# Patient Record
Sex: FEMALE | Race: BLACK OR AFRICAN AMERICAN | NOT HISPANIC OR LATINO | Employment: FULL TIME | URBAN - METROPOLITAN AREA
[De-identification: names, ages, dates, MRNs, and addresses within clinical notes are randomized per-mention and may not be internally consistent; named-entity substitution may affect disease eponyms.]

---

## 2022-04-04 ENCOUNTER — APPOINTMENT (OUTPATIENT)
Dept: RADIOLOGY | Facility: MEDICAL CENTER | Age: 22
End: 2022-04-04

## 2022-04-04 ENCOUNTER — OCCMED (OUTPATIENT)
Dept: URGENT CARE | Facility: MEDICAL CENTER | Age: 22
End: 2022-04-04
Payer: OTHER MISCELLANEOUS

## 2022-04-04 DIAGNOSIS — S43.52XA ACROMIOCLAVICULAR SPRAIN, LEFT, INITIAL ENCOUNTER: Primary | ICD-10-CM

## 2022-04-04 DIAGNOSIS — S43.52XA ACROMIOCLAVICULAR SPRAIN, LEFT, INITIAL ENCOUNTER: ICD-10-CM

## 2022-04-04 PROCEDURE — G0382 LEV 3 HOSP TYPE B ED VISIT: HCPCS | Performed by: PHYSICIAN ASSISTANT

## 2022-04-04 PROCEDURE — 73030 X-RAY EXAM OF SHOULDER: CPT

## 2022-04-04 PROCEDURE — 99283 EMERGENCY DEPT VISIT LOW MDM: CPT | Performed by: PHYSICIAN ASSISTANT

## 2022-04-10 ENCOUNTER — APPOINTMENT (OUTPATIENT)
Dept: URGENT CARE | Facility: MEDICAL CENTER | Age: 22
End: 2022-04-10
Payer: OTHER MISCELLANEOUS

## 2022-04-10 PROCEDURE — 99213 OFFICE O/P EST LOW 20 MIN: CPT | Performed by: PHYSICIAN ASSISTANT

## 2022-05-18 ENCOUNTER — APPOINTMENT (EMERGENCY)
Dept: RADIOLOGY | Facility: HOSPITAL | Age: 22
End: 2022-05-18

## 2022-05-18 ENCOUNTER — HOSPITAL ENCOUNTER (EMERGENCY)
Facility: HOSPITAL | Age: 22
Discharge: HOME | End: 2022-05-19
Attending: EMERGENCY MEDICINE | Admitting: EMERGENCY MEDICINE
Payer: COMMERCIAL

## 2022-05-18 DIAGNOSIS — M79.674 GREAT TOE PAIN, RIGHT: Primary | ICD-10-CM

## 2022-05-18 PROCEDURE — 73660 X-RAY EXAM OF TOE(S): CPT

## 2022-05-18 PROCEDURE — 99284 EMERGENCY DEPT VISIT MOD MDM: CPT | Performed by: EMERGENCY MEDICINE

## 2022-05-18 PROCEDURE — 99283 EMERGENCY DEPT VISIT LOW MDM: CPT

## 2022-05-19 VITALS
HEART RATE: 78 BPM | TEMPERATURE: 99.1 F | OXYGEN SATURATION: 98 % | RESPIRATION RATE: 18 BRPM | SYSTOLIC BLOOD PRESSURE: 110 MMHG | WEIGHT: 240.3 LBS | DIASTOLIC BLOOD PRESSURE: 64 MMHG

## 2022-05-19 RX ADMIN — Medication 2 G: at 00:45

## 2022-05-19 NOTE — ED PROVIDER NOTES
Pt Name: Hattie Cummings  MRN: [id number]  [place] 2000  Age/Sex: 25 y o  female  Date of evaluation: 5/18/2022  PCP: No primary care provider on file  CHIEF COMPLAINT    Chief Complaint   Patient presents with    Toe Pain     R great toe pain, unable to move it, ongoing for a few days  HPI    25 y o  female presenting with several to right great toe pain  Patient states that she underwent tactical training a few weeks ago and thinks that she may have injured her toe  Since then, she has had pain to the toe, worse with moving the toe, particularly with dorsiflexion  Over the past few days, she has noted worsening pain, particular walking  The pain is now dull, moderate to severe in intensity, worst over the 1st MTP, radiating throughout the foot, better at rest   She denies rash, fever, any other pain or injuries  HPI      Past Medical and Surgical History    History reviewed  No pertinent past medical history  Past Surgical History:   Procedure Laterality Date    NOSE SURGERY         History reviewed  No pertinent family history  Social History     Tobacco Use    Smoking status: Never Smoker    Smokeless tobacco: Never Used   Vaping Use    Vaping Use: Never used   Substance Use Topics    Alcohol use: Yes     Comment: 1 daily    Drug use: Never           Allergies    Allergies   Allergen Reactions    Latex Rash       Home Medications    Prior to Admission medications    Not on File           Review of Systems    Review of Systems   Constitutional: Negative for activity change, chills and fever  HENT: Negative for drooling and facial swelling  Eyes: Negative for pain, discharge and visual disturbance  Respiratory: Negative for apnea, cough, chest tightness, shortness of breath and wheezing  Cardiovascular: Negative for chest pain and leg swelling  Gastrointestinal: Negative for abdominal pain, constipation, diarrhea, nausea and vomiting     Genitourinary: Negative for difficulty urinating, dysuria and urgency  Musculoskeletal: Positive for arthralgias and gait problem  Negative for back pain  Skin: Negative for color change and rash  Neurological: Negative for dizziness, speech difficulty, weakness and headaches  Psychiatric/Behavioral: Negative for agitation, behavioral problems and confusion  All other systems reviewed and negative  Physical Exam      ED Triage Vitals [05/18/22 2302]   Temperature Pulse Respirations Blood Pressure SpO2   99 1 °F (37 3 °C) (!) 108 18 162/93 100 %      Temp Source Heart Rate Source Patient Position - Orthostatic VS BP Location FiO2 (%)   Tympanic Monitor Sitting Left arm --      Pain Score       --               Physical Exam  Vitals and nursing note reviewed  Constitutional:       General: She is not in acute distress  Appearance: She is well-developed  She is not ill-appearing, toxic-appearing or diaphoretic  HENT:      Head: Normocephalic and atraumatic  Right Ear: External ear normal       Left Ear: External ear normal    Eyes:      Conjunctiva/sclera: Conjunctivae normal       Pupils: Pupils are equal, round, and reactive to light  Cardiovascular:      Rate and Rhythm: Normal rate and regular rhythm  Heart sounds: Normal heart sounds  Pulmonary:      Effort: Pulmonary effort is normal  No respiratory distress  Breath sounds: No stridor  Abdominal:      General: There is no distension  Musculoskeletal:         General: Tenderness present  No deformity  Normal range of motion  Cervical back: Normal range of motion and neck supple  Comments: Patient tender to palpation the medial aspect of the 1st MTP of the right foot, small bunion noted there  No skin changes, no swelling, no pain out of proportion  Strength sensation pulse and cap refill intact  Pain reproduced maximally with dorsiflexion of great toe  Skin:     General: Skin is warm and dry        Findings: No erythema or rash    Neurological:      Mental Status: She is alert and oriented to person, place, and time  Psychiatric:         Behavior: Behavior normal          Thought Content: Thought content normal          Judgment: Judgment normal               Diagnostic Results      Labs:    Results Reviewed     None          All labs reviewed and utilized in the medical decision making process    Radiology:    XR toe great min 2 views RIGHT   ED Interpretation   No acute fracture or dislocation  All radiology studies independently viewed by me and interpreted by the radiologist     Procedure    Procedures        ED Course of Care and Re-Assessments      Given Voltaren gel with improvement of pain  Medications - No data to display        FINAL IMPRESSION    Final diagnoses:   Great toe pain, right         DISPOSITION/PLAN    Presentation as above with right great toe pain  Vital signs reassuring, examination likewise reassuring  Very low suspicion for unstable fracture dislocation, septic arthritis, necrotizing soft tissue infection, critical neurovascular disruption, and had a threat to life or limb  Suspect injury of flexor hallucis longus tendon verses possible chronic pressure injury from ill-fitting boots coupled with bunion  Counseled regarding lifestyle modifications and mitigation  Patient counseled regarding differential diagnosis, referred to Podiatry, discharged strict return precautions  Time reflects when diagnosis was documented in both MDM as applicable and the Disposition within this note     Time User Action Codes Description Comment    5/19/2022 12:12 AM Myrna Jaquez Add [W95 100] Great toe pain, right       ED Disposition     ED Disposition   Discharge    Condition   Stable    Date/Time   u May 19, 2022 12:12 AM    Comment   Vivi Drake discharge to home/self care                 Follow-up Information     Follow up With Specialties Details Why Contact Info Additional Information [address] [facility] Emergency Medicine Go to  If symptoms worsen [address] [facility] [address]  [address] [facility], [address], [place],   [address] Call in 1 day To schedule close outpatient follow-up for your foot pain  [address]  [facility] [address]  [phone]               PATIENT REFERRED TO:    [address] [facility]  [address] [facility] [address] [phone]  Go to   If symptoms worsen    [facility]  [address]  [facility] [address]  [phone]    Call in 1 day  To schedule close outpatient follow-up for your foot pain      DISCHARGE MEDICATIONS:    There are no discharge medications for this patient  No discharge procedures on file  Sanjuana Junior MD    Portions of the record may have been created with voice recognition software  Occasional wrong word or "sound alike" substitutions may have occurred due to the inherent limitations of voice recognition software    Please read the chart carefully and recognize, using context, where substitutions have occurred     Sanjuana Junior MD  05/19/22 3658

## 2022-06-13 ENCOUNTER — HOSPITAL ENCOUNTER (EMERGENCY)
Facility: HOSPITAL | Age: 22
Discharge: HOME | End: 2022-06-13
Attending: EMERGENCY MEDICINE
Payer: COMMERCIAL

## 2022-06-13 VITALS
OXYGEN SATURATION: 96 % | WEIGHT: 220 LBS | RESPIRATION RATE: 18 BRPM | TEMPERATURE: 99.5 F | BODY MASS INDEX: 35.36 KG/M2 | HEIGHT: 66 IN | SYSTOLIC BLOOD PRESSURE: 128 MMHG | DIASTOLIC BLOOD PRESSURE: 58 MMHG | HEART RATE: 89 BPM

## 2022-06-13 DIAGNOSIS — R11.0 NAUSEA: Primary | ICD-10-CM

## 2022-06-13 LAB — B-HCG SERPL-ACNC: <2 MIU/ML

## 2022-06-13 PROCEDURE — 99282 EMERGENCY DEPT VISIT SF MDM: CPT | Performed by: EMERGENCY MEDICINE

## 2022-06-13 PROCEDURE — 36415 COLL VENOUS BLD VENIPUNCTURE: CPT | Performed by: EMERGENCY MEDICINE

## 2022-06-13 PROCEDURE — 99283 EMERGENCY DEPT VISIT LOW MDM: CPT

## 2022-06-13 PROCEDURE — 84702 CHORIONIC GONADOTROPIN TEST: CPT | Performed by: EMERGENCY MEDICINE

## 2022-06-13 NOTE — ED PROVIDER NOTES
History  Chief Complaint   Patient presents with    Medical Problem     Pt requesting blood pregnancy test s/p 2 negatives and 2 positives at home     This young lady presents to emergency department for pregnancy test due to the fact that patient has some questionable positive test at home she is currently late for her menses and she has been intermittently nauseous  She went to Urgent Care they gave her some Zofran to treat the nausea  She is  0 para 0  She has had some slight spotting the last few days  She has no underlying past medical history, she is a nonsmoker she drinks alcohol only occasionally  Surgical history only pertinent for nasal surgery  Currently no pain  History provided by:  Patient   used: No    Medical Problem  Associated symptoms: nausea    Associated symptoms: no abdominal pain, no chest pain, no cough, no ear pain, no fever, no rash, no shortness of breath, no sore throat and no vomiting        Cannot display prior to admission medications because the patient has not been admitted in this contact  History reviewed  No pertinent past medical history  Past Surgical History:   Procedure Laterality Date    NOSE SURGERY         History reviewed  No pertinent family history  I have reviewed and agree with the history as documented  E-Cigarette/Vaping    E-Cigarette Use Never User      E-Cigarette/Vaping Substances     Social History     Tobacco Use    Smoking status: Never Smoker    Smokeless tobacco: Never Used   Vaping Use    Vaping Use: Never used   Substance Use Topics    Alcohol use: Yes     Comment: 1 daily    Drug use: Never       Review of Systems   Constitutional: Negative for chills and fever  HENT: Negative for ear pain and sore throat  Eyes: Negative for pain and visual disturbance  Respiratory: Negative for cough and shortness of breath  Cardiovascular: Negative for chest pain and palpitations  Gastrointestinal: Positive for nausea  Negative for abdominal pain and vomiting  Genitourinary: Negative for dysuria and hematuria  Musculoskeletal: Negative for arthralgias and back pain  Skin: Negative for color change and rash  Neurological: Negative for seizures and syncope  All other systems reviewed and are negative  Physical Exam  Physical Exam  Vitals and nursing note reviewed  Constitutional:       General: She is not in acute distress  Appearance: Normal appearance  She is well-developed  HENT:      Head: Normocephalic and atraumatic  Mouth/Throat:      Mouth: Mucous membranes are moist    Eyes:      Conjunctiva/sclera: Conjunctivae normal       Pupils: Pupils are equal, round, and reactive to light  Cardiovascular:      Rate and Rhythm: Normal rate and regular rhythm  Heart sounds: No murmur heard  Pulmonary:      Effort: Pulmonary effort is normal  No respiratory distress  Breath sounds: Normal breath sounds  Abdominal:      Palpations: Abdomen is soft  Tenderness: There is no abdominal tenderness  Musculoskeletal:      Cervical back: Neck supple  Skin:     General: Skin is warm and dry  Capillary Refill: Capillary refill takes less than 2 seconds  Neurological:      General: No focal deficit present  Mental Status: She is alert and oriented to person, place, and time     Psychiatric:         Mood and Affect: Mood normal          Behavior: Behavior normal          Vital Signs  ED Triage Vitals [06/13/22 1811]   Temperature Pulse Respirations Blood Pressure SpO2   99 5 °F (37 5 °C) 89 18 128/58 96 %      Temp Source Heart Rate Source Patient Position - Orthostatic VS BP Location FiO2 (%)   Oral Monitor Sitting Left arm --      Pain Score       --           Vitals:    06/13/22 1811   BP: 128/58   Pulse: 89   Patient Position - Orthostatic VS: Sitting         Visual Acuity      ED Medications  Medications - No data to display    Diagnostic Studies  Results Reviewed     Procedure Component Value Units Date/Time    hCG, quantitative [[id number]]  (Normal) Collected: 06/13/22 1819    Lab Status: Final result Specimen: Blood from Arm, Left Updated: 06/13/22 1847     HCG, Quant <2 mIU/mL     Narrative:       Expected Ranges:     Approximate               Approximate HCG  Gestation age          Concentration ( mIU/mL)  _____________          ______________________   [facility]                      HCG values  0 2-1                       5-50  1-2                           2-3                         100-5000  3-4                         500-39478  4-5                         1000-87253  5-6                         [id number]  6-8                         [id number]  8-12                        [id number]                   No orders to display              Procedures  Procedures         ED Course                                             MDM  Number of Diagnoses or Management Options  Diagnosis management comments: Neg quant hcg      Disposition  Final diagnoses:   Nausea     Time reflects when diagnosis was documented in both MDM as applicable and the Disposition within this note     Time User Action Codes Description Comment    6/13/2022  6:58 PM Carmen Robles Add [R11 0] Nausea       ED Disposition     ED Disposition   Discharge    Condition   Stable    Date/Time   Mon Jun 13, 2022  6:58 PM    Comment   Glenn Mendoza discharge to home/self care  Follow-up Information    None         Patient's Medications    No medications on file       No discharge procedures on file      PDMP Review     None          ED Provider  Electronically Signed by           Amanda Claude, MD  06/13/22 6660

## 2022-06-17 ENCOUNTER — OFFICE VISIT (OUTPATIENT)
Dept: OBGYN CLINIC | Facility: CLINIC | Age: 22
End: 2022-06-17

## 2022-06-17 VITALS
BODY MASS INDEX: 39.54 KG/M2 | SYSTOLIC BLOOD PRESSURE: 114 MMHG | WEIGHT: 245 LBS | HEART RATE: 86 BPM | DIASTOLIC BLOOD PRESSURE: 71 MMHG

## 2022-06-17 DIAGNOSIS — N92.6 IRREGULAR MENSES: Primary | ICD-10-CM

## 2022-06-17 DIAGNOSIS — N89.8 VAGINA ITCHING: ICD-10-CM

## 2022-06-17 DIAGNOSIS — R32 URINARY INCONTINENCE, UNSPECIFIED TYPE: ICD-10-CM

## 2022-06-17 DIAGNOSIS — B37.3 VAGINAL CANDIDIASIS: ICD-10-CM

## 2022-06-17 DIAGNOSIS — R10.2 PELVIC PAIN: ICD-10-CM

## 2022-06-17 LAB
BV WHIFF TEST VAG QL: ABNORMAL
CLUE CELLS SPEC QL WET PREP: ABNORMAL
PH SMN: 4.5 [PH]
SL AMB  POCT GLUCOSE, UA: NORMAL
SL AMB LEUKOCYTE ESTERASE,UA: NORMAL
SL AMB POCT BILIRUBIN,UA: NORMAL
SL AMB POCT BLOOD,UA: NORMAL
SL AMB POCT CLARITY,UA: CLEAR
SL AMB POCT COLOR,UA: YELLOW
SL AMB POCT KETONES,UA: NORMAL
SL AMB POCT NITRITE,UA: NORMAL
SL AMB POCT PH,UA: 5
SL AMB POCT SPECIFIC GRAVITY,UA: 1.03
SL AMB POCT URINE HCG: NEGATIVE
SL AMB POCT URINE PROTEIN: NORMAL
SL AMB POCT UROBILINOGEN: 0.2
SL AMB POCT WET MOUNT: ABNORMAL
T VAGINALIS VAG QL WET PREP: ABNORMAL
YEAST VAG QL WET PREP: ABNORMAL

## 2022-06-17 PROCEDURE — 99203 OFFICE O/P NEW LOW 30 MIN: CPT | Performed by: NURSE PRACTITIONER

## 2022-06-17 PROCEDURE — 87210 SMEAR WET MOUNT SALINE/INK: CPT | Performed by: NURSE PRACTITIONER

## 2022-06-17 PROCEDURE — 81002 URINALYSIS NONAUTO W/O SCOPE: CPT | Performed by: NURSE PRACTITIONER

## 2022-06-17 PROCEDURE — 81025 URINE PREGNANCY TEST: CPT | Performed by: NURSE PRACTITIONER

## 2022-06-17 RX ORDER — FLUCONAZOLE 150 MG/1
150 TABLET ORAL ONCE
Qty: 1 TABLET | Refills: 0 | Status: SHIPPED | OUTPATIENT
Start: 2022-06-17 | End: 2022-06-17

## 2022-06-17 NOTE — PATIENT INSTRUCTIONS
Thank you for your confidence in our team    We appreciate you and welcome your feedback  If you receive a survey from us, please take a few moments to let us know how we are doing     Sincerely,  VALENTIN Reece

## 2022-06-17 NOTE — PROGRESS NOTES
PROBLEM GYNECOLOGICAL VISIT    Kelley Clemente is a 25 y o  female who presents today with complaint of irregular menses  Her general medical history has been reviewed and she reports it as follows:    Past Medical History:   Diagnosis Date    Migraine without aura     OTC med management    PCOS (polycystic ovarian syndrome)      Past Surgical History:   Procedure Laterality Date    TURBINATE RESECTION       OB History        0    Para   0    Term   0       0    AB   0    Living   0       SAB   0    IAB   0    Ectopic   0    Multiple   0    Live Births   0               Social History     Tobacco Use    Smoking status: Never Smoker    Smokeless tobacco: Never Used   Vaping Use    Vaping Use: Some days   Substance Use Topics    Alcohol use: Yes     Alcohol/week: 7 0 standard drinks     Types: 7 Glasses of wine per week     Comment: 1 daily    Drug use: Never     Social History     Substance and Sexual Activity   Sexual Activity Yes    Partners: Male    Birth control/protection: None       No current outpatient medications    History of Present Illness:   Reports long-standing history of irregular menses with subsequent diagnosis of PCOS  She reports that she checked home pregnancy test x2 on 22 and both were positive  She presented to Urgent Care and ER on 22 with c/o nausea and serum HCG was negative  She reports nausea and abdominal bloating and cramping persist   She reports dyspareunia with L sided pelvic pain for past 5 months  She desires to become pregnant, but is worried that she is not ovulating as her past 2 menses have been different than her typical (with brown blood instead of red)  She additionally reports vaginal itching and discharge and urinary leaking for past 2 weeks  Denies vaginal discharge or odor  Review of Systems:  Review of Systems   Constitutional: Negative      Genitourinary: Positive for dyspareunia, menstrual problem, pelvic pain and vaginal pain  Negative for vaginal discharge  Physical Exam:  /71   Pulse 86   Wt 111 kg (245 lb)   LMP 05/09/2022   BMI 39 54 kg/m²   Physical Exam  Constitutional:       General: She is not in acute distress  Genitourinary:      Vulva exam comments: normal       Vaginal discharge and erythema present  Right Adnexa: not tender  Left Adnexa: tender  Cervical discharge present  No cervical lesion  Uterus is not tender  Abdominal:      Palpations: Abdomen is soft  Tenderness: There is abdominal tenderness  Neurological:      Mental Status: She is alert  Skin:     General: Skin is warm and dry  Vitals reviewed  Point of Care Testing:   -Wet mount: no clue cells, no trichomonads, few WBC's, pH=4 5   -KOH mount: + hyphae   -Whiff: negative   -urine pregnancy test: negative   -urinalysis: neg nitrites, neg leuks, neg blood    Assessment:   1  Vaginal candidiasis  2  Urinary incontinence  3  Pelvic pain  Plan:   1  Given Rx Diflucan  2  Imaging ordered: pelvic ultrasound  3  Return to office 1 week after ultrasound to review results  Will address urinary incontinence and irregular menses/desire for fertility with patient at that time

## 2022-06-24 ENCOUNTER — HOSPITAL ENCOUNTER (OUTPATIENT)
Dept: ULTRASOUND IMAGING | Facility: CLINIC | Age: 22
Discharge: HOME | End: 2022-06-24
Payer: COMMERCIAL

## 2022-06-24 DIAGNOSIS — R10.2 PELVIC PAIN: ICD-10-CM

## 2022-06-24 PROCEDURE — 76830 TRANSVAGINAL US NON-OB: CPT

## 2022-06-24 PROCEDURE — 76856 US EXAM PELVIC COMPLETE: CPT

## 2022-07-07 ENCOUNTER — OFFICE VISIT (OUTPATIENT)
Dept: OBGYN CLINIC | Facility: CLINIC | Age: 22
End: 2022-07-07

## 2022-07-07 VITALS
HEIGHT: 66 IN | DIASTOLIC BLOOD PRESSURE: 75 MMHG | WEIGHT: 247.6 LBS | SYSTOLIC BLOOD PRESSURE: 118 MMHG | BODY MASS INDEX: 39.79 KG/M2 | HEART RATE: 79 BPM

## 2022-07-07 DIAGNOSIS — Z71.2 ENCOUNTER TO DISCUSS TEST RESULTS: Primary | ICD-10-CM

## 2022-07-07 PROCEDURE — 99212 OFFICE O/P EST SF 10 MIN: CPT | Performed by: NURSE PRACTITIONER

## 2022-07-07 NOTE — PROGRESS NOTES
Miya Traore presents today to review results of pelvic ultrasound performed 6/24/22  Explained that ultrasound is completely normal   Patient reports that she did have menses since seeing me last with LMP 6/23/22  She reports that since then she has not been having any further nausea, abdominal bloating or cramping  She also reports that urinary leaking and vaginal discharge/itching has resolved  She is still very concerned about her irregular menses and PCOS as she has been trying to conceive for the past 6 months without success  She is worried that she is not ovulating  I will have patient return to have discussion with senior resident OBGYN next available

## 2022-07-07 NOTE — PATIENT INSTRUCTIONS
Thank you for your confidence in our team    We appreciate you and welcome your feedback  If you receive a survey from us, please take a few moments to let us know how we are doing     Sincerely,  VALENTIN Mills

## 2022-07-19 ENCOUNTER — TELEPHONE (OUTPATIENT)
Dept: OBGYN CLINIC | Facility: CLINIC | Age: 22
End: 2022-07-19

## 2023-01-23 ENCOUNTER — OFFICE VISIT (OUTPATIENT)
Dept: FAMILY MEDICINE CLINIC | Facility: CLINIC | Age: 23
End: 2023-01-23

## 2023-01-23 ENCOUNTER — APPOINTMENT (OUTPATIENT)
Dept: LAB | Facility: HOSPITAL | Age: 23
End: 2023-01-23

## 2023-01-23 VITALS
TEMPERATURE: 98.6 F | HEIGHT: 66 IN | HEART RATE: 98 BPM | DIASTOLIC BLOOD PRESSURE: 84 MMHG | BODY MASS INDEX: 42.91 KG/M2 | WEIGHT: 267 LBS | OXYGEN SATURATION: 95 % | SYSTOLIC BLOOD PRESSURE: 120 MMHG

## 2023-01-23 DIAGNOSIS — R21 RASH: ICD-10-CM

## 2023-01-23 DIAGNOSIS — M79.18 MYALGIA, MULTIPLE SITES: ICD-10-CM

## 2023-01-23 DIAGNOSIS — N92.6 MISSED PERIOD: ICD-10-CM

## 2023-01-23 DIAGNOSIS — F51.01 PRIMARY INSOMNIA: ICD-10-CM

## 2023-01-23 DIAGNOSIS — M54.41 CHRONIC MIDLINE LOW BACK PAIN WITH BILATERAL SCIATICA: ICD-10-CM

## 2023-01-23 DIAGNOSIS — E55.9 VITAMIN D DEFICIENCY: ICD-10-CM

## 2023-01-23 DIAGNOSIS — G89.29 CHRONIC MIDLINE LOW BACK PAIN WITH BILATERAL SCIATICA: ICD-10-CM

## 2023-01-23 DIAGNOSIS — M25.559 HIP PAIN: ICD-10-CM

## 2023-01-23 DIAGNOSIS — E66.01 CLASS 3 SEVERE OBESITY WITHOUT SERIOUS COMORBIDITY WITH BODY MASS INDEX (BMI) OF 40.0 TO 44.9 IN ADULT, UNSPECIFIED OBESITY TYPE (HCC): ICD-10-CM

## 2023-01-23 DIAGNOSIS — T73.3XXD FATIGUE DUE TO EXCESSIVE EXERTION, SUBSEQUENT ENCOUNTER: ICD-10-CM

## 2023-01-23 DIAGNOSIS — E28.2 PCOS (POLYCYSTIC OVARIAN SYNDROME): ICD-10-CM

## 2023-01-23 DIAGNOSIS — M54.42 CHRONIC MIDLINE LOW BACK PAIN WITH BILATERAL SCIATICA: ICD-10-CM

## 2023-01-23 DIAGNOSIS — R63.5 WEIGHT GAIN: ICD-10-CM

## 2023-01-23 DIAGNOSIS — E28.2 PCOS (POLYCYSTIC OVARIAN SYNDROME): Primary | ICD-10-CM

## 2023-01-23 LAB
25(OH)D3 SERPL-MCNC: 15.6 NG/ML (ref 30–100)
ALBUMIN SERPL BCP-MCNC: 4 G/DL (ref 3.5–5)
ALP SERPL-CCNC: 70 U/L (ref 46–116)
ALT SERPL W P-5'-P-CCNC: 32 U/L (ref 12–78)
ANION GAP SERPL CALCULATED.3IONS-SCNC: 9 MMOL/L (ref 4–13)
AST SERPL W P-5'-P-CCNC: 18 U/L (ref 5–45)
BASOPHILS # BLD AUTO: 0.02 THOUSANDS/ÂΜL (ref 0–0.1)
BASOPHILS NFR BLD AUTO: 0 % (ref 0–1)
BILIRUB SERPL-MCNC: 0.36 MG/DL (ref 0.2–1)
BILIRUB UR QL STRIP: NEGATIVE
BUN SERPL-MCNC: 10 MG/DL (ref 5–25)
CALCIUM SERPL-MCNC: 9.1 MG/DL (ref 8.3–10.1)
CHLORIDE SERPL-SCNC: 103 MMOL/L (ref 96–108)
CHOLEST SERPL-MCNC: 167 MG/DL
CLARITY UR: CLEAR
CO2 SERPL-SCNC: 28 MMOL/L (ref 21–32)
COLOR UR: NORMAL
CREAT SERPL-MCNC: 0.73 MG/DL (ref 0.6–1.3)
EOSINOPHIL # BLD AUTO: 0.11 THOUSAND/ÂΜL (ref 0–0.61)
EOSINOPHIL NFR BLD AUTO: 2 % (ref 0–6)
ERYTHROCYTE [DISTWIDTH] IN BLOOD BY AUTOMATED COUNT: 12.4 % (ref 11.6–15.1)
EST. AVERAGE GLUCOSE BLD GHB EST-MCNC: 88 MG/DL
FERRITIN SERPL-MCNC: 58 NG/ML (ref 8–388)
FSH SERPL-ACNC: 6 MIU/ML
GFR SERPL CREATININE-BSD FRML MDRD: 118 ML/MIN/1.73SQ M
GLUCOSE P FAST SERPL-MCNC: 87 MG/DL (ref 65–99)
GLUCOSE UR STRIP-MCNC: NEGATIVE MG/DL
HBA1C MFR BLD: 4.7 %
HCT VFR BLD AUTO: 40.4 % (ref 34.8–46.1)
HDLC SERPL-MCNC: 54 MG/DL
HGB BLD-MCNC: 12.6 G/DL (ref 11.5–15.4)
HGB UR QL STRIP.AUTO: NEGATIVE
IMM GRANULOCYTES # BLD AUTO: 0.02 THOUSAND/UL (ref 0–0.2)
IMM GRANULOCYTES NFR BLD AUTO: 0 % (ref 0–2)
INSULIN SERPL-ACNC: 20.4 MU/L (ref 3–25)
IRON SATN MFR SERPL: 25 % (ref 15–50)
IRON SERPL-MCNC: 85 UG/DL (ref 50–170)
KETONES UR STRIP-MCNC: NEGATIVE MG/DL
LDLC SERPL CALC-MCNC: 93 MG/DL (ref 0–100)
LEUKOCYTE ESTERASE UR QL STRIP: NEGATIVE
LH SERPL-ACNC: 9.4 MIU/ML
LYMPHOCYTES # BLD AUTO: 2.07 THOUSANDS/ÂΜL (ref 0.6–4.47)
LYMPHOCYTES NFR BLD AUTO: 39 % (ref 14–44)
MCH RBC QN AUTO: 27.5 PG (ref 26.8–34.3)
MCHC RBC AUTO-ENTMCNC: 31.2 G/DL (ref 31.4–37.4)
MCV RBC AUTO: 88 FL (ref 82–98)
MONOCYTES # BLD AUTO: 0.39 THOUSAND/ÂΜL (ref 0.17–1.22)
MONOCYTES NFR BLD AUTO: 7 % (ref 4–12)
NEUTROPHILS # BLD AUTO: 2.68 THOUSANDS/ÂΜL (ref 1.85–7.62)
NEUTS SEG NFR BLD AUTO: 52 % (ref 43–75)
NITRITE UR QL STRIP: NEGATIVE
NONHDLC SERPL-MCNC: 113 MG/DL
NRBC BLD AUTO-RTO: 0 /100 WBCS
PH UR STRIP.AUTO: 7 [PH]
PLATELET # BLD AUTO: 287 THOUSANDS/UL (ref 149–390)
PMV BLD AUTO: 9.3 FL (ref 8.9–12.7)
POTASSIUM SERPL-SCNC: 4 MMOL/L (ref 3.5–5.3)
PROLACTIN SERPL-MCNC: 7.9 NG/ML
PROT SERPL-MCNC: 7.7 G/DL (ref 6.4–8.4)
PROT UR STRIP-MCNC: NEGATIVE MG/DL
RBC # BLD AUTO: 4.58 MILLION/UL (ref 3.81–5.12)
SL AMB  POCT GLUCOSE, UA: NORMAL
SL AMB LEUKOCYTE ESTERASE,UA: NORMAL
SL AMB POCT BILIRUBIN,UA: NORMAL
SL AMB POCT BLOOD,UA: NEGATIVE
SL AMB POCT CLARITY,UA: CLEAR
SL AMB POCT COLOR,UA: YELLOW
SL AMB POCT KETONES,UA: NORMAL
SL AMB POCT NITRITE,UA: NORMAL
SL AMB POCT PH,UA: 7.5
SL AMB POCT SPECIFIC GRAVITY,UA: 1.01
SL AMB POCT URINE HCG: NEGATIVE
SL AMB POCT URINE PROTEIN: 15
SL AMB POCT UROBILINOGEN: 0.2
SODIUM SERPL-SCNC: 140 MMOL/L (ref 135–147)
SP GR UR STRIP.AUTO: 1.02 (ref 1–1.03)
TESTOST SERPL-MCNC: 49 NG/DL
TIBC SERPL-MCNC: 346 UG/DL (ref 250–450)
TRIGL SERPL-MCNC: 98 MG/DL
TSH SERPL DL<=0.05 MIU/L-ACNC: 2.15 UIU/ML (ref 0.45–4.5)
UROBILINOGEN UR STRIP-ACNC: <2 MG/DL
WBC # BLD AUTO: 5.29 THOUSAND/UL (ref 4.31–10.16)

## 2023-01-23 RX ORDER — MELATONIN
1000 DAILY
COMMUNITY
End: 2023-01-24

## 2023-01-23 RX ORDER — FERROUS SULFATE 325(65) MG
325 TABLET ORAL
COMMUNITY

## 2023-01-23 NOTE — PROGRESS NOTES
BMI Counseling: Body mass index is 43 09 kg/m²  The BMI is above normal  Nutrition recommendations include decreasing portion sizes, encouraging healthy choices of fruits and vegetables, decreasing fast food intake, consuming healthier snacks, limiting drinks that contain sugar, moderation in carbohydrate intake, increasing intake of lean protein, reducing intake of saturated and trans fat and reducing intake of cholesterol  Exercise recommendations include vigorous physical activity 75 minutes/week, exercising 3-5 times per week and strength training exercises  Patient referred to PCP  Rationale for BMI follow-up plan is due to patient being overweight or obese  Depression Screening and Follow-up Plan: Patient was screened for depression during today's encounter  They screened negative with a PHQ-2 score of 0  Assessment/Plan:    Patient is a 81-year-old female presents in office for establishing care  Underlying medical history of obesity current BMI is 43 09  PCOS used to be on birth control pill in the past she has stopped taking them and has gained some weight since all prior to that  She has missed her period now for 2 months in a row was checked for pregnancy in the office and it was negative we will do some hormonal work-up  Excessive fatigue and problems sleeping she has multiple joint issues primarily bilateral hips and lower back  She has had recurrent rashes on her face currently none and vitamin D deficiency in the past   Also history of iron anemia and vitamin D deficiency discussed that currently she is taking her supplements just for the last couple weeks  She is recently  she is a  and [facility] she is interested on getting some work-up done and possibly discussing some weight loss modalities     At this point she is to avoid carbs hydrate well increase protein intake increase activity  Encouraged that she has a daily exercise routine at least 20 minutes a day  Discussed adding my fitness pal on her phone and working on keeping track of caloric intake and fats and sodium  Once I get the results of her blood work I will call her and she is to see me back in 2 to 4 weeks to get evaluated and discuss further weight management modalities considering all labs are normal and there is no underlying clinical issues  We have discussed PCOS treatments in the past she has not tried metformin states her mom is a nurse and does not agree with the metformin so she has not been taking it  She has had issues in the armpits and skin boils and infections currently she does not have any but that is has been an issue in the past    Noted for some mild thickening of her skin around her neck and the darkening of the complexion  We will check for cholesterol panel and hyperlipidemia  Diabetes  I have ordered an EKG and blood work to be done and a sleep study       Questions answered and I will see her back in 4 weeks     Problem List Items Addressed This Visit    None  Visit Diagnoses     PCOS (polycystic ovarian syndrome)    -  Primary    Relevant Orders    Testosterone    FSH and LH    Prolactin    Comprehensive metabolic panel (Completed)    TSH, 3rd generation with Free T4 reflex (Completed)    UA (URINE) with reflex to Scope (Completed)    CBC and differential (Completed)    Iron Panel (Includes Ferritin, Iron Sat%, Iron, and TIBC)    Insulin, fasting    HEMOGLOBIN A1C W/ EAG ESTIMATION    Lipid panel (Completed)    ECG 12 lead    Home Study    Missed period        Relevant Orders    POCT urine HCG (Completed)    ECG 12 lead    Primary insomnia        Relevant Orders    Testosterone    FSH and LH    Prolactin    Comprehensive metabolic panel (Completed)    TSH, 3rd generation with Free T4 reflex (Completed)    UA (URINE) with reflex to Scope (Completed)    CBC and differential (Completed)    ECG 12 lead    Home Study    Weight gain        Fatigue due to excessive exertion, subsequent encounter        Relevant Orders    ECG 12 lead    Home Study    Hip pain        b/l lower back     Chronic midline low back pain with bilateral sciatica        Relevant Orders    POCT urine dip (Completed)    Myalgia, multiple sites        Relevant Orders    Testosterone    FSH and LH    Prolactin    Comprehensive metabolic panel (Completed)    TSH, 3rd generation with Free T4 reflex (Completed)    UA (URINE) with reflex to Scope (Completed)    CBC and differential (Completed)    ELINOR SCR, IFA W/REFL TITER/PATTERN/LUPUS PNL 4    RF Screen w/ Reflex to Titer    Lyme Antibody Profile with reflex to WB    Rash        Relevant Orders    ELINOR SCR, IFA W/REFL TITER/PATTERN/LUPUS PNL 4    RF Screen w/ Reflex to Titer    Lyme Antibody Profile with reflex to WB    Vitamin D deficiency        Relevant Orders    Vitamin D 25 hydroxy    Class 3 severe obesity without serious comorbidity with body mass index (BMI) of 40 0 to 44 9 in adult, unspecified obesity type (HCC)                Subjective:      Patient ID: Joseph Prado is a 25 y o  female  Patient is a 71-year-old female presents in office for establishing care  Underlying medical history of obesity current BMI is 43 09  PCOS used to be on birth control pill in the past she has stopped taking them and has gained some weight since all prior to that  She has missed her period now for 2 months in a row was checked for pregnancy in the office and it was negative we will do some hormonal work-up  Excessive fatigue and problems sleeping she has multiple joint issues primarily bilateral hips and lower back  She has had recurrent rashes on her face currently none and vitamin D deficiency in the past   Also history of iron anemia and vitamin D deficiency discussed that currently she is taking her supplements just for the last couple weeks     She is recently  she is a  and [employer] [facility] she is interested on getting some work-up done and possibly discussing some weight loss modalities  At this point she is to avoid carbs hydrate well increase protein intake increase activity  Encouraged that she has a daily exercise routine at least 20 minutes a day  Discussed adding my fitness pal on her phone and working on keeping track of caloric intake and fats and sodium  Once I get the results of her blood work I will call her and she is to see me back in 2 to 4 weeks to get evaluated and discuss further weight management modalities considering all labs are normal and there is no underlying clinical issues  We have discussed PCOS treatments in the past she has not tried metformin states her mom is a nurse and does not agree with the metformin so she has not been taking it  She has had issues in the armpits and skin boils and infections currently she does not have any but that is has been an issue in the past    Noted for some mild thickening of her skin around her neck and the darkening of the complexion  We will check for cholesterol panel and hyperlipidemia  Diabetes  I have ordered an EKG and blood work to be done and a sleep study  Questions answered and I will see her back in 4 weeks        The following portions of the patient's history were reviewed and updated as appropriate:   Past Medical History:  She has a past medical history of Migraine without aura and PCOS (polycystic ovarian syndrome)  ,  _______________________________________________________________________  Medical Problems:  does not have a problem list on file ,  _______________________________________________________________________  Past Surgical History:   has a past surgical history that includes Turbinate resection  ,  _______________________________________________________________________  Family History:  family history includes Hypertension in her father ,  _______________________________________________________________________  Social History:   reports that she has never smoked  She has never used smokeless tobacco  She reports current alcohol use of about 7 0 standard drinks per week  She reports current drug use  Drug: Marijuana  ,  _______________________________________________________________________  Allergies:  is allergic to latex     _______________________________________________________________________  Current Outpatient Medications   Medication Sig Dispense Refill   • cholecalciferol (VITAMIN D3) 1,000 units tablet Take 1,000 Units by mouth daily Unsure of exact dose     • ferrous sulfate 325 (65 Fe) mg tablet Take 325 mg by mouth daily with breakfast     • Lactobacillus (PROBIOTIC ACIDOPHILUS PO) Take by mouth       No current facility-administered medications for this visit      _______________________________________________________________________  Review of Systems   Constitutional: Positive for fatigue and unexpected weight change (weight gain )  Negative for fever  HENT: Negative for congestion, nosebleeds, sore throat and voice change  Eyes: Negative  Respiratory: Negative for cough and shortness of breath  Cardiovascular: Negative for chest pain, palpitations and leg swelling  Gastrointestinal: Negative for abdominal distention, abdominal pain, nausea and vomiting  Missed periods    Endocrine:        PCOS and weight gain    Genitourinary: Negative for difficulty urinating  Musculoskeletal: Positive for arthralgias and myalgias  Hip pains and low back pain    Skin: Positive for rash (recurrent rashes and skin infections under axilla )  Allergic/Immunologic: Negative for environmental allergies  Neurological: Negative for headaches  Hematological: Negative for adenopathy  Psychiatric/Behavioral: Negative for sleep disturbance and suicidal ideas  The patient is not nervous/anxious            Objective:  Vitals:    01/23/23 1345   BP: 120/84   BP Location: Left arm   Patient Position: Sitting Cuff Size: Large   Pulse: 98   Temp: 98 6 °F (37 °C)   SpO2: 95%   Weight: 121 kg (267 lb)   Height: 5' 6" (1 676 m)     Body mass index is 43 09 kg/m²  Physical Exam  Vitals and nursing note reviewed  Constitutional:       Appearance: She is obese  Comments: BMI 43 09   HENT:      Head: Atraumatic  Nose: No congestion or rhinorrhea  Mouth/Throat:      Pharynx: No posterior oropharyngeal erythema  Eyes:      Extraocular Movements: Extraocular movements intact  Cardiovascular:      Rate and Rhythm: Normal rate and regular rhythm  Pulses: Normal pulses  Heart sounds: Normal heart sounds  Pulmonary:      Effort: Pulmonary effort is normal       Breath sounds: Normal breath sounds  Abdominal:      Palpations: Abdomen is soft  Musculoskeletal:         General: Normal range of motion  Cervical back: Normal range of motion  Skin:     General: Skin is dry  Neurological:      General: No focal deficit present  Mental Status: She is alert and oriented to person, place, and time  Psychiatric:         Mood and Affect: Mood normal          Behavior: Behavior normal          BMI Counseling: Body mass index is 43 09 kg/m²  The BMI is above normal  Nutrition recommendations include reducing portion sizes, decreasing overall calorie intake, 3-5 servings of fruits/vegetables daily, reducing fast food intake, consuming healthier snacks, decreasing soda and/or juice intake, moderation in carbohydrate intake, increasing intake of lean protein, reducing intake of saturated fat and trans fat and reducing intake of cholesterol  Exercise recommendations include moderate aerobic physical activity for 150 minutes/week

## 2023-01-24 LAB
B BURGDOR IGG+IGM SER-ACNC: <0.2 AI
RHEUMATOID FACT SER QL LA: NEGATIVE

## 2023-01-24 RX ORDER — IRON HEME POLYPEPTIDE/FOLIC AC 12-1MG
5000 TABLET ORAL DAILY
Qty: 90 CAPSULE | Refills: 1 | Status: SHIPPED | OUTPATIENT
Start: 2023-01-24 | End: 2023-04-24

## 2023-01-24 NOTE — PATIENT INSTRUCTIONS
Obesity   AMBULATORY CARE:   Obesity  means your body mass index (BMI) is greater than 30  Your healthcare provider will use your height and weight to measure your BMI  The risks of obesity include  many health problems, including injuries or physical disability  · Diabetes (high blood sugar level)    · High blood pressure or high cholesterol    · Heart disease    · Stroke    · Gallbladder or liver disease    · Cancer of the colon, breast, prostate, liver, or kidney    · Sleep apnea    · Arthritis or gout    Screening  is done to check for health conditions before you have signs or symptoms  If you are 28to 79years old, your blood sugar level may be checked every 3 years for signs of prediabetes or diabetes  Your healthcare provider will check your blood pressure at each visit  High blood pressure can lead to a stroke or other problems  Your provider may check for signs of heart disease, cancer, or other health problems  Seek care immediately if:   · You have a severe headache, confusion, or difficulty speaking  · You have weakness on one side of your body  · You have chest pain, sweating, or shortness of breath  Call your doctor if:   · You have symptoms of gallbladder or liver disease, such as pain in your upper abdomen  · You have knee or hip pain and discomfort while walking  · You have symptoms of diabetes, such as intense hunger and thirst, and frequent urination  · You have symptoms of sleep apnea, such as snoring or daytime sleepiness  · You have questions or concerns about your condition or care  Treatment for obesity  focuses on helping you lose weight to improve your health  Even a small decrease in BMI can reduce the risk for many health problems  Your healthcare provider will help you set a weight-loss goal   · Lifestyle changes  are the first step in treating obesity  These include making healthy food choices and getting regular physical activity   Your healthcare provider may suggest a weight-loss program that involves coaching, education, and therapy  · Medicine  may help you lose weight when it is used with a healthy foods and physical activity  · Surgery  can help you lose weight if you are very obese and have other health problems  There are several types of weight-loss surgery  Ask your healthcare provider for more information  Tips for safe weight loss:   · Set small, realistic goals  An example of a small goal is to walk for 20 minutes 5 days a week  Anther goal is to lose 5% of your body weight  · Tell friends, family members, and coworkers about your goals  and ask for their support  Ask a friend to lose weight with you, or join a weight-loss support group  · Identify foods or triggers that may cause you to overeat , and find ways to avoid them  Remove tempting high-calorie foods from your home and workplace  Place a bowl of fresh fruit on your kitchen counter  If stress causes you to eat, then find other ways to cope with stress  A counselor or therapist may be able to help you  · Keep a diary to track what you eat and drink  Also write down how many minutes of physical activity you do each day  Weigh yourself once a week and record it in your diary  Eating changes: You will need to eat 500 to 1,000 fewer calories each day than you currently eat to lose 1 to 2 pounds a week  The following changes will help you cut calories:  · Eat smaller portions  Use small plates, no larger than 9 inches in diameter  Fill your plate half full of fruits and vegetables  Measure your food using measuring cups until you know what a serving size looks like  · Eat 3 meals and 1 or 2 snacks each day  Plan your meals in advance  Wilmer Fleischer and eat at home most of the time  Eat slowly  Do not skip meals  Skipping meals can lead to overeating later in the day  This can make it harder for you to lose weight   Talk with a dietitian to help you make a meal plan and schedule that is right for you  · Eat fruits and vegetables at every meal   They are low in calories and high in fiber, which makes you feel full  Do not add butter, margarine, or cream sauce to vegetables  Use herbs to season steamed vegetables  · Eat less fat and fewer fried foods  Eat more baked or grilled chicken and fish  These protein sources are lower in calories and fat than red meat  Limit fast food  Dress your salads with olive oil and vinegar instead of bottled dressing  · Limit the amount of sugar you eat  Do not drink sugary beverages  Limit alcohol  Activity changes:  Physical activity is good for your body in many ways  It helps you burn calories and build strong muscles  It decreases stress and depression, and improves your mood  It can also help you sleep better  Talk to your healthcare provider before you begin an exercise program   · Exercise for at least 30 minutes 5 days a week  Start slowly  Set aside time each day for physical activity that you enjoy and that is convenient for you  It is best to do both weight training and an activity that increases your heart rate, such as walking, bicycling, or swimming  · Find ways to be more active  Do yard work and housecleaning  Walk up the stairs instead of using elevators  Spend your leisure time going to events that require walking, such as outdoor festivals or fairs  This extra physical activity can help you lose weight and keep it off  Follow up with your doctor as directed: You may need to meet with a dietitian  Write down your questions so you remember to ask them during your visits  © Copyright [email or web address] 2022 Information is for End User's use only and may not be sold, redistributed or otherwise used for commercial purposes  All illustrations and images included in CareNotes® are the copyrighted property of [employer]  or Yocasta Ibarra   The above information is an  only   It is not intended as medical advice for individual conditions or treatments  Talk to your doctor, nurse or pharmacist before following any medical regimen to see if it is safe and effective for you  Weight Management   AMBULATORY CARE:   Why it is important to manage your weight:  Being overweight increases your risk of health conditions such as heart disease, high blood pressure, type 2 diabetes, and certain types of cancer  It can also increase your risk for osteoarthritis, sleep apnea, and other respiratory problems  Aim for a slow, steady weight loss  Even a small amount of weight loss can lower your risk of health problems  Risks of being overweight:  Extra weight can cause many health problems, including the following:  · Diabetes (high blood sugar level)    · High blood pressure or high cholesterol    · Heart disease    · Stroke    · Gallbladder or liver disease    · Cancer of the colon, breast, prostate, liver, or kidney    · Sleep apnea    · Arthritis or gout    Screening  is done to check for health conditions before you have signs or symptoms  If you are 28to 79years old, your blood sugar level may be checked every 3 years for signs of prediabetes or diabetes  Your healthcare provider will check your blood pressure at each visit  High blood pressure can lead to a stroke or other problems  Your provider may check for signs of heart disease, cancer, or other health problems  How to lose weight safely:  A safe and healthy way to lose weight is to eat fewer calories and get regular exercise  · You can lose up about 1 pound a week by decreasing the number of calories you eat by 500 calories each day  You can decrease calories by eating smaller portion sizes or by cutting out high-calorie foods  Read labels to find out how many calories are in the foods you eat  · You can also burn calories with exercise such as walking, swimming, or biking   You will be more likely to keep weight off if you make these changes part of your lifestyle  Exercise at least 30 minutes per day on most days of the week  You can also fit in more physical activity by taking the stairs instead of the elevator or parking farther away from stores  Ask your healthcare provider about the best exercise plan for you  Healthy meal plan for weight management:  A healthy meal plan includes a variety of foods, contains fewer calories, and helps you stay healthy  A healthy meal plan includes the following:     · Eat whole-grain foods more often  A healthy meal plan should contain fiber  Fiber is the part of grains, fruits, and vegetables that is not broken down by your body  Whole-grain foods are healthy and provide extra fiber in your diet  Some examples of whole-grain foods are whole-wheat breads and pastas, oatmeal, brown rice, and bulgur  · Eat a variety of vegetables every day  Include dark, leafy greens such as spinach, kale, willie greens, and mustard greens  Eat yellow and orange vegetables such as carrots, sweet potatoes, and winter squash  · Eat a variety of fruits every day  Choose fresh or canned fruit (canned in its own juice or light syrup) instead of juice  Fruit juice has very little or no fiber  · Eat low-fat dairy foods  Drink fat-free (skim) milk or 1% milk  Eat fat-free yogurt and low-fat cottage cheese  Try low-fat cheeses such as mozzarella and other reduced-fat cheeses  · Choose meat and other protein foods that are low in fat  Choose beans or other legumes such as split peas or lentils  Choose fish, skinless poultry (chicken or turkey), or lean cuts of red meat (beef or pork)  Before you cook meat or poultry, cut off any visible fat  · Use less fat and oil  Try baking foods instead of frying them  Add less fat, such as margarine, sour cream, regular salad dressing and mayonnaise to foods  Eat fewer high-fat foods   Some examples of high-fat foods include french fries, doughnuts, ice cream, and cakes     · Eat fewer sweets  Limit foods and drinks that are high in sugar  This includes candy, cookies, regular soda, and sweetened drinks  Ways to decrease calories:   · Eat smaller portions  ? Use a small plate with smaller servings  ? Do not eat second helpings  ? When you eat at a restaurant, ask for a box and place half of your meal in the box before you eat  ? Share an entrée with someone else  · Replace high-calorie snacks with healthy, low-calorie snacks  ? Choose fresh fruit, vegetables, fat-free rice cakes, or air-popped popcorn instead of potato chips, nuts, or chocolate  ? Choose water or calorie-free drinks instead of soda or sweetened drinks  · Do not shop for groceries when you are hungry  You may be more likely to make unhealthy food choices  Take a grocery list of healthy foods and shop after you have eaten  · Eat regular meals  Do not skip meals  Skipping meals can lead to overeating later in the day  This can make it harder for you to lose weight  Eat a healthy snack in place of a meal if you do not have time to eat a regular meal  Talk with a dietitian to help you create a meal plan and schedule that is right for you  Other things to consider as you try to lose weight:   · Be aware of situations that may give you the urge to overeat, such as eating while watching television  Find ways to avoid these situations  For example, read a book, go for a walk, or do crafts  · Meet with a weight loss support group or friends who are also trying to lose weight  This may help you stay motivated to continue working on your weight loss goals  © Copyright [employer] 2022 Information is for End User's use only and may not be sold, redistributed or otherwise used for commercial purposes  All illustrations and images included in CareNotes® are the copyrighted property of [employer]  or Yocasta Bass  The above information is an  only   It is not intended as medical advice for individual conditions or treatments  Talk to your doctor, nurse or pharmacist before following any medical regimen to see if it is safe and effective for you  Low Fat Diet   AMBULATORY CARE:   A low-fat diet  is an eating plan that is low in total fat, unhealthy fat, and cholesterol  You may need to follow a low-fat diet if you have trouble digesting or absorbing fat  You may also need to follow this diet if you have high cholesterol  You can also lower your cholesterol by increasing the amount of fiber in your diet  Soluble fiber is a type of fiber that helps to decrease cholesterol levels  Different types of fat in food:   · Limit unhealthy fats  A diet that is high in cholesterol, saturated fat, and trans fat may cause unhealthy cholesterol levels  Unhealthy cholesterol levels increase your risk of heart disease  ? Cholesterol:  Limit intake of cholesterol to less than 200 mg per day  Cholesterol is found in meat, eggs, and dairy  ? Saturated fat:  Limit saturated fat to less than 7% of your total daily calories  Ask your dietitian how many calories you need each day  Saturated fat is found in butter, cheese, ice cream, whole milk, and palm oil  Saturated fat is also found in meat, such as beef, pork, chicken skin, and processed meats  Processed meats include sausage, hot dogs, and bologna  ? Trans fat:  Avoid trans fat as much as possible  Trans fat is used in fried and baked foods  Foods that say trans fat free on the label may still have up to 0 5 grams of trans fat per serving  · Include healthy fats  Replace foods that are high in saturated and trans fat with foods high in healthy fats  This may help to decrease high cholesterol levels  ? Monounsaturated fats: These are found in avocados, nuts, and vegetable oils, such as olive, canola, and sunflower oil  ? Polyunsaturated fats: These can be found in vegetable oils, such as soybean or corn oil  Omega-3 fats can help to decrease the risk of heart disease  Omega-3 fats are found in fish, such as salmon, herring, trout, and tuna  Omega-3 fats can also be found in plant foods, such as walnuts, flaxseed, soybeans, and canola oil  Foods to limit or avoid:   · Grains:      ? Snacks that are made with partially hydrogenated oils, such as chips, regular crackers, and butter-flavored popcorn    ? High-fat baked goods, such as biscuits, croissants, doughnuts, pies, cookies, and pastries    · Dairy:      ? Whole milk, 2% milk, and yogurt and ice cream made with whole milk    ? Half and half creamer, heavy cream, and whipping cream    ? Cheese, cream cheese, and sour cream    · Meats and proteins:      ? High-fat cuts of meat (T-bone steak, regular hamburger, and ribs)    ? Fried meat, poultry (turkey and chicken), and fish    ? Poultry (chicken and turkey) with skin    ? Cold cuts (salami or bologna), hot dogs, luevano, and sausage    ? Whole eggs and egg yolks    · Vegetables and fruits with added fat:      ? Fried vegetables or vegetables in butter or high-fat sauces, such as cream or cheese sauces    ? Fried fruit or fruit served with butter or cream    · Fats:      ? Butter, stick margarine, and shortening    ? Coconut, palm oil, and palm kernel oil    Foods to include:   · Grains:      ? Whole-grain breads, cereals, pasta, and brown rice    ? Low-fat crackers and pretzels    · Vegetables and fruits:      ? Fresh, frozen, or canned vegetables (no salt or low-sodium)    ? Fresh, frozen, dried, or canned fruit (canned in light syrup or fruit juice)    ? Avocado    · Low-fat dairy products:      ? Nonfat (skim) or 1% milk    ? Nonfat or low-fat cheese, yogurt, and cottage cheese    · Meats and proteins:      ? Chicken or turkey with no skin    ? Baked or broiled fish    ? Lean beef and pork (loin, round, extra lean hamburger)    ? Beans and peas, unsalted nuts, soy products    ?  Egg whites and substitutes    ? Seeds and nuts    · Fats:      ? Unsaturated oil, such as canola, olive, peanut, soybean, or sunflower oil    ? Soft or liquid margarine and vegetable oil spread    ? Low-fat salad dressing    Other ways to decrease fat:   · Read food labels before you buy foods  Choose foods that have less than 30% of calories from fat  Choose low-fat or fat-free dairy products  Remember that fat free does not mean calorie free  These foods still contain calories, and too many calories can lead to weight gain  · Trim fat from meat and avoid fried food  Trim all visible fat from meat before you cook it  Remove the skin from poultry  Do not szymanski meat, fish, or poultry  Bake, roast, boil, or broil these foods instead  Avoid fried foods  Eat a baked potato instead of [place] fries  Steam vegetables instead of sautéing them in butter  · Add less fat to foods  Use imitation luevano bits on salads and baked potatoes instead of regular luevano bits  Use fat-free or low-fat salad dressings instead of regular dressings  Use low-fat or nonfat butter-flavored topping instead of regular butter or margarine on popcorn and other foods  Ways to decrease fat in recipes:  Replace high-fat ingredients with low-fat or nonfat ones  This may cause baked goods to be drier than usual  You may need to use nonfat cooking spray on pans to prevent food from sticking  You also may need to change the amount of other ingredients, such as water, in the recipe  Try the following:  · Use low-fat or light margarine instead of regular margarine or shortening  · Use lean ground turkey breast or chicken, or lean ground beef (less than 5% fat) instead of hamburger  · Add 1 teaspoon of canola oil to 8 ounces of skim milk instead of using cream or half and half  · Use grated zucchini, carrots, or apples in breads instead of coconut      · Use blenderized, low-fat cottage cheese, plain tofu, or low-fat ricotta cheese instead of cream cheese  · Use 1 egg white and 1 teaspoon of canola oil, or use ¼ cup (2 ounces) of fat-free egg substitute instead of a whole egg  · Replace half of the oil that is called for in a recipe with applesauce when you bake  Use 3 tablespoons of cocoa powder and 1 tablespoon of canola oil instead of a square of baking chocolate  How to increase fiber:  Eat enough high-fiber foods to get 20 to 30 grams of fiber every day  Slowly increase your fiber intake to avoid stomach cramps, gas, and other problems  · Eat 3 ounces of whole-grain foods each day  An ounce is about 1 slice of bread  Eat whole-grain breads, such as whole-wheat bread  Whole wheat, whole-wheat flour, or other whole grains should be listed as the first ingredient on the food label  Replace white flour with whole-grain flour or use half of each in recipes  Whole-grain flour is heavier than white flour, so you may have to add more yeast or baking powder  · Eat a high-fiber cereal for breakfast   Oatmeal is a good source of soluble fiber  Look for cereals that have bran or fiber in the name  Choose whole-grain products, such as brown rice, barley, and whole-wheat pasta  · Eat more beans, peas, and lentils  For example, add beans to soups or salads  Eat at least 5 cups of fruits and vegetables each day  Eat fruits and vegetables with the peel because the peel is high in fiber  © Copyright [employer] 2022 Information is for End User's use only and may not be sold, redistributed or otherwise used for commercial purposes  All illustrations and images included in CareNotes® are the copyrighted property of [employer]  or [address] [place], [place] [id number]   The above information is an  only  It is not intended as medical advice for individual conditions or treatments  Talk to your doctor, nurse or pharmacist before following any medical regimen to see if it is safe and effective for you      Heart Healthy Diet   AMBULATORY CARE:   A heart healthy diet  is an eating plan low in unhealthy fats and sodium (salt)  The plan is high in healthy fats and fiber  A heart healthy diet helps improve your cholesterol levels and lowers your risk for heart disease and stroke  A dietitian will teach you how to read and understand food labels  Heart healthy diet guidelines to follow:   · Choose foods that contain healthy fats  ? Unsaturated fats  include monounsaturated and polyunsaturated fats  Unsaturated fat is found in foods such as soybean, canola, olive, corn, and safflower oils  It is also found in soft tub margarine that is made with liquid vegetable oil  ? Omega-3 fat  is found in certain fish, such as salmon, tuna, and trout, and in walnuts and flaxseed  Eat fish high in omega-3 fats at least 2 times a week  · Get 20 to 30 grams of fiber each day  Fruits, vegetables, whole-grain foods, and legumes (cooked beans) are good sources of fiber  · Limit or do not have unhealthy fats  ? Cholesterol  is found in animal foods, such as eggs and lobster, and in dairy products made from whole milk  Limit cholesterol to less than 200 mg each day  ? Saturated fat  is found in meats, such as luevano and hamburger  It is also found in chicken or turkey skin, whole milk, and butter  Limit saturated fat to less than 7% of your total daily calories  ? Trans fat  is found in packaged foods, such as potato chips and cookies  It is also in hard margarine, some fried foods, and shortening  Do not eat foods that contain trans fats  · Limit sodium as directed  You may be told to limit sodium to 2,000 to 2,300 mg each day  Choose low-sodium or no-salt-added foods  Add little or no salt to food you prepare  Use herbs and spices in place of salt  Include the following in your heart healthy plan:  Ask your dietitian or healthcare provider how many servings to have from each of the following food groups:  · Grains:      ?  Whole-wheat breads, cereals, and pastas, and brown rice    ? Low-fat, low-sodium crackers and chips    · Vegetables:      ? Broccoli, green beans, green peas, and spinach    ? Collards, kale, and lima beans    ? Carrots, sweet potatoes, tomatoes, and peppers    ? Canned vegetables with no salt added    · Fruits:      ? Bananas, peaches, pears, and pineapple    ? Grapes, raisins, and dates    ? Oranges, tangerines, grapefruit, orange juice, and grapefruit juice    ? Apricots, mangoes, melons, and papaya    ? Raspberries and strawberries    ? Canned fruit with no added sugar    · Low-fat dairy:      ? Nonfat (skim) milk, 1% milk, and low-fat almond, cashew, or soy milks fortified with calcium    ? Low-fat cheese, regular or frozen yogurt, and cottage cheese    · Meats and proteins:      ? Lean cuts of beef and pork (loin, leg, round), skinless chicken and turkey    ? Legumes, soy products, egg whites, or nuts    Limit or do not include the following in your heart healthy plan:   · Unhealthy fats and oils:      ? Whole or 2% milk, cream cheese, sour cream, or cheese    ? High-fat cuts of beef (T-bone steaks, ribs), chicken or turkey with skin, and organ meats such as liver    ? Butter, stick margarine, shortening, and cooking oils such as coconut or palm oil    · Foods and liquids high in sodium:      ? Packaged foods, such as frozen dinners, cookies, macaroni and cheese, and cereals with more than 300 mg of sodium per serving    ? Vegetables with added sodium, such as instant potatoes, vegetables with added sauces, or regular canned vegetables    ? Cured or smoked meats, such as hot dogs, luevano, and sausage    ? High-sodium ketchup, barbecue sauce, salad dressing, pickles, olives, soy sauce, or miso    · Foods and liquids high in sugar:      ? Candy, cake, cookies, pies, or doughnuts    ? Soft drinks (soda), sports drinks, or sweetened tea    ?  Canned or dry mixes for cakes, soups, sauces, or gravies    Other healthy heart guidelines: · Do not smoke  Nicotine and other chemicals in cigarettes and cigars can cause lung and heart damage  Ask your healthcare provider for information if you currently smoke and need help to quit  E-cigarettes or smokeless tobacco still contain nicotine  Talk to your healthcare provider before you use these products  · Limit or do not drink alcohol as directed  Alcohol can damage your heart and raise your blood pressure  Your healthcare provider may give you specific daily and weekly limits  The general recommended limit is 1 drink a day for women 21 or older and for men 72 or older  Do not have more than 3 drinks in a day or 7 in a week  The recommended limit is 2 drinks a day for men 24to 59years of age  Do not have more than 4 drinks in a day or 14 in a week  A drink of alcohol is 12 ounces of beer, 5 ounces of wine, or 1½ ounces of liquor  · Exercise regularly  Exercise can help you maintain a healthy weight and improve your blood pressure and cholesterol levels  Regular exercise can also decrease your risk for heart problems  Ask your healthcare provider about the best exercise plan for you  Do not start an exercise program without asking your healthcare provider  Follow up with your doctor or cardiologist as directed:  Write down your questions so you remember to ask them during your visits  © [employer] 2022 Information is for End User's use only and may not be sold, redistributed or otherwise used for commercial purposes  All illustrations and images included in CareNotes® are the copyrighted property of A D A M , Inc  or [facility]   The above information is an  only  It is not intended as medical advice for individual conditions or treatments  Talk to your doctor, nurse or pharmacist before following any medical regimen to see if it is safe and effective for you  Calorie Counting Diet   WHAT YOU NEED TO KNOW:   What is a calorie counting diet?   It is a meal plan based on counting calories each day to reach a healthy body weight  You will need to eat fewer calories if you are trying to lose weight  Weight loss may decrease your risk for certain health problems or improve your health if you have health problems  Some of these health problems include heart disease, high blood pressure, and diabetes  What foods should I avoid? Your dietitian will tell you if you need to avoid certain foods based on your body weight and health condition  You may need to avoid high-fat foods if you are at risk for or have heart disease  You may need to eat fewer foods from the breads and starches food group if you have diabetes  How many calories are in foods? The following is a list of foods and drinks with the approximate number of calories in each  Check the food label to find the exact number of calories  A dietitian can tell you how many calories you should have from each food group each day  · Carbohydrate:      ? ½ of a 3-inch bagel, 1 slice of bread, or ½ of a hamburger bun or hot dog bun (80)    ? 1 (8-inch) flour tortilla or ½ cup of cooked rice (100)    ? 1 (6-inch) corn tortilla (80)    ? 1 (6-inch) pancake or 1 cup of bran flakes cereal (110)    ? ½ cup of cooked cereal (80)    ? ½ cup of cooked pasta (85)    ? 1 ounce of pretzels (100)    ? 3 cups of air-popped popcorn without butter or oil (80)    · Dairy:      ? 1 cup of skim or 1% milk (90)    ? 1 cup of 2% milk (120)    ? 1 cup of whole milk (160)    ? 1 cup of 2% chocolate milk (220)    ? 1 ounce of low-fat cheese with 3 grams of fat per ounce (70)    ? 1 ounce of cheddar cheese (114)    ? ½ cup of 1% fat cottage cheese (80)    ? 1 cup of plain or sugar-free, fat-free yogurt (90)    · Protein foods:      ? 3 ounces of fish (not breaded or fried) (95)    ? 3 ounces of breaded, fried fish (195)    ? ¾ cup of tuna canned in water (105)    ? 3 ounces of chicken breast without skin (105)    ?  1 fried chicken breast with skin (350)    ? ¼ cup of fat free egg substitute (40)    ? 1 large egg (75)    ? 3 ounces of lean beef or pork (165)    ? 3 ounces of fried pork chop or ham (185)    ? ½ cup of cooked dried beans, such as kidney, euceda, lentils, or navy (115)    ? 3 ounces of bologna or lunch meat (225)    ? 2 links of breakfast sausage (140)    · Vegetables:      ? ½ cup of sliced mushrooms (10)    ? 1 cup of salad greens, such as lettuce, spinach, or radha (15)    ? ½ cup of steamed asparagus (20)    ? ½ cup of cooked summer squash, zucchini squash, or green or wax beans (25)    ? 1 cup of broccoli or cauliflower florets, or 1 medium tomato (25)    ? 1 large raw carrot or ½ cup of cooked carrots (40)    ? ? of a medium cucumber or 1 stalk of celery (5)    ? 1 small baked potato (160)    ? 1 cup of breaded, fried vegetables (230)    · Fruit:      ? 1 (6-inch) banana (55)     ? ½ of a 4-inch grapefruit (55)    ? 15 grapes (60)    ? 1 medium orange or apple (70)    ? 1 large peach (65)    ? 1 cup of fresh pineapple chunks (75)    ? 1 cup of melon cubes (50)    ? 1¼ cups of whole strawberries (45)    ? ½ cup of fruit canned in juice (55)    ? ½ cup of fruit canned in heavy syrup (110)    ? ? cup of raisins (130)    ? ½ cup of unsweetened fruit juice (60)    ? ½ cup of grape, cranberry, or prune juice (90)    · Fat:      ? 10 peanuts or 2 teaspoons of peanut butter (55)    ? 2 tablespoons of avocado or 1 tablespoon of regular salad dressing (45)    ? 2 slices of luevano (90)    ? 1 teaspoon of oil, such as safflower, canola, corn, or olive oil (45)    ? 2 teaspoons of low-fat margarine, or 1 tablespoon of low-fat mayonnaise (50)    ? 1 teaspoon of regular margarine (40)    ? 1 tablespoon of regular mayonnaise (135)    ? 1 tablespoon of cream cheese or 2 tablespoons of low-fat cream cheese (45)    ?  2 tablespoons of vegetable shortening (215)    · Dessert and sweets:      ? 8 animal crackers or 5 vanilla wafers (80)    ? 1 frozen fruit juice bar (80)    ? ½ cup of ice milk or low-fat frozen yogurt (90)    ? ½ cup of sherbet or sorbet (125)    ? ½ cup of sugar-free pudding or custard (60)    ? ½ cup of ice cream (140)    ? ½ cup of pudding or custard (175)    ? 1 (2-inch) square chocolate brownie (185)    · Combination foods:      ? Bean burrito made with an 8-inch tortilla, without cheese (275)    ? Chicken breast sandwich with lettuce and tomato (325)    ? 1 cup of chicken noodle soup (60)    ? 1 beef taco (175)    ? Regular hamburger with lettuce and tomato (310)    ? Regular cheeseburger with lettuce and tomato (410)     ? ¼ of a 12-inch cheese pizza (280)    ? Fried fish sandwich with lettuce and tomato (425)    ? Hot dog and bun (275)    ? 1½ cups of macaroni and cheese (310)    ? Taco salad with a fried tortilla shell (870)    · Low-calorie foods:      ? 1 tablespoon of ketchup or 1 tablespoon of fat free sour cream (15)    ? 1 teaspoon of mustard (5)    ? ¼ cup of salsa (20)    ? 1 large dill pickle (15)    ? 1 tablespoon of fat free salad dressing (10)    ? 2 teaspoons of low-sugar, light jam or jelly, or 1 tablespoon of sugar-free syrup (15)    ? 1 sugar-free popsicle (15)    ? 1 cup of club soda, seltzer water, or diet soda (0)    CARE AGREEMENT:   You have the right to help plan your care  Discuss treatment options with your healthcare provider to decide what care you want to receive  You always have the right to refuse treatment  The above information is an  only  It is not intended as medical advice for individual conditions or treatments  Talk to your doctor, nurse or pharmacist before following any medical regimen to see if it is safe and effective for you  © Copyright [employer] 2022 Information is for End User's use only and may not be sold, redistributed or otherwise used for commercial purposes   All illustrations and images included in CareNotes® are the copyrighted property of [employer]  or [address] [facility]

## 2023-01-25 LAB
ATRIAL RATE: 76 BPM
P AXIS: 59 DEGREES
PR INTERVAL: 172 MS
QRS AXIS: 17 DEGREES
QRSD INTERVAL: 76 MS
QT INTERVAL: 380 MS
QTC INTERVAL: 427 MS
T WAVE AXIS: 20 DEGREES
VENTRICULAR RATE: 76 BPM

## 2023-01-26 LAB — MISCELLANEOUS LAB TEST RESULT: NORMAL

## 2023-02-07 ENCOUNTER — RA CDI HCC (OUTPATIENT)
Dept: OTHER | Facility: HOSPITAL | Age: 23
End: 2023-02-07

## 2023-02-07 NOTE — PROGRESS NOTES
[facility] 76  coding opportunities       Chart Reviewed number of suggestions sent to Provider: 1     Patients Insurance     Commercial Insurance: [employer]       [id number]

## 2023-02-20 ENCOUNTER — OFFICE VISIT (OUTPATIENT)
Dept: FAMILY MEDICINE CLINIC | Facility: CLINIC | Age: 23
End: 2023-02-20

## 2023-02-20 VITALS
HEART RATE: 75 BPM | BODY MASS INDEX: 40.66 KG/M2 | TEMPERATURE: 99.1 F | WEIGHT: 253 LBS | HEIGHT: 66 IN | SYSTOLIC BLOOD PRESSURE: 116 MMHG | DIASTOLIC BLOOD PRESSURE: 84 MMHG | OXYGEN SATURATION: 97 %

## 2023-02-20 DIAGNOSIS — E28.2 PCOS (POLYCYSTIC OVARIAN SYNDROME): ICD-10-CM

## 2023-02-20 DIAGNOSIS — Z00.00 ANNUAL PHYSICAL EXAM: Primary | ICD-10-CM

## 2023-02-20 DIAGNOSIS — D50.9 IRON DEFICIENCY ANEMIA, UNSPECIFIED IRON DEFICIENCY ANEMIA TYPE: ICD-10-CM

## 2023-02-20 NOTE — PROGRESS NOTES
BMI Counseling: Body mass index is 40 84 kg/m²  The BMI is above normal  Nutrition recommendations include decreasing portion sizes, encouraging healthy choices of fruits and vegetables, decreasing fast food intake, consuming healthier snacks, limiting drinks that contain sugar, moderation in carbohydrate intake, increasing intake of lean protein, reducing intake of saturated and trans fat and reducing intake of cholesterol  Exercise recommendations include vigorous physical activity 75 minutes/week, exercising 3-5 times per week and strength training exercises  No pharmacotherapy was ordered  Patient referred to PCP  Rationale for BMI follow-up plan is due to patient being overweight or obese  Assessment/Plan:    Presents in office for annual physical and lab review   She has lost over 12 lbs since last time seen   She gave up carbs and not eating at work   [employer] better   Looks better   Reviewed labs   Reviewed vitamin D supplementation - rest of the labs look fine   Low fat diet   Increase activity   Follow up in 4 months      Problem List Items Addressed This Visit    None  Visit Diagnoses     Annual physical exam    -  Primary    Relevant Orders    CBC and differential    TSH, 3rd generation with Free T4 reflex    Vitamin D 25 hydroxy    Comprehensive metabolic panel    UA (URINE) with reflex to Scope    PCOS (polycystic ovarian syndrome)        Relevant Orders    CBC and differential    TSH, 3rd generation with Free T4 reflex    Vitamin D 25 hydroxy    Comprehensive metabolic panel    UA (URINE) with reflex to Scope    Iron deficiency anemia, unspecified iron deficiency anemia type        Relevant Orders    CBC and differential    TSH, 3rd generation with Free T4 reflex    Vitamin D 25 hydroxy    Comprehensive metabolic panel    UA (URINE) with reflex to Scope    Iron Panel (Includes Ferritin, Iron Sat%, Iron, and TIBC)          No results found  Subjective:      Patient ID: Winston Stone is a 25 y o  female  Presents in office for annual physical and lab review   She has lost over 12 lbs since last time seen   She gave up carbs and not eating at work   [employer] better   Looks better   Reviewed labs       The following portions of the patient's history were reviewed and updated as appropriate:   Past Medical History:  She has a past medical history of Migraine without aura and PCOS (polycystic ovarian syndrome)  ,  _______________________________________________________________________  Medical Problems:  does not have a problem list on file ,  _______________________________________________________________________  Past Surgical History:   has a past surgical history that includes Turbinate resection  ,  _______________________________________________________________________  Family History:  family history includes Hypertension in her father ,  _______________________________________________________________________  Social History:   reports that she has never smoked  She has never used smokeless tobacco  She reports current alcohol use of about 7 0 standard drinks per week  She reports current drug use  Drug: Marijuana  ,  _______________________________________________________________________  Allergies:  is allergic to latex     _______________________________________________________________________  Current Outpatient Medications   Medication Sig Dispense Refill   • Cholecalciferol (Dialyvite Vitamin D 5000) 125 MCG (5000 UT) capsule Take 1 capsule (5,000 Units total) by mouth daily 90 capsule 1   • ferrous sulfate 325 (65 Fe) mg tablet Take 325 mg by mouth daily with breakfast     • Lactobacillus (PROBIOTIC ACIDOPHILUS PO) Take by mouth       No current facility-administered medications for this visit      _______________________________________________________________________  Review of Systems   Constitutional: Positive for fatigue and unexpected weight change (weight gain )  Negative for fever     HENT: Negative for congestion, nosebleeds, sore throat and voice change  Eyes: Negative  Respiratory: Negative for cough and shortness of breath  Cardiovascular: Negative for chest pain, palpitations and leg swelling  Gastrointestinal: Negative for abdominal distention, abdominal pain, nausea and vomiting  Missed periods    Endocrine:        PCOS and weight gain    Genitourinary: Negative for difficulty urinating  Musculoskeletal: Positive for arthralgias and myalgias  Hip pains and low back pain   Improved a bit    Skin: Positive for rash (recurrent rashes and skin infections under axilla )  Allergic/Immunologic: Negative for environmental allergies  Neurological: Negative for headaches  Hematological: Negative for adenopathy  Psychiatric/Behavioral: Negative for sleep disturbance and suicidal ideas  The patient is not nervous/anxious  Objective:  Vitals:    02/20/23 1439   BP: 116/84   BP Location: Left arm   Patient Position: Sitting   Cuff Size: Large   Pulse: 75   Temp: 99 1 °F (37 3 °C)   SpO2: 97%   Weight: 115 kg (253 lb)   Height: 5' 6" (1 676 m)     Body mass index is 40 84 kg/m²  Physical Exam  Vitals and nursing note reviewed  Constitutional:       Appearance: She is obese  Comments: BMI 40 84    HENT:      Head: Atraumatic  Nose: No congestion or rhinorrhea  Mouth/Throat:      Pharynx: No posterior oropharyngeal erythema  Eyes:      Extraocular Movements: Extraocular movements intact  Cardiovascular:      Rate and Rhythm: Normal rate and regular rhythm  Pulses: Normal pulses  Heart sounds: Normal heart sounds  Pulmonary:      Effort: Pulmonary effort is normal       Breath sounds: Normal breath sounds  Abdominal:      Palpations: Abdomen is soft  Musculoskeletal:         General: Normal range of motion  Cervical back: Normal range of motion  Skin:     General: Skin is dry     Neurological:      General: No focal deficit present  Mental Status: She is alert and oriented to person, place, and time  Psychiatric:         Mood and Affect: Mood normal          Behavior: Behavior normal          US pelvis complete w transvaginal  Status: Final result     PACS Images     Show images for US pelvis complete w transvaginal  Study Result    Narrative & Impression   PELVIC ULTRASOUND, COMPLETE     INDICATION:  The patient is 25years old  R10 2: Pelvic and perineal pain      COMPARISON: None     TECHNIQUE:   Transabdominal pelvic ultrasound was performed in sagittal and transverse planes with a curvilinear transducer  Additional transvaginal imaging was performed to better evaluate the endometrium and ovaries  Imaging included volumetric   sweeps as well as traditional still imaging technique      FINDINGS:     UTERUS:  The uterus is anteverted in position, measuring 6 2 x 4 4 x 5 0 cm  The uterus has a normal contour and echotexture  The cervix appears within normal limits      ENDOMETRIUM:    The endometrial echo complex has an AP caliber of 9 0 mm  Its appearance is within normal limits for age and cycle and shows no filling defects        OVARIES/ADNEXA:  Right ovary:  5 3 x 3 2 x 2 4 cm  21 7 mL  No suspicious right ovarian abnormality  Doppler flow within normal limits      Left ovary:  4 3 x 2 9 x 1 7 cm  11 2 mL  No suspicious left ovarian abnormality  Doppler flow within normal limits      No suspicious adnexal mass or loculated collections    There is no free fluid      IMPRESSION:     Normal                     Workstation performed: [id number]        Imaging    US pelvis complete w transvaginal (Order: [id number]) - 6/24/2022    Result History    US pelvis complete w transvaginal (Order #[id number]) on 6/24/2022 - Order Result History Report

## 2023-03-09 ENCOUNTER — HOSPITAL ENCOUNTER (EMERGENCY)
Facility: HOSPITAL | Age: 23
Discharge: HOME | End: 2023-03-09
Attending: EMERGENCY MEDICINE

## 2023-03-09 ENCOUNTER — APPOINTMENT (EMERGENCY)
Dept: RADIOLOGY | Facility: HOSPITAL | Age: 23
End: 2023-03-09

## 2023-03-09 VITALS
RESPIRATION RATE: 17 BRPM | OXYGEN SATURATION: 97 % | TEMPERATURE: 98.4 F | SYSTOLIC BLOOD PRESSURE: 123 MMHG | DIASTOLIC BLOOD PRESSURE: 56 MMHG | HEART RATE: 85 BPM

## 2023-03-09 DIAGNOSIS — J40 BRONCHITIS: Primary | ICD-10-CM

## 2023-03-09 LAB
2HR DELTA HS TROPONIN: >0 NG/L
ALBUMIN SERPL BCP-MCNC: 4.4 G/DL (ref 3.5–5)
ALP SERPL-CCNC: 61 U/L (ref 34–104)
ALT SERPL W P-5'-P-CCNC: 18 U/L (ref 7–52)
ANION GAP SERPL CALCULATED.3IONS-SCNC: 6 MMOL/L (ref 4–13)
AST SERPL W P-5'-P-CCNC: 12 U/L (ref 13–39)
ATRIAL RATE: 82 BPM
ATRIAL RATE: 83 BPM
BASOPHILS # BLD AUTO: 0.03 THOUSANDS/ÂΜL (ref 0–0.1)
BASOPHILS NFR BLD AUTO: 0 % (ref 0–1)
BILIRUB SERPL-MCNC: 0.39 MG/DL (ref 0.2–1)
BUN SERPL-MCNC: 7 MG/DL (ref 5–25)
CALCIUM SERPL-MCNC: 9.9 MG/DL (ref 8.4–10.2)
CARDIAC TROPONIN I PNL SERPL HS: 2 NG/L
CARDIAC TROPONIN I PNL SERPL HS: <2 NG/L
CHLORIDE SERPL-SCNC: 106 MMOL/L (ref 96–108)
CO2 SERPL-SCNC: 26 MMOL/L (ref 21–32)
CREAT SERPL-MCNC: 0.72 MG/DL (ref 0.6–1.3)
D DIMER PPP FEU-MCNC: 0.35 UG/ML FEU
EOSINOPHIL # BLD AUTO: 0.11 THOUSAND/ÂΜL (ref 0–0.61)
EOSINOPHIL NFR BLD AUTO: 1 % (ref 0–6)
ERYTHROCYTE [DISTWIDTH] IN BLOOD BY AUTOMATED COUNT: 12.3 % (ref 11.6–15.1)
FLUAV RNA RESP QL NAA+PROBE: NEGATIVE
FLUBV RNA RESP QL NAA+PROBE: NEGATIVE
GFR SERPL CREATININE-BSD FRML MDRD: 119 ML/MIN/1.73SQ M
GLUCOSE SERPL-MCNC: 105 MG/DL (ref 65–140)
HCG SERPL QL: NEGATIVE
HCT VFR BLD AUTO: 40.8 % (ref 34.8–46.1)
HGB BLD-MCNC: 13 G/DL (ref 11.5–15.4)
IMM GRANULOCYTES # BLD AUTO: 0.03 THOUSAND/UL (ref 0–0.2)
IMM GRANULOCYTES NFR BLD AUTO: 0 % (ref 0–2)
LYMPHOCYTES # BLD AUTO: 2.06 THOUSANDS/ÂΜL (ref 0.6–4.47)
LYMPHOCYTES NFR BLD AUTO: 19 % (ref 14–44)
MCH RBC QN AUTO: 28.1 PG (ref 26.8–34.3)
MCHC RBC AUTO-ENTMCNC: 31.9 G/DL (ref 31.4–37.4)
MCV RBC AUTO: 88 FL (ref 82–98)
MONOCYTES # BLD AUTO: 0.84 THOUSAND/ÂΜL (ref 0.17–1.22)
MONOCYTES NFR BLD AUTO: 8 % (ref 4–12)
NEUTROPHILS # BLD AUTO: 7.53 THOUSANDS/ÂΜL (ref 1.85–7.62)
NEUTS SEG NFR BLD AUTO: 72 % (ref 43–75)
NRBC BLD AUTO-RTO: 0 /100 WBCS
P AXIS: 77 DEGREES
P AXIS: 78 DEGREES
PLATELET # BLD AUTO: 278 THOUSANDS/UL (ref 149–390)
PMV BLD AUTO: 9.3 FL (ref 8.9–12.7)
POTASSIUM SERPL-SCNC: 4.1 MMOL/L (ref 3.5–5.3)
PR INTERVAL: 164 MS
PR INTERVAL: 172 MS
PROT SERPL-MCNC: 7.5 G/DL (ref 6.4–8.4)
QRS AXIS: 73 DEGREES
QRS AXIS: 74 DEGREES
QRSD INTERVAL: 72 MS
QRSD INTERVAL: 74 MS
QT INTERVAL: 348 MS
QT INTERVAL: 358 MS
QTC INTERVAL: 406 MS
QTC INTERVAL: 420 MS
RBC # BLD AUTO: 4.62 MILLION/UL (ref 3.81–5.12)
RSV RNA RESP QL NAA+PROBE: NEGATIVE
S PYO DNA THROAT QL NAA+PROBE: NOT DETECTED
SARS-COV-2 RNA RESP QL NAA+PROBE: NEGATIVE
SODIUM SERPL-SCNC: 138 MMOL/L (ref 135–147)
T WAVE AXIS: 65 DEGREES
T WAVE AXIS: 69 DEGREES
VENTRICULAR RATE: 82 BPM
VENTRICULAR RATE: 83 BPM
WBC # BLD AUTO: 10.6 THOUSAND/UL (ref 4.31–10.16)

## 2023-03-09 RX ORDER — SODIUM CHLORIDE 9 MG/ML
3 INJECTION INTRAVENOUS
Status: DISCONTINUED | OUTPATIENT
Start: 2023-03-09 | End: 2023-03-09 | Stop reason: HOSPADM

## 2023-03-09 NOTE — Clinical Note
Jordan Lowe was seen and treated in our emergency department on 3/9/2023  Diagnosis:     Lukasz Lee  may return to work on return date  She may return on this date: 03/14/2023         If you have any questions or concerns, please don't hesitate to call        Odalis Mendieta RN    ______________________________           _______________          _______________  Hospital Representative                              Date                                Time

## 2023-03-10 LAB
GAMMA INTERFERON BACKGROUND BLD IA-ACNC: 0.04 IU/ML
M TB IFN-G BLD-IMP: NEGATIVE
M TB IFN-G CD4+ BCKGRND COR BLD-ACNC: 0 IU/ML
M TB IFN-G CD4+ BCKGRND COR BLD-ACNC: 0 IU/ML
MITOGEN IGNF BCKGRD COR BLD-ACNC: 8.69 IU/ML

## 2023-03-28 NOTE — ED PROVIDER NOTES
History  Chief Complaint   Patient presents with   • Cough     Pt reports a cough and generalized weakness over the last 2 days  Pt states she has been coughing up thick bloody sputum in the mornings      52-year-old female presents emergency room with cough and generalized weakness x2 days  Patient states that she has had a productive cough with thick bloody sputum that has been worse in the mornings  Denies any fever/chills, chest pain, abdominal pain, nausea/vomiting, diarrhea/constipation, or urinary symptoms  Does states that she works in a [employer] and has been exposed to TB and is concerned that she may have this  She denies any night sweats  Has not tried anything for the symptoms  No other complaints  History provided by:  Patient  Cough  Cough characteristics:  Productive  Sputum characteristics:  Bloody  Severity:  Moderate  Onset quality:  Sudden  Timing:  Intermittent  Progression:  Worsening  Chronicity:  New  Relieved by:  Nothing  Worsened by:  Nothing  Ineffective treatments:  None tried  Associated symptoms: no chest pain, no chills, no ear pain, no fever, no headaches, no rash, no shortness of breath, no sore throat and no wheezing        Prior to Admission Medications   Prescriptions Last Dose Informant Patient Reported? Taking?    Cholecalciferol (Dialyvite Vitamin D 5000) 125 MCG (5000 UT) capsule   No No   Sig: Take 1 capsule (5,000 Units total) by mouth daily   Lactobacillus (PROBIOTIC ACIDOPHILUS PO)   Yes No   Sig: Take by mouth   ferrous sulfate 325 (65 Fe) mg tablet   Yes No   Sig: Take 325 mg by mouth daily with breakfast      Facility-Administered Medications: None       Past Medical History:   Diagnosis Date   • Migraine without aura     OTC med management   • PCOS (polycystic ovarian syndrome)        Past Surgical History:   Procedure Laterality Date   • TURBINATE RESECTION         Family History   Problem Relation Age of Onset   • Hypertension Father    • Colon cancer Neg Hx • Ovarian cancer Neg Hx    • Breast cancer Neg Hx    • Cancer Neg Hx      I have reviewed and agree with the history as documented  E-Cigarette/Vaping   • E-Cigarette Use Current Some Day User    • Comments very seldom, 3 times a year      E-Cigarette/Vaping Substances     Social History     Tobacco Use   • Smoking status: Never   • Smokeless tobacco: Never   Vaping Use   • Vaping Use: Some days   Substance Use Topics   • Alcohol use: Yes     Alcohol/week: 7 0 standard drinks     Types: 7 Glasses of wine per week     Comment: 1 daily   • Drug use: Yes     Types: Marijuana       Review of Systems   Constitutional: Negative for chills and fever  HENT: Negative for congestion, ear pain and sore throat  Eyes: Negative for pain and visual disturbance  Respiratory: Positive for cough  Negative for shortness of breath and wheezing  Cardiovascular: Negative for chest pain and leg swelling  Gastrointestinal: Negative for abdominal pain, diarrhea, nausea and vomiting  Genitourinary: Negative for dysuria, frequency, hematuria and urgency  Musculoskeletal: Negative for neck pain and neck stiffness  Skin: Negative for rash and wound  Neurological: Negative for weakness, numbness and headaches  Psychiatric/Behavioral: Negative for agitation and confusion  All other systems reviewed and are negative  Physical Exam  Physical Exam  Vitals and nursing note reviewed  Constitutional:       Appearance: She is well-developed  HENT:      Head: Normocephalic and atraumatic  Eyes:      Pupils: Pupils are equal, round, and reactive to light  Cardiovascular:      Rate and Rhythm: Normal rate and regular rhythm  Pulmonary:      Effort: Pulmonary effort is normal       Breath sounds: Normal breath sounds  Abdominal:      General: Bowel sounds are normal       Palpations: Abdomen is soft  Musculoskeletal:         General: Normal range of motion        Cervical back: Normal range of motion and neck supple  Skin:     General: Skin is warm and dry  Neurological:      General: No focal deficit present  Mental Status: She is alert and oriented to person, place, and time        Comments: No focal deficits         Vital Signs  ED Triage Vitals [03/09/23 1147]   Temperature Pulse Respirations Blood Pressure SpO2   98 4 °F (36 9 °C) 100 18 159/90 100 %      Temp Source Heart Rate Source Patient Position - Orthostatic VS BP Location FiO2 (%)   Oral Monitor Sitting Left arm --      Pain Score       --           Vitals:    03/09/23 1147 03/09/23 1515 03/09/23 1700   BP: 159/90 129/67 123/56   Pulse: 100 92 85   Patient Position - Orthostatic VS: Sitting Sitting Sitting         Visual Acuity      ED Medications  Medications - No data to display    Diagnostic Studies  Results Reviewed     Procedure Component Value Units Date/Time    Quantiferon TB Gold Plus [[id number]] Collected: 03/09/23 1448    Lab Status: Final result Specimen: Blood from Arm, Left Updated: 03/10/23 1506     QFT Nil 0 04 IU/ml      QFT TB1-NIL 0 00 IU/ml      QFT TB2-NIL 0 00 IU/ml      QFT Mitogen-NIL 8 69 IU/ml      QFT Final Interpretation Negative    Strep A PCR [[id number]]  (Normal) Collected: 03/09/23 1659    Lab Status: Final result Specimen: Throat Updated: 03/09/23 1822     STREP A PCR Not Detected    HS Troponin I 2hr [[id number]]  (Normal) Collected: 03/09/23 1659    Lab Status: Final result Specimen: Blood from Arm, Right Updated: 03/09/23 1748     hs TnI 2hr 2 ng/L      Delta 2hr hsTnI >0 ng/L     FLU/RSV/COVID - if FLU/RSV clinically relevant [[id number]]  (Normal) Collected: 03/09/23 1448    Lab Status: Final result Specimen: Nares from Nose Updated: 03/09/23 1543     SARS-CoV-2 Negative     INFLUENZA A PCR Negative     INFLUENZA B PCR Negative     RSV PCR Negative    Narrative:      FOR PEDIATRIC PATIENTS - copy/paste COVID Guidelines URL to browser: [email or web address]    SARS-CoV-2 assay is a Nucleic Acid Amplification assay intended for the  qualitative detection of nucleic acid from SARS-CoV-2 in nasopharyngeal  swabs  Results are for the presumptive identification of SARS-CoV-2 RNA  Positive results are indicative of infection with SARS-CoV-2, the virus  causing COVID-19, but do not rule out bacterial infection or co-infection  with other viruses  Laboratories within the [place] and its  territories are required to report all positive results to the appropriate  public health authorities  Negative results do not preclude SARS-CoV-2  infection and should not be used as the sole basis for treatment or other  patient management decisions  Negative results must be combined with  clinical observations, patient history, and epidemiological information  This test has not been FDA cleared or approved  This test has been authorized by FDA under an Emergency Use Authorization  (EUA)  This test is only authorized for the duration of time the  declaration that circumstances exist justifying the authorization of the  emergency use of an in vitro diagnostic tests for detection of SARS-CoV-2  virus and/or diagnosis of COVID-19 infection under section 564(b)(1) of  the Act, 21 U  S C  424PDW-0(H)(0), unless the authorization is terminated  or revoked sooner  The test has been validated but independent review by FDA  and CLIA is pending  Test performed using [employer] GeneXpert: This RT-PCR assay targets N2,  a region unique to SARS-CoV-2  A conserved region in the E-gene was chosen  for pan-Sarbecovirus detection which includes SARS-CoV-2  According to CMS-2020-01-R, this platform meets the definition of high-throughput technology      hCG, qualitative pregnancy [[id number]]  (Normal) Collected: 03/09/23 1448    Lab Status: Final result Specimen: Blood from Arm, Left Updated: 03/09/23 1524     Preg, Serum Negative    HS Troponin 0hr (reflex protocol) [[id number]]  (Normal) Collected: 03/09/23 1448    Lab Status: Final result Specimen: Blood from Arm, Left Updated: 03/09/23 1522     hs TnI 0hr <2 ng/L     Comprehensive metabolic panel [[id number]]  (Abnormal) Collected: 03/09/23 1448    Lab Status: Final result Specimen: Blood from Arm, Left Updated: 03/09/23 1514     Sodium 138 mmol/L      Potassium 4 1 mmol/L      Chloride 106 mmol/L      CO2 26 mmol/L      ANION GAP 6 mmol/L      BUN 7 mg/dL      Creatinine 0 72 mg/dL      Glucose 105 mg/dL      Calcium 9 9 mg/dL      AST 12 U/L      ALT 18 U/L      Alkaline Phosphatase 61 U/L      Total Protein 7 5 g/dL      Albumin 4 4 g/dL      Total Bilirubin 0 39 mg/dL      eGFR 119 ml/min/1 73sq m     Narrative:      [place] guidelines for Chronic Kidney Disease (CKD):   •  Stage 1 with normal or high GFR (GFR > 90 mL/min/1 73 square meters)  •  Stage 2 Mild CKD (GFR = 60-89 mL/min/1 73 square meters)  •  Stage 3A Moderate CKD (GFR = 45-59 mL/min/1 73 square meters)  •  Stage 3B Moderate CKD (GFR = 30-44 mL/min/1 73 square meters)  •  Stage 4 Severe CKD (GFR = 15-29 mL/min/1 73 square meters)  •  Stage 5 End Stage CKD (GFR <15 mL/min/1 73 square meters)  Note: GFR calculation is accurate only with a steady state creatinine    D-dimer, quantitative [[id number]]  (Normal) Collected: 03/09/23 1448    Lab Status: Final result Specimen: Blood from Arm, Left Updated: 03/09/23 1508     D-Dimer, Quant 0 35 ug/ml FEU     CBC and differential [[id number]]  (Abnormal) Collected: 03/09/23 1448    Lab Status: Final result Specimen: Blood from Arm, Left Updated: 03/09/23 1456     WBC 10 60 Thousand/uL      RBC 4 62 Million/uL      Hemoglobin 13 0 g/dL      Hematocrit 40 8 %      MCV 88 fL      MCH 28 1 pg      MCHC 31 9 g/dL      RDW 12 3 %      MPV 9 3 fL      Platelets 973 Thousands/uL      nRBC 0 /100 WBCs      Neutrophils Relative 72 %      Immat GRANS % 0 % Lymphocytes Relative 19 %      Monocytes Relative 8 %      Eosinophils Relative 1 %      Basophils Relative 0 %      Neutrophils Absolute 7 53 Thousands/µL      Immature Grans Absolute 0 03 Thousand/uL      Lymphocytes Absolute 2 06 Thousands/µL      Monocytes Absolute 0 84 Thousand/µL      Eosinophils Absolute 0 11 Thousand/µL      Basophils Absolute 0 03 Thousands/µL                  X-ray chest 2 views   Final Result by Cinthya Mckeon MD (03/10 0500)      No acute cardiopulmonary disease  Workstation performed: [id number]                    Procedures  Procedures         ED Course  ED Course as of 03/28/23 1335   Thu Mar 09, 2023   1517 D-Dimer, Quant: 0 35                                             Medical Decision Making  Patient with URI symptoms-we will get cardiac work-up including D-dimer, Trope/EKG, chest x-ray, and reassess for dispo  Patient reevaluated and feels improved  Patient updated on results of tests  Discharge instructions given including follow-up, and return precautions  Patient demonstrates verbal understanding and agrees with plan  Bronchitis: acute illness or injury  Amount and/or Complexity of Data Reviewed  Labs: ordered  Decision-making details documented in ED Course  Radiology: ordered  Disposition  Final diagnoses:   Bronchitis     Time reflects when diagnosis was documented in both MDM as applicable and the Disposition within this note     Time User Action Codes Description Comment    3/9/2023  4:17 PM Kimi NICOLE Add [J40] Bronchitis       ED Disposition     ED Disposition   Discharge    Condition   Stable    Date/Time   Thu Mar 9, 2023  4:17 PM    Comment   Jad Homme discharge to home/self care                 Follow-up Information     Follow up With Specialties Details Why Contact Info Additional Information    VALENTIN Rose Nurse Practitioner, Family Medicine Call in 1 day for follow up within 2-3 days [address] [facility] [id number]  [address] [facility] Emergency Department Emergency Medicine Go to  immediately for any new or worsening symptoms Maryam Renteria [address] [facility] [address] [facility] Emergency Department, [address] [place] [address], [facility]          Discharge Medication List as of 3/9/2023  4:52 PM      CONTINUE these medications which have NOT CHANGED    Details   Cholecalciferol (Dialyvite Vitamin D 5000) 125 MCG (5000 UT) capsule Take 1 capsule (5,000 Units total) by mouth daily, Starting Tue 1/24/2023, Until Mon 4/24/2023, Normal      ferrous sulfate 325 (65 Fe) mg tablet Take 325 mg by mouth daily with breakfast, Historical Med      Lactobacillus (PROBIOTIC ACIDOPHILUS PO) Take by mouth, Historical Med             No discharge procedures on file      PDMP Review     None          ED Provider  Electronically Signed by           Junior Ivy DO  03/28/23 0001

## 2023-06-08 ENCOUNTER — RA CDI HCC (OUTPATIENT)
Dept: OTHER | Facility: HOSPITAL | Age: 23
End: 2023-06-08

## 2023-06-08 NOTE — PROGRESS NOTES
[facility] [address]  coding opportunities          Chart Reviewed number of suggestions sent to Provider: 1     Patients Insurance        Commercial Insurance: [facility]       E66 01: Morbid (severe) obesity due to excess calories [facility]     Per CMS/ICD 10 coding guidelines, to be used when BMI >=40

## 2023-08-09 ENCOUNTER — HOSPITAL ENCOUNTER (EMERGENCY)
Facility: HOSPITAL | Age: 23
Discharge: HOME | End: 2023-08-09
Attending: EMERGENCY MEDICINE
Payer: COMMERCIAL

## 2023-08-09 ENCOUNTER — APPOINTMENT (EMERGENCY)
Dept: CT IMAGING | Facility: HOSPITAL | Age: 23
End: 2023-08-09
Payer: COMMERCIAL

## 2023-08-09 VITALS
DIASTOLIC BLOOD PRESSURE: 65 MMHG | SYSTOLIC BLOOD PRESSURE: 107 MMHG | RESPIRATION RATE: 18 BRPM | OXYGEN SATURATION: 96 % | TEMPERATURE: 98.2 F | HEART RATE: 70 BPM

## 2023-08-09 DIAGNOSIS — R10.30 LOWER ABDOMINAL PAIN: Primary | ICD-10-CM

## 2023-08-09 LAB
ALBUMIN SERPL BCP-MCNC: 4.6 G/DL (ref 3.5–5)
ALP SERPL-CCNC: 52 U/L (ref 34–104)
ALT SERPL W P-5'-P-CCNC: 14 U/L (ref 7–52)
ANION GAP SERPL CALCULATED.3IONS-SCNC: 6 MMOL/L
AST SERPL W P-5'-P-CCNC: 11 U/L (ref 13–39)
BASOPHILS # BLD AUTO: 0.01 THOUSANDS/ÂΜL (ref 0–0.1)
BASOPHILS NFR BLD AUTO: 0 % (ref 0–1)
BILIRUB SERPL-MCNC: 0.39 MG/DL (ref 0.2–1)
BILIRUB UR QL STRIP: NEGATIVE
BUN SERPL-MCNC: 9 MG/DL (ref 5–25)
CALCIUM SERPL-MCNC: 9.5 MG/DL (ref 8.4–10.2)
CHLORIDE SERPL-SCNC: 108 MMOL/L (ref 96–108)
CLARITY UR: CLEAR
CO2 SERPL-SCNC: 23 MMOL/L (ref 21–32)
COLOR UR: NORMAL
CREAT SERPL-MCNC: 0.73 MG/DL (ref 0.6–1.3)
EOSINOPHIL # BLD AUTO: 0.13 THOUSAND/ÂΜL (ref 0–0.61)
EOSINOPHIL NFR BLD AUTO: 2 % (ref 0–6)
ERYTHROCYTE [DISTWIDTH] IN BLOOD BY AUTOMATED COUNT: 12.4 % (ref 11.6–15.1)
EXT PREGNANCY TEST URINE: NEGATIVE
EXT. CONTROL: NORMAL
GFR SERPL CREATININE-BSD FRML MDRD: 117 ML/MIN/1.73SQ M
GLUCOSE SERPL-MCNC: 95 MG/DL (ref 65–140)
GLUCOSE UR STRIP-MCNC: NEGATIVE MG/DL
HCT VFR BLD AUTO: 39 % (ref 34.8–46.1)
HGB BLD-MCNC: 12.5 G/DL (ref 11.5–15.4)
HGB UR QL STRIP.AUTO: NEGATIVE
IMM GRANULOCYTES # BLD AUTO: 0.01 THOUSAND/UL (ref 0–0.2)
IMM GRANULOCYTES NFR BLD AUTO: 0 % (ref 0–2)
KETONES UR STRIP-MCNC: NEGATIVE MG/DL
LEUKOCYTE ESTERASE UR QL STRIP: NEGATIVE
LIPASE SERPL-CCNC: 19 U/L (ref 11–82)
LYMPHOCYTES # BLD AUTO: 2.11 THOUSANDS/ÂΜL (ref 0.6–4.47)
LYMPHOCYTES NFR BLD AUTO: 36 % (ref 14–44)
MCH RBC QN AUTO: 27.9 PG (ref 26.8–34.3)
MCHC RBC AUTO-ENTMCNC: 32.1 G/DL (ref 31.4–37.4)
MCV RBC AUTO: 87 FL (ref 82–98)
MONOCYTES # BLD AUTO: 0.48 THOUSAND/ÂΜL (ref 0.17–1.22)
MONOCYTES NFR BLD AUTO: 8 % (ref 4–12)
NEUTROPHILS # BLD AUTO: 3.15 THOUSANDS/ÂΜL (ref 1.85–7.62)
NEUTS SEG NFR BLD AUTO: 54 % (ref 43–75)
NITRITE UR QL STRIP: NEGATIVE
NRBC BLD AUTO-RTO: 0 /100 WBCS
PH UR STRIP.AUTO: 6.5 [PH]
PLATELET # BLD AUTO: 283 THOUSANDS/UL (ref 149–390)
PMV BLD AUTO: 9.1 FL (ref 8.9–12.7)
POTASSIUM SERPL-SCNC: 3.7 MMOL/L (ref 3.5–5.3)
PROT SERPL-MCNC: 7.6 G/DL (ref 6.4–8.4)
PROT UR STRIP-MCNC: NEGATIVE MG/DL
RBC # BLD AUTO: 4.48 MILLION/UL (ref 3.81–5.12)
SODIUM SERPL-SCNC: 137 MMOL/L (ref 135–147)
SP GR UR STRIP.AUTO: 1.03 (ref 1–1.03)
UROBILINOGEN UR STRIP-ACNC: <2 MG/DL
WBC # BLD AUTO: 5.89 THOUSAND/UL (ref 4.31–10.16)

## 2023-08-09 PROCEDURE — 74177 CT ABD & PELVIS W/CONTRAST: CPT

## 2023-08-09 PROCEDURE — 99284 EMERGENCY DEPT VISIT MOD MDM: CPT

## 2023-08-09 PROCEDURE — 81003 URINALYSIS AUTO W/O SCOPE: CPT | Performed by: PHYSICIAN ASSISTANT

## 2023-08-09 PROCEDURE — 87591 N.GONORRHOEAE DNA AMP PROB: CPT | Performed by: PHYSICIAN ASSISTANT

## 2023-08-09 PROCEDURE — 85025 COMPLETE CBC W/AUTO DIFF WBC: CPT | Performed by: PHYSICIAN ASSISTANT

## 2023-08-09 PROCEDURE — 36415 COLL VENOUS BLD VENIPUNCTURE: CPT | Performed by: PHYSICIAN ASSISTANT

## 2023-08-09 PROCEDURE — 81025 URINE PREGNANCY TEST: CPT | Performed by: PHYSICIAN ASSISTANT

## 2023-08-09 PROCEDURE — 83690 ASSAY OF LIPASE: CPT | Performed by: PHYSICIAN ASSISTANT

## 2023-08-09 PROCEDURE — 80053 COMPREHEN METABOLIC PANEL: CPT | Performed by: PHYSICIAN ASSISTANT

## 2023-08-09 PROCEDURE — 99285 EMERGENCY DEPT VISIT HI MDM: CPT | Performed by: PHYSICIAN ASSISTANT

## 2023-08-09 PROCEDURE — G1004 CDSM NDSC: HCPCS

## 2023-08-09 PROCEDURE — NC001 PR NO CHARGE: Performed by: PHYSICIAN ASSISTANT

## 2023-08-09 PROCEDURE — 87491 CHLMYD TRACH DNA AMP PROBE: CPT | Performed by: PHYSICIAN ASSISTANT

## 2023-08-09 RX ORDER — DICYCLOMINE HCL 20 MG
20 TABLET ORAL 2 TIMES DAILY PRN
Qty: 20 TABLET | Refills: 0 | Status: SHIPPED | OUTPATIENT
Start: 2023-08-09

## 2023-08-09 RX ADMIN — IOHEXOL 100 ML: 350 INJECTION, SOLUTION INTRAVENOUS at 15:34

## 2023-08-09 NOTE — DISCHARGE INSTRUCTIONS
Can try heating pad/moist heat compresses to the lower abdomen. May take Bentyl as needed for alleviation of cramping abdominal pain in addition to Tylenol/NSAIDs for pain control. Recommend outpatient GI and OB/GYN follow-up for further evaluation and management if symptoms persist.  Please return to the ED with any new or worsening symptoms as discussed.

## 2023-08-09 NOTE — Clinical Note
Jennifer Ballesteros was seen and treated in our emergency department on 8/9/2023. Diagnosis:     Edgar Gould  . She may return on this date: 08/11/2023         If you have any questions or concerns, please don't hesitate to call.       Lyly Peña RN    ______________________________           _______________          _______________  Hospital Representative                              Date                                Time

## 2023-08-09 NOTE — Clinical Note
Yuri Jung was seen and treated in our emergency department on 8/9/2023. Diagnosis:     Amilcar Morel  . She may return on this date: 08/11/2023         If you have any questions or concerns, please don't hesitate to call.       Hanna Miramontes RN    ______________________________           _______________          _______________  Hospital Representative                              Date                                Time

## 2023-08-10 ENCOUNTER — APPOINTMENT (EMERGENCY)
Dept: ULTRASOUND IMAGING | Facility: HOSPITAL | Age: 23
End: 2023-08-10
Payer: COMMERCIAL

## 2023-08-10 ENCOUNTER — HOSPITAL ENCOUNTER (EMERGENCY)
Facility: HOSPITAL | Age: 23
Discharge: HOME | End: 2023-08-10
Attending: EMERGENCY MEDICINE
Payer: COMMERCIAL

## 2023-08-10 VITALS
TEMPERATURE: 97.8 F | DIASTOLIC BLOOD PRESSURE: 53 MMHG | SYSTOLIC BLOOD PRESSURE: 109 MMHG | OXYGEN SATURATION: 100 % | RESPIRATION RATE: 16 BRPM | HEART RATE: 78 BPM

## 2023-08-10 DIAGNOSIS — R10.9 ABDOMINAL PAIN: Primary | ICD-10-CM

## 2023-08-10 LAB
ALBUMIN SERPL BCP-MCNC: 4.2 G/DL (ref 3.5–5)
ALP SERPL-CCNC: 46 U/L (ref 34–104)
ALT SERPL W P-5'-P-CCNC: 13 U/L (ref 7–52)
ANION GAP SERPL CALCULATED.3IONS-SCNC: 5 MMOL/L
AST SERPL W P-5'-P-CCNC: 13 U/L (ref 13–39)
BASOPHILS # BLD AUTO: 0.03 THOUSANDS/ÂΜL (ref 0–0.1)
BASOPHILS NFR BLD AUTO: 0 % (ref 0–1)
BILIRUB SERPL-MCNC: 0.36 MG/DL (ref 0.2–1)
BUN SERPL-MCNC: 8 MG/DL (ref 5–25)
C TRACH DNA SPEC QL NAA+PROBE: NEGATIVE
CALCIUM SERPL-MCNC: 8.8 MG/DL (ref 8.4–10.2)
CHLORIDE SERPL-SCNC: 107 MMOL/L (ref 96–108)
CO2 SERPL-SCNC: 24 MMOL/L (ref 21–32)
CREAT SERPL-MCNC: 0.73 MG/DL (ref 0.6–1.3)
EOSINOPHIL # BLD AUTO: 0.18 THOUSAND/ÂΜL (ref 0–0.61)
EOSINOPHIL NFR BLD AUTO: 3 % (ref 0–6)
ERYTHROCYTE [DISTWIDTH] IN BLOOD BY AUTOMATED COUNT: 12.5 % (ref 11.6–15.1)
GFR SERPL CREATININE-BSD FRML MDRD: 117 ML/MIN/1.73SQ M
GLUCOSE SERPL-MCNC: 102 MG/DL (ref 65–140)
HCG SERPL QL: NEGATIVE
HCT VFR BLD AUTO: 38.2 % (ref 34.8–46.1)
HGB BLD-MCNC: 12.5 G/DL (ref 11.5–15.4)
IMM GRANULOCYTES # BLD AUTO: 0.01 THOUSAND/UL (ref 0–0.2)
IMM GRANULOCYTES NFR BLD AUTO: 0 % (ref 0–2)
LIPASE SERPL-CCNC: 17 U/L (ref 11–82)
LYMPHOCYTES # BLD AUTO: 2.86 THOUSANDS/ÂΜL (ref 0.6–4.47)
LYMPHOCYTES NFR BLD AUTO: 41 % (ref 14–44)
MCH RBC QN AUTO: 28.3 PG (ref 26.8–34.3)
MCHC RBC AUTO-ENTMCNC: 32.7 G/DL (ref 31.4–37.4)
MCV RBC AUTO: 87 FL (ref 82–98)
MONOCYTES # BLD AUTO: 0.58 THOUSAND/ÂΜL (ref 0.17–1.22)
MONOCYTES NFR BLD AUTO: 8 % (ref 4–12)
N GONORRHOEA DNA SPEC QL NAA+PROBE: NEGATIVE
NEUTROPHILS # BLD AUTO: 3.34 THOUSANDS/ÂΜL (ref 1.85–7.62)
NEUTS SEG NFR BLD AUTO: 48 % (ref 43–75)
NRBC BLD AUTO-RTO: 0 /100 WBCS
PLATELET # BLD AUTO: 287 THOUSANDS/UL (ref 149–390)
PMV BLD AUTO: 9.7 FL (ref 8.9–12.7)
POTASSIUM SERPL-SCNC: 3.9 MMOL/L (ref 3.5–5.3)
PROT SERPL-MCNC: 6.9 G/DL (ref 6.4–8.4)
RBC # BLD AUTO: 4.41 MILLION/UL (ref 3.81–5.12)
SODIUM SERPL-SCNC: 136 MMOL/L (ref 135–147)
WBC # BLD AUTO: 7 THOUSAND/UL (ref 4.31–10.16)

## 2023-08-10 PROCEDURE — 80053 COMPREHEN METABOLIC PANEL: CPT | Performed by: EMERGENCY MEDICINE

## 2023-08-10 PROCEDURE — 83690 ASSAY OF LIPASE: CPT | Performed by: EMERGENCY MEDICINE

## 2023-08-10 PROCEDURE — 36415 COLL VENOUS BLD VENIPUNCTURE: CPT | Performed by: EMERGENCY MEDICINE

## 2023-08-10 PROCEDURE — 76856 US EXAM PELVIC COMPLETE: CPT

## 2023-08-10 PROCEDURE — 84703 CHORIONIC GONADOTROPIN ASSAY: CPT | Performed by: EMERGENCY MEDICINE

## 2023-08-10 PROCEDURE — 85025 COMPLETE CBC W/AUTO DIFF WBC: CPT | Performed by: EMERGENCY MEDICINE

## 2023-08-10 PROCEDURE — 76830 TRANSVAGINAL US NON-OB: CPT

## 2023-08-10 RX ORDER — ACETAMINOPHEN 325 MG/1
975 TABLET ORAL ONCE
Status: COMPLETED | OUTPATIENT
Start: 2023-08-10 | End: 2023-08-10

## 2023-08-10 RX ORDER — KETOROLAC TROMETHAMINE 30 MG/ML
15 INJECTION, SOLUTION INTRAMUSCULAR; INTRAVENOUS ONCE
Status: COMPLETED | OUTPATIENT
Start: 2023-08-10 | End: 2023-08-10

## 2023-08-10 RX ADMIN — ACETAMINOPHEN 975 MG: 325 TABLET, FILM COATED ORAL at 20:42

## 2023-08-10 RX ADMIN — KETOROLAC TROMETHAMINE 15 MG: 30 INJECTION, SOLUTION INTRAMUSCULAR; INTRAVENOUS at 20:42

## 2023-08-11 NOTE — ED PROVIDER NOTES
History  Chief Complaint   Patient presents with   • Abdominal Pain     Patient c/o lower abdominal pain more on the left side x several days, was previously seen here x 2 days ago for same symptoms. Per patient, "the pain isn't getting any better. " Patient had CT scan 2 days ago, requesting ultrasound. Patient is a 81-year-old female past medical history PCOS, migraine presenting with pelvic pain. Patient states she has had bilateral lower quadrant abdominal cramping for the last week and a half sometimes worse on the right side nonradiating. Denies any nausea/vomiting/diarrhea but does note some constipation last bowel movement was today. Was seen 2 days ago and had normal CT he was given Bentyl which is not helping. Is not taking any other medications for pain. Notes irregular periods and states that her last 1 was 6/14. States the pain is stabbing and worse with standing and walking. Has never had before. Denies any urinary symptoms, vaginal discharge, vaginal bleeding, nausea/vomiting/diarrhea, chest pain, shortness of breath, dizziness, fevers. Prior to Admission Medications   Prescriptions Last Dose Informant Patient Reported? Taking?    Cholecalciferol (Dialyvite Vitamin D 5000) 125 MCG (5000 UT) capsule   No No   Sig: Take 1 capsule (5,000 Units total) by mouth daily   Lactobacillus (PROBIOTIC ACIDOPHILUS PO)   Yes No   Sig: Take by mouth   dicyclomine (BENTYL) 20 mg tablet   No No   Sig: Take 1 tablet (20 mg total) by mouth 2 (two) times a day as needed (abdominal pain/cramping)   ferrous sulfate 325 (65 Fe) mg tablet   Yes No   Sig: Take 325 mg by mouth daily with breakfast      Facility-Administered Medications: None       Past Medical History:   Diagnosis Date   • Migraine without aura     OTC med management   • PCOS (polycystic ovarian syndrome)        Past Surgical History:   Procedure Laterality Date   • TURBINATE RESECTION         Family History   Problem Relation Age of Onset   • Hypertension Father    • Colon cancer Neg Hx    • Ovarian cancer Neg Hx    • Breast cancer Neg Hx    • Cancer Neg Hx      I have reviewed and agree with the history as documented. E-Cigarette/Vaping   • E-Cigarette Use Current Some Day User    • Comments very seldom, 3 times a year      E-Cigarette/Vaping Substances     Social History     Tobacco Use   • Smoking status: Never   • Smokeless tobacco: Never   Vaping Use   • Vaping Use: Some days   Substance Use Topics   • Alcohol use: Yes     Alcohol/week: 7.0 standard drinks of alcohol     Types: 7 Glasses of wine per week     Comment: 1 daily   • Drug use: Not Currently     Types: Marijuana       Review of Systems   All other systems reviewed and are negative. Physical Exam  Physical Exam  Vitals reviewed. Constitutional:       General: She is not in acute distress. Appearance: Normal appearance. She is not ill-appearing. HENT:      Mouth/Throat:      Mouth: Mucous membranes are moist.   Eyes:      Conjunctiva/sclera: Conjunctivae normal.      Comments: Normal conjunctiva   Cardiovascular:      Rate and Rhythm: Normal rate and regular rhythm. Heart sounds: Normal heart sounds. Pulmonary:      Effort: Pulmonary effort is normal.      Breath sounds: Normal breath sounds. Abdominal:      General: Abdomen is flat. Palpations: Abdomen is soft. Tenderness: There is abdominal tenderness in the right lower quadrant. There is no right CVA tenderness or left CVA tenderness. Comments: Right lower quadrant tenderness without guarding   Musculoskeletal:         General: No swelling. Normal range of motion. Cervical back: Neck supple. Skin:     General: Skin is warm and dry. Neurological:      General: No focal deficit present. Mental Status: She is alert.    Psychiatric:         Mood and Affect: Mood normal.         Vital Signs  ED Triage Vitals   Temperature Pulse Respirations Blood Pressure SpO2   08/10/23 2014 08/10/23 2014 08/10/23 2014 08/10/23 2014 08/10/23 2014   97.8 °F (36.6 °C) 89 16 151/100 100 %      Temp src Heart Rate Source Patient Position - Orthostatic VS BP Location FiO2 (%)   -- -- -- -- --             Pain Score       08/10/23 2042       3           Vitals:    08/10/23 2014   BP: 151/100   Pulse: 89         Visual Acuity      ED Medications  Medications   ketorolac (TORADOL) injection 15 mg (15 mg Intravenous Given 8/10/23 2042)   acetaminophen (TYLENOL) tablet 975 mg (975 mg Oral Given 8/10/23 2042)       Diagnostic Studies  Results Reviewed     Procedure Component Value Units Date/Time    Comprehensive metabolic panel [[id number]] Collected: 08/10/23 2138    Lab Status: Final result Specimen: Blood from Arm, Right Updated: 08/10/23 2206     Sodium 136 mmol/L      Potassium 3.9 mmol/L      Chloride 107 mmol/L      CO2 24 mmol/L      ANION GAP 5 mmol/L      BUN 8 mg/dL      Creatinine 0.73 mg/dL      Glucose 102 mg/dL      Calcium 8.8 mg/dL      AST 13 U/L      ALT 13 U/L      Alkaline Phosphatase 46 U/L      Total Protein 6.9 g/dL      Albumin 4.2 g/dL      Total Bilirubin 0.36 mg/dL      eGFR 117 ml/min/1.73sq m     Narrative:      [place] guidelines for Chronic Kidney Disease (CKD):   •  Stage 1 with normal or high GFR (GFR > 90 mL/min/1.73 square meters)  •  Stage 2 Mild CKD (GFR = 60-89 mL/min/1.73 square meters)  •  Stage 3A Moderate CKD (GFR = 45-59 mL/min/1.73 square meters)  •  Stage 3B Moderate CKD (GFR = 30-44 mL/min/1.73 square meters)  •  Stage 4 Severe CKD (GFR = 15-29 mL/min/1.73 square meters)  •  Stage 5 End Stage CKD (GFR <15 mL/min/1.73 square meters)  Note: GFR calculation is accurate only with a steady state creatinine    Lipase [[id number]]  (Normal) Collected: 08/10/23 2138    Lab Status: Final result Specimen: Blood from Arm, Right Updated: 08/10/23 2206     Lipase 17 u/L     hCG, qualitative pregnancy [[id number]]  (Normal) Collected: 08/10/23 2041    Lab Status: Final result Specimen: Blood from Arm, Right Updated: 08/10/23 2117     Preg, Serum Negative    CBC and differential [[id number]] Collected: 08/10/23 2041    Lab Status: Final result Specimen: Blood from Arm, Right Updated: 08/10/23 2055     WBC 7.00 Thousand/uL      RBC 4.41 Million/uL      Hemoglobin 12.5 g/dL      Hematocrit 38.2 %      MCV 87 fL      MCH 28.3 pg      MCHC 32.7 g/dL      RDW 12.5 %      MPV 9.7 fL      Platelets 417 Thousands/uL      nRBC 0 /100 WBCs      Neutrophils Relative 48 %      Immat GRANS % 0 %      Lymphocytes Relative 41 %      Monocytes Relative 8 %      Eosinophils Relative 3 %      Basophils Relative 0 %      Neutrophils Absolute 3.34 Thousands/µL      Immature Grans Absolute 0.01 Thousand/uL      Lymphocytes Absolute 2.86 Thousands/µL      Monocytes Absolute 0.58 Thousand/µL      Eosinophils Absolute 0.18 Thousand/µL      Basophils Absolute 0.03 Thousands/µL     UA w Reflex to Microscopic w Reflex to Culture [[id number]]     Lab Status: No result Specimen: Urine                  US pelvis complete w transvaginal   Final Result by Elena Stout MD (08/10 2241)      Unremarkable pelvic ultrasound. Workstation performed: [id number]                    Procedures  Procedures         ED Course  ED Course as of 08/10/23 2248   Thu Aug 10, 2023   2243 Patient notes improvement of pain, patient has no elevated white count, afebrile with no vomiting have low suspicion for appendicitis given normal CT yesterday, normal ultrasound today and have discussed outpatient follow-up with OB/GYN and return precautions which she states she understands. SBIRT 20yo+    Flowsheet Row Most Recent Value   Initial Alcohol Screen: US AUDIT-C     1. How often do you have a drink containing alcohol? 0 Filed at: 08/10/2023 2016   2. How many drinks containing alcohol do you have on a typical day you are drinking? 0 Filed at: 08/10/2023 2016   3a. Male UNDER 65:  How often do you have five or more drinks on one occasion? 0 Filed at: 08/10/2023 2016   3b. FEMALE Any Age, or MALE 65+: How often do you have 4 or more drinks on one occassion? 0 Filed at: 08/10/2023 2016   Audit-C Score 0 Filed at: 08/10/2023 2016   MERISSA: How many times in the past year have you. .. Used an illegal drug or used a prescription medication for non-medical reasons? Never Filed at: 08/10/2023 2016                    Medical Decision Making  Patient is a 21-year-old female past medical history of PCOS, migraine presenting with right lower quadrant abdominal pain. Patient is well-appearing at bedside with stable vitals and in no acute distress. She has right lower quadrant abdominal pain without guarding, no CVA tenderness and no other significant physical exam findings. Patient had CT abdomen pelvis yesterday which did not show any acute pathology, intrarenal stone without obstruction and no other findings. Will obtain pelvic ultrasound today to assess for torsion given patient's history of PCOS, obtain labs and urinalysis to assess for electrolyte abnormalities, anemia, signs of UTI or stone, give pain control and reassess. If normal ultrasound will consider repeat CT and discussed with patient. Amount and/or Complexity of Data Reviewed  Labs: ordered. Radiology: ordered. Risk  OTC drugs. Prescription drug management. Disposition  Final diagnoses:   Abdominal pain     Time reflects when diagnosis was documented in both MDM as applicable and the Disposition within this note     Time User Action Codes Description Comment    8/10/2023 10:47 PM Tho Lerner Add [R10.9] Abdominal pain       ED Disposition     ED Disposition   Discharge    Condition   Stable    Date/Time   Thu Aug 10, 2023 10:47 PM    Comment   Chari Dumont discharge to home/self care.                Follow-up Information     Follow up With Specialties Details Why Contact Info    Your OBGYN  Schedule an appointment as soon as possible for a visit             Patient's Medications   Discharge Prescriptions    No medications on file       No discharge procedures on file.     PDMP Review     None          ED Provider  Electronically Signed by           Bernis Mortimer, DO  08/10/23 1311

## 2023-08-21 ENCOUNTER — TELEPHONE (OUTPATIENT)
Dept: OBGYN CLINIC | Facility: CLINIC | Age: 23
End: 2023-08-21

## 2023-08-22 ENCOUNTER — TELEPHONE (OUTPATIENT)
Dept: OBGYN CLINIC | Facility: CLINIC | Age: 23
End: 2023-08-22

## 2023-10-16 ENCOUNTER — HOSPITAL ENCOUNTER (EMERGENCY)
Facility: HOSPITAL | Age: 23
Discharge: HOME | End: 2023-10-16
Attending: EMERGENCY MEDICINE | Admitting: EMERGENCY MEDICINE
Payer: COMMERCIAL

## 2023-10-16 VITALS
TEMPERATURE: 97.9 F | DIASTOLIC BLOOD PRESSURE: 90 MMHG | SYSTOLIC BLOOD PRESSURE: 135 MMHG | HEART RATE: 93 BPM | RESPIRATION RATE: 18 BRPM | OXYGEN SATURATION: 99 %

## 2023-10-16 DIAGNOSIS — Z32.00 ENCOUNTER FOR PREGNANCY TEST, RESULT UNKNOWN: Primary | ICD-10-CM

## 2023-10-16 LAB
ABO GROUP BLD: NORMAL
B-HCG SERPL-ACNC: 20 MIU/ML (ref 0–5)
RH BLD: POSITIVE

## 2023-10-16 PROCEDURE — 86900 BLOOD TYPING SEROLOGIC ABO: CPT | Performed by: EMERGENCY MEDICINE

## 2023-10-16 PROCEDURE — 36415 COLL VENOUS BLD VENIPUNCTURE: CPT | Performed by: EMERGENCY MEDICINE

## 2023-10-16 PROCEDURE — 86901 BLOOD TYPING SEROLOGIC RH(D): CPT | Performed by: EMERGENCY MEDICINE

## 2023-10-16 PROCEDURE — 84702 CHORIONIC GONADOTROPIN TEST: CPT | Performed by: EMERGENCY MEDICINE

## 2023-10-16 NOTE — DISCHARGE INSTRUCTIONS
A  personal message from Dr. Baltazar Kuo,  Thank you so much for allowing me to care for you today. I pride myself in the care and attention I give all my patients. I hope you were a witness to this tonight. If for any reason your condition does not improve or worsens, or you have a question that was not answered during your visit you can feel free to text me on my personal phone #  # [phone]. I will answer to your message and continue your care past your emergency room visit. Please understand that although you are being discharged because your condition has been deemed stable and able to be managed on an outpatient setting. However your condition may worsen as part of the natural progression of the illness/condition, if this occurs please come back to the emergency department for a repeat evaluation.

## 2023-10-16 NOTE — ED PROVIDER NOTES
History  Chief Complaint   Patient presents with    Pregnancy Test     Would like Hcg blood test, has done three tests and all had thin faint positive     Bettina Villa is a 26y.o.  year old female  Without significant past medical history,   Surgical Hx: none  Denies the use of cigarettes, alcohol, or drugs  That presents to the Emergency Department today with a chief complaint of patient thinks she might be pregnant. She has had 3 pregnancy test at home that have a very faint line so she thinks she might be. This will be her first pregnancy. She has no cramping no bleeding no spotting. She does not know her blood type at this time. History obtained directly from the patient / family        History provided by:  Patient   used: No    Pregnancy Test  Associated symptoms: no abdominal pain, no chest pain, no cough, no ear pain, no fever, no rash, no shortness of breath, no sore throat and no vomiting        Prior to Admission Medications   Prescriptions Last Dose Informant Patient Reported? Taking?    Cholecalciferol (Dialyvite Vitamin D 5000) 125 MCG (5000 UT) capsule   No No   Sig: Take 1 capsule (5,000 Units total) by mouth daily   Lactobacillus (PROBIOTIC ACIDOPHILUS PO)   Yes No   Sig: Take by mouth   dicyclomine (BENTYL) 20 mg tablet   No No   Sig: Take 1 tablet (20 mg total) by mouth 2 (two) times a day as needed (abdominal pain/cramping)   ferrous sulfate 325 (65 Fe) mg tablet   Yes No   Sig: Take 325 mg by mouth daily with breakfast      Facility-Administered Medications: None       Past Medical History:   Diagnosis Date    Migraine without aura     OTC med management    PCOS (polycystic ovarian syndrome)        Past Surgical History:   Procedure Laterality Date    TURBINATE RESECTION         Family History   Problem Relation Age of Onset    Hypertension Father     Colon cancer Neg Hx     Ovarian cancer Neg Hx     Breast cancer Neg Hx     Cancer Neg Hx      I have reviewed and agree with the history as documented. E-Cigarette/Vaping    E-Cigarette Use Current Some Day User     Comments very seldom, 3 times a year      E-Cigarette/Vaping Substances     Social History     Tobacco Use    Smoking status: Never    Smokeless tobacco: Never   Vaping Use    Vaping Use: Some days   Substance Use Topics    Alcohol use: Yes     Alcohol/week: 7.0 standard drinks of alcohol     Types: 7 Glasses of wine per week     Comment: 1 daily    Drug use: Not Currently     Types: Marijuana       Review of Systems   Constitutional:  Negative for chills and fever. HENT:  Negative for ear pain and sore throat. Eyes:  Negative for pain and visual disturbance. Respiratory:  Negative for cough and shortness of breath. Cardiovascular:  Negative for chest pain and palpitations. Gastrointestinal:  Negative for abdominal pain and vomiting. Genitourinary:  Negative for dysuria and hematuria. Musculoskeletal:  Negative for arthralgias and back pain. Skin:  Negative for color change and rash. Neurological:  Negative for seizures and syncope. All other systems reviewed and are negative. Physical Exam  Physical Exam  Vitals and nursing note reviewed. Constitutional:       General: She is not in acute distress. Appearance: Normal appearance. She is well-developed. She is obese. HENT:      Head: Normocephalic and atraumatic. Eyes:      Conjunctiva/sclera: Conjunctivae normal.      Pupils: Pupils are equal, round, and reactive to light. Cardiovascular:      Rate and Rhythm: Normal rate. Heart sounds: No murmur heard. Pulmonary:      Effort: Pulmonary effort is normal. No respiratory distress. Abdominal:      General: Abdomen is flat. Palpations: Abdomen is soft. Tenderness: There is no abdominal tenderness. Musculoskeletal:         General: No swelling. Cervical back: Neck supple. Skin:     General: Skin is warm and dry.       Capillary Refill: Capillary refill takes less than 2 seconds. Neurological:      General: No focal deficit present. Mental Status: She is alert and oriented to person, place, and time. Psychiatric:         Mood and Affect: Mood normal.         Behavior: Behavior normal.         Vital Signs  ED Triage Vitals [10/16/23 1722]   Temperature Pulse Respirations Blood Pressure SpO2   97.9 °F (36.6 °C) 93 18 135/90 99 %      Temp Source Heart Rate Source Patient Position - Orthostatic VS BP Location FiO2 (%)   Temporal Monitor Sitting Left arm --      Pain Score       --           Vitals:    10/16/23 1722   BP: 135/90   Pulse: 93   Patient Position - Orthostatic VS: Sitting         Visual Acuity      ED Medications  Medications - No data to display    Diagnostic Studies  Results Reviewed       Procedure Component Value Units Date/Time    hCG, quantitative [[id number]]  (Abnormal) Collected: 10/16/23 1743    Lab Status: Final result Specimen: Blood from Arm, Right Updated: 10/16/23 1821     HCG, Quant 20 mIU/mL     Narrative:       Expected Ranges:    HCG results between 5 and 25 mIU/mL may be indicative of early pregnancy but should be interpreted in light of the total clinical presentation. HCG can rise to detectable levels in david and post menopausal women (0-11.6 mIU/mL).      Approximate               Approximate HCG  Gestation age          Concentration ( mIU/mL)  _____________          ______________________   Dixie Arnett                      HCG values  0.2-1                       5-50  1-2                           2-3                         100-5000  3-4                         500-04371  4-5                         1000-27570  5-6                         [id number]  6-8                         [id number]  8-12                        [id number]                     No orders to display              Procedures  Procedures         ED Course                               SBIRT 20yo+      Flowsheet Row Most Recent Value   Initial Alcohol Screen: US AUDIT-C     1. How often do you have a drink containing alcohol? 0 Filed at: 10/16/2023 1723   2. How many drinks containing alcohol do you have on a typical day you are drinking? 0 Filed at: 10/16/2023 1723   3a. Male UNDER 65: How often do you have five or more drinks on one occasion? 0 Filed at: 10/16/2023 1723   3b. FEMALE Any Age, or MALE 65+: How often do you have 4 or more drinks on one occassion? 0 Filed at: 10/16/2023 1723   Audit-C Score 0 Filed at: 10/16/2023 1723   MERISSA: How many times in the past year have you. .. Used an illegal drug or used a prescription medication for non-medical reasons? Never Filed at: 10/16/2023 1723                      Medical Decision Making  Patient's quantitative pregnancy test was 20. I asked the patient to come back in 2 days for repeat quantitative pregnancy test.    Amount and/or Complexity of Data Reviewed  Labs: ordered. Risk  OTC drugs. Disposition  Final diagnoses:   Encounter for pregnancy test, result unknown     Time reflects when diagnosis was documented in both MDM as applicable and the Disposition within this note       Time User Action Codes Description Comment    10/16/2023  5:31 PM Augusto Reinoso Add [Z32.00] Encounter for pregnancy test, result unknown           ED Disposition       ED Disposition   Discharge    Condition   Stable    Date/Time   Mon Oct 16, 2023 [address] discharge to home/self care.                    Follow-up Information       Follow up With Specialties Details Why Contact Info    VALENTIN Chery Nurse Practitioner, Family Medicine In 1 week If symptoms worsen [address]  [phone]              Discharge Medication List as of 10/16/2023  5:31 PM        CONTINUE these medications which have NOT CHANGED    Details   Cholecalciferol (Dialyvite Vitamin D 5000) 125 MCG (5000 UT) capsule Take 1 capsule (5,000 Units total) by mouth daily, Starting Tue 1/24/2023, Until Mon 4/24/2023, Normal      dicyclomine (BENTYL) 20 mg tablet Take 1 tablet (20 mg total) by mouth 2 (two) times a day as needed (abdominal pain/cramping), Starting Wed 8/9/2023, Normal      ferrous sulfate 325 (65 Fe) mg tablet Take 325 mg by mouth daily with breakfast, Historical Med      Lactobacillus (PROBIOTIC ACIDOPHILUS PO) Take by mouth, Historical Med             No discharge procedures on file.     PDMP Review       None            ED Provider  Electronically Signed by             Bella Chicas MD  10/17/23 1037

## 2023-10-17 ENCOUNTER — HOSPITAL ENCOUNTER (EMERGENCY)
Facility: HOSPITAL | Age: 23
Discharge: HOME | End: 2023-10-17
Attending: EMERGENCY MEDICINE | Admitting: EMERGENCY MEDICINE
Payer: COMMERCIAL

## 2023-10-17 VITALS
HEART RATE: 94 BPM | RESPIRATION RATE: 18 BRPM | TEMPERATURE: 98.4 F | OXYGEN SATURATION: 98 % | SYSTOLIC BLOOD PRESSURE: 123 MMHG | DIASTOLIC BLOOD PRESSURE: 64 MMHG

## 2023-10-17 DIAGNOSIS — Z34.90 EARLY STAGE OF PREGNANCY: Primary | ICD-10-CM

## 2023-10-17 LAB — B-HCG SERPL-ACNC: 37 MIU/ML (ref 0–5)

## 2023-10-17 PROCEDURE — 84702 CHORIONIC GONADOTROPIN TEST: CPT | Performed by: EMERGENCY MEDICINE

## 2023-10-17 PROCEDURE — 36415 COLL VENOUS BLD VENIPUNCTURE: CPT | Performed by: EMERGENCY MEDICINE

## 2023-10-17 NOTE — ED PROVIDER NOTES
History  Chief Complaint   Patient presents with    Pregnancy Problem     Patient states she has multiple pregnancy tests since yesterday. Seen at [facility] yesterday and told hcg was low. And to come back for a recheck in a few days. Patient is anxious and wants hcg level drawn now. LMP 9/19     Alyssa Li is a 26year old female with a history of PCOS presenting for repeat quantitative hCG testing. Patient has had multiple positive pregnancy tests positive since yesterday, she was seen at the [facility] emergency department yesterday, quantitative hCG was drawn and was greater than 20, Rh factor was positive. She was instructed to get repeat testing in 48 hours, patient was too anxious for which she came to the emergency department today for repeat hCG quantitative testing. She endorses mild abdominal cramping, no vaginal bleeding or spotting. No fevers. Last menstrual period was approximately 4 weeks ago. Denies any prior pregnancies. History provided by:  Patient and medical records   used: No    Pregnancy Problem  Associated symptoms: no abdominal pain, no back pain, no dysuria and no fever        Prior to Admission Medications   Prescriptions Last Dose Informant Patient Reported? Taking?    Cholecalciferol (Dialyvite Vitamin D 5000) 125 MCG (5000 UT) capsule   No No   Sig: Take 1 capsule (5,000 Units total) by mouth daily   Lactobacillus (PROBIOTIC ACIDOPHILUS PO)   Yes No   Sig: Take by mouth   dicyclomine (BENTYL) 20 mg tablet   No No   Sig: Take 1 tablet (20 mg total) by mouth 2 (two) times a day as needed (abdominal pain/cramping)   ferrous sulfate 325 (65 Fe) mg tablet   Yes No   Sig: Take 325 mg by mouth daily with breakfast      Facility-Administered Medications: None       Past Medical History:   Diagnosis Date    Migraine without aura     OTC med management    PCOS (polycystic ovarian syndrome)        Past Surgical History:   Procedure Laterality Date    TURBINATE RESECTION Family History   Problem Relation Age of Onset    Hypertension Father     Colon cancer Neg Hx     Ovarian cancer Neg Hx     Breast cancer Neg Hx     Cancer Neg Hx      I have reviewed and agree with the history as documented. E-Cigarette/Vaping    E-Cigarette Use Current Some Day User     Comments very seldom, 3 times a year      E-Cigarette/Vaping Substances     Social History     Tobacco Use    Smoking status: Never    Smokeless tobacco: Never   Vaping Use    Vaping Use: Some days   Substance Use Topics    Alcohol use: Yes     Alcohol/week: 7.0 standard drinks of alcohol     Types: 7 Glasses of wine per week     Comment: 1 daily    Drug use: Not Currently     Types: Marijuana        Review of Systems   Constitutional:  Negative for chills and fever. HENT:  Negative for ear pain and sore throat. Eyes:  Negative for pain and visual disturbance. Respiratory:  Negative for cough and shortness of breath. Cardiovascular:  Negative for chest pain and palpitations. Gastrointestinal:  Negative for abdominal pain and vomiting. Genitourinary:  Negative for dysuria and hematuria. Musculoskeletal:  Negative for arthralgias and back pain. Skin:  Negative for color change and rash. Neurological:  Negative for seizures and syncope. All other systems reviewed and are negative. Physical Exam  ED Triage Vitals [10/17/23 1111]   Temperature Pulse Respirations Blood Pressure SpO2   98.4 °F (36.9 °C) 94 18 123/64 98 %      Temp Source Heart Rate Source Patient Position - Orthostatic VS BP Location FiO2 (%)   Oral Monitor Sitting Right arm --      Pain Score       --             Orthostatic Vital Signs  Vitals:    10/17/23 1111   BP: 123/64   Pulse: 94   Patient Position - Orthostatic VS: Sitting       Physical Exam  Vitals and nursing note reviewed. Constitutional:       Appearance: Normal appearance. HENT:      Head: Normocephalic and atraumatic.       Mouth/Throat:      Mouth: Mucous membranes are moist.      Pharynx: Oropharynx is clear. Eyes:      General: No scleral icterus. Conjunctiva/sclera: Conjunctivae normal.   Cardiovascular:      Rate and Rhythm: Normal rate and regular rhythm. Heart sounds: Normal heart sounds. Pulmonary:      Effort: Pulmonary effort is normal. No respiratory distress. Breath sounds: Normal breath sounds. Abdominal:      General: Abdomen is flat. There is no distension. Tenderness: There is no abdominal tenderness. Musculoskeletal:         General: No tenderness or signs of injury. Cervical back: Neck supple. No rigidity. Skin:     General: Skin is warm. Coloration: Skin is not jaundiced. Findings: No erythema or rash. Neurological:      General: No focal deficit present. Mental Status: She is alert. Mental status is at baseline. Psychiatric:         Mood and Affect: Mood normal.         Behavior: Behavior normal.         ED Medications  Medications - No data to display    Diagnostic Studies  Results Reviewed       Procedure Component Value Units Date/Time    hCG, quantitative, pregnancy [[id number]]  (Abnormal) Collected: 10/17/23 1129    Lab Status: Final result Specimen: Blood from Arm, Left Updated: 10/17/23 1202     HCG, Quant 37 mIU/mL     Narrative:       Expected Ranges:    HCG results between 5 and 25 mIU/mL may be indicative of early pregnancy but should be interpreted in light of the total clinical presentation. HCG can rise to detectable levels in david and post menopausal women (0-11.6 mIU/mL).      Approximate               Approximate HCG  Gestation age          Concentration ( mIU/mL)  _____________          ______________________   Jeronimo Naval                      HCG values  0.2-1                       5-50  1-2                           2-3                         100-5000  3-4                         500-28545  4-5                         1000-31755  5-6                         [id number]  6-8 [id number]  8-12                        [id number]                     No orders to display         Procedures  Procedures      ED Course                                       Medical Decision Making  Ayde Means is a 26year old female with a history of PCOS presenting for repeat quantitative hCG testing. Patient had several positive pregnancy test yesterday and was seen at the emergency department on room for quantitative hCG which was greater than 20, she was Rh+ at that visit. She was instructed to get repeat hCG in 48 hours, however she was very anxious and wanted to get blood work repeated sooner. She otherwise has no complaints today, no significant abdominal tenderness, mild cramping noted, no vaginal bleeding. hCG was collected here in the emergency department today after which she requested be discharged. Instructed that the hCG should be collected in 48 hours, may not be very helpful in distinguishing between true pregnancy today which she was understanding of, advised that she follow-up with her OB/GYN for further evaluation and work-up of this possible early pregnancy. She was given strict return precautions and agreeable to plan. Amount and/or Complexity of Data Reviewed  Labs: ordered. Disposition  Final diagnoses:   Early stage of pregnancy     Time reflects when diagnosis was documented in both MDM as applicable and the Disposition within this note       Time User Action Codes Description Comment    10/17/2023 11:42 AM Willie Michele Add [Z34.90] Early stage of pregnancy           ED Disposition       ED Disposition   Discharge    Condition   Stable    Date/Time   Tue Oct 17, 2023 11:42 AM    Comment   Demetrius Mullen discharge to home/self care.                    Follow-up Information       Follow up With Specialties Details Why [address] [facility], [address] [facility] Obstetrics and Gynecology, Nurse Practitioner, Obstetrics, Gynecology Schedule an appointment as soon as possible for a visit   [place] [id number]  [phone]              Discharge Medication List as of 10/17/2023 11:43 AM        CONTINUE these medications which have NOT CHANGED    Details   Cholecalciferol (Dialyvite Vitamin D 5000) 125 MCG (5000 UT) capsule Take 1 capsule (5,000 Units total) by mouth daily, Starting Tue 1/24/2023, Until Mon 4/24/2023, Normal      dicyclomine (BENTYL) 20 mg tablet Take 1 tablet (20 mg total) by mouth 2 (two) times a day as needed (abdominal pain/cramping), Starting Wed 8/9/2023, Normal      ferrous sulfate 325 (65 Fe) mg tablet Take 325 mg by mouth daily with breakfast, Historical Med      Lactobacillus (PROBIOTIC ACIDOPHILUS PO) Take by mouth, Historical Med           No discharge procedures on file. PDMP Review       None             ED Provider  Attending physically available and evaluated Jenn Venegas. I managed the patient along with the ED Attending.     Electronically Signed by           Chaz Han DO  10/17/23 7944

## 2023-10-17 NOTE — ED ATTENDING ATTESTATION
10/17/2023  I, Arrie Najjar, MD, saw and evaluated the patient. I have discussed the patient with the resident/non-physician practitioner and agree with the resident's/non-physician practitioner's findings, Plan of Care, and MDM as documented in the resident's/non-physician practitioner's note, except where noted. All available labs and Radiology studies were reviewed. I was present for key portions of any procedure(s) performed by the resident/non-physician practitioner and I was immediately available to provide assistance. At this point I agree with the current assessment done in the Emergency Department. I have conducted an independent evaluation of this patient a history and physical is as follows:    25-year-old G3PO female with a history of PCOS presenting for evaluation of positive pregnancy test.  Patient states she has irregular menstrual cycles, and per her OB documentation from this month, patient's last menstrual cycle was likely at the end of August.  Patient states she takes ovulation test and had regular sexual intercourse throughout the week of ovulation. Patient states yesterday she took several pregnancy test which were positive. Presented to the emergency department at [facility] and had an hCG performed which was 20. Blood type is Rh+. Patient was told to have repeat blood work in 2 days. However, patient states she is "too anxious" and wanted it repeated today. Notes fatigue, breast tenderness, and intermittent nausea. Denies vaginal bleeding or discharge. Notes slight intermittent abdominal cramping. hCG was drawn upon triage; patient is now requesting discharge. Please see resident documentation for history review of systems.     Exam: Vital signs and nursing notes reviewed  General: Awake, alert, anxious  HEENT: Normocephalic, atraumatic, mucous membranes moist  Neck: Supple  Lungs: Symmetric expansion, nonlabored respirations  Abdomen: nondistended  Extremities: No deformity    ED course/medical decision makin-year-old female presenting for evaluation of positive pregnancy test.  Quantitative hCG was 20 yesterday; repeat was sent today, but the patient informed that this test likely will not be very informative. Ideally, would like to obtain hCGs at least 48 hours apart to ensure appropriate doubling. Discussed possibility of early pregnancy versus chemical pregnancy. Patient denies acute complaints and does not require imaging today. Strongly encouraged to follow-up with OB/GYN for further testing and management. Advised to act as if she is pregnant and to avoid tobacco and alcohol use. Patient is in agreement with this plan.     Diagnosis: Positive pregnancy test  Disposition: Discharge

## 2023-10-18 ENCOUNTER — OFFICE VISIT (OUTPATIENT)
Dept: FAMILY MEDICINE CLINIC | Facility: CLINIC | Age: 23
End: 2023-10-18
Payer: COMMERCIAL

## 2023-10-18 VITALS
HEIGHT: 66 IN | SYSTOLIC BLOOD PRESSURE: 108 MMHG | OXYGEN SATURATION: 97 % | HEART RATE: 93 BPM | DIASTOLIC BLOOD PRESSURE: 70 MMHG | BODY MASS INDEX: 39.53 KG/M2 | WEIGHT: 246 LBS | TEMPERATURE: 99.4 F

## 2023-10-18 DIAGNOSIS — Z3A.01 LESS THAN 8 WEEKS GESTATION OF PREGNANCY: ICD-10-CM

## 2023-10-18 DIAGNOSIS — Z32.01 POSITIVE PREGNANCY TEST: Primary | ICD-10-CM

## 2023-10-18 PROCEDURE — 99213 OFFICE O/P EST LOW 20 MIN: CPT | Performed by: NURSE PRACTITIONER

## 2023-10-18 NOTE — PROGRESS NOTES
Assessment/Plan:    Pt is a 26 yr old female   Presents in office post ER follow up   Was seen in ER as she had multiple home pregnancies that were positive and she went to be check in the ER. She did have a positive pregnancy test but still very early in pregnancy. She is feeling ok no spotting or bleeding   She is having a hard time believing as she has history of PCOS. She is already on prenatal vitamins discussed healthy diet adequate hydration ad reportable s/s    Follow up with OB and some follow up labs ordered she can repeat in 2 weeks        Problem List Items Addressed This Visit    None  Visit Diagnoses       Positive pregnancy test    -  Primary    ER follow up    Relevant Orders    Ambulatory Referral to Obstetrics / Gynecology    RPR-Syphilis Screening (Total Syphilis IGG/IGM)    Hepatitis B surface antigen    hCG Ql w/reflex to hCG Qn    CBC and differential    TSH, 3rd generation with Free T4 reflex    UA w Reflex to Microscopic w Reflex to Culture -Lab Collect    Comprehensive metabolic panel    Hepatitis C antibody    HIV 1/2 AB/AG w Reflex [facility] for 2 yr old and above    Less than 8 weeks gestation of pregnancy                  Subjective:      Patient ID: Luisito Benavidez is a 26 y.o. female. Pt is a 26 yr old female   Presents in office post ER follow up   Was seen in ER as she had multiple home pregnancies that were positive and she went to be check in the ER. She did have a positive pregnancy test but still very early in pregnancy. She is feeling ok no spotting or bleeding   She is having a hard time believing as she has history of PCOS.    She is already on prenatal vitamins discussed healthy diet adequate hydration ad reportable s/s    Follow up with OB and some follow up labs ordered she can repeat in 2 weeks         The following portions of the patient's history were reviewed and updated as appropriate:   Past Medical History:  She has a past medical history of Migraine without aura and PCOS (polycystic ovarian syndrome). ,  _______________________________________________________________________  Medical Problems:  does not have a problem list on file.,  _______________________________________________________________________  Past Surgical History:   has a past surgical history that includes Turbinate resection. ,  _______________________________________________________________________  Family History:  family history includes Hypertension in her father.,  _______________________________________________________________________  Social History:   reports that she has never smoked. She has never used smokeless tobacco. She reports current alcohol use of about 7.0 standard drinks of alcohol per week. She reports that she does not currently use drugs after having used the following drugs: Marijuana. ,  _______________________________________________________________________  Allergies:  is allergic to latex. .  _______________________________________________________________________  Current Outpatient Medications   Medication Sig Dispense Refill    Prenatal Vit-Iron Carbonyl-FA (prenatal multivitamin) TABS Take 1 tablet by mouth daily       No current facility-administered medications for this visit.     _______________________________________________________________________  Review of Systems   Constitutional:  Positive for fatigue and unexpected weight change (weight gain ). Negative for fever. HENT:  Negative for congestion, nosebleeds, sore throat and voice change. Eyes: Negative. Respiratory:  Negative for cough and shortness of breath. Cardiovascular:  Negative for chest pain, palpitations and leg swelling. Gastrointestinal:  Negative for abdominal distention, abdominal pain, nausea and vomiting. Missed periods    Endocrine:        PCOS and weight gain    Genitourinary:  Negative for difficulty urinating. Musculoskeletal:  Positive for arthralgias and myalgias. Hip pains and low back pain   Improved a bit    Skin:  Positive for rash (recurrent rashes and skin infections under axilla ). Allergic/Immunologic: Negative for environmental allergies. Neurological:  Negative for headaches. Hematological:  Negative for adenopathy. Psychiatric/Behavioral:  Negative for sleep disturbance and suicidal ideas. The patient is not nervous/anxious. Objective:  Vitals:    10/18/23 0743   BP: 108/70   BP Location: Right arm   Patient Position: Sitting   Cuff Size: Large   Pulse: 93   Temp: 99.4 °F (37.4 °C)   SpO2: 97%   Weight: 112 kg (246 lb)   Height: 5' 6" (1.676 m)     Body mass index is 39.71 kg/m². Physical Exam  Vitals and nursing note reviewed. Constitutional:       Appearance: Normal appearance. HENT:      Head: Atraumatic. Cardiovascular:      Rate and Rhythm: Normal rate and regular rhythm. Pulses: Normal pulses. Heart sounds: Normal heart sounds. Pulmonary:      Effort: Pulmonary effort is normal.      Breath sounds: Normal breath sounds. Abdominal:      Palpations: Abdomen is soft. Musculoskeletal:      Right lower leg: No edema. Left lower leg: No edema. Skin:     General: Skin is warm. Capillary Refill: Capillary refill takes less than 2 seconds. Neurological:      Mental Status: She is alert and oriented to person, place, and time.    Psychiatric:         Mood and Affect: Mood normal.         Behavior: Behavior normal.       LAB WORK REVIEWED FROM  THE ED

## 2023-10-19 ENCOUNTER — HOSPITAL ENCOUNTER (EMERGENCY)
Facility: HOSPITAL | Age: 23
Discharge: HOME | End: 2023-10-19
Attending: EMERGENCY MEDICINE
Payer: COMMERCIAL

## 2023-10-19 VITALS
OXYGEN SATURATION: 99 % | RESPIRATION RATE: 20 BRPM | HEART RATE: 101 BPM | TEMPERATURE: 97.9 F | DIASTOLIC BLOOD PRESSURE: 79 MMHG | SYSTOLIC BLOOD PRESSURE: 135 MMHG

## 2023-10-19 DIAGNOSIS — Z34.90 EARLY STAGE OF PREGNANCY: Primary | ICD-10-CM

## 2023-10-19 LAB — B-HCG SERPL-ACNC: 77 MIU/ML (ref 0–5)

## 2023-10-19 PROCEDURE — 99283 EMERGENCY DEPT VISIT LOW MDM: CPT

## 2023-10-19 PROCEDURE — 84702 CHORIONIC GONADOTROPIN TEST: CPT | Performed by: PHYSICIAN ASSISTANT

## 2023-10-19 PROCEDURE — 99283 EMERGENCY DEPT VISIT LOW MDM: CPT | Performed by: PHYSICIAN ASSISTANT

## 2023-10-19 PROCEDURE — 36415 COLL VENOUS BLD VENIPUNCTURE: CPT | Performed by: PHYSICIAN ASSISTANT

## 2023-10-19 NOTE — ED PROVIDER NOTES
History  Chief Complaint   Patient presents with    Pregnancy Problem     , presents for reevaluation of hcg levels. Reports ongoing cramps, denies vaginal bleeding. 26year old female with a history of PCOS presents to ED with chief complaint of requesting repeat quantitative hCG testing. Patient has had multiple positive pregnancy tests positive this week, she was seen at the [facility] emergency department on 10/16/23, quantitative hCG was drawn and was greater than 20, Rh factor was positive. Was seen at [facility] emergency department on 10/17/23, quantitative HCG was drawn and was 37. She returns today with significant anxiety about her pregnancy and requesting repeat hCG quantitative testing. She reports  mild abdominal cramping, no vaginal bleeding or spotting. No fevers. Last menstrual period was approximately 4 weeks ago. Denies any prior pregnancies. History provided by:  Patient   used: No    Pregnancy Problem  Associated symptoms: no abdominal pain, no back pain, no dysuria and no fever        Prior to Admission Medications   Prescriptions Last Dose Informant Patient Reported? Taking? Prenatal Vit-Iron Carbonyl-FA (prenatal multivitamin) TABS   Yes No   Sig: Take 1 tablet by mouth daily      Facility-Administered Medications: None       Past Medical History:   Diagnosis Date    Migraine without aura     OTC med management    PCOS (polycystic ovarian syndrome)        Past Surgical History:   Procedure Laterality Date    TURBINATE RESECTION         Family History   Problem Relation Age of Onset    Hypertension Father     Colon cancer Neg Hx     Ovarian cancer Neg Hx     Breast cancer Neg Hx     Cancer Neg Hx      I have reviewed and agree with the history as documented.     E-Cigarette/Vaping    E-Cigarette Use Current Some Day User     Comments very seldom, 3 times a year      E-Cigarette/Vaping Substances     Social History     Tobacco Use    Smoking status: Never Smokeless tobacco: Never   Vaping Use    Vaping Use: Some days   Substance Use Topics    Alcohol use: Yes     Alcohol/week: 7.0 standard drinks of alcohol     Types: 7 Glasses of wine per week     Comment: 1 daily    Drug use: Not Currently     Types: Marijuana       Review of Systems   Constitutional:  Negative for chills and fever. HENT:  Negative for ear pain and sore throat. Eyes:  Negative for pain and visual disturbance. Respiratory:  Negative for cough and shortness of breath. Cardiovascular:  Negative for chest pain and palpitations. Gastrointestinal:  Negative for abdominal pain and vomiting. Genitourinary:  Negative for dysuria and hematuria. Musculoskeletal:  Negative for arthralgias and back pain. Skin:  Negative for color change and rash. Neurological:  Negative for seizures and syncope. All other systems reviewed and are negative. Physical Exam  Physical Exam  Vitals and nursing note reviewed. Constitutional:       Appearance: Normal appearance. HENT:      Head: Normocephalic and atraumatic. Mouth/Throat:      Mouth: Mucous membranes are moist.      Pharynx: Oropharynx is clear. Eyes:      General: No scleral icterus. Conjunctiva/sclera: Conjunctivae normal.   Cardiovascular:      Rate and Rhythm: Normal rate and regular rhythm. Heart sounds: Normal heart sounds. Pulmonary:      Effort: Pulmonary effort is normal. No respiratory distress. Breath sounds: Normal breath sounds. Abdominal:      General: Abdomen is flat. There is no distension. Tenderness: There is no abdominal tenderness. Musculoskeletal:         General: No tenderness or signs of injury. Cervical back: Neck supple. No rigidity. Skin:     General: Skin is warm. Coloration: Skin is not jaundiced. Findings: No erythema or rash. Neurological:      General: No focal deficit present. Mental Status: She is alert. Mental status is at baseline.    Psychiatric: Mood and Affect: Mood normal.         Behavior: Behavior normal.         Vital Signs  ED Triage Vitals [10/19/23 0701]   Temperature Pulse Respirations Blood Pressure SpO2   97.9 °F (36.6 °C) 101 20 135/79 99 %      Temp Source Heart Rate Source Patient Position - Orthostatic VS BP Location FiO2 (%)   Temporal Monitor Sitting Left arm --      Pain Score       --           Vitals:    10/19/23 0701   BP: 135/79   Pulse: 101   Patient Position - Orthostatic VS: Sitting         Visual Acuity      ED Medications  Medications - No data to display    Diagnostic Studies  Results Reviewed       Procedure Component Value Units Date/Time    Quantitative hCG [[id number]]  (Abnormal) Collected: 10/19/23 0752    Lab Status: Final result Specimen: Blood from Arm, Right Updated: 10/19/23 0824     HCG, Quant 77 mIU/mL     Narrative:       Expected Ranges:    HCG results between 5 and 25 mIU/mL may be indicative of early pregnancy but should be interpreted in light of the total clinical presentation. HCG can rise to detectable levels in david and post menopausal women (0-11.6 mIU/mL). Approximate               Approximate HCG  Gestation age          Concentration ( mIU/mL)  _____________          ______________________   Leonides Lechuga                      HCG values  0.2-1                       5-50  1-2                           2-3                         100-5000  3-4                         500-29951  4-5                         1000-83874  5-6                         [id number]  6-8                         [id number]  8-12                        [id number]                     No orders to display              Procedures  Procedures         ED Course                                             Medical Decision Making  MDM    hCG was collected here in the emergency department. patient requested be discharged and will follow up on [facility].   Discussed results today may not be helpful in distinguishing between true pregnancy today vs miscarriage. Discussed results today can not assure final outcome of this pregnancy,   which she was understanding of. Instructed follow-up with her OB/GYN for further evaluation and work-up of this possible early pregnancy. No abdominal pain, syncope or hypotension. Doubt ectopic pregnancy. She was given strict return precautions and agreeable to plan. I discussed diagnosis and treatment plan with patient at bedside. Extended discussion with patient regarding the diagnosis, pathophysiology, expectant coarse and treatment plan. Instructed to follow up with pcp and recommended specialist in 3-5 days. Reviewed reasons to return to ed. Patient verbalized understanding of diagnosis and agreement with discharge plan of care as well as understanding of reasons to return to ed. Amount and/or Complexity of Data Reviewed  Labs: ordered. Disposition  Final diagnoses:   Early stage of pregnancy     Time reflects when diagnosis was documented in both MDM as applicable and the Disposition within this note       Time User Action Codes Description Comment    10/19/2023  7:31 AM Marvin Loaiza Add [Z34.90] Early stage of pregnancy           ED Disposition       ED Disposition   Discharge    Condition   Stable    Date/Time   Thu Oct 19, 2023  7:31 AM    Comment   Luisito Been discharge to home/self care.                    Follow-up Information       Follow up With Specialties Details Why Contact Info Additional Information    VALENTIN Colon Nurse Practitioner, Family Medicine Call in 1 week for further evaluation of symptoms [address] [facility] Emergency Department Emergency Medicine Go to  If symptoms worsen [address] [facility] Emergency Department, [address] [facility] [place], [id number]    Zaid Jenkins MD Obstetrics and Gynecology, Obstetrics, Gynecology Call in 2 weeks for further evaluation of symptoms [address] [facility] [address] [facility]  [phone]               Discharge Medication List as of 10/19/2023  7:31 AM        CONTINUE these medications which have NOT CHANGED    Details   Prenatal Vit-Iron Carbonyl-FA (prenatal multivitamin) TABS Take 1 tablet by mouth daily, Historical Med             No discharge procedures on file.     PDMP Review       None            ED Provider  Electronically Signed by             Devora Pandya PA-C  10/19/23 1004

## 2023-10-25 ENCOUNTER — HOSPITAL ENCOUNTER (EMERGENCY)
Facility: HOSPITAL | Age: 23
Discharge: HOME | End: 2023-10-25
Attending: EMERGENCY MEDICINE
Payer: COMMERCIAL

## 2023-10-25 ENCOUNTER — APPOINTMENT (EMERGENCY)
Dept: ULTRASOUND IMAGING | Facility: HOSPITAL | Age: 23
End: 2023-10-25
Payer: COMMERCIAL

## 2023-10-25 ENCOUNTER — APPOINTMENT (OUTPATIENT)
Dept: ULTRASOUND IMAGING | Facility: HOSPITAL | Age: 23
End: 2023-10-25
Payer: COMMERCIAL

## 2023-10-25 VITALS
HEART RATE: 92 BPM | RESPIRATION RATE: 17 BRPM | DIASTOLIC BLOOD PRESSURE: 74 MMHG | SYSTOLIC BLOOD PRESSURE: 133 MMHG | OXYGEN SATURATION: 98 % | TEMPERATURE: 97.6 F

## 2023-10-25 DIAGNOSIS — Z34.90 INTRAUTERINE PREGNANCY: Primary | ICD-10-CM

## 2023-10-25 DIAGNOSIS — R10.2 PELVIC PAIN: ICD-10-CM

## 2023-10-25 LAB
ALBUMIN SERPL BCP-MCNC: 4.5 G/DL (ref 3.5–5)
ALP SERPL-CCNC: 53 U/L (ref 34–104)
ALT SERPL W P-5'-P-CCNC: 20 U/L (ref 7–52)
ANION GAP SERPL CALCULATED.3IONS-SCNC: 8 MMOL/L
AST SERPL W P-5'-P-CCNC: 15 U/L (ref 13–39)
B-HCG SERPL-ACNC: 1643 MIU/ML (ref 0–5)
BASOPHILS # BLD AUTO: 0.02 THOUSANDS/ÂΜL (ref 0–0.1)
BASOPHILS NFR BLD AUTO: 0 % (ref 0–1)
BILIRUB SERPL-MCNC: 0.24 MG/DL (ref 0.2–1)
BILIRUB UR QL STRIP: NEGATIVE
BUN SERPL-MCNC: 6 MG/DL (ref 5–25)
CALCIUM SERPL-MCNC: 9.4 MG/DL (ref 8.4–10.2)
CHLORIDE SERPL-SCNC: 106 MMOL/L (ref 96–108)
CLARITY UR: CLEAR
CO2 SERPL-SCNC: 22 MMOL/L (ref 21–32)
COLOR UR: NORMAL
CREAT SERPL-MCNC: 0.67 MG/DL (ref 0.6–1.3)
EOSINOPHIL # BLD AUTO: 0.11 THOUSAND/ÂΜL (ref 0–0.61)
EOSINOPHIL NFR BLD AUTO: 1 % (ref 0–6)
ERYTHROCYTE [DISTWIDTH] IN BLOOD BY AUTOMATED COUNT: 12.5 % (ref 11.6–15.1)
GFR SERPL CREATININE-BSD FRML MDRD: 125 ML/MIN/1.73SQ M
GLUCOSE SERPL-MCNC: 102 MG/DL (ref 65–140)
GLUCOSE UR STRIP-MCNC: NEGATIVE MG/DL
HCT VFR BLD AUTO: 36.2 % (ref 34.8–46.1)
HGB BLD-MCNC: 11.6 G/DL (ref 11.5–15.4)
HGB UR QL STRIP.AUTO: NEGATIVE
IMM GRANULOCYTES # BLD AUTO: 0.04 THOUSAND/UL (ref 0–0.2)
IMM GRANULOCYTES NFR BLD AUTO: 1 % (ref 0–2)
KETONES UR STRIP-MCNC: NEGATIVE MG/DL
LEUKOCYTE ESTERASE UR QL STRIP: NEGATIVE
LIPASE SERPL-CCNC: 18 U/L (ref 11–82)
LYMPHOCYTES # BLD AUTO: 2.05 THOUSANDS/ÂΜL (ref 0.6–4.47)
LYMPHOCYTES NFR BLD AUTO: 27 % (ref 14–44)
MCH RBC QN AUTO: 28.1 PG (ref 26.8–34.3)
MCHC RBC AUTO-ENTMCNC: 32 G/DL (ref 31.4–37.4)
MCV RBC AUTO: 88 FL (ref 82–98)
MONOCYTES # BLD AUTO: 0.7 THOUSAND/ÂΜL (ref 0.17–1.22)
MONOCYTES NFR BLD AUTO: 9 % (ref 4–12)
NEUTROPHILS # BLD AUTO: 4.83 THOUSANDS/ÂΜL (ref 1.85–7.62)
NEUTS SEG NFR BLD AUTO: 62 % (ref 43–75)
NITRITE UR QL STRIP: NEGATIVE
NRBC BLD AUTO-RTO: 0 /100 WBCS
PH UR STRIP.AUTO: 7 [PH]
PLATELET # BLD AUTO: 300 THOUSANDS/UL (ref 149–390)
PMV BLD AUTO: 8.8 FL (ref 8.9–12.7)
POTASSIUM SERPL-SCNC: 3.8 MMOL/L (ref 3.5–5.3)
PROT SERPL-MCNC: 7.6 G/DL (ref 6.4–8.4)
PROT UR STRIP-MCNC: NEGATIVE MG/DL
RBC # BLD AUTO: 4.13 MILLION/UL (ref 3.81–5.12)
SODIUM SERPL-SCNC: 136 MMOL/L (ref 135–147)
SP GR UR STRIP.AUTO: 1.02 (ref 1–1.03)
UROBILINOGEN UR STRIP-ACNC: <2 MG/DL
WBC # BLD AUTO: 7.75 THOUSAND/UL (ref 4.31–10.16)

## 2023-10-25 PROCEDURE — 81003 URINALYSIS AUTO W/O SCOPE: CPT | Performed by: EMERGENCY MEDICINE

## 2023-10-25 PROCEDURE — 99284 EMERGENCY DEPT VISIT MOD MDM: CPT

## 2023-10-25 PROCEDURE — 84702 CHORIONIC GONADOTROPIN TEST: CPT | Performed by: EMERGENCY MEDICINE

## 2023-10-25 PROCEDURE — 87086 URINE CULTURE/COLONY COUNT: CPT | Performed by: EMERGENCY MEDICINE

## 2023-10-25 PROCEDURE — 76815 OB US LIMITED FETUS(S): CPT

## 2023-10-25 PROCEDURE — 36415 COLL VENOUS BLD VENIPUNCTURE: CPT | Performed by: EMERGENCY MEDICINE

## 2023-10-25 PROCEDURE — 85025 COMPLETE CBC W/AUTO DIFF WBC: CPT | Performed by: EMERGENCY MEDICINE

## 2023-10-25 PROCEDURE — 83690 ASSAY OF LIPASE: CPT | Performed by: EMERGENCY MEDICINE

## 2023-10-25 PROCEDURE — 99285 EMERGENCY DEPT VISIT HI MDM: CPT | Performed by: EMERGENCY MEDICINE

## 2023-10-25 PROCEDURE — 80053 COMPREHEN METABOLIC PANEL: CPT | Performed by: EMERGENCY MEDICINE

## 2023-10-25 NOTE — ED PROVIDER NOTES
History  Chief Complaint   Patient presents with    Abdominal Pain Pregnant     Pt reports 5 weeks pregnant, R sided abdominal pain for two days, nausea     Clarence Traore is 26 y.o. female . Patient presents with:  Abdominal Pain Pregnant: Pt reports 5 weeks pregnant, R sided abdominal pain for two days, nausea   Early pregnancy of 5 weeks. Alaina Mayer today is 1.6k  Here with RLQ pain, sharp, moderate, constant. Pt seen by me earlier in the pregnancy . History provided by:  Patient   used: No        Prior to Admission Medications   Prescriptions Last Dose Informant Patient Reported? Taking? Prenatal Vit-Iron Carbonyl-FA (prenatal multivitamin) TABS   Yes No   Sig: Take 1 tablet by mouth daily      Facility-Administered Medications: None       Past Medical History:   Diagnosis Date    Migraine without aura     OTC med management    PCOS (polycystic ovarian syndrome)        Past Surgical History:   Procedure Laterality Date    TURBINATE RESECTION         Family History   Problem Relation Age of Onset    Hypertension Father     Colon cancer Neg Hx     Ovarian cancer Neg Hx     Breast cancer Neg Hx     Cancer Neg Hx      I have reviewed and agree with the history as documented. E-Cigarette/Vaping    E-Cigarette Use Current Some Day User     Comments very seldom, 3 times a year      E-Cigarette/Vaping Substances     Social History     Tobacco Use    Smoking status: Never    Smokeless tobacco: Never   Vaping Use    Vaping Use: Some days   Substance Use Topics    Alcohol use: Yes     Alcohol/week: 7.0 standard drinks of alcohol     Types: 7 Glasses of wine per week     Comment: 1 daily    Drug use: Not Currently     Types: Marijuana       Review of Systems   Constitutional:  Negative for chills and fever. HENT:  Negative for ear pain and sore throat. Eyes:  Negative for pain and visual disturbance. Respiratory:  Negative for cough and shortness of breath.     Cardiovascular:  Negative for chest pain and palpitations. Gastrointestinal:  Negative for abdominal pain and vomiting. Genitourinary:  Negative for dysuria and hematuria. Musculoskeletal:  Negative for arthralgias and back pain. Skin:  Negative for color change and rash. Neurological:  Negative for seizures and syncope. All other systems reviewed and are negative. Physical Exam  Physical Exam  Vitals and nursing note reviewed. Constitutional:       General: She is not in acute distress. Appearance: Normal appearance. She is well-developed. She is obese. HENT:      Head: Normocephalic and atraumatic. Right Ear: External ear normal.      Left Ear: External ear normal.      Nose: Nose normal.      Mouth/Throat:      Mouth: Mucous membranes are moist.   Eyes:      Conjunctiva/sclera: Conjunctivae normal.      Pupils: Pupils are equal, round, and reactive to light. Cardiovascular:      Rate and Rhythm: Normal rate. Pulses: Normal pulses. Heart sounds: No murmur heard. Pulmonary:      Effort: Pulmonary effort is normal. No respiratory distress. Breath sounds: Normal breath sounds. Abdominal:      General: Abdomen is flat. Bowel sounds are normal.      Palpations: Abdomen is soft. Tenderness: There is abdominal tenderness (rlq, w/o rebound). There is no guarding or rebound. Hernia: No hernia is present. Musculoskeletal:         General: No swelling. Cervical back: Neck supple. Skin:     General: Skin is warm and dry. Capillary Refill: Capillary refill takes less than 2 seconds. Neurological:      General: No focal deficit present. Mental Status: She is alert and oriented to person, place, and time. Psychiatric:         Mood and Affect: Mood normal.         Thought Content:  Thought content normal.         Judgment: Judgment normal.         Vital Signs  ED Triage Vitals [10/25/23 1527]   Temperature Pulse Respirations Blood Pressure SpO2   97.6 °F (36.4 °C) (!) 122 18 168/87 99 %      Temp Source Heart Rate Source Patient Position - Orthostatic VS BP Location FiO2 (%)   Temporal Monitor Sitting Left arm --      Pain Score       --           Vitals:    10/25/23 1527 10/25/23 1740   BP: 168/87 133/74   Pulse: (!) 122 92   Patient Position - Orthostatic VS: Sitting          Visual Acuity      ED Medications  Medications - No data to display    Diagnostic Studies  Results Reviewed       Procedure Component Value Units Date/Time    hCG, quantitative, pregnancy [[id number]]  (Abnormal) Collected: 10/25/23 1534    Lab Status: Final result Specimen: Blood from Arm, Right Updated: 10/25/23 1641     HCG, Quant 1,643 mIU/mL     Narrative:       Expected Ranges:    HCG results between 5 and 25 mIU/mL may be indicative of early pregnancy but should be interpreted in light of the total clinical presentation. HCG can rise to detectable levels in david and post menopausal women (0-11.6 mIU/mL).      Approximate               Approximate HCG  Gestation age          Concentration ( mIU/mL)  _____________          ______________________   Juice Ocampo                      HCG values  0.2-1                       5-50  1-2                           2-3                         100-5000  3-4                         500-33011  4-5                         1000-71697  5-6                         [id number]  6-8                         07919-138762  8-12                        77395-480317      Comprehensive metabolic panel [[id number]] Collected: 10/25/23 1534    Lab Status: Final result Specimen: Blood from Hand, Right Updated: 10/25/23 1603     Sodium 136 mmol/L      Potassium 3.8 mmol/L      Chloride 106 mmol/L      CO2 22 mmol/L      ANION GAP 8 mmol/L      BUN 6 mg/dL      Creatinine 0.67 mg/dL      Glucose 102 mg/dL      Calcium 9.4 mg/dL      AST 15 U/L      ALT 20 U/L      Alkaline Phosphatase 53 U/L      Total Protein 7.6 g/dL      Albumin 4.5 g/dL      Total Bilirubin 0.24 mg/dL      eGFR 125 ml/min/1.73sq m     Narrative:      [facility] guidelines for Chronic Kidney Disease (CKD):     Stage 1 with normal or high GFR (GFR > 90 mL/min/1.73 square meters)    Stage 2 Mild CKD (GFR = 60-89 mL/min/1.73 square meters)    Stage 3A Moderate CKD (GFR = 45-59 mL/min/1.73 square meters)    Stage 3B Moderate CKD (GFR = 30-44 mL/min/1.73 square meters)    Stage 4 Severe CKD (GFR = 15-29 mL/min/1.73 square meters)    Stage 5 End Stage CKD (GFR <15 mL/min/1.73 square meters)  Note: GFR calculation is accurate only with a steady state creatinine    Lipase [[id number]]  (Normal) Collected: 10/25/23 1534    Lab Status: Final result Specimen: Blood from Hand, Right Updated: 10/25/23 1603     Lipase 18 u/L     UA w Reflex to Microscopic w Reflex to Culture [[id number]] Collected: 10/25/23 1536    Lab Status: Final result Specimen: Urine, Clean Catch Updated: 10/25/23 1545     Color, UA Light Yellow     Clarity, UA Clear     Specific Gravity, UA 1.024     pH, UA 7.0     Leukocytes, UA Negative     Nitrite, UA Negative     Protein, UA Negative mg/dl      Glucose, UA Negative mg/dl      Ketones, UA Negative mg/dl      Urobilinogen, UA <2.0 mg/dl      Bilirubin, UA Negative     Occult Blood, UA Negative     URINE COMMENT --    Urine culture [[id number]] Collected: 10/25/23 1536    Lab Status:  In process Specimen: Urine, Clean Catch Updated: 10/25/23 1545    CBC and differential [[id number]]  (Abnormal) Collected: 10/25/23 1534    Lab Status: Final result Specimen: Blood from Hand, Right Updated: 10/25/23 1542     WBC 7.75 Thousand/uL      RBC 4.13 Million/uL      Hemoglobin 11.6 g/dL      Hematocrit 36.2 %      MCV 88 fL      MCH 28.1 pg      MCHC 32.0 g/dL      RDW 12.5 %      MPV 8.8 fL      Platelets 725 Thousands/uL      nRBC 0 /100 WBCs      Neutrophils Relative 62 %      Immat GRANS % 1 %      Lymphocytes Relative 27 %      Monocytes Relative 9 %      Eosinophils Relative 1 %      Basophils Relative 0 %      Neutrophils Absolute 4.83 Thousands/µL      Immature Grans Absolute 0.04 Thousand/uL      Lymphocytes Absolute 2.05 Thousands/µL      Monocytes Absolute 0.70 Thousand/µL      Eosinophils Absolute 0.11 Thousand/µL      Basophils Absolute 0.02 Thousands/µL                    US OB pregnancy limited with transvaginal    (Results Pending)              Procedures  Procedures         ED Course  ED Course as of 10/25/23 2049   Wed Oct 25, 2023   1717 HCG QUANTITATIVE(!): 1,643   1717 Color, UA: Light Yellow   1717 Leukocytes, UA: Negative   1717 Nitrite, UA: Negative   1717 WBC: 7.75   1717 Hemoglobin: 11.6   1717 HCT: 36.2                               SBIRT 22yo+      Flowsheet Row Most Recent Value   Initial Alcohol Screen: US AUDIT-C     1. How often do you have a drink containing alcohol? 0 Filed at: 10/25/2023 1528   2. How many drinks containing alcohol do you have on a typical day you are drinking? 0 Filed at: 10/25/2023 1528   3a. Male UNDER 65: How often do you have five or more drinks on one occasion? 0 Filed at: 10/25/2023 1528   3b. FEMALE Any Age, or MALE 65+: How often do you have 4 or more drinks on one occassion? 0 Filed at: 10/25/2023 1528   Audit-C Score 0 Filed at: 10/25/2023 1528   MERISSA: How many times in the past year have you. .. Used an illegal drug or used a prescription medication for non-medical reasons? Never Filed at: 10/25/2023 1528                      Medical Decision Making  Patient presents emergency department with suprapubic abdominal pain. Differential diagnosis includes but not limited to: Cystitis, UTI, kidney stone/renal colic, colitis, appendicitis, Pregnancy (ectopic), ovarian cyst, ovarian cyst rupture, ovarian torsion. Adhesions. US + IUP no fetal heart beat yet. Normal ovaries     Amount and/or Complexity of Data Reviewed  Labs: ordered. Decision-making details documented in ED Course. Radiology: ordered. Risk  OTC drugs.              Disposition  Final diagnoses:   Intrauterine pregnancy   Pelvic pain     Time reflects when diagnosis was documented in both MDM as applicable and the Disposition within this note       Time User Action Codes Description Comment    10/25/2023  8:49 PM Emily Fernando, [address] [N20.24] Intrauterine pregnancy     10/25/2023  8:49 PM Augusto Wolff Add [R10.2] Pelvic pain           ED Disposition       ED Disposition   Discharge    Condition   Stable    Date/Time   Wed Oct 25, 2023 2049    Comment   Leana Echevarria discharge to home/self care. Follow-up Information    None         Patient's Medications   Discharge Prescriptions    No medications on file       No discharge procedures on file.     PDMP Review       None            ED Provider  Electronically Signed by             Ming Bello MD  10/25/23 2049

## 2023-10-26 LAB — BACTERIA UR CULT: NORMAL

## 2023-10-26 NOTE — DISCHARGE INSTRUCTIONS
A  personal message from Dr. Katarina Motley,  Thank you so much for allowing me to care for you today. I pride myself in the care and attention I give all my patients. I hope you were a witness to this tonight. If for any reason your condition does not improve or worsens, or you have a question that was not answered during your visit you can feel free to text me on my personal phone #  # [phone]. I will answer to your message and continue your care past your emergency room visit. Please understand that although you are being discharged because your condition has been deemed stable and able to be managed on an outpatient setting. However your condition may worsen as part of the natural progression of the illness/condition, if this occurs please come back to the emergency department for a repeat evaluation.

## 2023-10-31 ENCOUNTER — LAB (OUTPATIENT)
Dept: LAB | Facility: HOSPITAL | Age: 23
End: 2023-10-31
Payer: COMMERCIAL

## 2023-10-31 ENCOUNTER — HOSPITAL ENCOUNTER (EMERGENCY)
Facility: HOSPITAL | Age: 23
Discharge: HOME | End: 2023-10-31
Attending: EMERGENCY MEDICINE | Admitting: EMERGENCY MEDICINE
Payer: COMMERCIAL

## 2023-10-31 VITALS
RESPIRATION RATE: 18 BRPM | SYSTOLIC BLOOD PRESSURE: 140 MMHG | DIASTOLIC BLOOD PRESSURE: 70 MMHG | OXYGEN SATURATION: 100 % | TEMPERATURE: 97.9 F | HEART RATE: 94 BPM

## 2023-10-31 DIAGNOSIS — Z01.89 ENCOUNTER FOR LABORATORY TEST: Primary | ICD-10-CM

## 2023-10-31 DIAGNOSIS — Z32.01 POSITIVE PREGNANCY TEST: Primary | ICD-10-CM

## 2023-10-31 LAB
ALBUMIN SERPL BCP-MCNC: 4.6 G/DL (ref 3.5–5)
ALP SERPL-CCNC: 54 U/L (ref 34–104)
ALT SERPL W P-5'-P-CCNC: 16 U/L (ref 7–52)
ANION GAP SERPL CALCULATED.3IONS-SCNC: 7 MMOL/L
AST SERPL W P-5'-P-CCNC: 12 U/L (ref 13–39)
B-HCG SERPL-ACNC: ABNORMAL MIU/ML (ref 0–5)
BASOPHILS # BLD AUTO: 0.02 THOUSANDS/ÂΜL (ref 0–0.1)
BASOPHILS NFR BLD AUTO: 0 % (ref 0–1)
BILIRUB SERPL-MCNC: 0.25 MG/DL (ref 0.2–1)
BILIRUB UR QL STRIP: NEGATIVE
BUN SERPL-MCNC: 8 MG/DL (ref 5–25)
CALCIUM SERPL-MCNC: 9.3 MG/DL (ref 8.4–10.2)
CHLORIDE SERPL-SCNC: 104 MMOL/L (ref 96–108)
CLARITY UR: CLEAR
CO2 SERPL-SCNC: 24 MMOL/L (ref 21–32)
COLOR UR: NORMAL
CREAT SERPL-MCNC: 0.68 MG/DL (ref 0.6–1.3)
EOSINOPHIL # BLD AUTO: 0.12 THOUSAND/ÂΜL (ref 0–0.61)
EOSINOPHIL NFR BLD AUTO: 1 % (ref 0–6)
ERYTHROCYTE [DISTWIDTH] IN BLOOD BY AUTOMATED COUNT: 12.8 % (ref 11.6–15.1)
GFR SERPL CREATININE-BSD FRML MDRD: 124 ML/MIN/1.73SQ M
GLUCOSE P FAST SERPL-MCNC: 90 MG/DL (ref 65–99)
GLUCOSE UR STRIP-MCNC: NEGATIVE MG/DL
HBV SURFACE AG SER QL: NORMAL
HCG SERPL QL: POSITIVE
HCT VFR BLD AUTO: 35.7 % (ref 34.8–46.1)
HCV AB SER QL: NORMAL
HGB BLD-MCNC: 11.3 G/DL (ref 11.5–15.4)
HGB UR QL STRIP.AUTO: NEGATIVE
HIV 1+2 AB+HIV1 P24 AG SERPL QL IA: NORMAL
HIV 2 AB SERPL QL IA: NORMAL
HIV1 AB SERPL QL IA: NORMAL
HIV1 P24 AG SERPL QL IA: NORMAL
IMM GRANULOCYTES # BLD AUTO: 0.05 THOUSAND/UL (ref 0–0.2)
IMM GRANULOCYTES NFR BLD AUTO: 1 % (ref 0–2)
KETONES UR STRIP-MCNC: NEGATIVE MG/DL
LEUKOCYTE ESTERASE UR QL STRIP: NEGATIVE
LYMPHOCYTES # BLD AUTO: 2.35 THOUSANDS/ÂΜL (ref 0.6–4.47)
LYMPHOCYTES NFR BLD AUTO: 27 % (ref 14–44)
MCH RBC QN AUTO: 27.9 PG (ref 26.8–34.3)
MCHC RBC AUTO-ENTMCNC: 31.7 G/DL (ref 31.4–37.4)
MCV RBC AUTO: 88 FL (ref 82–98)
MONOCYTES # BLD AUTO: 0.7 THOUSAND/ÂΜL (ref 0.17–1.22)
MONOCYTES NFR BLD AUTO: 8 % (ref 4–12)
NEUTROPHILS # BLD AUTO: 5.41 THOUSANDS/ÂΜL (ref 1.85–7.62)
NEUTS SEG NFR BLD AUTO: 63 % (ref 43–75)
NITRITE UR QL STRIP: NEGATIVE
NRBC BLD AUTO-RTO: 0 /100 WBCS
PH UR STRIP.AUTO: 6.5 [PH]
PLATELET # BLD AUTO: 295 THOUSANDS/UL (ref 149–390)
PMV BLD AUTO: 8.9 FL (ref 8.9–12.7)
POTASSIUM SERPL-SCNC: 3.7 MMOL/L (ref 3.5–5.3)
PROT SERPL-MCNC: 7.7 G/DL (ref 6.4–8.4)
PROT UR STRIP-MCNC: NEGATIVE MG/DL
RBC # BLD AUTO: 4.05 MILLION/UL (ref 3.81–5.12)
SODIUM SERPL-SCNC: 135 MMOL/L (ref 135–147)
SP GR UR STRIP.AUTO: 1.03 (ref 1–1.03)
TREPONEMA PALLIDUM IGG+IGM AB [PRESENCE] IN SERUM OR PLASMA BY IMMUNOASSAY: NORMAL
TSH SERPL DL<=0.05 MIU/L-ACNC: 4.48 UIU/ML (ref 0.45–4.5)
UROBILINOGEN UR STRIP-ACNC: <2 MG/DL
WBC # BLD AUTO: 8.65 THOUSAND/UL (ref 4.31–10.16)

## 2023-10-31 PROCEDURE — 80053 COMPREHEN METABOLIC PANEL: CPT

## 2023-10-31 PROCEDURE — 84443 ASSAY THYROID STIM HORMONE: CPT

## 2023-10-31 PROCEDURE — 81003 URINALYSIS AUTO W/O SCOPE: CPT

## 2023-10-31 PROCEDURE — 87086 URINE CULTURE/COLONY COUNT: CPT

## 2023-10-31 PROCEDURE — 86780 TREPONEMA PALLIDUM: CPT

## 2023-10-31 PROCEDURE — 36415 COLL VENOUS BLD VENIPUNCTURE: CPT

## 2023-10-31 PROCEDURE — 87340 HEPATITIS B SURFACE AG IA: CPT

## 2023-10-31 PROCEDURE — 84703 CHORIONIC GONADOTROPIN ASSAY: CPT

## 2023-10-31 PROCEDURE — 86803 HEPATITIS C AB TEST: CPT

## 2023-10-31 PROCEDURE — 84702 CHORIONIC GONADOTROPIN TEST: CPT | Performed by: EMERGENCY MEDICINE

## 2023-10-31 PROCEDURE — 87389 HIV-1 AG W/HIV-1&-2 AB AG IA: CPT

## 2023-10-31 PROCEDURE — 85025 COMPLETE CBC W/AUTO DIFF WBC: CPT

## 2023-10-31 PROCEDURE — 99281 EMR DPT VST MAYX REQ PHY/QHP: CPT

## 2023-10-31 PROCEDURE — 99283 EMERGENCY DEPT VISIT LOW MDM: CPT | Performed by: EMERGENCY MEDICINE

## 2023-10-31 NOTE — DISCHARGE INSTRUCTIONS
Please follow up PCP and OB. Recommend tylenol 650 mg every 6 hours as needed for pain. Please return for severe chest pain, significant shortness of breath, severely worsening symptoms, or any other concerning signs or symptoms. Please refer to the following documents for additional instructions and return precautions.

## 2023-10-31 NOTE — ED PROVIDER NOTES
History  Chief Complaint   Patient presents with    Labs Only     Pt here for quantitative hcg, had out patient labs and they jair a qualitative instead. Pt having brown discharge, 6 weeks pregnant      81-year-old female with no significant reported past history presenting for blood testing. Patient reports early first trimester pregnancy. Reports persistent lower abdominal cramping for the past several days which has remained unchanged. Was seen and evaluated in the ED a few days ago. Normal ultrasound and quant. Was suppose to get follow-up quant today but had a qualitative run instead. Denies any bleeding. Denies any chest pain shortness of breath. Denies any other complaints. Chart reviewed. Past Medical History:  No date: Migraine without aura      Comment:  OTC med management  No date: PCOS (polycystic ovarian syndrome)  Family History: non-contributory  Social History          Prior to Admission Medications   Prescriptions Last Dose Informant Patient Reported? Taking? Prenatal Vit-Iron Carbonyl-FA (prenatal multivitamin) TABS   Yes No   Sig: Take 1 tablet by mouth daily      Facility-Administered Medications: None       Past Medical History:   Diagnosis Date    Migraine without aura     OTC med management    PCOS (polycystic ovarian syndrome)        Past Surgical History:   Procedure Laterality Date    TURBINATE RESECTION         Family History   Problem Relation Age of Onset    Hypertension Father     Colon cancer Neg Hx     Ovarian cancer Neg Hx     Breast cancer Neg Hx     Cancer Neg Hx      I have reviewed and agree with the history as documented. E-Cigarette/Vaping    E-Cigarette Use Current Some Day User     Comments very seldom, 3 times a year      E-Cigarette/Vaping Substances     Social History     Tobacco Use    Smoking status: Never    Smokeless tobacco: Never   Vaping Use    Vaping Use: Some days   Substance Use Topics    Alcohol use:  Yes     Alcohol/week: 7.0 standard drinks of alcohol     Types: 7 Glasses of wine per week     Comment: 1 daily    Drug use: Not Currently     Types: Marijuana       Review of Systems   Constitutional:  Negative for appetite change, chills, diaphoresis, fever and unexpected weight change. HENT:  Negative for congestion and rhinorrhea. Eyes:  Negative for photophobia and visual disturbance. Respiratory:  Negative for cough, chest tightness and shortness of breath. Cardiovascular:  Negative for chest pain, palpitations and leg swelling. Gastrointestinal:  Positive for abdominal pain. Negative for abdominal distention, blood in stool, constipation, diarrhea, nausea and vomiting. Genitourinary:  Negative for dysuria and hematuria. Musculoskeletal:  Negative for back pain, joint swelling, neck pain and neck stiffness. Skin:  Negative for color change, pallor, rash and wound. Neurological:  Negative for dizziness, syncope, weakness, light-headedness and headaches. Psychiatric/Behavioral:  Negative for agitation. All other systems reviewed and are negative. Physical Exam  Physical Exam  Vitals and nursing note reviewed. Constitutional:       General: She is not in acute distress. Appearance: Normal appearance. She is well-developed. She is not ill-appearing, toxic-appearing or diaphoretic. HENT:      Head: Normocephalic and atraumatic. Nose: Nose normal. No congestion or rhinorrhea. Mouth/Throat:      Mouth: Mucous membranes are moist.      Pharynx: Oropharynx is clear. No oropharyngeal exudate or posterior oropharyngeal erythema. Eyes:      General: No scleral icterus. Right eye: No discharge. Left eye: No discharge. Extraocular Movements: Extraocular movements intact. Conjunctiva/sclera: Conjunctivae normal.      Pupils: Pupils are equal, round, and reactive to light. Neck:      Vascular: No JVD. Trachea: No tracheal deviation. Comments: Supple. Normal range of motion. Cardiovascular:      Rate and Rhythm: Normal rate and regular rhythm. Heart sounds: Normal heart sounds. No murmur heard. No friction rub. No gallop. Comments: Normal rate and regular rhythm  Pulmonary:      Effort: Pulmonary effort is normal. No respiratory distress. Breath sounds: Normal breath sounds. No stridor. No wheezing or rales. Comments: Clear to auscultation bilaterally  Chest:      Chest wall: No tenderness. Abdominal:      General: Bowel sounds are normal. There is no distension. Palpations: Abdomen is soft. Tenderness: There is no abdominal tenderness. There is no right CVA tenderness, left CVA tenderness, guarding or rebound. Comments: Soft, nontender, nondistended. Normal bowel sounds throughout   Musculoskeletal:         General: No swelling, tenderness, deformity or signs of injury. Normal range of motion. Cervical back: Normal range of motion and neck supple. No rigidity. No muscular tenderness. Right lower leg: No edema. Left lower leg: No edema. Lymphadenopathy:      Cervical: No cervical adenopathy. Skin:     General: Skin is warm and dry. Coloration: Skin is not pale. Findings: No erythema or rash. Neurological:      General: No focal deficit present. Mental Status: She is alert. Mental status is at baseline. Sensory: No sensory deficit. Motor: No weakness or abnormal muscle tone. Coordination: Coordination normal.      Gait: Gait normal.      Comments: Alert. Strength and sensation grossly intact. Ambulatory without difficulty at baseline. Psychiatric:         Behavior: Behavior normal.         Thought Content:  Thought content normal.         Vital Signs  ED Triage Vitals [10/31/23 1745]   Temperature Pulse Respirations Blood Pressure SpO2   97.9 °F (36.6 °C) 94 18 140/70 100 %      Temp Source Heart Rate Source Patient Position - Orthostatic VS BP Location FiO2 (%)   Temporal Monitor Sitting Left arm --      Pain Score       --           Vitals:    10/31/23 1745   BP: 140/70   Pulse: 94   Patient Position - Orthostatic VS: Sitting         Visual Acuity      ED Medications  Medications - No data to display    Diagnostic Studies  Results Reviewed       Procedure Component Value Units Date/Time    hCG, quantitative [[id number]] Collected: 10/31/23 1755    Lab Status: In process Specimen: Blood from Arm, Right Updated: 10/31/23 1800                   No orders to display              Procedures  Procedures         ED Course                                             Medical Decision Making  67-year-old female with no significant reported past history presenting for blood testing. Patient already has an OB and is taking prenatal vitamin. Unchanged symptoms. Here today for quantitative testing. Ordered. Discussed results and recommendations. Advised follow up PCP and OB. Medication recommendations. Given instructions and return precautions. Patient/family at bedside acknowledged understanding of all written and verbal instructions and return precautions. Discharged. Amount and/or Complexity of Data Reviewed  Labs: ordered. Disposition  Final diagnoses:   Encounter for laboratory test     Time reflects when diagnosis was documented in both MDM as applicable and the Disposition within this note       Time User Action Codes Description Comment    10/31/2023  5:59 PM Hesham Carrillo Add [Z01.89] Encounter for laboratory test           ED Disposition       ED Disposition   Discharge    Condition   Stable    Date/Time   Tue Oct 31, 2023  5:59 PM    Comment   Beckie Bermudez discharge to home/self care.                    Follow-up Information       Follow up With Specialties Details Why Contact Info Additional Information    VALENTIN Mondragon Nurse Practitioner, Family Medicine Schedule an appointment as soon as possible for a visit in 1 week  Veda [address] [facility]  Obstetrics and Gynecology Schedule an appointment as soon as possible for a visit in 1 week  [address] [facility], [address] [place] [address], [address] [place] [address]            Patient's Medications   Discharge Prescriptions    No medications on file       No discharge procedures on file.     PDMP Review       None            ED Provider  Electronically Signed by             Mariela Gamble MD  10/31/23 8451

## 2023-11-02 LAB — BACTERIA UR CULT: NORMAL

## 2023-11-14 ENCOUNTER — OFFICE VISIT (OUTPATIENT)
Dept: OBGYN CLINIC | Facility: CLINIC | Age: 23
End: 2023-11-14
Payer: COMMERCIAL

## 2023-11-14 VITALS
DIASTOLIC BLOOD PRESSURE: 62 MMHG | HEIGHT: 66 IN | SYSTOLIC BLOOD PRESSURE: 122 MMHG | WEIGHT: 256.5 LBS | BODY MASS INDEX: 41.22 KG/M2

## 2023-11-14 DIAGNOSIS — E03.8 SUBCLINICAL HYPOTHYROIDISM: ICD-10-CM

## 2023-11-14 DIAGNOSIS — N91.2 AMENORRHEA: Primary | ICD-10-CM

## 2023-11-14 PROCEDURE — 99204 OFFICE O/P NEW MOD 45 MIN: CPT | Performed by: PHYSICIAN ASSISTANT

## 2023-11-14 PROCEDURE — 76817 TRANSVAGINAL US OBSTETRIC: CPT | Performed by: PHYSICIAN ASSISTANT

## 2023-11-14 RX ORDER — LEVOTHYROXINE SODIUM 0.03 MG/1
25 TABLET ORAL DAILY
Qty: 30 TABLET | Refills: 0 | Status: SHIPPED | OUTPATIENT
Start: 2023-11-14

## 2023-11-14 NOTE — PROGRESS NOTES
Assessment/Plan:  - Viable IUP @ 8w 1d EGA  - MARIANO 24  - Continue PNV  - TSH 4.4, will start synthroid, target <2.5  - Patient to call for concerns  - RTO 3 weeks for OB intake    Encounter Diagnosis     ICD-10-CM    1. Amenorrhea  N91.2 Ambulatory Referral to Maternal Fetal Medicine      2. Subclinical hypothyroidism  E03.8 levothyroxine (Euthyrox) 25 mcg tablet                Subjective:       Patient ID: Rush Garcia [id number]        Rush Garcia is a 26 y.o. Buzz Zhao presenting to the office for pregnancy confirmation. Patient's last menstrual period was 2023. , placing her at [address] today with MARIANO of 24. She is feeling ok today. She had some spotting which has now resolved. OB History    Para Term  AB Living   1 0 0 0 0 0   SAB IAB Ectopic Multiple Live Births   0 0 0 0 0      # Outcome Date GA Lbr Brian/2nd Weight Sex Delivery Anes PTL Lv   1 Current               Obstetric Comments   Believes she suffered a miscarriage in . Positive pregnancy tests, and then had heavy bleeding shortly ever. Never confirmed with OBGYN          The following portions of the patient's history were reviewed and updated as appropriate: allergies, current medications, past family history, past medical history, past social history, past surgical history, and problem list.    Allergies:  Latex    Medications:    Current Outpatient Medications:     levothyroxine (Euthyrox) 25 mcg tablet, Take 1 tablet (25 mcg total) by mouth daily, Disp: 30 tablet, Rfl: 0    Prenatal Vit-Iron Carbonyl-FA (prenatal multivitamin) TABS, Take 1 tablet by mouth daily, Disp: , Rfl:       Review of Systems:   Review of Systems   Constitutional:  Negative for chills, fever and unexpected weight change. Respiratory:  Negative for shortness of breath. Cardiovascular:  Negative for chest pain. Gastrointestinal:  Negative for abdominal pain, diarrhea, nausea and vomiting. Skin:  Negative for rash. Objective:       Visit Vitals  /62 (BP Location: Left arm, Patient Position: Sitting, Cuff Size: Standard)   Ht 5' 6" (1.676 m)   Wt 116 kg (256 lb 8 oz)   LMP 09/18/2023   BMI 41.40 kg/m²   OB Status Pregnant   Smoking Status Never   BSA 2.22 m²        GEN: The patient was alert and oriented x3, pleasant well-appearing female in no acute distress. CV: Regular rate  PULM: nonlabored respirations  MSK: Normal gait  : WNL  Skin: warm, dry  Neuro: no focal deficits  Psych: normal affect and judgement, cooperative    Ultrasound:     Viability US     Gestational sac: present               Location: intrauterine  Yolk sac: present  Fetal pole: present               CRL: 1.64 cm = 8w0d  Cardiac activity: present               Rate: 176 bpm     Ovaries: normal appearing bilaterally  Cul de sac: absence of free fluid  Uterus: normal in appearance           Ultrasound Probe Disinfection    A transvaginal ultrasound was performed.    Prior to use, disinfection was performed with High Level Disinfection Process (Trophon)  Probe serial number [id number]: [id number] was used    Chong Dean PA-C  11/14/23  3:19 PM

## 2023-11-15 ENCOUNTER — TELEPHONE (OUTPATIENT)
Facility: HOSPITAL | Age: 23
End: 2023-11-15

## 2023-11-15 NOTE — TELEPHONE ENCOUNTER
Patient left voicemail at 11:09am stating she had a referral from her OB to schedule an appointment and requested a call back to schedule. Returned patients call to schedule appointment. Left voicemail requesting she give our office a call back at [phone].

## 2023-11-29 ENCOUNTER — INITIAL PRENATAL (OUTPATIENT)
Dept: OBGYN CLINIC | Facility: CLINIC | Age: 23
End: 2023-11-29

## 2023-11-29 VITALS
WEIGHT: 255.4 LBS | HEART RATE: 91 BPM | OXYGEN SATURATION: 98 % | RESPIRATION RATE: 16 BRPM | DIASTOLIC BLOOD PRESSURE: 74 MMHG | SYSTOLIC BLOOD PRESSURE: 112 MMHG | HEIGHT: 66 IN | BODY MASS INDEX: 41.05 KG/M2

## 2023-11-29 DIAGNOSIS — Z34.00 SUPERVISION OF NORMAL FIRST PREGNANCY, ANTEPARTUM: ICD-10-CM

## 2023-11-29 DIAGNOSIS — Z3A.10 10 WEEKS GESTATION OF PREGNANCY: Primary | ICD-10-CM

## 2023-11-29 PROCEDURE — OBC

## 2023-11-29 NOTE — PROGRESS NOTES
Wendy Goldstein presents for OB intake interview. She is a pleasant 23yr old -, however, she had a +UPT (faintly)in the past with (-) resulted quant. Wendy Goldstein has a history of PCOS and an elevated TSH. Thyrid levels were added to her OB Panel. This pregnancy has been complicated with early bleeding, confirmation ultrasounds at the office and the the ER were both reassuring. Patient offers no concerns during today's visit. Patient oriented to practice, different practice providers, different practice locations. Reviewed expected prenatal visit schedule. PLAN-  -Prenatal labs ordered  -Referral given for MFM  -Reviewed Genetic Testing options  -Patient to contact us with any concerns  -RTO for OB visit with pap and cultures if indicated      OB History          1    Para   0    Term   0       0    AB   0    Living   0         SAB   0    IAB   0    Ectopic   0    Multiple   0    Live Births   0           Obstetric Comments   Believes she suffered a miscarriage in . Positive pregnancy tests, and then had heavy bleeding shortly ever. Never confirmed with OBGYN   Menarche 13               Hx of  delivery prior to 36 weeks 6 days: NO     Last Menstrual Period: Patient's last menstrual period was 2023. Confirmation ultrasound on 2023: 8w1d       Estimated Date of Delivery: 2024                Signs/Symptoms of Pregnancy                            Breast tenderness:  YES              Fatigue:   YES              Cramping:   YES (scant)              Nausea:  NO  Vomiting:   NO     Pregravid BMI: Body mass index is 39.08  Discussed appropriate weight gain in pregnancy based on pre-gravid BMI.      Diabetes              Pregestational DM:  NO              hx of GDM: NO              BMI >35: YES              first degree relative with type 2 diabetes: NO              hx of PCOS: YES              current metformin use: NO               prior hx of LGA/macrosomia: NO Hypertension              Hx of chronic HTN: NO              hx of gestational HTN: NO              hx of preeclampsia, eclampsia, or HELLP syndrome: NO               Family h/o preeclampsia: NO              Age 28 or older: NO              Multifetal gestation: NO  Type 1 or Type 2 DM: NO  Renal Disease: NO  Autoimmune disease (systemic lupus erythematosus, antiphospholipid antibody syndrome): NO  Nulliparity: YES  Obesity (BMI over 30): YES  More than 10 year pregnancy interval: NO  Previous IUGR, low birthweight or small for gestational age: NO        Infection Screening              Does the pt have a hx of MRSA?  NO              Recent travel outside of [place]? NO, plans to travel during pregnancy       Immunizations:              Influenza vaccine given today: NO (declined a this time)              Discussed Tdap vaccine administration at 27-28 weeks   COVID vaccine discussed   RSV vaccine discussed     Interview education              [facility] Pregnancy Essentials Book reviewed and discussed                          Handouts given                          Nutrition During Pregnancy  Prenatal Genetic Screening Tests                          Immunization & Pregnancy                          Medications & Pregnancy                          Warning Signs During Pregnancy                          Baby and Me support center                          [facility] [address] in Pregnancy    Dental visit with last 6 months - YES (with orthodontist)  PHQ-2/9 score: 0         MyChart activated (not 1518 years of age)?: yes       [facility]: N/A  ONAF form submitted: N/A

## 2023-11-30 ENCOUNTER — LAB (OUTPATIENT)
Dept: LAB | Facility: CLINIC | Age: 23
End: 2023-11-30
Payer: COMMERCIAL

## 2023-11-30 DIAGNOSIS — E28.2 PCOS (POLYCYSTIC OVARIAN SYNDROME): ICD-10-CM

## 2023-11-30 DIAGNOSIS — Z34.00 SUPERVISION OF NORMAL FIRST PREGNANCY, ANTEPARTUM: ICD-10-CM

## 2023-11-30 DIAGNOSIS — Z13.71 SCREENING FOR GENETIC DISEASE CARRIER STATUS: Primary | ICD-10-CM

## 2023-11-30 DIAGNOSIS — D50.9 IRON DEFICIENCY ANEMIA, UNSPECIFIED IRON DEFICIENCY ANEMIA TYPE: ICD-10-CM

## 2023-11-30 DIAGNOSIS — Z00.00 ANNUAL PHYSICAL EXAM: ICD-10-CM

## 2023-11-30 DIAGNOSIS — Z3A.10 10 WEEKS GESTATION OF PREGNANCY: ICD-10-CM

## 2023-11-30 LAB
25(OH)D3 SERPL-MCNC: 23.7 NG/ML (ref 30–100)
ABO GROUP BLD: NORMAL
BASOPHILS # BLD AUTO: 0.02 THOUSANDS/ÂΜL (ref 0–0.1)
BASOPHILS NFR BLD AUTO: 0 % (ref 0–1)
BLD GP AB SCN SERPL QL: NEGATIVE
EOSINOPHIL # BLD AUTO: 0.14 THOUSAND/ÂΜL (ref 0–0.61)
EOSINOPHIL NFR BLD AUTO: 2 % (ref 0–6)
ERYTHROCYTE [DISTWIDTH] IN BLOOD BY AUTOMATED COUNT: 12.8 % (ref 11.6–15.1)
FERRITIN SERPL-MCNC: 73 NG/ML (ref 11–307)
HCT VFR BLD AUTO: 35.4 % (ref 34.8–46.1)
HGB BLD-MCNC: 11 G/DL (ref 11.5–15.4)
IMM GRANULOCYTES # BLD AUTO: 0.03 THOUSAND/UL (ref 0–0.2)
IMM GRANULOCYTES NFR BLD AUTO: 0 % (ref 0–2)
IRON SATN MFR SERPL: 15 % (ref 15–50)
IRON SERPL-MCNC: 51 UG/DL (ref 50–212)
LYMPHOCYTES # BLD AUTO: 2.25 THOUSANDS/ÂΜL (ref 0.6–4.47)
LYMPHOCYTES NFR BLD AUTO: 29 % (ref 14–44)
MCH RBC QN AUTO: 27.8 PG (ref 26.8–34.3)
MCHC RBC AUTO-ENTMCNC: 31.1 G/DL (ref 31.4–37.4)
MCV RBC AUTO: 90 FL (ref 82–98)
MONOCYTES # BLD AUTO: 0.64 THOUSAND/ÂΜL (ref 0.17–1.22)
MONOCYTES NFR BLD AUTO: 8 % (ref 4–12)
NEUTROPHILS # BLD AUTO: 4.58 THOUSANDS/ÂΜL (ref 1.85–7.62)
NEUTS SEG NFR BLD AUTO: 61 % (ref 43–75)
NRBC BLD AUTO-RTO: 0 /100 WBCS
PLATELET # BLD AUTO: 290 THOUSANDS/UL (ref 149–390)
PMV BLD AUTO: 9 FL (ref 8.9–12.7)
RBC # BLD AUTO: 3.95 MILLION/UL (ref 3.81–5.12)
RH BLD: POSITIVE
RUBV IGG SERPL IA-ACNC: 35.8 IU/ML
SPECIMEN EXPIRATION DATE: NORMAL
TIBC SERPL-MCNC: 337 UG/DL (ref 250–450)
TSH SERPL DL<=0.05 MIU/L-ACNC: 1.42 UIU/ML (ref 0.45–4.5)
UIBC SERPL-MCNC: 286 UG/DL (ref 155–355)
WBC # BLD AUTO: 7.66 THOUSAND/UL (ref 4.31–10.16)

## 2023-11-30 PROCEDURE — 86901 BLOOD TYPING SEROLOGIC RH(D): CPT

## 2023-11-30 PROCEDURE — 81220 CFTR GENE COM VARIANTS: CPT

## 2023-11-30 PROCEDURE — 86803 HEPATITIS C AB TEST: CPT

## 2023-11-30 PROCEDURE — 83550 IRON BINDING TEST: CPT

## 2023-11-30 PROCEDURE — 84443 ASSAY THYROID STIM HORMONE: CPT

## 2023-11-30 PROCEDURE — 86850 RBC ANTIBODY SCREEN: CPT

## 2023-11-30 PROCEDURE — 87086 URINE CULTURE/COLONY COUNT: CPT

## 2023-11-30 PROCEDURE — 86787 VARICELLA-ZOSTER ANTIBODY: CPT

## 2023-11-30 PROCEDURE — 82306 VITAMIN D 25 HYDROXY: CPT

## 2023-11-30 PROCEDURE — 36415 COLL VENOUS BLD VENIPUNCTURE: CPT

## 2023-11-30 PROCEDURE — 87340 HEPATITIS B SURFACE AG IA: CPT

## 2023-11-30 PROCEDURE — 86780 TREPONEMA PALLIDUM: CPT

## 2023-11-30 PROCEDURE — 87389 HIV-1 AG W/HIV-1&-2 AB AG IA: CPT

## 2023-11-30 PROCEDURE — 85025 COMPLETE CBC W/AUTO DIFF WBC: CPT

## 2023-11-30 PROCEDURE — 81329 SMN1 GENE DOS/DELETION ALYS: CPT

## 2023-11-30 PROCEDURE — 86762 RUBELLA ANTIBODY: CPT

## 2023-11-30 PROCEDURE — 83540 ASSAY OF IRON: CPT

## 2023-11-30 PROCEDURE — 82728 ASSAY OF FERRITIN: CPT

## 2023-11-30 PROCEDURE — 86900 BLOOD TYPING SEROLOGIC ABO: CPT

## 2023-12-01 LAB
HBV SURFACE AG SER QL: NORMAL
HCV AB SER QL: NORMAL
HIV 1+2 AB+HIV1 P24 AG SERPL QL IA: NORMAL
HIV 2 AB SERPL QL IA: NORMAL
HIV1 AB SERPL QL IA: NORMAL
HIV1 P24 AG SERPL QL IA: NORMAL
TREPONEMA PALLIDUM IGG+IGM AB [PRESENCE] IN SERUM OR PLASMA BY IMMUNOASSAY: NORMAL
VZV IGG SER QL IA: ABNORMAL

## 2023-12-02 LAB — BACTERIA UR CULT: NORMAL

## 2023-12-04 DIAGNOSIS — R39.9 UTI SYMPTOMS: Primary | ICD-10-CM

## 2023-12-05 ENCOUNTER — ROUTINE PRENATAL (OUTPATIENT)
Dept: OBGYN CLINIC | Facility: CLINIC | Age: 23
End: 2023-12-05
Payer: COMMERCIAL

## 2023-12-05 VITALS — DIASTOLIC BLOOD PRESSURE: 64 MMHG | WEIGHT: 254 LBS | SYSTOLIC BLOOD PRESSURE: 114 MMHG | BODY MASS INDEX: 41 KG/M2

## 2023-12-05 DIAGNOSIS — Z3A.11 11 WEEKS GESTATION OF PREGNANCY: ICD-10-CM

## 2023-12-05 DIAGNOSIS — O99.211 OBESITY AFFECTING PREGNANCY IN FIRST TRIMESTER, UNSPECIFIED OBESITY TYPE: Primary | ICD-10-CM

## 2023-12-05 DIAGNOSIS — N76.0 ACUTE VAGINITIS: ICD-10-CM

## 2023-12-05 DIAGNOSIS — R35.0 URINARY FREQUENCY: ICD-10-CM

## 2023-12-05 LAB
SL AMB  POCT GLUCOSE, UA: NEGATIVE
SL AMB LEUKOCYTE ESTERASE,UA: NEGATIVE
SL AMB POCT BILIRUBIN,UA: NEGATIVE
SL AMB POCT BLOOD,UA: NEGATIVE
SL AMB POCT CLARITY,UA: CLEAR
SL AMB POCT COLOR,UA: YELLOW
SL AMB POCT KETONES,UA: NEGATIVE
SL AMB POCT NITRITE,UA: NEGATIVE
SL AMB POCT PH,UA: 6
SL AMB POCT SPECIFIC GRAVITY,UA: 1.02
SL AMB POCT URINE PROTEIN: NEGATIVE
SL AMB POCT UROBILINOGEN: NEGATIVE

## 2023-12-05 PROCEDURE — 81003 URINALYSIS AUTO W/O SCOPE: CPT | Performed by: PHYSICIAN ASSISTANT

## 2023-12-05 PROCEDURE — 87086 URINE CULTURE/COLONY COUNT: CPT | Performed by: PHYSICIAN ASSISTANT

## 2023-12-05 PROCEDURE — PNV: Performed by: PHYSICIAN ASSISTANT

## 2023-12-05 NOTE — PROGRESS NOTES
Patient is a 25 YO  female presenting to the office at 11.1 weeks for routine OB care. BP: 114/64  TWlb  Fetal Movement: yes good movement   LOF: no  VB: no  CTX: no  Patient report urinary odor and frequent urination  Reports yellow discharge x 1 day with an odor. Uses dial soap.  Recommend dove unscented  Sureswab plus collected  UA and culture ordered  Reviewed precautions  Call for concerns  RTO 4 weeks

## 2023-12-05 NOTE — PROGRESS NOTES
Petr Vega is  51cga0f, here for vaginal discharge. , with an odor. Also having a "slight ache in pelvic area."  Pt also states that every time she goes to the bathroom her underwear is wet. Pt states is also having increased frequency in urination. Pt is also having left sciatica pain and numbness.   CTX:  no  LOF: no  VB:  no    FM: not yet

## 2023-12-06 LAB
BV BACTERIA RRNA VAG QL NAA+PROBE: NEGATIVE
C GLABRATA RNA VAG QL NAA+PROBE: NOT DETECTED
C TRACH RRNA SPEC QL NAA+PROBE: NOT DETECTED
CANDIDA RRNA VAG QL PROBE: NOT DETECTED
CITATION REF LAB TEST: NORMAL
CLINICAL INFO: NORMAL
ETHNIC BACKGROUND STATED: NORMAL
GENE DIS ANL CARRIER INTERP-IMP: NORMAL
GENE MUT TESTED BLD/T: NORMAL
LAB DIRECTOR NAME PROVIDER: NORMAL
N GONORRHOEA RRNA SPEC QL NAA+PROBE: NOT DETECTED
REASON FOR REFERRAL (NARRATIVE): NORMAL
RECOMMENDATION PATIENT DOC-IMP: NORMAL
REF LAB TEST METHOD: NORMAL
SERVICE CMNT-IMP: NORMAL
SMN1 GENE MUT ANL BLD/T: NORMAL
SPECIMEN SOURCE: NORMAL
T VAGINALIS RRNA SPEC QL NAA+PROBE: NOT DETECTED

## 2023-12-07 LAB — BACTERIA UR CULT: NORMAL

## 2023-12-14 LAB
CF COMMENT: NORMAL
CFTR MUT ANL BLD/T: NORMAL

## 2023-12-15 ENCOUNTER — ROUTINE PRENATAL (OUTPATIENT)
Facility: HOSPITAL | Age: 23
End: 2023-12-15
Payer: COMMERCIAL

## 2023-12-15 VITALS
WEIGHT: 259.8 LBS | HEIGHT: 66 IN | BODY MASS INDEX: 41.75 KG/M2 | HEART RATE: 107 BPM | DIASTOLIC BLOOD PRESSURE: 76 MMHG | SYSTOLIC BLOOD PRESSURE: 134 MMHG

## 2023-12-15 DIAGNOSIS — O99.211 SEVERE OBESITY DUE TO EXCESS CALORIES AFFECTING PREGNANCY IN FIRST TRIMESTER (HCC): Primary | ICD-10-CM

## 2023-12-15 DIAGNOSIS — E66.01 SEVERE OBESITY DUE TO EXCESS CALORIES AFFECTING PREGNANCY IN FIRST TRIMESTER (HCC): Primary | ICD-10-CM

## 2023-12-15 DIAGNOSIS — Z3A.12 12 WEEKS GESTATION OF PREGNANCY: ICD-10-CM

## 2023-12-15 DIAGNOSIS — Z36.82 ENCOUNTER FOR NUCHAL TRANSLUCENCY TESTING: ICD-10-CM

## 2023-12-15 DIAGNOSIS — N91.2 AMENORRHEA: ICD-10-CM

## 2023-12-15 PROCEDURE — 76813 OB US NUCHAL MEAS 1 GEST: CPT | Performed by: OBSTETRICS & GYNECOLOGY

## 2023-12-15 PROCEDURE — 36415 COLL VENOUS BLD VENIPUNCTURE: CPT | Performed by: OBSTETRICS & GYNECOLOGY

## 2023-12-15 PROCEDURE — 76801 OB US < 14 WKS SINGLE FETUS: CPT | Performed by: OBSTETRICS & GYNECOLOGY

## 2023-12-15 PROCEDURE — 99243 OFF/OP CNSLTJ NEW/EST LOW 30: CPT | Performed by: OBSTETRICS & GYNECOLOGY

## 2023-12-15 RX ORDER — ASPIRIN 81 MG/1
162 TABLET, CHEWABLE ORAL DAILY
Qty: 180 TABLET | Refills: 1 | Status: SHIPPED | OUTPATIENT
Start: 2023-12-15

## 2023-12-15 NOTE — LETTER
December 15, 2023     Patsy Finley PA-C  [address]  [address]     Patient: Jelani Silveira   YOB: 2000   Date of Visit: 12/15/2023       Dear Dr. [facility]: Thank you for referring Jelani Silveira to me for evaluation. Below are my notes for this consultation. If you have questions, please do not hesitate to call me. I look forward to following your patient along with you. Sincerely,        Pascale Sawyer MD        CC: No Recipients    Pascale Sawyer MD  12/15/2023  2:30 PM  Sign when Signing Visit  Todd Golmdan presents today for a genetic screening ultrasound. This is her first pregnancy. She has a history of PCOS and mild hypothyroidism for which she is on 25 mcg of levothyroxine with a normal TSH level. She has a history of a turbinate reduction surgery. She has no other significant contributory substance use or family history. A review of systems is otherwise negative. We discussed the options for genetic screening, including but not limited to first trimester screening, second trimester screening, combined first and second trimester screening, noninvasive prenatal screening (NIPS) for patients at high risk and diagnostic screening through the use of CVS and amniocentesis. We discussed the risks and benefits of each approach including the sensitivities and false positive rates as well as the difference between a screening test and a diagnostic test. At the conclusion of our discussion the patient elected noninvasive prenatal testing utilizing the [facility] Non-invasive prenatal screening (NIPS) test. The patient had this blood work drawn in the office and the results should be available approximately 7-10 days after her blood draw. Her results will be reported from [facility]. The implications of obesity and pregnancy are significant.   The level of obesity is directly related to the risk of adverse pregnancy outcomes including but not limited to, risk of diabetes, hypertensive disorders of pregnancy, macrosomia, intrauterine growth restriction, labor and shoulder dystocias,  section, and increased risk of stillbirth. Recommend discussing the current weight gain recommendations for women with obesity and discussing good dietary practices as well as the safety of exercise in pregnancy. I recommend the patient gain no more than 11-20 pounds throughout her entire pregnancy, increase her exercise and follow healthy dietary habits. Consider referral to a dietitian should the patient have difficulty following the aforementioned recommendations. Recommend third trimester growth ultrasounds to screen for fetal growth problems as well as ensuring the patient is appropriately screened for pregestational and gestational diabetes. Additional  surveillance is recommended starting at 36 weeks in those women with a BMI of greater than 40 given the increased risk for stillbirth. Given the patient's history of nulliparity, BMI, and ethnicity, I recommend initiating low dose aspirin therapy. A recent meta-analysis yielded risk reductions of 24% for preeclampsia, 20% for intrauterine growth restriction, and 14% for  birth, with an absolute risk reduction of 2-5% for preeclampsia, one to 5% for intrauterine growth restriction, and 2-4% for  birth. In this study, there was no identified risk of harm to the mother or fetus but long-term evidence was somewhat limited. Given the overall safety profile and risk-benefit analysis, I recommend 162 mg of aspirin be taken Daily and discontinued at around 36 weeks gestation or 2-3 weeks prior to planned delivery. I reviewed these recommendations with the patient and answered all of her questions to apparent satisfaction. We discussed follow-up in detail and I recommend an anatomy ultrasound be scheduled for 20 weeks gestation.     Thank you very much for allowing us to participate in the care of this very nice patient. Should you have any questions, please do not hesitate to contact me. Portions of the record may have been created with voice recognition software. Occasional wrong word or "sound a like" substitutions may have occurred due to the inherent limitations of voice recognition software. Read the chart carefully and recognize, using context, where substitutions have occurred. Mallika Morales  12/15/2023  2:17 PM  Sign when Signing Visit  Patient chose to have [facility] Non-invasive Prenatal Screen with fetal sex. Patient choose billed through insurance. Patient assistance program (PAP) application provided to patient no. PAP sent with specimen No.     Patient given brochure and is aware [facility] will contact their insurance and coordinate coverage. Patient made aware she will receive a text message and e-mail from [employer]. Patient informed text/phone call message will come from area code  "415". Provided [employer] # [phone] and web site at [email or web address].   "[facility] your test online" card with barcode and test tube ID provided to patient. Reviewed [employer]'s web site states 5-7 business days for results via their portal.   [employer] message will be sent to patient when [facility] receives results /provider reviews. 2 vials of blood drawn from  right arm by Yanira Al RN. Patient tolerated blood draw without difficulty. Specimens labeled with patient identifiers (name, date of birth, specimen collection date), order and specimen were verified with patient, packed and sent via [facility]. FED EX  tracking #  [id number]  Copy of lab order scanned to Epic media. Maternal Fetal [facility] will have results in approximately 7-10 business days and will call patient or notify via [address] [place] [address]. Patient aware viewing lab result online will reveal fetal sex if ordered.     Patient verbalized understanding of all instructions and no questions at this time.

## 2023-12-15 NOTE — PROGRESS NOTES
Ultrasound Probe Disinfection    A transvaginal ultrasound was performed. Prior to use, disinfection was performed with High Level Disinfection Process ([employer]pkon). Probe serial number [id number]: [id number] was used.       Guanaco Pandya  12/15/23  1:53 PM

## 2023-12-15 NOTE — PROGRESS NOTES
Patient chose to have [facility] Non-invasive Prenatal Screen with fetal sex. Patient choose billed through insurance. Patient assistance program (PAP) application provided to patient no. PAP sent with specimen No.     Patient given brochure and is aware [facility] will contact their insurance and coordinate coverage. Patient made aware she will receive a text message and e-mail from [employer]. Patient informed text/phone call message will come from area code  "415". Provided [employer] # [phone] and web site at [email or web address].   "Jackman your test online" card with barcode and test tube ID provided to patient. Reviewed [employer]'s web site states 5-7 business days for results via their portal.   [employer] message will be sent to patient when [facility] receives results /provider reviews. 2 vials of blood drawn from  right arm by Yanira Al RN. Patient tolerated blood draw without difficulty. Specimens labeled with patient identifiers (name, date of birth, specimen collection date), order and specimen were verified with patient, packed and sent via [facility]. FED EX  tracking #  [id number]  Copy of lab order scanned to Epic media. Maternal Fetal Medicine will have results in approximately 7-10 business days and will call patient or notify via [address] [place] [address]. Patient aware viewing lab result online will reveal fetal sex if ordered. Patient verbalized understanding of all instructions and no questions at this time.

## 2023-12-21 ENCOUNTER — ROUTINE PRENATAL (OUTPATIENT)
Dept: OBGYN CLINIC | Facility: CLINIC | Age: 23
End: 2023-12-21

## 2023-12-21 VITALS — SYSTOLIC BLOOD PRESSURE: 114 MMHG | WEIGHT: 255 LBS | DIASTOLIC BLOOD PRESSURE: 70 MMHG | BODY MASS INDEX: 41.16 KG/M2

## 2023-12-21 DIAGNOSIS — Z34.01 PRENATAL CARE, FIRST PREGNANCY IN FIRST TRIMESTER: Primary | ICD-10-CM

## 2023-12-21 DIAGNOSIS — Z36.9 ANTENATAL SCREENING ENCOUNTER: ICD-10-CM

## 2023-12-21 PROCEDURE — 87591 N.GONORRHOEAE DNA AMP PROB: CPT | Performed by: PHYSICIAN ASSISTANT

## 2023-12-21 PROCEDURE — G0124 SCREEN C/V THIN LAYER BY MD: HCPCS | Performed by: PATHOLOGY

## 2023-12-21 PROCEDURE — PNV: Performed by: PHYSICIAN ASSISTANT

## 2023-12-21 PROCEDURE — G0145 SCR C/V CYTO,THINLAYER,RESCR: HCPCS | Performed by: PATHOLOGY

## 2023-12-21 PROCEDURE — 87491 CHLMYD TRACH DNA AMP PROBE: CPT | Performed by: PHYSICIAN ASSISTANT

## 2023-12-21 NOTE — PROGRESS NOTES
Latisha Kelly is 66zpg6o, here for her first ob appt, pap is needed and ordered, along with gc/chl. Overall feeling well.    CTX/cramping: light cramping  LOF:  no  VB/spotting: no    FM:  possible flutter.

## 2023-12-26 LAB
C TRACH DNA SPEC QL NAA+PROBE: NEGATIVE
N GONORRHOEA DNA SPEC QL NAA+PROBE: NEGATIVE

## 2024-01-01 ENCOUNTER — NURSE TRIAGE (OUTPATIENT)
Dept: OTHER | Facility: OTHER | Age: 24
End: 2024-01-01

## 2024-01-01 ENCOUNTER — HOSPITAL ENCOUNTER (EMERGENCY)
Facility: HOSPITAL | Age: 24
Discharge: HOME | End: 2024-01-01
Attending: EMERGENCY MEDICINE
Payer: COMMERCIAL

## 2024-01-01 VITALS
BODY MASS INDEX: 40.98 KG/M2 | HEART RATE: 93 BPM | WEIGHT: 255 LBS | TEMPERATURE: 97.5 F | RESPIRATION RATE: 22 BRPM | HEIGHT: 66 IN | SYSTOLIC BLOOD PRESSURE: 124 MMHG | DIASTOLIC BLOOD PRESSURE: 67 MMHG | OXYGEN SATURATION: 98 %

## 2024-01-01 DIAGNOSIS — O16.2 ELEVATED BLOOD PRESSURE AFFECTING PREGNANCY IN SECOND TRIMESTER, ANTEPARTUM: Primary | ICD-10-CM

## 2024-01-01 DIAGNOSIS — R51.9 HEADACHE: ICD-10-CM

## 2024-01-01 LAB
ALBUMIN SERPL BCP-MCNC: 4 G/DL (ref 3.5–5)
ALP SERPL-CCNC: 44 U/L (ref 34–104)
ALT SERPL W P-5'-P-CCNC: 36 U/L (ref 7–52)
ANION GAP SERPL CALCULATED.3IONS-SCNC: 10 MMOL/L
AST SERPL W P-5'-P-CCNC: 16 U/L (ref 13–39)
BASOPHILS # BLD AUTO: 0.02 THOUSANDS/ÂΜL (ref 0–0.1)
BASOPHILS NFR BLD AUTO: 0 % (ref 0–1)
BILIRUB SERPL-MCNC: 0.29 MG/DL (ref 0.2–1)
BILIRUB UR QL STRIP: NEGATIVE
BNP SERPL-MCNC: 18 PG/ML (ref 0–100)
BUN SERPL-MCNC: 5 MG/DL (ref 5–25)
CALCIUM SERPL-MCNC: 9.5 MG/DL (ref 8.4–10.2)
CARDIAC TROPONIN I PNL SERPL HS: 3 NG/L
CHLORIDE SERPL-SCNC: 105 MMOL/L (ref 96–108)
CLARITY UR: CLEAR
CO2 SERPL-SCNC: 21 MMOL/L (ref 21–32)
COLOR UR: YELLOW
CREAT SERPL-MCNC: 0.54 MG/DL (ref 0.6–1.3)
EOSINOPHIL # BLD AUTO: 0.23 THOUSAND/ÂΜL (ref 0–0.61)
EOSINOPHIL NFR BLD AUTO: 3 % (ref 0–6)
ERYTHROCYTE [DISTWIDTH] IN BLOOD BY AUTOMATED COUNT: 12.3 % (ref 11.6–15.1)
GFR SERPL CREATININE-BSD FRML MDRD: 134 ML/MIN/1.73SQ M
GLUCOSE SERPL-MCNC: 123 MG/DL (ref 65–140)
GLUCOSE UR STRIP-MCNC: NEGATIVE MG/DL
HCT VFR BLD AUTO: 35 % (ref 34.8–46.1)
HGB BLD-MCNC: 11.2 G/DL (ref 11.5–15.4)
HGB UR QL STRIP.AUTO: NEGATIVE
IMM GRANULOCYTES # BLD AUTO: 0.08 THOUSAND/UL (ref 0–0.2)
IMM GRANULOCYTES NFR BLD AUTO: 1 % (ref 0–2)
KETONES UR STRIP-MCNC: NEGATIVE MG/DL
LDH SERPL-CCNC: 124 U/L (ref 140–271)
LEUKOCYTE ESTERASE UR QL STRIP: NEGATIVE
LYMPHOCYTES # BLD AUTO: 2 THOUSANDS/ÂΜL (ref 0.6–4.47)
LYMPHOCYTES NFR BLD AUTO: 24 % (ref 14–44)
MAGNESIUM SERPL-MCNC: 1.7 MG/DL (ref 1.9–2.7)
MCH RBC QN AUTO: 28 PG (ref 26.8–34.3)
MCHC RBC AUTO-ENTMCNC: 32 G/DL (ref 31.4–37.4)
MCV RBC AUTO: 88 FL (ref 82–98)
MONOCYTES # BLD AUTO: 0.46 THOUSAND/ÂΜL (ref 0.17–1.22)
MONOCYTES NFR BLD AUTO: 6 % (ref 4–12)
NEUTROPHILS # BLD AUTO: 5.63 THOUSANDS/ÂΜL (ref 1.85–7.62)
NEUTS SEG NFR BLD AUTO: 66 % (ref 43–75)
NITRITE UR QL STRIP: NEGATIVE
NRBC BLD AUTO-RTO: 0 /100 WBCS
PH UR STRIP.AUTO: 6 [PH]
PLATELET # BLD AUTO: 310 THOUSANDS/UL (ref 149–390)
PMV BLD AUTO: 9 FL (ref 8.9–12.7)
POTASSIUM SERPL-SCNC: 3.4 MMOL/L (ref 3.5–5.3)
PROT SERPL-MCNC: 6.9 G/DL (ref 6.4–8.4)
PROT UR STRIP-MCNC: NEGATIVE MG/DL
RBC # BLD AUTO: 4 MILLION/UL (ref 3.81–5.12)
SODIUM SERPL-SCNC: 136 MMOL/L (ref 135–147)
SP GR UR STRIP.AUTO: 1.02 (ref 1–1.03)
URATE SERPL-MCNC: 4.8 MG/DL (ref 2–7.5)
UROBILINOGEN UR STRIP-ACNC: <2 MG/DL
WBC # BLD AUTO: 8.42 THOUSAND/UL (ref 4.31–10.16)

## 2024-01-01 PROCEDURE — 99283 EMERGENCY DEPT VISIT LOW MDM: CPT

## 2024-01-01 PROCEDURE — 84484 ASSAY OF TROPONIN QUANT: CPT | Performed by: EMERGENCY MEDICINE

## 2024-01-01 PROCEDURE — 96360 HYDRATION IV INFUSION INIT: CPT

## 2024-01-01 PROCEDURE — 83880 ASSAY OF NATRIURETIC PEPTIDE: CPT | Performed by: EMERGENCY MEDICINE

## 2024-01-01 PROCEDURE — 84550 ASSAY OF BLOOD/URIC ACID: CPT | Performed by: EMERGENCY MEDICINE

## 2024-01-01 PROCEDURE — 99285 EMERGENCY DEPT VISIT HI MDM: CPT | Performed by: EMERGENCY MEDICINE

## 2024-01-01 PROCEDURE — 81003 URINALYSIS AUTO W/O SCOPE: CPT | Performed by: EMERGENCY MEDICINE

## 2024-01-01 PROCEDURE — 83615 LACTATE (LD) (LDH) ENZYME: CPT | Performed by: EMERGENCY MEDICINE

## 2024-01-01 PROCEDURE — 96361 HYDRATE IV INFUSION ADD-ON: CPT

## 2024-01-01 PROCEDURE — 93005 ELECTROCARDIOGRAM TRACING: CPT

## 2024-01-01 PROCEDURE — 80053 COMPREHEN METABOLIC PANEL: CPT | Performed by: EMERGENCY MEDICINE

## 2024-01-01 PROCEDURE — 87086 URINE CULTURE/COLONY COUNT: CPT | Performed by: EMERGENCY MEDICINE

## 2024-01-01 PROCEDURE — 83735 ASSAY OF MAGNESIUM: CPT | Performed by: EMERGENCY MEDICINE

## 2024-01-01 PROCEDURE — 85025 COMPLETE CBC W/AUTO DIFF WBC: CPT | Performed by: EMERGENCY MEDICINE

## 2024-01-01 PROCEDURE — 36415 COLL VENOUS BLD VENIPUNCTURE: CPT | Performed by: EMERGENCY MEDICINE

## 2024-01-01 RX ORDER — LANOLIN ALCOHOL/MO/W.PET/CERES
400 CREAM (GRAM) TOPICAL 2 TIMES DAILY
Status: DISCONTINUED | OUTPATIENT
Start: 2024-01-01 | End: 2024-01-01 | Stop reason: HOSPADM

## 2024-01-01 RX ORDER — METOCLOPRAMIDE HYDROCHLORIDE 5 MG/ML
10 INJECTION INTRAMUSCULAR; INTRAVENOUS ONCE
Status: DISCONTINUED | OUTPATIENT
Start: 2024-01-01 | End: 2024-01-01

## 2024-01-01 RX ORDER — POTASSIUM CHLORIDE 20MEQ/15ML
40 LIQUID (ML) ORAL ONCE
Status: COMPLETED | OUTPATIENT
Start: 2024-01-01 | End: 2024-01-01

## 2024-01-01 RX ORDER — ACETAMINOPHEN 325 MG/1
975 TABLET ORAL ONCE
Status: COMPLETED | OUTPATIENT
Start: 2024-01-01 | End: 2024-01-01

## 2024-01-01 RX ORDER — MAGNESIUM SULFATE HEPTAHYDRATE 40 MG/ML
2 INJECTION, SOLUTION INTRAVENOUS ONCE
Status: DISCONTINUED | OUTPATIENT
Start: 2024-01-01 | End: 2024-01-01

## 2024-01-01 RX ADMIN — Medication 400 MG: at 17:10

## 2024-01-01 RX ADMIN — POTASSIUM CHLORIDE 40 MEQ: 1.5 SOLUTION ORAL at 16:38

## 2024-01-01 RX ADMIN — ACETAMINOPHEN 975 MG: 325 TABLET, FILM COATED ORAL at 16:38

## 2024-01-01 RX ADMIN — SODIUM CHLORIDE 1000 ML: 0.9 INJECTION, SOLUTION INTRAVENOUS at 16:38

## 2024-01-01 NOTE — ED PROVIDER NOTES
History  Chief Complaint   Patient presents with    Headache     Pt c/o worsening headache that started on Friday, pt states her bp at home was 177/115, pt is 15 weeks pregnant      Patient presents to the ER with uncontrolled hypertension and early second trimester pregnancy.  Patient has a medical history which includes migraines, anemia, PCOS.  She is G1, P0 with an LMP of September 18, 2023.  She reports no history of hypertension prior to pregnancy.  Patient has had a headache almost every day since she became pregnant which usually last a few hours and resolves.  However she has had headache almost continuously for the last 3 days although it resolved without intervention this morning and she is currently pain-free.  There has been no associated lateralizing or focal motor weakness or sensory change.  No speech or vision changes.  No edema.  She has had an ultrasound which she tells me confirmed a graza IUP.  Since Friday she has had persistently elevated blood pressure for the first time in her life.  She has also had mild shortness of breath when lying flat.  There is been no chest pain.  No fever no chills no dysuria hematuria frequency no change in bowel habit.        Prior to Admission Medications   Prescriptions Last Dose Informant Patient Reported? Taking?   Prenatal Vit-Iron Carbonyl-FA (prenatal multivitamin) TABS  Self Yes No   Sig: Take 1 tablet by mouth daily   aspirin 81 mg chewable tablet  Self No No   Sig: Chew 2 tablets (162 mg total) daily   levothyroxine (Euthyrox) 25 mcg tablet  Self No No   Sig: Take 1 tablet (25 mcg total) by mouth daily      Facility-Administered Medications: None       Past Medical History:   Diagnosis Date    Anemia     Migraine without aura     OTC med management    PCOS (polycystic ovarian syndrome)        Past Surgical History:   Procedure Laterality Date    TURBINATE RESECTION         Family History   Problem Relation Age of Onset    No Known Problems Mother      Hypertension Father     No Known Problems Sister     No Known Problems Brother     No Known Problems Brother     No Known Problems Brother     Colon cancer Neg Hx     Ovarian cancer Neg Hx     Breast cancer Neg Hx     Cancer Neg Hx      I have reviewed and agree with the history as documented.    E-Cigarette/Vaping    E-Cigarette Use Former User     Quit Date 1/1/23     Comments very seldom, 3 times a year-quit pror to recent pregnancy      E-Cigarette/Vaping Substances    Nicotine Yes     THC No     CBD No     Flavoring Yes     Other No     Unknown No      Social History     Tobacco Use    Smoking status: Never    Smokeless tobacco: Never   Vaping Use    Vaping status: Former    Quit date: 1/1/2023    Substances: Nicotine, Flavoring   Substance Use Topics    Alcohol use: Not Currently     Alcohol/week: 7.0 standard drinks of alcohol     Types: 7 Glasses of wine per week     Comment: 1 daily-prior to pregnancy    Drug use: Not Currently     Types: Marijuana     Comment: last used THC over 2 years ago (as of 11/29/23)       Review of Systems   Constitutional:  Negative for fever.   HENT:  Negative for rhinorrhea.    Eyes:  Negative for visual disturbance.   Respiratory:  Positive for shortness of breath.    Cardiovascular:  Negative for chest pain.   Gastrointestinal:  Negative for abdominal pain, diarrhea and vomiting.   Endocrine: Negative for polydipsia.   Genitourinary:  Negative for dysuria, frequency and hematuria.   Musculoskeletal:  Negative for neck stiffness.   Skin:  Negative for rash.   Allergic/Immunologic: Negative for immunocompromised state.   Neurological:  Positive for headaches. Negative for speech difficulty, weakness and numbness.   Psychiatric/Behavioral:  Negative for suicidal ideas.        Physical Exam  Physical Exam  Constitutional:       General: She is not in acute distress.  HENT:      Head: Normocephalic and atraumatic.      Right Ear: External ear normal.      Left Ear: External ear  normal.      Nose: Nose normal.      Mouth/Throat:      Pharynx: Oropharynx is clear.   Eyes:      Conjunctiva/sclera: Conjunctivae normal.   Cardiovascular:      Rate and Rhythm: Normal rate and regular rhythm.      Heart sounds: Normal heart sounds. No murmur heard.  Pulmonary:      Effort: Pulmonary effort is normal.      Breath sounds: Normal breath sounds.   Abdominal:      General: Bowel sounds are normal.      Palpations: Abdomen is soft. There is no mass.      Tenderness: There is no abdominal tenderness. There is no guarding.   Musculoskeletal:         General: No swelling or tenderness.      Cervical back: Normal range of motion and neck supple.      Right lower leg: No edema.      Left lower leg: No edema.   Skin:     General: Skin is warm and dry.      Capillary Refill: Capillary refill takes less than 2 seconds.   Neurological:      General: No focal deficit present.      Mental Status: She is alert and oriented to person, place, and time.      Cranial Nerves: No cranial nerve deficit.      Sensory: No sensory deficit.      Motor: No weakness.      Coordination: Coordination normal.      Deep Tendon Reflexes: Reflexes normal.   Psychiatric:         Mood and Affect: Mood normal.         Vital Signs  ED Triage Vitals [01/01/24 1412]   Temperature Pulse Respirations Blood Pressure SpO2   97.5 °F (36.4 °C) 105 20 167/80 98 %      Temp Source Heart Rate Source Patient Position - Orthostatic VS BP Location FiO2 (%)   Oral Monitor Sitting Left arm --      Pain Score       --           Vitals:    01/01/24 1412   BP: 167/80   Pulse: 105   Patient Position - Orthostatic VS: Sitting         Visual Acuity      ED Medications  Medications - No data to display    Diagnostic Studies  Results Reviewed       Procedure Component Value Units Date/Time    HS Troponin 0hr (reflex protocol) [[id number]]     Lab Status: No result Specimen: Blood     B-Type Natriuretic Peptide(BNP) [[id number]]     Lab Status: No result  Specimen: Blood     CBC and differential [[id number]]     Lab Status: No result Specimen: Blood     Comprehensive metabolic panel [[id number]]     Lab Status: No result Specimen: Blood     UA w Reflex to Microscopic w Reflex to Culture [[id number]]     Lab Status: No result Specimen: Urine     Magnesium [[id number]]     Lab Status: No result Specimen: Blood     Uric acid [[id number]]     Lab Status: No result Specimen: Blood     LD,Blood [[id number]]     Lab Status: No result Specimen: Blood                    No orders to display              Procedures  Procedures         ED Course  ED Course as of 01/01/24 2014 Mon Jan 01, 2024   1645 EKG performed at 1526 shows sinus rhythm with a rate of 95 Axis within normal limits no acute ischemic changes                               SBIRT 22yo+      Flowsheet Row Most Recent Value   Initial Alcohol Screen: US AUDIT-C     1. How often do you have a drink containing alcohol? 0 Filed at: 01/01/2024 1413   2. How many drinks containing alcohol do you have on a typical day you are drinking?  0 Filed at: 01/01/2024 1413   3b. FEMALE Any Age, or MALE 65+: How often do you have 4 or more drinks on one occassion? 0 Filed at: 01/01/2024 1413   Audit-C Score 0 Filed at: 01/01/2024 1413   MERISSA: How many times in the past year have you...    Used an illegal drug or used a prescription medication for non-medical reasons? Never Filed at: 01/01/2024 1413                      Medical Decision Making  Patient in early second trimester pregnancy with uncontrolled hypertension of new onset.  Patient does not have symptoms or clinical findings to suggest acute endorgan damage.  It would be very early to have preeclampsia and she does not have additional clinical findings to suggest preeclampsia either.  I was initially planning to give her some magnesium not for preeclampsia but for her headache as well as IV fluids however given that her headache is resolved I have canceled those orders.  Plan to  check labs and an EKG and then discussed with her OB service regarding management of her blood pressure.    D/w OB - HTN in pregnancy, dc and they will see her this week, symptoms improved in ED    Amount and/or Complexity of Data Reviewed  Labs: ordered.    Risk  OTC drugs.  Prescription drug management.             Disposition  Final diagnoses:   None     ED Disposition       None          Follow-up Information    None         Patient's Medications   Discharge Prescriptions    No medications on file       No discharge procedures on file.    PDMP Review       None            ED Provider  Electronically Signed by             Alesia Ulloa MD  01/01/24 2014

## 2024-01-01 NOTE — TELEPHONE ENCOUNTER
"Regarding: Bad headache  ----- Message from Danilo Burgos sent at 1/1/2024 11:49 AM EST -----  'Pt also stated the BP was  177/118\"    ----- Message from Danilo Burgos sent at 1/1/2024 11:48 AM EST -----  \" Pt called in with a bad headache , pt stated that this week was the worse, pt is 15 weeks pregnant and already sent a message through My Chart as well \" Please advise    "

## 2024-01-01 NOTE — TELEPHONE ENCOUNTER
"Reason for Disposition  • Patient sounds very sick or weak to the triager    Answer Assessment - Initial Assessment Questions  1. LOCATION: \"Where does it hurt?\"       Whole head, yesterday left side  2. ONSET: \"When did the headache start?\" (Minutes, hours or days)       Have been having them frequent but this one started Friday   3. PATTERN: \"Does the pain come and go, or has it been constant since it started?\"      Intense over this weekend   4. SEVERITY: \"How bad is the pain?\" and \"What does it keep you from doing?\"     - MILD - doesn't interfere with normal activities     - MODERATE - interferes with normal activities or awakens from sleep     - SEVERE - excruciating pain, unable to do any normal activities         severe  5. RECURRENT SYMPTOM: \"Have you ever had headaches before?\" If Yes, ask: \"When was the last time?\" and \"What happened that time?\"       Yes past several weeks but this time worse since Friday   6. CAUSE: \"What do you think is causing the headache?\"      Unsure   7. MIGRAINE: \"Have you been diagnosed with migraine headaches?\" If Yes, ask: \"Is this headache similar?\"       In the past   9. OTHER SYMPTOMS: \"Do you have any other symptoms?\" (e.g., fever, stiff neck, blurred vision; swelling of hands, face, or feet)      Blood pressure elevated; short of breath \"I feel like I cannot breathe\"  10. PREGNANCY: \"How many weeks pregnant are you?\"        15 weeks   11. MARIANO: \"What date are you expecting to deliver?\"        6/24/24    Checks blood pressure and Saturday was 177/118 -she states that is when she felt the worst.   Today she did not check blood pressure    Protocols used: Pregnancy - Headache-ADULT-AH    "

## 2024-01-02 ENCOUNTER — CLINICAL SUPPORT (OUTPATIENT)
Dept: OBGYN CLINIC | Facility: CLINIC | Age: 24
End: 2024-01-02

## 2024-01-02 ENCOUNTER — TELEPHONE (OUTPATIENT)
Dept: OBGYN CLINIC | Facility: CLINIC | Age: 24
End: 2024-01-02

## 2024-01-02 VITALS — SYSTOLIC BLOOD PRESSURE: 142 MMHG | DIASTOLIC BLOOD PRESSURE: 88 MMHG

## 2024-01-02 DIAGNOSIS — Z01.30 BLOOD PRESSURE CHECK: Primary | ICD-10-CM

## 2024-01-02 LAB
ATRIAL RATE: 95 BPM
BACTERIA UR CULT: NORMAL
LAB AP GYN PRIMARY INTERPRETATION: ABNORMAL
LAB AP LMP: ABNORMAL
Lab: ABNORMAL
P AXIS: 65 DEGREES
PATH INTERP SPEC-IMP: ABNORMAL
PR INTERVAL: 164 MS
QRS AXIS: 52 DEGREES
QRSD INTERVAL: 74 MS
QT INTERVAL: 336 MS
QTC INTERVAL: 422 MS
T WAVE AXIS: 48 DEGREES
VENTRICULAR RATE: 95 BPM

## 2024-01-02 NOTE — TELEPHONE ENCOUNTER
Patient called regarding the results of her PAP test. Her PAP is ASCUS. She would like to know what you recommend for follow up? At her age is it just follow PAP in one year?

## 2024-01-02 NOTE — PROGRESS NOTES
Patient here for BP check, she is 15w1d  Over the weekend she started with severe headaches where she couldn't talk because it was so bad. She took her BP at home and it was 177/118 and 156/110. She called into health call and was seen in the ED yesterday. Her BP in the ED was 167/80. She states she was given fluids, tylenol, potassium and magnesium. She had improvement but did get a headache last night that woke her from her sleep. She denies any visual changes. BP today was 142/88. Discussed with Dr. Hook. Patient is to take two extra strength tylenol at one time (1,000 mg) and take some caffeine with it. She can do this three times daily as needed. She is to start prevention medication, Magnesium 500 mg daily and Riboflavin 400 mg daily. She is to follow up in one week for a repeat BP check. If she has severe headaches again she is to report to the ED.     Of note she denies any PMH of migraines or blood pressure problems in the past. All her BP's prior to this weekend were normal 112/74, 114/70, 114/64

## 2024-01-03 NOTE — TELEPHONE ENCOUNTER
Called patient to review recommendation for repeat pap in one year. Called patient, her mailbox is full, unable to leave a VM. Will call patient again tomorrow if she does not call back

## 2024-01-04 ENCOUNTER — TELEPHONE (OUTPATIENT)
Dept: FAMILY MEDICINE CLINIC | Facility: CLINIC | Age: 24
End: 2024-01-04

## 2024-01-04 NOTE — TELEPHONE ENCOUNTER
"FW: Emergency Discharge  Received: 2 days ago  Caitlin Sturges  VALENTIN Parrish  Note from OB visit today BP check  \"Patient here for BP check, she is 15w1d  Over the weekend she started with severe headaches where she couldn't talk because it was so bad. She took her BP at home and it was 177/118 and 156/110. She called into health call and was seen in the ED yesterday. Her BP in the ED was 167/80. She states she was given fluids, tylenol, potassium and magnesium. She had improvement but did get a headache last night that woke her from her sleep. She denies any visual changes. BP today was 142/88. Discussed with Dr. Hook. Patient is to take two extra strength tylenol at one time (1,000 mg) and take some caffeine with it. She can do this three times daily as needed. She is to start prevention medication, Magnesium 500 mg daily and Riboflavin 400 mg daily. She is to follow up in one week for a repeat BP check. If she has severe headaches again she is to report to the ED.     Of note she denies any PMH of migraines or blood pressure problems in the past. All her BP's prior to this weekend were normal 112/74, 114/70, 114/64\"    Looks like OBGYN is managing this and seeing her             Discharge Notification     Patient: Latisha Kelly  : 2000 (23 yrs)  No data recorded  PCP: VALENTIN Parrish  Attending: No att. providers found  Formerly [facility], [facility], Unit: [facility] ED  Admission Date: 2024  Patient Class: Emergency  ER Presenting complaint:  Headache/high blood pres*  Admitting Diagnosis: Headache [R51.9]           "

## 2024-01-05 NOTE — PROGRESS NOTES
23 y.o.  female at 15w4d (Estimated Date of Delivery: 24) for PNV.    Pre-Vel Vitals      Flowsheet Row Most Recent Value   Prenatal Assessment    Movement Present   Prenatal Vitals    Blood Pressure 114/70   Weight - Scale 116 kg (255 lb)   Urine Albumin/Glucose    Dilation/Effacement/Station    Vaginal Drainage    Edema           TW.897 kg (13 lb)    Leakage of fluid: no  Vaginal bleeding: no  Contractions/Cramping: no  Fetal movement: no  Pt feeling well  Low risk NIPS  Rx afp  Pap done today  Declines flu shot    RTO in 4 weeks.

## 2024-01-09 ENCOUNTER — APPOINTMENT (OUTPATIENT)
Dept: LAB | Facility: AMBULARY SURGERY CENTER | Age: 24
End: 2024-01-09
Payer: COMMERCIAL

## 2024-01-09 ENCOUNTER — ROUTINE PRENATAL (OUTPATIENT)
Dept: OBGYN CLINIC | Facility: CLINIC | Age: 24
End: 2024-01-09

## 2024-01-09 VITALS
HEART RATE: 102 BPM | OXYGEN SATURATION: 98 % | SYSTOLIC BLOOD PRESSURE: 144 MMHG | RESPIRATION RATE: 20 BRPM | DIASTOLIC BLOOD PRESSURE: 90 MMHG

## 2024-01-09 DIAGNOSIS — Z3A.16 16 WEEKS GESTATION OF PREGNANCY: ICD-10-CM

## 2024-01-09 DIAGNOSIS — Z36.9 ANTENATAL SCREENING ENCOUNTER: ICD-10-CM

## 2024-01-09 DIAGNOSIS — O10.919 CHRONIC HYPERTENSION AFFECTING PREGNANCY: Primary | ICD-10-CM

## 2024-01-09 DIAGNOSIS — Z34.01 PRENATAL CARE, FIRST PREGNANCY IN FIRST TRIMESTER: ICD-10-CM

## 2024-01-09 DIAGNOSIS — R39.9 UTI SYMPTOMS: ICD-10-CM

## 2024-01-09 DIAGNOSIS — O10.919 CHRONIC HYPERTENSION AFFECTING PREGNANCY: ICD-10-CM

## 2024-01-09 LAB
ALBUMIN SERPL BCP-MCNC: 4.1 G/DL (ref 3.5–5)
ALP SERPL-CCNC: 45 U/L (ref 34–104)
ALT SERPL W P-5'-P-CCNC: 38 U/L (ref 7–52)
ANION GAP SERPL CALCULATED.3IONS-SCNC: 9 MMOL/L
AST SERPL W P-5'-P-CCNC: 23 U/L (ref 13–39)
BASOPHILS # BLD AUTO: 0.03 THOUSANDS/ÂΜL (ref 0–0.1)
BASOPHILS NFR BLD AUTO: 0 % (ref 0–1)
BILIRUB SERPL-MCNC: 0.22 MG/DL (ref 0.2–1)
BUN SERPL-MCNC: 7 MG/DL (ref 5–25)
CALCIUM SERPL-MCNC: 9.8 MG/DL (ref 8.4–10.2)
CHLORIDE SERPL-SCNC: 103 MMOL/L (ref 96–108)
CO2 SERPL-SCNC: 23 MMOL/L (ref 21–32)
CREAT SERPL-MCNC: 0.46 MG/DL (ref 0.6–1.3)
CREAT UR-MCNC: 97.9 MG/DL
EOSINOPHIL # BLD AUTO: 0.17 THOUSAND/ÂΜL (ref 0–0.61)
EOSINOPHIL NFR BLD AUTO: 2 % (ref 0–6)
ERYTHROCYTE [DISTWIDTH] IN BLOOD BY AUTOMATED COUNT: 12.4 % (ref 11.6–15.1)
GFR SERPL CREATININE-BSD FRML MDRD: 141 ML/MIN/1.73SQ M
GLUCOSE SERPL-MCNC: 78 MG/DL (ref 65–140)
HCT VFR BLD AUTO: 36.5 % (ref 34.8–46.1)
HGB BLD-MCNC: 11.3 G/DL (ref 11.5–15.4)
IMM GRANULOCYTES # BLD AUTO: 0.1 THOUSAND/UL (ref 0–0.2)
IMM GRANULOCYTES NFR BLD AUTO: 1 % (ref 0–2)
LYMPHOCYTES # BLD AUTO: 2.08 THOUSANDS/ÂΜL (ref 0.6–4.47)
LYMPHOCYTES NFR BLD AUTO: 23 % (ref 14–44)
MCH RBC QN AUTO: 28.2 PG (ref 26.8–34.3)
MCHC RBC AUTO-ENTMCNC: 31 G/DL (ref 31.4–37.4)
MCV RBC AUTO: 91 FL (ref 82–98)
MONOCYTES # BLD AUTO: 0.7 THOUSAND/ÂΜL (ref 0.17–1.22)
MONOCYTES NFR BLD AUTO: 8 % (ref 4–12)
NEUTROPHILS # BLD AUTO: 5.85 THOUSANDS/ÂΜL (ref 1.85–7.62)
NEUTS SEG NFR BLD AUTO: 66 % (ref 43–75)
NRBC BLD AUTO-RTO: 0 /100 WBCS
PLATELET # BLD AUTO: 317 THOUSANDS/UL (ref 149–390)
PMV BLD AUTO: 9.7 FL (ref 8.9–12.7)
POTASSIUM SERPL-SCNC: 3.9 MMOL/L (ref 3.5–5.3)
PROT SERPL-MCNC: 7 G/DL (ref 6.4–8.4)
PROT UR-MCNC: 7 MG/DL
PROT/CREAT UR: 0.07 MG/G{CREAT} (ref 0–0.1)
RBC # BLD AUTO: 4.01 MILLION/UL (ref 3.81–5.12)
SODIUM SERPL-SCNC: 135 MMOL/L (ref 135–147)
TSH SERPL DL<=0.05 MIU/L-ACNC: 2.19 UIU/ML (ref 0.45–4.5)
WBC # BLD AUTO: 8.93 THOUSAND/UL (ref 4.31–10.16)

## 2024-01-09 PROCEDURE — 87086 URINE CULTURE/COLONY COUNT: CPT

## 2024-01-09 PROCEDURE — 36415 COLL VENOUS BLD VENIPUNCTURE: CPT

## 2024-01-09 PROCEDURE — 82570 ASSAY OF URINE CREATININE: CPT

## 2024-01-09 PROCEDURE — 85025 COMPLETE CBC W/AUTO DIFF WBC: CPT

## 2024-01-09 PROCEDURE — PNV

## 2024-01-09 PROCEDURE — 80053 COMPREHEN METABOLIC PANEL: CPT

## 2024-01-09 PROCEDURE — 82105 ALPHA-FETOPROTEIN SERUM: CPT

## 2024-01-09 PROCEDURE — 84156 ASSAY OF PROTEIN URINE: CPT

## 2024-01-09 PROCEDURE — 84443 ASSAY THYROID STIM HORMONE: CPT

## 2024-01-09 RX ORDER — LABETALOL 100 MG/1
100 TABLET, FILM COATED ORAL 2 TIMES DAILY
Qty: 60 TABLET | Refills: 0 | Status: SHIPPED | OUTPATIENT
Start: 2024-01-09 | End: 2024-02-08

## 2024-01-09 NOTE — PROGRESS NOTES
Latisha presents to the office for a blood pressure check. She had a sudden onset of headaches and hypertension on 01/01/2024, which she presented to the ED for. She stated that since last week when she was here for a blood pressure check- she had one headache, which resolved after taking a nap. She reports that she checked her BP one time during the week: 142/96. Blood pressure today was taken manually in her Right arm: 144/90. She reports feeling slightly light headed, but no headaches. Patient stated that she did have a yeast infection that resolved on its own- had pink tinge a few weeks ago on her underwear. Patient aware that she could use Monistat 7 and also eat a daily yogurt if she likes them, if it happens to come back.   Reviewed today's blood pressure result with Mary Lou- patient now with a diagnosis of cHTN. She should have Pre-E labs drawn. Patient will also have added TSH since she is due for a refill of thyroid medication- to make sure she is getting the correct dosage. We will be sending medication to her pharmacy for hypertension, and she should return later this week for another BP check.

## 2024-01-10 LAB — BACTERIA UR CULT: NORMAL

## 2024-01-11 ENCOUNTER — ROUTINE PRENATAL (OUTPATIENT)
Dept: OBGYN CLINIC | Facility: CLINIC | Age: 24
End: 2024-01-11

## 2024-01-11 VITALS
HEART RATE: 95 BPM | OXYGEN SATURATION: 98 % | DIASTOLIC BLOOD PRESSURE: 86 MMHG | SYSTOLIC BLOOD PRESSURE: 128 MMHG | RESPIRATION RATE: 18 BRPM

## 2024-01-11 DIAGNOSIS — Z3A.16 16 WEEKS GESTATION OF PREGNANCY: Primary | ICD-10-CM

## 2024-01-11 DIAGNOSIS — O10.919 CHRONIC HYPERTENSION AFFECTING PREGNANCY: ICD-10-CM

## 2024-01-11 DIAGNOSIS — E03.8 SUBCLINICAL HYPOTHYROIDISM: ICD-10-CM

## 2024-01-11 LAB
2ND TRIMESTER 4 SCREEN SERPL-IMP: NORMAL
AFP ADJ MOM SERPL: 1.32
AFP INTERP AMN-IMP: NORMAL
AFP INTERP SERPL-IMP: NORMAL
AFP INTERP SERPL-IMP: NORMAL
AFP SERPL-MCNC: 36.3 NG/ML
AGE AT DELIVERY: 23.3 YR
GA METHOD: NORMAL
GA: 16.1 WEEKS
IDDM PATIENT QL: NO
MULTIPLE PREGNANCY: NO
NEURAL TUBE DEFECT RISK FETUS: 9058 %

## 2024-01-11 PROCEDURE — PNV

## 2024-01-11 RX ORDER — LEVOTHYROXINE SODIUM 0.03 MG/1
25 TABLET ORAL DAILY
Qty: 90 TABLET | Refills: 0 | Status: SHIPPED | OUTPATIENT
Start: 2024-01-11 | End: 2024-04-10

## 2024-01-11 NOTE — PROGRESS NOTES
Latisha presents to the office for a blood pressure check. She was provided with a prescription of Labetalol 100mg BID on Tuesday, but hasn't started the medication yet because of issues with her pharmacy. She will  today after work. Blood pressure was taken manually in her Right arm: 128/86. She had one headache that resolved on its own after sleeping.  Reviewed with Mary Lou- Patient should still start the medication tonight. She has an appointment next week for her scheduled OB check- we will check BP at that time. Her labs look stable at this time. Patient is aware. She also said that she is going to talk to the provider next week about a possible early Glucola test. Patient is agreeable to contact us with any headaches that will not go away with Tylenol or visual changes. Reviewed next appointment with the patient. Mary Lou also refilled her thyroid medication for her.

## 2024-01-19 ENCOUNTER — ROUTINE PRENATAL (OUTPATIENT)
Dept: OBGYN CLINIC | Facility: CLINIC | Age: 24
End: 2024-01-19

## 2024-01-19 VITALS — DIASTOLIC BLOOD PRESSURE: 70 MMHG | WEIGHT: 259 LBS | SYSTOLIC BLOOD PRESSURE: 120 MMHG | BODY MASS INDEX: 41.8 KG/M2

## 2024-01-19 DIAGNOSIS — Z3A.17 17 WEEKS GESTATION OF PREGNANCY: ICD-10-CM

## 2024-01-19 DIAGNOSIS — E03.8 SUBCLINICAL HYPOTHYROIDISM: ICD-10-CM

## 2024-01-19 DIAGNOSIS — O10.912 CHRONIC HYPERTENSION IN OBSTETRIC CONTEXT IN SECOND TRIMESTER: Primary | ICD-10-CM

## 2024-01-19 DIAGNOSIS — E66.01 MORBID OBESITY WITH BMI OF 40.0-44.9, ADULT (HCC): ICD-10-CM

## 2024-01-19 PROCEDURE — PNV

## 2024-01-19 NOTE — PROGRESS NOTES
Patient is a 24 YO  female presenting to the office at 17w4d for routine OB care.     Patient is feeling well today.     Hx PCOS and hypothyroidism, on 25mcg levothyroxine. Last TSH on  was 2.1    Was getting daily severe headaches, seen in the ER on 24. Recommended to take Tylenol with caffeine at onset of headache and magnesium 500mg QD, robiflavin 400mg QD. Still getting headaches, had a few over the weekend, but not as severe as previous, and no longer lasting full days. Tylenol helps for a little and then headaches come back. No blurry vision or other visual changes.     Came to office for BP check on 24, new dx of cHTN made. Currently taking labetalol 100mg BID. Pressures at home have been stable 120's/70's since beginning labetalol. BP stable today in office.    Patient prefers not to complete early 1-hour glucose test if it is not required. Discussed risk factors for gestational diabetes and screening ~26 weeks.    Fetal heart rate: 150  BP: 120/70  TWlb  Fetal Movement: yes, occasional flutters.   LOF: no  VB: no  CTX: no  Reviewed precautions  Call for concerns  RTO 4 weeks

## 2024-01-19 NOTE — PROGRESS NOTES
Patient states she is doing well, the only question she has is about clarification of having to do an early glucose test. Urine dip neg/neg.

## 2024-02-05 ENCOUNTER — ROUTINE PRENATAL (OUTPATIENT)
Facility: HOSPITAL | Age: 24
End: 2024-02-05
Payer: COMMERCIAL

## 2024-02-05 VITALS
DIASTOLIC BLOOD PRESSURE: 76 MMHG | WEIGHT: 261.8 LBS | BODY MASS INDEX: 42.07 KG/M2 | HEART RATE: 99 BPM | OXYGEN SATURATION: 99 % | SYSTOLIC BLOOD PRESSURE: 120 MMHG | HEIGHT: 66 IN

## 2024-02-05 DIAGNOSIS — O10.919 CHRONIC HYPERTENSION AFFECTING PREGNANCY: ICD-10-CM

## 2024-02-05 DIAGNOSIS — Z3A.20 20 WEEKS GESTATION OF PREGNANCY: ICD-10-CM

## 2024-02-05 DIAGNOSIS — Z36.3 ENCOUNTER FOR ANTENATAL SCREENING FOR MALFORMATION: Primary | ICD-10-CM

## 2024-02-05 DIAGNOSIS — Z36.86 ENCOUNTER FOR ANTENATAL SCREENING FOR CERVICAL LENGTH: ICD-10-CM

## 2024-02-05 DIAGNOSIS — O99.212 OBESITY AFFECTING PREGNANCY IN SECOND TRIMESTER, UNSPECIFIED OBESITY TYPE: ICD-10-CM

## 2024-02-05 PROCEDURE — 99213 OFFICE O/P EST LOW 20 MIN: CPT | Performed by: STUDENT IN AN ORGANIZED HEALTH CARE EDUCATION/TRAINING PROGRAM

## 2024-02-05 PROCEDURE — 76811 OB US DETAILED SNGL FETUS: CPT | Performed by: STUDENT IN AN ORGANIZED HEALTH CARE EDUCATION/TRAINING PROGRAM

## 2024-02-05 PROCEDURE — 76817 TRANSVAGINAL US OBSTETRIC: CPT | Performed by: STUDENT IN AN ORGANIZED HEALTH CARE EDUCATION/TRAINING PROGRAM

## 2024-02-05 RX ORDER — MAGNESIUM 30 MG
TABLET ORAL
COMMUNITY

## 2024-02-05 NOTE — PROGRESS NOTES
"[facility]: Ms. Kelly was seen today for anatomic survey and cervical length screening ultrasound.  See ultrasound report under \"OB Procedures\" tab.      MDM:   I. Diagnoses/Problems addressed:  Stable chronic illness: chronic hypertension  II.  Data: I reviewed 5 lab tests ordered by another provider.  III.  Risk of morbidity: Low    Please don't hesitate to contact our office with any concerns or questions.  -Francesca Atwood MD      "

## 2024-02-05 NOTE — PROGRESS NOTES
Ultrasound Probe Disinfection    A transvaginal ultrasound was performed.   Prior to use, disinfection was performed with High Level Disinfection Process ([employer]ae[employer]).  Probe serial number A3: [id number] was used.      Alexsandra Pardo  02/05/24  12:40 PM

## 2024-02-12 ENCOUNTER — ROUTINE PRENATAL (OUTPATIENT)
Dept: OBGYN CLINIC | Facility: CLINIC | Age: 24
End: 2024-02-12

## 2024-02-12 VITALS — DIASTOLIC BLOOD PRESSURE: 70 MMHG | WEIGHT: 256 LBS | BODY MASS INDEX: 41.32 KG/M2 | SYSTOLIC BLOOD PRESSURE: 130 MMHG

## 2024-02-12 DIAGNOSIS — Z3A.21 21 WEEKS GESTATION OF PREGNANCY: ICD-10-CM

## 2024-02-12 DIAGNOSIS — E66.8 OTHER OBESITY AFFECTING PREGNANCY IN SECOND TRIMESTER: ICD-10-CM

## 2024-02-12 DIAGNOSIS — O99.212 OTHER OBESITY AFFECTING PREGNANCY IN SECOND TRIMESTER: ICD-10-CM

## 2024-02-12 DIAGNOSIS — O10.919 CHRONIC HYPERTENSION AFFECTING PREGNANCY: Primary | ICD-10-CM

## 2024-02-12 PROCEDURE — PNV: Performed by: OBSTETRICS & GYNECOLOGY

## 2024-02-13 NOTE — PROGRESS NOTES
23 y.o.   female at 21 wga for PNV. BP : 130/70. TW  + FM, - LOF, - Ctxn, - bleeding  Pt complains of passing mucus over the last 2 days and back pain.  Some sharp abd/pelvic pain, but not period like cramping    Spec - cervix closed, physiological discharge  SVE cl/th/hi  CL - 3.1cm    Reviewed with pt that current exam is reassuring.  Reviewed PTL precautions and for her to call with concerns  Has appt next week for BP check

## 2024-02-16 ENCOUNTER — ROUTINE PRENATAL (OUTPATIENT)
Dept: OBGYN CLINIC | Facility: CLINIC | Age: 24
End: 2024-02-16

## 2024-02-16 VITALS — SYSTOLIC BLOOD PRESSURE: 124 MMHG | WEIGHT: 263 LBS | DIASTOLIC BLOOD PRESSURE: 76 MMHG | BODY MASS INDEX: 42.45 KG/M2

## 2024-02-16 DIAGNOSIS — Z3A.21 21 WEEKS GESTATION OF PREGNANCY: ICD-10-CM

## 2024-02-16 DIAGNOSIS — O10.919 CHRONIC HYPERTENSION AFFECTING PREGNANCY: ICD-10-CM

## 2024-02-16 DIAGNOSIS — O10.919 CHRONIC HYPERTENSION AFFECTING PREGNANCY: Primary | ICD-10-CM

## 2024-02-16 PROCEDURE — PNV: Performed by: PHYSICIAN ASSISTANT

## 2024-02-16 RX ORDER — LABETALOL 100 MG/1
100 TABLET, FILM COATED ORAL 2 TIMES DAILY
Qty: 60 TABLET | Refills: 0 | Status: SHIPPED | OUTPATIENT
Start: 2024-02-16 | End: 2024-02-16

## 2024-02-16 RX ORDER — LABETALOL 100 MG/1
100 TABLET, FILM COATED ORAL 2 TIMES DAILY
Qty: 180 TABLET | Refills: 0 | Status: SHIPPED | OUTPATIENT
Start: 2024-02-16 | End: 2024-05-16

## 2024-02-16 NOTE — PROGRESS NOTES
Patient is a 24 YO  female presenting to the office at 21.4 weeks for routine OB care.   BP: 124/76  TWlb  Fetal Movement: yes  LOF: no  VB: no  CTX: no  Recommend daily BP checks at home  CHTN, recently placed on 100mg BID labetalol - rec repeat BP check next week  Should not drive more than 2 hours one way for work due to pregnancy  Reviewed precautions  Call for concerns  RTO 1 week for BP check and 4 weeks for routine OB       Oriented - self; Oriented - place; Oriented - time

## 2024-02-16 NOTE — PROGRESS NOTES
Patient is requesting a refill on her BP medications. She also has a few things she would like to discuss with you. Urine dip neg/neg.

## 2024-02-16 NOTE — LETTER
February 16, 2024     Patient: Latisha Kelly  YOB: 2000  Date of Visit: 2/16/2024      To Whom it May Concern:    Latisha Kelly is under my professional care. Latisha was seen in my office on 2/16/2024. Latisha may return to work with limitations . Due to pregnancy, Latisha should spend no more than 2 hours in a vehicle each way during her work day  .    If you have any questions or concerns, please don't hesitate to call.         Sincerely,          Mary Lou Castellanos PA-C        CC: No Recipients

## 2024-02-16 NOTE — LETTER
February 16, 2024     Patient: Latisha Kelly  YOB: 2000  Date of Visit: 2/16/2024      To Whom it May Concern:    Latisha Kelly is under my professional care. Latisha was seen in my office on 2/16/2024. Latisha is currently pregnant with a due date of June 24, 2024.     If you have any questions or concerns, please don't hesitate to call.         Sincerely,          Mary Lou Castellanos PA-C        CC: No Recipients

## 2024-02-23 ENCOUNTER — CLINICAL SUPPORT (OUTPATIENT)
Dept: OBGYN CLINIC | Facility: CLINIC | Age: 24
End: 2024-02-23

## 2024-02-23 VITALS
DIASTOLIC BLOOD PRESSURE: 80 MMHG | BODY MASS INDEX: 42.75 KG/M2 | SYSTOLIC BLOOD PRESSURE: 122 MMHG | HEIGHT: 66 IN | WEIGHT: 266 LBS

## 2024-02-23 NOTE — PROGRESS NOTES
Prenatal 22 weeks, here for a blood pressure check. Pt would like heart tones to be completed, which Yas AVILA completed.     BP:122/80  Heart tones: 150

## 2024-02-26 ENCOUNTER — HOSPITAL ENCOUNTER (INPATIENT)
Facility: HOSPITAL | Age: 24
LOS: 4 days | Discharge: HOME | DRG: 817 | End: 2024-03-01
Attending: OBSTETRICS & GYNECOLOGY | Admitting: OBSTETRICS & GYNECOLOGY
Payer: COMMERCIAL

## 2024-02-26 ENCOUNTER — TELEPHONE (OUTPATIENT)
Dept: OBGYN CLINIC | Facility: CLINIC | Age: 24
End: 2024-02-26

## 2024-02-26 DIAGNOSIS — O34.32 CERVICAL CERCLAGE SUTURE PRESENT IN SECOND TRIMESTER: ICD-10-CM

## 2024-02-26 DIAGNOSIS — O34.32 CERVICAL INSUFFICIENCY DURING PREGNANCY IN SECOND TRIMESTER, ANTEPARTUM: Primary | ICD-10-CM

## 2024-02-26 DIAGNOSIS — O10.919 CHRONIC HYPERTENSION AFFECTING PREGNANCY: ICD-10-CM

## 2024-02-26 PROBLEM — O47.00 PRETERM UTERINE CONTRACTIONS: Status: ACTIVE | Noted: 2024-02-26

## 2024-02-26 PROBLEM — Z3A.23 23 WEEKS GESTATION OF PREGNANCY: Status: ACTIVE | Noted: 2023-12-15

## 2024-02-26 PROBLEM — O47.9 UTERINE CONTRACTIONS DURING PREGNANCY: Status: ACTIVE | Noted: 2024-02-26

## 2024-02-26 PROBLEM — O47.9 UTERINE CONTRACTIONS DURING PREGNANCY: Status: RESOLVED | Noted: 2024-02-26 | Resolved: 2024-02-26

## 2024-02-26 LAB
ABO GROUP BLD: NORMAL
ALBUMIN SERPL BCP-MCNC: 3.9 G/DL (ref 3.5–5)
ALP SERPL-CCNC: 58 U/L (ref 34–104)
ALT SERPL W P-5'-P-CCNC: 22 U/L (ref 7–52)
ANION GAP SERPL CALCULATED.3IONS-SCNC: 10 MMOL/L
AST SERPL W P-5'-P-CCNC: 12 U/L (ref 13–39)
BACTERIA UR QL AUTO: ABNORMAL /HPF
BILIRUB SERPL-MCNC: 0.25 MG/DL (ref 0.2–1)
BILIRUB UR QL STRIP: NEGATIVE
BLD GP AB SCN SERPL QL: NEGATIVE
BUN SERPL-MCNC: 7 MG/DL (ref 5–25)
CALCIUM SERPL-MCNC: 9.6 MG/DL (ref 8.4–10.2)
CHLORIDE SERPL-SCNC: 105 MMOL/L (ref 96–108)
CLARITY UR: CLEAR
CO2 SERPL-SCNC: 22 MMOL/L (ref 21–32)
COLOR UR: YELLOW
CREAT SERPL-MCNC: 0.53 MG/DL (ref 0.6–1.3)
ERYTHROCYTE [DISTWIDTH] IN BLOOD BY AUTOMATED COUNT: 13.1 % (ref 11.6–15.1)
GFR SERPL CREATININE-BSD FRML MDRD: 135 ML/MIN/1.73SQ M
GLUCOSE SERPL-MCNC: 71 MG/DL (ref 65–140)
GLUCOSE UR STRIP-MCNC: NEGATIVE MG/DL
HCT VFR BLD AUTO: 32.9 % (ref 34.8–46.1)
HGB BLD-MCNC: 10.5 G/DL (ref 11.5–15.4)
HGB UR QL STRIP.AUTO: NEGATIVE
KETONES UR STRIP-MCNC: ABNORMAL MG/DL
LEUKOCYTE ESTERASE UR QL STRIP: ABNORMAL
MCH RBC QN AUTO: 28.1 PG (ref 26.8–34.3)
MCHC RBC AUTO-ENTMCNC: 31.9 G/DL (ref 31.4–37.4)
MCV RBC AUTO: 88 FL (ref 82–98)
MUCOUS THREADS UR QL AUTO: ABNORMAL
NITRITE UR QL STRIP: NEGATIVE
NON-SQ EPI CELLS URNS QL MICRO: ABNORMAL /HPF
PH UR STRIP.AUTO: 6.5 [PH] (ref 4.5–8)
PLATELET # BLD AUTO: 290 THOUSANDS/UL (ref 149–390)
PMV BLD AUTO: 9.1 FL (ref 8.9–12.7)
POTASSIUM SERPL-SCNC: 3.7 MMOL/L (ref 3.5–5.3)
PROT SERPL-MCNC: 6.7 G/DL (ref 6.4–8.4)
PROT UR STRIP-MCNC: NEGATIVE MG/DL
RBC # BLD AUTO: 3.74 MILLION/UL (ref 3.81–5.12)
RBC #/AREA URNS AUTO: ABNORMAL /HPF
RH BLD: POSITIVE
SODIUM SERPL-SCNC: 137 MMOL/L (ref 135–147)
SP GR UR STRIP.AUTO: 1.02 (ref 1–1.03)
SPECIMEN EXPIRATION DATE: NORMAL
UROBILINOGEN UR QL STRIP.AUTO: 0.2 E.U./DL
WBC # BLD AUTO: 10.71 THOUSAND/UL (ref 4.31–10.16)
WBC #/AREA URNS AUTO: ABNORMAL /HPF

## 2024-02-26 PROCEDURE — 86901 BLOOD TYPING SEROLOGIC RH(D): CPT

## 2024-02-26 PROCEDURE — 87491 CHLMYD TRACH DNA AMP PROBE: CPT

## 2024-02-26 PROCEDURE — 86780 TREPONEMA PALLIDUM: CPT

## 2024-02-26 PROCEDURE — 81001 URINALYSIS AUTO W/SCOPE: CPT

## 2024-02-26 PROCEDURE — NC001 PR NO CHARGE: Performed by: OBSTETRICS & GYNECOLOGY

## 2024-02-26 PROCEDURE — 87150 DNA/RNA AMPLIFIED PROBE: CPT

## 2024-02-26 PROCEDURE — 83036 HEMOGLOBIN GLYCOSYLATED A1C: CPT

## 2024-02-26 PROCEDURE — 59025 FETAL NON-STRESS TEST: CPT | Performed by: OBSTETRICS & GYNECOLOGY

## 2024-02-26 PROCEDURE — 80053 COMPREHEN METABOLIC PANEL: CPT

## 2024-02-26 PROCEDURE — 76816 OB US FOLLOW-UP PER FETUS: CPT | Performed by: OBSTETRICS & GYNECOLOGY

## 2024-02-26 PROCEDURE — 87591 N.GONORRHOEAE DNA AMP PROB: CPT

## 2024-02-26 PROCEDURE — 4A1HXCZ MONITORING OF PRODUCTS OF CONCEPTION, CARDIAC RATE, EXTERNAL APPROACH: ICD-10-PCS | Performed by: OBSTETRICS & GYNECOLOGY

## 2024-02-26 PROCEDURE — 76817 TRANSVAGINAL US OBSTETRIC: CPT

## 2024-02-26 PROCEDURE — 99254 IP/OBS CNSLTJ NEW/EST MOD 60: CPT | Performed by: OBSTETRICS & GYNECOLOGY

## 2024-02-26 PROCEDURE — 86850 RBC ANTIBODY SCREEN: CPT

## 2024-02-26 PROCEDURE — 99214 OFFICE O/P EST MOD 30 MIN: CPT

## 2024-02-26 PROCEDURE — 86900 BLOOD TYPING SEROLOGIC ABO: CPT

## 2024-02-26 PROCEDURE — 85027 COMPLETE CBC AUTOMATED: CPT

## 2024-02-26 RX ORDER — MAGNESIUM SULFATE HEPTAHYDRATE 40 MG/ML
2 INJECTION, SOLUTION INTRAVENOUS CONTINUOUS
Status: DISCONTINUED | OUTPATIENT
Start: 2024-02-26 | End: 2024-02-27

## 2024-02-26 RX ORDER — SIMETHICONE 80 MG
80 TABLET,CHEWABLE ORAL 4 TIMES DAILY PRN
Status: DISCONTINUED | OUTPATIENT
Start: 2024-02-26 | End: 2024-03-01 | Stop reason: HOSPADM

## 2024-02-26 RX ORDER — MAGNESIUM SULFATE HEPTAHYDRATE 40 MG/ML
2 INJECTION, SOLUTION INTRAVENOUS ONCE
Status: COMPLETED | OUTPATIENT
Start: 2024-02-26 | End: 2024-02-26

## 2024-02-26 RX ORDER — ACETAMINOPHEN 325 MG/1
650 TABLET ORAL EVERY 4 HOURS PRN
Status: DISCONTINUED | OUTPATIENT
Start: 2024-02-26 | End: 2024-02-28

## 2024-02-26 RX ORDER — BETAMETHASONE SODIUM PHOSPHATE AND BETAMETHASONE ACETATE 3; 3 MG/ML; MG/ML
12 INJECTION, SUSPENSION INTRA-ARTICULAR; INTRALESIONAL; INTRAMUSCULAR; SOFT TISSUE EVERY 24 HOURS
Status: COMPLETED | OUTPATIENT
Start: 2024-02-26 | End: 2024-02-27

## 2024-02-26 RX ORDER — INDOMETHACIN 25 MG/1
25 CAPSULE ORAL EVERY 6 HOURS
Status: COMPLETED | OUTPATIENT
Start: 2024-02-27 | End: 2024-02-28

## 2024-02-26 RX ORDER — SODIUM CHLORIDE, SODIUM LACTATE, POTASSIUM CHLORIDE, CALCIUM CHLORIDE 600; 310; 30; 20 MG/100ML; MG/100ML; MG/100ML; MG/100ML
50 INJECTION, SOLUTION INTRAVENOUS CONTINUOUS
Status: DISCONTINUED | OUTPATIENT
Start: 2024-02-26 | End: 2024-02-27

## 2024-02-26 RX ORDER — CALCIUM GLUCONATE 94 MG/ML
1 INJECTION, SOLUTION INTRAVENOUS ONCE AS NEEDED
Status: DISCONTINUED | OUTPATIENT
Start: 2024-02-26 | End: 2024-03-01 | Stop reason: HOSPADM

## 2024-02-26 RX ORDER — INDOMETHACIN 25 MG/1
50 CAPSULE ORAL ONCE
Status: COMPLETED | OUTPATIENT
Start: 2024-02-26 | End: 2024-02-26

## 2024-02-26 RX ORDER — CALCIUM CARBONATE 500 MG/1
1000 TABLET, CHEWABLE ORAL DAILY PRN
Status: DISCONTINUED | OUTPATIENT
Start: 2024-02-26 | End: 2024-03-01 | Stop reason: HOSPADM

## 2024-02-26 RX ORDER — LEVOTHYROXINE SODIUM 0.03 MG/1
25 TABLET ORAL
Status: DISCONTINUED | OUTPATIENT
Start: 2024-02-27 | End: 2024-03-01 | Stop reason: HOSPADM

## 2024-02-26 RX ORDER — ONDANSETRON 2 MG/ML
4 INJECTION INTRAMUSCULAR; INTRAVENOUS EVERY 8 HOURS PRN
Status: DISCONTINUED | OUTPATIENT
Start: 2024-02-26 | End: 2024-03-01 | Stop reason: HOSPADM

## 2024-02-26 RX ORDER — LABETALOL 100 MG/1
100 TABLET, FILM COATED ORAL 2 TIMES DAILY
Status: DISCONTINUED | OUTPATIENT
Start: 2024-02-26 | End: 2024-03-01 | Stop reason: HOSPADM

## 2024-02-26 RX ORDER — MAGNESIUM SULFATE HEPTAHYDRATE 40 MG/ML
4 INJECTION, SOLUTION INTRAVENOUS ONCE
Status: COMPLETED | OUTPATIENT
Start: 2024-02-26 | End: 2024-02-26

## 2024-02-26 RX ORDER — DOCUSATE SODIUM 100 MG/1
100 CAPSULE, LIQUID FILLED ORAL 2 TIMES DAILY
Status: DISCONTINUED | OUTPATIENT
Start: 2024-02-26 | End: 2024-03-01 | Stop reason: HOSPADM

## 2024-02-26 RX ADMIN — MAGNESIUM SULFATE HEPTAHYDRATE 4 G: 40 INJECTION, SOLUTION INTRAVENOUS at 19:47

## 2024-02-26 RX ADMIN — PENICILLIN G POTASSIUM 2.5 MILLION UNITS: 20000000 INJECTION, POWDER, FOR SOLUTION INTRAVENOUS at 23:59

## 2024-02-26 RX ADMIN — SODIUM CHLORIDE, SODIUM LACTATE, POTASSIUM CHLORIDE, AND CALCIUM CHLORIDE 50 ML/HR: .6; .31; .03; .02 INJECTION, SOLUTION INTRAVENOUS at 19:47

## 2024-02-26 RX ADMIN — INDOMETHACIN 50 MG: 25 CAPSULE ORAL at 20:21

## 2024-02-26 RX ADMIN — MAGNESIUM SULFATE HEPTAHYDRATE 2 G/HR: 40 INJECTION, SOLUTION INTRAVENOUS at 20:19

## 2024-02-26 RX ADMIN — MAGNESIUM SULFATE HEPTAHYDRATE 2 G: 40 INJECTION, SOLUTION INTRAVENOUS at 20:03

## 2024-02-26 RX ADMIN — BETAMETHASONE SODIUM PHOSPHATE AND BETAMETHASONE ACETATE 12 MG: 3; 3 INJECTION, SUSPENSION INTRA-ARTICULAR; INTRALESIONAL; INTRAMUSCULAR at 19:54

## 2024-02-26 RX ADMIN — PENICILLIN G POTASSIUM 5 MILLION UNITS: 20000000 INJECTION, POWDER, FOR SOLUTION INTRAVENOUS at 19:47

## 2024-02-26 NOTE — LETTER
[facility]  [address] [place] [address]  Dept: [phone]    February 27, 2024     Patient: Latisha Kelly   YOB: 2000   Date of Visit: 2/26/2024       To Whom it May Concern:    Latisha Kelly is under my professional care. She was seen in the hospital from 2/26/2024 to 02/27/24. Her partner, Jefferson Gutiérrez, was present with her in the hospital. Please excuse his absence from work on 2/26/24 and 2/27/24.     If you have any questions or concerns, please don't hesitate to call.         Sincerely,          Wendy Oquendo MD

## 2024-02-26 NOTE — TELEPHONE ENCOUNTER
----- Message from Latisha Kelly sent at 2/26/2024  3:11 PM EST -----  Regarding: Constant cramping  Contact: [phone]  Hello, I feel like I’m constantly asking questions or having concerns my apologies but today since 7am this morning I’ve been having what feels like cramps or even contractions. At least every 30 months this pain and cramping in my pelvic area then it calms down then starts again. I just ate to see if that would help and it didn’t. I’m really good with my water intake as well. I haven’t felt baby move much today. This morning I think what possibly was a kick but it was so painful  then I haven’t felt movement since. I’ve been trying to get her to move I’m hoping she’s just having a lazy day. But besides that is this constant on and off cramping concerning? I haven’t had cramps since the first trimester. Overall it just feels…uncomfortable. And they have been on and off literally all day like a schedule.

## 2024-02-26 NOTE — LETTER
[facility]  [address] [facility] [address]  Dept: [phone]    March 1, 2024     Patient: Latisha Kelly   YOB: 2000   Date of Visit: 2/26/2024       To Whom it May Concern:    Latisha Kelly is under my professional care. She was seen in the hospital from 2/26/2024 to 03/01/24. She may return to work on 3/11/24 if she is ready, and if not, then 3/18/24  with the following limitations : no driving for the next two weeks until her follow up appointment with M .     If you have any questions or concerns, please don't hesitate to call.         Sincerely,        Jeremias Ramirez MD

## 2024-02-26 NOTE — TELEPHONE ENCOUNTER
Spoke with patient, she is aware to go to Labor and delivery. Dr. Hook and L&D aware patient should be there in an hour.

## 2024-02-27 PROBLEM — O47.00 PRETERM UTERINE CONTRACTIONS: Status: RESOLVED | Noted: 2024-02-26 | Resolved: 2024-02-27

## 2024-02-27 PROBLEM — E03.9 HYPOTHYROIDISM: Status: ACTIVE | Noted: 2024-02-27

## 2024-02-27 PROBLEM — O60.02 PRETERM LABOR IN SECOND TRIMESTER: Status: ACTIVE | Noted: 2024-02-27

## 2024-02-27 LAB
C TRACH DNA SPEC QL NAA+PROBE: NEGATIVE
EST. AVERAGE GLUCOSE BLD GHB EST-MCNC: 74 MG/DL
HBA1C MFR BLD: 4.2 %
N GONORRHOEA DNA SPEC QL NAA+PROBE: NEGATIVE
TREPONEMA PALLIDUM IGG+IGM AB [PRESENCE] IN SERUM OR PLASMA BY IMMUNOASSAY: NORMAL

## 2024-02-27 PROCEDURE — 90715 TDAP VACCINE 7 YRS/> IM: CPT | Performed by: OBSTETRICS & GYNECOLOGY

## 2024-02-27 PROCEDURE — 99223 1ST HOSP IP/OBS HIGH 75: CPT | Performed by: OBSTETRICS & GYNECOLOGY

## 2024-02-27 PROCEDURE — 76816 OB US FOLLOW-UP PER FETUS: CPT | Performed by: OBSTETRICS & GYNECOLOGY

## 2024-02-27 PROCEDURE — 99232 SBSQ HOSP IP/OBS MODERATE 35: CPT | Performed by: OBSTETRICS & GYNECOLOGY

## 2024-02-27 PROCEDURE — NC001 PR NO CHARGE: Performed by: OBSTETRICS & GYNECOLOGY

## 2024-02-27 RX ADMIN — SODIUM CHLORIDE, SODIUM LACTATE, POTASSIUM CHLORIDE, AND CALCIUM CHLORIDE 1000 ML: .6; .31; .03; .02 INJECTION, SOLUTION INTRAVENOUS at 21:48

## 2024-02-27 RX ADMIN — INDOMETHACIN 25 MG: 25 CAPSULE ORAL at 02:18

## 2024-02-27 RX ADMIN — PENICILLIN G POTASSIUM 2.5 MILLION UNITS: 20000000 INJECTION, POWDER, FOR SOLUTION INTRAVENOUS at 03:56

## 2024-02-27 RX ADMIN — BETAMETHASONE SODIUM PHOSPHATE AND BETAMETHASONE ACETATE 12 MG: 3; 3 INJECTION, SUSPENSION INTRA-ARTICULAR; INTRALESIONAL; INTRAMUSCULAR at 19:43

## 2024-02-27 RX ADMIN — LEVOTHYROXINE SODIUM 25 MCG: 25 TABLET ORAL at 06:01

## 2024-02-27 RX ADMIN — SODIUM CHLORIDE, SODIUM LACTATE, POTASSIUM CHLORIDE, AND CALCIUM CHLORIDE 125 ML: .6; .31; .03; .02 INJECTION, SOLUTION INTRAVENOUS at 23:51

## 2024-02-27 RX ADMIN — SODIUM CHLORIDE, SODIUM LACTATE, POTASSIUM CHLORIDE, AND CALCIUM CHLORIDE 1000 ML: .6; .31; .03; .02 INJECTION, SOLUTION INTRAVENOUS at 22:42

## 2024-02-27 RX ADMIN — INDOMETHACIN 25 MG: 25 CAPSULE ORAL at 15:45

## 2024-02-27 RX ADMIN — INDOMETHACIN 25 MG: 25 CAPSULE ORAL at 21:30

## 2024-02-27 RX ADMIN — MAGNESIUM SULFATE HEPTAHYDRATE 2 G/HR: 40 INJECTION, SOLUTION INTRAVENOUS at 06:11

## 2024-02-27 RX ADMIN — DOCUSATE SODIUM 100 MG: 100 CAPSULE, LIQUID FILLED ORAL at 08:46

## 2024-02-27 RX ADMIN — DOCUSATE SODIUM 100 MG: 100 CAPSULE, LIQUID FILLED ORAL at 18:15

## 2024-02-27 RX ADMIN — TETANUS TOXOID, REDUCED DIPHTHERIA TOXOID AND ACELLULAR PERTUSSIS VACCINE, ADSORBED 0.5 ML: 5; 2.5; 8; 8; 2.5 SUSPENSION INTRAMUSCULAR at 13:57

## 2024-02-27 RX ADMIN — INDOMETHACIN 25 MG: 25 CAPSULE ORAL at 08:46

## 2024-02-27 RX ADMIN — LABETALOL HYDROCHLORIDE 100 MG: 100 TABLET, FILM COATED ORAL at 08:46

## 2024-02-27 NOTE — UTILIZATION REVIEW
Initial Clinical Review    IQ not available d/t electronic issues     Admission: Date/Time/Statement:   Admission Orders (From admission, onward)       Ordered        24 1923  Inpatient Admission  Once                          Orders Placed This Encounter   Procedures    Inpatient Admission     Standing Status:   Standing     Number of Occurrences:   1     Order Specific Question:   Level of Care     Answer:   Med Surg [16]     Order Specific Question:   Estimated length of stay     Answer:   More than 2 Midnights     Order Specific Question:   Certification     Answer:   I certify that inpatient services are medically necessary for this patient for a duration of greater than two midnights. See H&P and MD Progress Notes for additional information about the patient's course of treatment.         Chief Complaint   Patient presents with    Abdominal Cramping     And Decreased Fetal Movement        Initial Presentation: 23 y.o. female presented to L&D as inpatient admission G 1 @ 23 weeks  for cervical dilation and shortening, concern for  labor. Patient c/o (+) cramping and contractions as per patient feels like period cramps,denies leaking bleeding (+) fetal movement. Plan consult MFM. Neonatology, IV MGSO4 x 12 hrs for neuro protection, BTM ,indocin for tocolysis, and supportive care   SVE: Cervical Dilation: 4  Cervical Effacement: 100  Fetal Station: -3  Presentation: Vertex  Method: Manual  Bedside Vag US 24  Ultrasound Other  Fetal Presentation: Vertex  Cervical Length: 0.42  Funnel: Yes  Funnel Measurement: 6.58  Debris: Yes  Placenta Previa: No  Vasa Previa: No     MFM consult   US today showed a vertex active fetus that has an EFW of 1lb 3 oz and 546 gms. The placenta is anterior. LVP is 4 cm. There is no sign of a fibroid or abruption. The prior missed anatomy was reviewed and no malformations were noted. The 4 chamber view of the heart is still limited by fetal position. The funneling of  her cervix was noted on abdominal scan and did not funnel past her external os. The fetal vertex is in the BUBBA and is above the region of the cervical os.      Date: 02-27-24   Day 2: s/p 12 hrs MGSO 4,  NST TID and prn, 2 BTM injection today remains on Indocin as tocolytic,  Pregnancy complicated by cHTN labetalol 100 mg BID  Hypothyroidism,levothyroxine 25 mcg and BMI 43.     Admitting Vitals   Temperature Pulse Respirations Blood Pressure SpO2   02/26/24 1747 02/26/24 1800 02/26/24 1747 02/26/24 1800 02/26/24 1950   98.5 °F (36.9 °C) 103 18 119/59 99 %      Temp Source Heart Rate Source Patient Position - Orthostatic VS BP Location FiO2 (%)   02/26/24 1747 02/26/24 1747 -- 02/26/24 1747 --   Oral Monitor  Right arm       Pain Score       02/26/24 2021       No Pain          Wt Readings from Last 1 Encounters:   02/26/24 119 kg (263 lb)     Additional Vital Signs:   Date/Time Temp Pulse Resp BP SpO2 O2 Device   02/27/24 0558 97.8 °F (36.6 °C) 98 20 134/71 97 % None (Room air)   02/27/24 0400 -- -- -- -- -- None (Room air)   02/27/24 0353 97.8 °F (36.6 °C) 93 20 131/67 -- --   02/27/24 0350 -- 65 -- -- 95 %  --   SpO2: adjust pulse ox at 02/27/24 0350   02/27/24 0205 98 °F (36.7 °C) 93 18 111/54 100 % None (Room air)   02/27/24 0000 97.9 °F (36.6 °C) 101 18 135/65 97 % None (Room air)   02/26/24 2105 98.9 °F (37.2 °C) 100 20 121/59 -- --   02/26/24 2100 -- 103 -- -- 98 % None (Room air)   02/26/24 2025 -- 104 -- -- 99 % --   02/26/24 2023 -- -- -- -- -- --   Comment rows:   OBSERV: Dr Hook in room to talk to pt at 02/26/24 2023 02/26/24 2021 -- 103 -- -- 97 % --   02/26/24 2010 -- 105 -- -- 98 % --   Comment rows:   OBSERV: NICU md in room for consult at 02/26/24 2010 02/26/24 2000 -- 108 Abnormal  -- -- 99 % --   02/26/24 1950 -- 109 Abnormal  -- 122/68 99 % --   02/26/24 1947 -- 102 -- -- -- --   02/26/24 1800 -- 103 16 119/59                         Pertinent Labs/Diagnostic Test Results:     Bedside Vag  US 04-26-24  Ultrasound Other  Fetal Presentation: Vertex  Cervical Length: 0.42  Funnel: Yes  Funnel Measurement: 6.58  Debris: Yes  Placenta Previa: No  Vasa Previa: No         Results from last 7 days   Lab Units 02/26/24 1942   WBC Thousand/uL 10.71*   HEMOGLOBIN g/dL 10.5*   HEMATOCRIT % 32.9*   PLATELETS Thousands/uL 290         Results from last 7 days   Lab Units 02/26/24 1942   SODIUM mmol/L 137   POTASSIUM mmol/L 3.7   CHLORIDE mmol/L 105   CO2 mmol/L 22   ANION GAP mmol/L 10   BUN mg/dL 7   CREATININE mg/dL 0.53*   EGFR ml/min/1.73sq m 135   CALCIUM mg/dL 9.6     Results from last 7 days   Lab Units 02/26/24 1942   AST U/L 12*   ALT U/L 22   ALK PHOS U/L 58   TOTAL PROTEIN g/dL 6.7   ALBUMIN g/dL 3.9   TOTAL BILIRUBIN mg/dL 0.25         Results from last 7 days   Lab Units 02/26/24 1942   GLUCOSE RANDOM mg/dL 71         Results from last 7 days   Lab Units 02/26/24 1942   HEMOGLOBIN A1C % 4.2   EAG mg/dl 74     Results from last 7 days   Lab Units 02/26/24  1758   CLARITY UA  Clear   COLOR UA  Yellow   SPEC GRAV UA  1.020   PH UA  6.5   GLUCOSE UA mg/dl Negative   KETONES UA mg/dl Trace*   BLOOD UA  Negative   PROTEIN UA mg/dl Negative   NITRITE UA  Negative   BILIRUBIN UA  Negative   UROBILINOGEN UA E.U./dl 0.2   LEUKOCYTES UA  Small*   WBC UA /hpf 1-2   RBC UA /hpf 1-2   BACTERIA UA /hpf Occasional   EPITHELIAL CELLS WET PREP /hpf Occasional   MUCUS THREADS  Occasional*       Past Medical History:   Diagnosis Date    Anemia     Hypertension     chtn started medication with pregnancy    Hypothyroid     Migraine without aura     OTC med management    PCOS (polycystic ovarian syndrome)     Varicella     vaccine    Visual impairment      Present on Admission:   Chronic hypertension affecting pregnancy   Obesity affecting pregnancy in second trimester   Hypothyroidism      Admitting Diagnosis: Uterine contractions during pregnancy [O47.9]  Decreased fetal movement affecting management of mother,  antepartum [O36.8190]  23 weeks gestation of pregnancy [Z3A.23]  Age/Sex: 23 y.o. female    Admission Orders:  VS, I/O lung assessments and deep tendon reflexes q 2 hrs while on MGSO 4  Continuous external monitoring while on MGSO4           Scheduled Medications:  betamethasone acetate-betamethasone sodium phosphate, 12 mg, Intramuscular, Q24H  docusate sodium, 100 mg, Oral, BID  indomethacin, 25 mg, Oral, Q6H  labetalol, 100 mg, Oral, BID  levothyroxine, 25 mcg, Oral, Early Morning      Continuous IV Infusions:  lactated ringers, 50 mL/hr, Intravenous, Continuous  magnesium sulfate 20 g/500 mL infusion (premix)  Rate: 50 mL/hr Dose: 2 g/h     PRN Meds:  acetaminophen, 650 mg, Oral, Q4H PRN  calcium carbonate, 1,000 mg, Oral, Daily PRN  calcium gluconate, 1 g, Intravenous, Once PRN  ondansetron, 4 mg, Intravenous, Q8H PRN  simethicone, 80 mg, Oral, 4x Daily PRN        IP CONSULT TO PERINATOLOGY  IP CONSULT TO NEONATOLOGY    Network Utilization Review Department  ATTENTION: Please call with any questions or concerns to [phone] and carefully listen to the prompts so that you are directed to the right person. All voicemails are confidential.   For Discharge needs, contact Care Management DC Support Team at [phone] opt. [phone]  Send all requests for admission clinical reviews, approved or denied determinations and any other requests to dedicated fax number below belonging to the campus where the patient is receiving treatment. List of dedicated fax numbers for the Facilities:  FACILITY NAME UR FAX NUMBER   ADMISSION DENIALS (Administrative/Medical Necessity) [phone]   DISCHARGE SUPPORT TEAM (NETWORK) [phone]   PARENT CHILD HEALTH (Maternity/NICU/Pediatrics) [phone]   [facility] [phone]   [facility] [phone]   [facility] [phone]   [facility] [phone]   [facility]  [facility] [phone]   [facility] [phone]   [facility] [phone]   [facility] [phone]   [facility] [phone]   [facility] [phone]   [facility] [phone]   [facility] [phone]

## 2024-02-27 NOTE — PLAN OF CARE
Problem: ANTEPARTUM  Goal: Maintain pregnancy as long as maternal and/or fetal condition is stable  Description: INTERVENTIONS:  - Maternal surveillance  - Fetal surveillance  - Monitor uterine activity  - Medications as ordered  - Bedrest with brp   Outcome: Progressing     Problem: PAIN - ADULT  Goal: Verbalizes/displays adequate comfort level or baseline comfort level  Description: Interventions:  - Encourage patient to monitor pain and request assistance  - Assess pain using appropriate pain scale  - Administer analgesics based on type and severity of pain and evaluate response  - Implement non-pharmacological measures as appropriate and evaluate response  - Consider cultural and social influences on pain and pain management  - Notify physician/advanced practitioner if interventions unsuccessful or patient reports new pain  Outcome: Progressing     Problem: INFECTION - ADULT  Goal: Absence or prevention of progression during hospitalization  Description: INTERVENTIONS:  - Assess and monitor for signs and symptoms of infection  - Monitor lab/diagnostic results  - Monitor all insertion sites, i.e. indwelling lines, tubes, and drains  - Pep appropriate cooling/warming therapies per order  - Administer medications as ordered  - Instruct and encourage patient and family to use good hand hygiene technique  - Identify and instruct in appropriate isolation precautions for identified infection/condition  Outcome: Progressing  Goal: Absence of fever/infection during neutropenic period  Description: INTERVENTIONS:  - Monitor WBC as ordered     Outcome: Progressing     Problem: SAFETY ADULT  Goal: Patient will remain free of falls  Description: INTERVENTIONS:  - Educate patient/family on patient safety including physical limitations  - Instruct patient to call for assistance with activity   - Consult OT/PT to assist with strengthening/mobility   - Keep Call bell within reach  - Keep bed low and locked with side  rails adjusted as appropriate  - Keep care items and personal belongings within reach  - Initiate and maintain comfort rounds  - Make Fall Risk Sign visible to staff    - Apply yellow socks and bracelet for high fall risk patients  - Consider moving patient to room near nurses station  Outcome: Progressing  Goal: Maintain or return to baseline ADL function  Description: INTERVENTIONS:  -  Assess patient's ability to carry out ADLs; assess patient's baseline for ADL function and identify physical deficits which impact ability to perform ADLs (bathing, care of mouth/teeth, toileting, grooming, dressing, etc.)  - Assess/evaluate cause of self-care deficits   - Assess range of motion  - Assess patient's mobility; develop plan if impaired  - Assess patient's need for assistive devices and provide as appropriate  - Encourage maximum independence but intervene and supervise when necessary  - Involve family in performance of ADLs  - Assess for home care needs following discharge   - Consider OT consult to assist with ADL evaluation and planning for discharge  - Provide patient education as appropriate  Outcome: Progressing  Goal: Maintains/Returns to pre admission functional level  Description: INTERVENTIONS:  - Perform AM-PAC 6 Click Basic Mobility/ Daily Activity assessment daily.  - Set and communicate daily mobility goal to care team and patient/family/caregiver.   - Collaborate with rehabilitation services on mobility goals if consulted    - Out of bed for toileting  - Record patient progress and toleration of activity level   Outcome: Progressing     Problem: DISCHARGE PLANNING  Goal: Discharge to home or other facility with appropriate resources  Description: INTERVENTIONS:  - Identify barriers to discharge w/patient and caregiver  - Arrange for needed discharge resources and transportation as appropriate  - Identify discharge learning needs (meds, wound care, etc.)    - Refer to Case Management Department for  coordinating discharge planning if the patient needs post-hospital services based on physician/advanced practitioner order or complex needs related to functional status, cognitive ability, or social support system  Outcome: Progressing

## 2024-02-27 NOTE — PROGRESS NOTES
Patient is feeling comfortable at this time. We reviewed the plan to proceed to a room upstairs on antepartum. Patient is s/p magnesium and has her second dose of steroids due this evening. She has a regular diet ordered. Pt will let us know if at any point she feels more uncomfortable.    Tasha Blair MD  PGY-4 OB/GYN   2/27/2024 8:30 AM

## 2024-02-27 NOTE — PROCEDURES
Latisha Kelly, a  at 23w0d with an MARIANO of 2024, by Last Menstrual Period, was seen at [facility] for the following procedure(s): $Procedure Type: US - Transvaginal]                  Ultrasound Other  Fetal Presentation: Vertex  Cervical Length: 0.42  Funnel: Yes  Funnel Measurement: 6.58  Debris: Yes  Placenta Previa: No  Vasa Previa: No         Ultrasound Probe Disinfection    A transvaginal ultrasound was performed.   Prior to use, disinfection was performed with High Level Disinfection Process (Trophon)    Odette Mercer MD  24  9:50 PM

## 2024-02-27 NOTE — ASSESSMENT & PLAN NOTE
Dated by LMP  Pregnancy complicated by cHTN on meds, Hypothyroidism, and BMI 43.   Genetic screening: NIPS low risk  See plan under  uterine contractions

## 2024-02-27 NOTE — PLAN OF CARE
Problem: ANTEPARTUM  Goal: Maintain pregnancy as long as maternal and/or fetal condition is stable  Description: INTERVENTIONS:  - Maternal surveillance  - Fetal surveillance  - Monitor uterine activity  - Medications as ordered  - Bedrest  Outcome: Progressing     Problem: PAIN - ADULT  Goal: Verbalizes/displays adequate comfort level or baseline comfort level  Description: Interventions:  - Encourage patient to monitor pain and request assistance  - Assess pain using appropriate pain scale  - Administer analgesics based on type and severity of pain and evaluate response  - Implement non-pharmacological measures as appropriate and evaluate response  - Consider cultural and social influences on pain and pain management  - Notify physician/advanced practitioner if interventions unsuccessful or patient reports new pain  Outcome: Progressing     Problem: INFECTION - ADULT  Goal: Absence or prevention of progression during hospitalization  Description: INTERVENTIONS:  - Assess and monitor for signs and symptoms of infection  - Monitor lab/diagnostic results  - Monitor all insertion sites, i.e. indwelling lines, tubes, and drains  - Monitor endotracheal if appropriate and nasal secretions for changes in amount and color  - Gardner appropriate cooling/warming therapies per order  - Administer medications as ordered  - Instruct and encourage patient and family to use good hand hygiene technique  - Identify and instruct in appropriate isolation precautions for identified infection/condition  Outcome: Progressing  Goal: Absence of fever/infection during neutropenic period  Description: INTERVENTIONS:  - Monitor WBC    Outcome: Progressing     Problem: SAFETY ADULT  Goal: Patient will remain free of falls  Description: INTERVENTIONS:  - Educate patient/family on patient safety including physical limitations  - Instruct patient to call for assistance with activity   - Consult OT/PT to assist with strengthening/mobility   -  Keep Call bell within reach  - Keep bed low and locked with side rails adjusted as appropriate  - Keep care items and personal belongings within reach  - Initiate and maintain comfort rounds  Outcome: Progressing  Goal: Maintain or return to baseline ADL function  Description: INTERVENTIONS:  -  Assess patient's ability to carry out ADLs; assess patient's baseline for ADL function and identify physical deficits which impact ability to perform ADLs (bathing, care of mouth/teeth, toileting, grooming, dressing, etc.)  - Assess/evaluate cause of self-care deficits   - Assess range of motion  - Assess patient's mobility; develop plan if impaired  - Assess patient's need for assistive devices and provide as appropriate  - Encourage maximum independence but intervene and supervise when necessary  - Involve family in performance of ADLs  - Assess for home care needs following discharge   - Consider OT consult to assist with ADL evaluation and planning for discharge  - Provide patient education as appropriate  Outcome: Progressing  Goal: Maintains/Returns to pre admission functional level  Description: INTERVENTIONS:  - Perform AM-PAC 6 Click Basic Mobility/ Daily Activity assessment daily.  - Set and communicate daily mobility goal to care team and patient/family/caregiver.   - Collaborate with rehabilitation services on mobility goals if consulted  Outcome: Progressing     Problem: Knowledge Deficit  Goal: Patient/family/caregiver demonstrates understanding of disease process, treatment plan, medications, and discharge instructions  Description: Complete learning assessment and assess knowledge base.  Interventions:  - Provide teaching at level of understanding  - Provide teaching via preferred learning methods  Outcome: Progressing     Problem: DISCHARGE PLANNING  Goal: Discharge to home or other facility with appropriate resources  Description: INTERVENTIONS:  - Identify barriers to discharge w/patient and caregiver  -  Arrange for needed discharge resources and transportation as appropriate  - Identify discharge learning needs (meds, wound care, etc.)  - Arrange for interpretive services to assist at discharge as needed  - Refer to Case Management Department for coordinating discharge planning if the patient needs post-hospital services based on physician/advanced practitioner order or complex needs related to functional status, cognitive ability, or social support system  Outcome: Progressing

## 2024-02-27 NOTE — CONSULTS
History of Present Illness: Patient is a 23 y.o. , with an MARIANO of 2024, at 23 and 0/7 weeks gestation who is being admitted for signs and symptoms of  labor. Pregnancy complicated by short cervix, chronic hypertension, obesity, polycystic ovarian disease, and hypothyroidism. PNL-neg, GBS-unknown, bld type-A+. Current meds: penicillin, Magnesium sulfate, Labetalol, synthroid, and completing first course of Celestone. EFW .    Discussed the following with the mother:    Prognosis: Survival rate ~40%. Potential complications include the followings:    Resp: At ~100% risk for RDS. High risk and will mechanical ventilation including oxygen and surfactant therapy. Risk of CLD is about ~80%. At risk of apnea of prematurity, will need to be treated with Caffeine.     CV: At high risk for significant PDA (80%). May need treatment with Indocin/Tylenol, Ibuprofen and/or surgical ligation.    FEN: will need IV nutrition via central line. Risk of cholestasis is low. May take several weeks until able to tolerate all enteral feed PO by mouth. Risk of NEC is lower if feeding breast milk. Risk of NEC 10-20%.    ID: risk of early sepsis is low unless labor is complicated by chorioamnionitis. Risk of late sepsis is high, 60-70%.    HEM: At risk for jaundice-may need phototherapy. At risk of anemia of prematurity-may require PRBC transfusions. At risk for thrombocytopenia due to maternal preeclampsia.    Neuro: risk of severe IVH is ~30%. Risk of CP 5-10%. At high risk for behavioral problems and learning disabilities ~ 30%. Risk of IQ <70 is ~13%. At risk of IUGR due to maternal preeclampsia.    Optho: Risk of severe ROP ~25%. May need laser surgery.    Overall prognosis:    Survival w/o profound Neurodevelpmental impairment 15-30%   Survival w/o moderate to severe Neurodevelpmental impairment 10-20%  Death 60-75%  Death or profound Neurodevelpmental impairment 70-85%  Death or  moderate to severe Neurodevelpmental  impairment 85-90%    Criteria for discharge: Feeding all PO and good wgt gain at least two days prior to discharge, maintaining normal body temperature in open crib for at least two days prior to discharge, no apnea spells for at least 5 days before discharge, off mechanical ventilation and oxygen therapy, age at least 35wks postconceptual age. Discussed visitation policy with parents.    Continue fetal monitoring, continue Celestone due to its potential benefit, continue current OB care plan, notify NICU team if signs of fetal distress and/or premature delivery.       Time spent to complete this consult ~60 minutes. Majority of time was spent face to face with patient

## 2024-02-27 NOTE — ASSESSMENT & PLAN NOTE
Speculum exam significant for 4cm of cervical membranes at the cervical os, but not prolapsing into the vagina.   Discussed likelihood of  labor given clinical picture and cervical exam of 4/100/-3 with intact membranes and no intact cervix based on bimanual exam.  Patient is appropriately tearful  Transvaginal ultrasound showed significant funneling of 6.5 cm and debris.  Difficult to ascertain true cervical length but likely to be no intact cervix given bimanual exam.  Vaginitis slides were negative and UA was negative for infection.  Urine culture, CBC, CMP, GBS swab, GC/CT were collected and sent.  Recommend neonatology consultation.  Recommend magnesium sulfate for neuroprotection, betamethasone for fetal lung maturity, Indocin for tocolysis, and penicillin due to GBS unknown status.   Patient is agreeable to all recommended interventions.  After further discussion patient wants all things done for this baby.  Remainder of questions to be deferred to neonatology team.

## 2024-02-27 NOTE — CONSULTS
Consultation - Maternal Fetal Medicine   Latisha Kelly 23 y.o. female MRN: [id number]  Unit/Bed#: LD TRIAGE 2- Encounter: [id number]  Admit date: 2024.  Today's date: 24    Assessment/Plan   Ms. Kelly is a 23 y.o. year-old  at 23w0d, Hospital Day: 1, admitted for  labor.  By issue:    Hypothyroidism  Assessment & Plan  Taking home levothyroxine 25mcg qD    Chronic hypertension affecting pregnancy  Assessment & Plan  Blood pressure is well-controlled on home labetalol 100 mg BID  Follow-up PreE labs    23 weeks gestation of pregnancy  Assessment & Plan  Dated by LMP  Pregnancy complicated by cHTN on meds, Hypothyroidism, and BMI 43.   Genetic screening: NIPS low risk  See plan under  uterine contractions    Obesity affecting pregnancy in second trimester  Assessment & Plan  BMI 43  No early Glucola  23 A1c of 4.7  Follow-up A1c    *  uterine contractions  Assessment & Plan  Speculum exam significant for 4cm of cervical membranes at the cervical os, but not prolapsing into the vagina.   Discussed likelihood of  labor given clinical picture and cervical exam of 4/100/-3 with intact membranes and no intact cervix based on bimanual exam.  Patient is appropriately tearful  Transvaginal ultrasound showed significant funneling of 6.5 cm and debris.  Difficult to ascertain true cervical length but likely to be no intact cervix given bimanual exam.  Vaginitis slides were negative and UA was negative for infection.  Urine culture, CBC, CMP, GBS swab, GC/CT were collected and sent.  Recommend neonatology consultation.  Recommend magnesium sulfate for neuroprotection, betamethasone for fetal lung maturity, Indocin for tocolysis, and penicillin due to GBS unknown status.   Patient is agreeable to all recommended interventions.  After further discussion patient wants all things done for this baby.  Remainder of questions to be deferred to neonatology team.           Chief  Complaint   Patient presents with    Abdominal Cramping     And Decreased Fetal Movement        Physician Requesting Consult: Antonia Hook, *  Reason for Consult / Principal Problem:  labor  Subspeciality: Perinatology    Latisha Kelly is a 23 y.o. year-old  with an MARIANO of Estimated Date of Delivery: 24 at 23w0d, Hospital Day: 1, admitted for cervical insufficiency/ labor. She states that she was having [id number] cramping and pelvic pressure for the past day. Latisha denies any vaginal bleeding and leakage of fluid. She does endorses vaginal discomfort which she had a negative vaginitis work up about 1 week ago. Her pregnancy is complicated by cHTN on 100mg BID of labetalol and hypothyroidism.     Other obstetric review of symptoms:  Contractions: [id number].    Leakage of fluid: None.    Bleeding: None.    Fetal movement: present.      Review of Systems   Constitutional:  Negative for chills and fever.   HENT:  Negative for ear pain and sore throat.    Eyes:  Negative for pain and visual disturbance.   Respiratory:  Negative for cough and shortness of breath.    Cardiovascular:  Negative for chest pain and palpitations.   Gastrointestinal:  Negative for abdominal pain, nausea and vomiting.   Genitourinary:  Positive for pelvic pain. Negative for dysuria, hematuria and vaginal bleeding.   Musculoskeletal:  Negative for arthralgias and back pain.   Skin:  Negative for color change and rash.   Neurological:  Negative for seizures and syncope.   All other systems reviewed and are negative.      Other history is as follows:    Historical Information   OB History    Para Term  AB Living   1 0 0 0 0 0   SAB IAB Ectopic Multiple Live Births   0 0 0 0 0      # Outcome Date GA Lbr Brian/2nd Weight Sex Delivery Anes PTL Lv   1 Current               Obstetric Comments   Believes she suffered a miscarriage in . Positive pregnancy tests, and then had heavy bleeding shortly ever.  Never confirmed with OBGYN    Menarche 13     Gynecologic history: Patient's last menstrual period was 09/18/2023.     Past Medical History:   Diagnosis Date    Anemia     Migraine without aura     OTC med management    PCOS (polycystic ovarian syndrome)      Past Surgical History:   Procedure Laterality Date    TURBINATE RESECTION       Social History   Social History     Substance and Sexual Activity   Alcohol Use Not Currently    Alcohol/week: 7.0 standard drinks of alcohol    Types: 7 Glasses of wine per week    Comment: 1 daily-prior to pregnancy     Social History     Substance and Sexual Activity   Drug Use Not Currently    Types: Marijuana    Comment: last used THC over 2 years ago (as of 11/29/23)     Social History     Tobacco Use   Smoking Status Never   Smokeless Tobacco Never     Family History:   Family History   Problem Relation Age of Onset    No Known Problems Mother     Hypertension Father     No Known Problems Sister     No Known Problems Brother     No Known Problems Brother     No Known Problems Brother     Colon cancer Neg Hx     Ovarian cancer Neg Hx     Breast cancer Neg Hx     Cancer Neg Hx        Meds/Allergies   Prior to Admission Medications   Prescriptions Last Dose Informant Patient Reported? Taking?   Prenatal Vit-Iron Carbonyl-FA (prenatal multivitamin) TABS 2/25/2024 Self Yes Yes   Sig: Take 1 tablet by mouth daily   aspirin 81 mg chewable tablet 2/25/2024 Self No Yes   Sig: Chew 2 tablets (162 mg total) daily   labetalol (NORMODYNE) 100 mg tablet 2/26/2024 at 1751  No Yes   Sig: Take 1 tablet (100 mg total) by mouth 2 (two) times a day   levothyroxine (Euthyrox) 25 mcg tablet 2/26/2024 Self No Yes   Sig: Take 1 tablet (25 mcg total) by mouth daily   magnesium 30 MG tablet 2/25/2024 Self Yes Yes   Sig: Take by mouth      Facility-Administered Medications: None     Medication Administration - last 24 hours from 02/25/2024 2033 to 02/26/2024 2033         Date/Time Order Dose Route  Action Action by     02/26/2024 1954 EST betamethasone acetate-betamethasone sodium phosphate (CELESTONE) injection 12 mg 12 mg Intramuscular Given Abbie Chakraborty RN     02/26/2024 2002 EST magnesium sulfate 4 g/100 mL IVPB (premix) 4 g 0 g Intravenous Stopped Trina Celestin RN     02/26/2024 1947 EST magnesium sulfate 4 g/100 mL IVPB (premix) 4 g 4 g Intravenous New Bag Abbie Chakraborty RN     02/26/2024 2018 EST magnesium sulfate 2 g/50 mL IVPB (premix) 2 g 0 g Intravenous Stopped Trina Celetsin RN     02/26/2024 2003 EST magnesium sulfate 2 g/50 mL IVPB (premix) 2 g 2 g Intravenous New Bag Trina Celestin RN     02/26/2024 2019 EST magnesium sulfate 20 g/500 mL infusion (premix) 2 g/hr Intravenous New Bag Trina Celestin RN     02/26/2024 1947 EST lactated ringers infusion 50 mL/hr Intravenous New Bag Abbie Chakraborty RN     02/26/2024 1947 EST penicillin G (PFIZERPEN-G) in 0.9% sodium chloride 250 mL IVPB 5 Million Units 5 Million Units Intravenous New Bag Abbie Chakraborty RN     02/26/2024 2021 EST indomethacin (INDOCIN) capsule 50 mg 50 mg Oral Given Trina Celestin RN          Current Facility-Administered Medications   Medication Dose Route Frequency    acetaminophen (TYLENOL) tablet 650 mg  650 mg Oral Q4H PRN    betamethasone acetate-betamethasone sodium phosphate (CELESTONE) injection 12 mg  12 mg Intramuscular Q24H    calcium carbonate (TUMS) chewable tablet 1,000 mg  1,000 mg Oral Daily PRN    calcium gluconate 10 % injection 1 g  1 g Intravenous Once PRN    docusate sodium (COLACE) capsule 100 mg  100 mg Oral BID    [START ON 2/27/2024] indomethacin (INDOCIN) capsule 25 mg  25 mg Oral Q6H    labetalol (NORMODYNE) tablet 100 mg  100 mg Oral BID    lactated ringers infusion  50 mL/hr Intravenous Continuous    [START ON 2/27/2024] levothyroxine tablet 25 mcg  25 mcg Oral Early Morning    magnesium sulfate 20 g/500 mL infusion (premix)  2 g/hr Intravenous Continuous    ondansetron (ZOFRAN)  injection 4 mg  4 mg Intravenous Q8H PRN    penicillin G (PFIZERPEN-G) in 0.9% sodium chloride 250 mL IVPB 5 Million Units  5 Million Units Intravenous Once    Followed by    penicillin G (PFIZERPEN-G) in 0.9% sodium chloride 100 mL IVPB 2.5 Million Units  2.5 Million Units Intravenous Q4H    simethicone (MYLICON) chewable tablet 80 mg  80 mg Oral 4x Daily PRN     Allergies   Allergen Reactions    Latex Rash       Objective    Patient Vitals for the past 24 hrs:   BP Temp Temp src Pulse Resp SpO2   02/26/24 2025 -- -- -- 104 -- 99 %   02/26/24 2021 -- -- -- 103 -- 97 %   02/26/24 2010 -- -- -- 105 -- 98 %   02/26/24 2000 -- -- -- (!) 108 -- 99 %   02/26/24 1950 122/68 -- -- (!) 109 -- 99 %   02/26/24 1800 119/59 -- -- 103 16 --   02/26/24 1747 -- 98.5 °F (36.9 °C) Oral -- 18 --     Vitals: Blood pressure 122/68, pulse 104, temperature 98.5 °F (36.9 °C), temperature source Oral, resp. rate 16, last menstrual period 09/18/2023, SpO2 99%.There is no height or weight on file to calculate BMI.    Physical Exam  Vitals reviewed.   Constitutional:       Appearance: Normal appearance.   HENT:      Head: Normocephalic and atraumatic.   Eyes:      Extraocular Movements: Extraocular movements intact.   Cardiovascular:      Rate and Rhythm: Normal rate.      Pulses: Normal pulses.   Pulmonary:      Effort: Pulmonary effort is normal. No respiratory distress.   Abdominal:      Palpations: Abdomen is soft.      Tenderness: There is no abdominal tenderness.      Comments: Gravid uterus   Musculoskeletal:         General: Normal range of motion.      Cervical back: Normal range of motion.   Skin:     General: Skin is warm and dry.   Neurological:      Mental Status: She is alert.   Psychiatric:         Mood and Affect: Mood normal.         Behavior: Behavior normal.       Cervical Dilation: 4  Cervical Effacement: 100  Fetal Station: -3  Presentation: Vertex  Method: Manual  OB Examiner: Sylvie    Prenatal Labs: I have  "personally reviewed pertinent reports.  , Blood Type:   Lab Results   Component Value Date    ABO A 2024     , D (Rh type):   Lab Results   Component Value Date    RH Positive 2024     , Antibody Screen: No results found for: \"ANTIBODYSCR\" , HCT/HGB:   Lab Results   Component Value Date    HCT 32.9 (L) 2024    HGB 10.5 (L) 2024      , Platelets: No results found for: \"PLATELETS\"   , 1 hour Glucola: No results found for: \"GLUCOLA\", Rubella: No results found for: \"RUBELLAIGGQT\"     , VDRL/RPR: No results found for: \"RPR\"   , Hep B: No results found for: \"HEPBSAG\"  , HIV: No results found for: \"HIVAGAB\"  , Group B Strep:  No results found for: \"STREPGRPB\"       Results from last 7 days   Lab Units 24   WBC Thousand/uL 10.71*   HEMOGLOBIN g/dL 10.5*   MCV fL 88   PLATELETS Thousands/uL 290         Results from last 7 days   Lab Units 24   POTASSIUM mmol/L 3.7   CHLORIDE mmol/L 105   CO2 mmol/L 22   BUN mg/dL 7   CREATININE mg/dL 0.53*   EGFR ml/min/1.73sq m 135     Results from last 7 days   Lab Units 24   AST U/L 12*   ALT U/L 22   ALK PHOS U/L 58     Results from last 7 days   Lab Units 24   PLATELETS Thousands/uL 290       Fetal data:  Nonstress test: date 24 Continuous  Baseline:  150 bpm  Variability:  minimal  Accelerations: present, 10x10  Decelerations:  occasional variable decelerations  Contractions:  present, difficult to trace  Assessment: reactive  Plan:  continuous while on mag    MFM ultrasound report key findings:      exam performed on 24    Variable presentation  Fetal growth appeared normal  Placenta Location = Anterior  No placenta previa     MEASUREMENTS (* Included In Average GA)     AC             14.27 cm        19 weeks 4 days* (32%)  BPD             4.17 cm        18 weeks 4 days* (6%)  HC             17.29 cm        19 weeks 6 days* (35%)  Femur           3.00 cm        19 weeks 2 days* (18%)     Nuchal " Fold      3.5 mm  NBL              7.7 mm     Cerebellum       1.9 cm        18 weeks 3 days  CisternaMagna    5.1 mm     EFW Hadlock 4    294 grams - 0 lbs 10 oz     THE AVERAGE GESTATIONAL AGE is 19 weeks 2 days +/- 10 days.     AMNIOTIC FLUID        Largest Vertical Pocket = 7.6 cm  Amniotic Fluid: Normal     CERVICAL EVALUATION     The cervix appeared normal (Ultrasound Examination).     SUPINE  Cervical Length: 3.30 cm     OTHER TEST RESULTS  Funneling?: No             Dynamic Changes?: No  Resp. To TFP?: No    Imaging, EKG, Pathology, and Other Studies: I have personally reviewed pertinent reports.            Odette Mercer MD  2/26/2024  8:33 PM

## 2024-02-27 NOTE — H&P
H & P- Obstetrics   Latisha Kelly 23 y.o. female MRN: [id number]  Unit/Bed#: -01 Encounter: [id number]      Assessment/Plan:    Latisha is a 23 y.o.  at 23w0d admitted for cervical dilation and shortening, concern for  labor.  Latisha is feeling cramping and contractions and so we discussed that it is unclear how quickly labor may progress that we are concerned based on her cervical dilation and normal exam several weeks ago.  Will plan for maternal-fetal medicine and neonatology consults, betamethasone, magnesium for fetal neuroprotection, penicillin due to unknown GBS status, infectious labs pending, and will consider tocolysis in the absence of infection.    SVE: Cervical Dilation: 4  Cervical Effacement: 100  Fetal Station: -3  Presentation: Vertex  Method: Manual  OB Examiner: Sylvie    Hypothyroidism  Assessment & Plan  Taking home levothyroxine 25mcg qD    Chronic hypertension affecting pregnancy  Assessment & Plan  Blood pressure is well-controlled on home labetalol 100 mg BID  Follow-up PreE labs    23 weeks gestation of pregnancy  Assessment & Plan  Dated by LMP  Pregnancy complicated by cHTN on meds, Hypothyroidism, and BMI 43.   Genetic screening: NIPS low risk  See plan under  uterine contractions    Obesity affecting pregnancy in second trimester  Assessment & Plan  BMI 43  No early Glucola  23 A1c of 4.7  Follow-up A1c    *  uterine contractions  Assessment & Plan  Speculum exam significant for 4cm of cervical membranes at the cervical os, but not prolapsing into the vagina.   Discussed likelihood of  labor given clinical picture and cervical exam of 4/100/-3 with intact membranes and no intact cervix based on bimanual exam.  Patient is appropriately tearful  Transvaginal ultrasound showed significant funneling of 6.5 cm and debris.  Difficult to ascertain true cervical length but likely to be no intact cervix given bimanual exam.  Vaginitis slides  were negative and UA was negative for infection.  Urine culture, CBC, CMP, GBS swab, GC/CT were collected and sent.  Recommend Perinatology consult  Recommend neonatology consultation.  Recommend magnesium sulfate for neuroprotection, betamethasone for fetal lung maturity, Indocin for tocolysis, and penicillin due to GBS unknown status.     Patient of: [facility]  This patient will be an INPATIENT  and I certify the anticipated length of stay is >2 Midnights  Discussed with Dr. Hook      SUBJECTIVE:    Chief Complaint: Cramping    HPI: Latisha Kelly is a 23 y.o.  with an MARIANO of 2024, by Last Menstrual Period at 23w0d who is being admitted for cervical dilation and shortening, concern for  labor.  She reports that she has had cramping all morning and into this evening and feels that it is coming and going somewhat like period cramps.  She also has been having mucoid discharge, but denies leakage of clear fluid and vaginal bleeding.  She is starting to feel fetal movement.  She reports that over the past several weeks she has had some vaginal irritation with negative workup.  She has a history of chronic hypertension, elevated BMI, hypothyroidism, PCOS and has a family history of cervical insufficiency.  She reports that her mother had a cerclage in her pregnancy.      Pregnancy Plan:  Pregnancy: Huggins  Fetal sex: Female     Delivery Plans  Acceptable blood products: All     Post-Delivery Plans  Feeding intentions: Breast Milk      Patient Active Problem List   Diagnosis    Obesity affecting pregnancy in second trimester    23 weeks gestation of pregnancy    Chronic hypertension affecting pregnancy    Encounter for care and examination of lactating mother     uterine contractions    Hypothyroidism       OB History    Para Term  AB Living   1 0 0 0 0 0   SAB IAB Ectopic Multiple Live Births   0 0 0 0 0      # Outcome Date GA Lbr Brian/2nd Weight Sex Delivery  Anes PTL Lv   1 Current               Obstetric Comments   Believes she suffered a miscarriage in 2022. Positive pregnancy tests, and then had heavy bleeding shortly ever. Never confirmed with OBGYN    Menarche 13       Past Medical History:   Diagnosis Date    Anemia     Hypertension     chtn started medication with pregnancy    Hypothyroid     Migraine without aura     OTC med management    PCOS (polycystic ovarian syndrome)     Varicella     vaccine    Visual impairment        Past Surgical History:   Procedure Laterality Date    TURBINATE RESECTION         Social History     Tobacco Use    Smoking status: Never    Smokeless tobacco: Never   Substance Use Topics    Alcohol use: Not Currently     Alcohol/week: 7.0 standard drinks of alcohol     Types: 7 Glasses of wine per week     Comment: 1 daily-prior to pregnancy       Allergies   Allergen Reactions    Latex Rash       Medications Prior to Admission   Medication    aspirin 81 mg chewable tablet    labetalol (NORMODYNE) 100 mg tablet    levothyroxine (Euthyrox) 25 mcg tablet    magnesium 30 MG tablet    Prenatal Vit-Iron Carbonyl-FA (prenatal multivitamin) TABS           OBJECTIVE:  Vitals:  Temp:  [97.9 °F (36.6 °C)-98.9 °F (37.2 °C)] 97.9 °F (36.6 °C)  HR:  [100-109] 101  Resp:  [16-20] 18  BP: (119-135)/(59-68) 135/65  Body mass index is 42.45 kg/m².     Physical Exam:  General: Anxious appearing, not in distress  Respiratory: Unlabored breathing  Cardiovascular: Regular rate.  Abdomen: Soft, gravid, nontender  Fundal Height: Appropriate for gestational age.  Extremities: Warm and well perfused.  Non tender.  Psychiatric: Behavioral normal for situation    Speculum exam:  Normal external genitalia, cervical mucous present in vaginal vault, visibly dilated cervix with taut-appearing membranes at cervical os    Nitrazine negative, pooling negative, ferning negative  KOH and NS microscopy unremarkable    Cervical length 0.4 cm by US    FHT:  Baseline Rate  "(FHR): 155 bpm  Variability: Moderate (minimal noted at times)  Accelerations: 10 x 10 (<32 weeks)  Decelerations: Variable, Dylan not <80 bpm, For < 60 seconds  FHR Category: Category II    TOCO:   Contraction Frequency (minutes): pt report contraction noted  Contraction Duration (seconds): 30-60  Contraction Intensity:  (rn and dr strickland in room ctx not palpated)      Prenatal Labs:   I have personally reviewed pertinent reports.  Blood Type:   Lab Results   Component Value Date/Time    ABO Grouping A 02/26/2024 07:42 PM   D (Rh type):   Lab Results   Component Value Date/Time    Rh Factor Positive 02/26/2024 07:42 PM   Antibody Screen:   HCT/HGB:   Lab Results   Component Value Date/Time    Hematocrit 32.9 (L) 02/26/2024 07:42 PM    Hemoglobin 10.5 (L) 02/26/2024 07:42 PM   MCV:   Lab Results   Component Value Date/Time    MCV 88 02/26/2024 07:42 PM   Platelets:   Lab Results   Component Value Date/Time    Platelets 290 02/26/2024 07:42 PM   1 hour Glucola: None  Varicella:   Lab Results   Component Value Date/Time    Varicella IgG Equivocal (A) 11/30/2023 05:27 PM   Rubella:   Lab Results   Component Value Date/Time    Rubella IgG Quant 35.8 11/30/2023 05:27 PM   VDRL/RPR:   Urine Culture/Screen:   Lab Results   Component Value Date/Time    Urine Culture 10,000-19,000 cfu/ml 01/09/2024 02:00 PM   Urine Drug Screen: No results found for: \"AMPHETUR\", \"BARBTUR\", \"BDZUR\", \"THCUR\", \"COCAINEUR\", \"METHADONEUR\", \"OPIATEUR\", \"PCPUR\", \"MTHAMUR\", \"ECSTASYUR\", \"TRICYCLICSUR\"  Hep B:   Lab Results   Component Value Date/Time    Hepatitis B Surface Ag Non-reactive 11/30/2023 05:27 PM   Hep C: Non reactive  HIV: Non reactive  Chlamydia: Negative  Gonorrhea:   Lab Results   Component Value Date/Time    N gonorrhoeae, DNA Probe Negative 12/21/2023 03:13 PM   Group B Strep:  unknown      Hedy Keyes MD  2/27/2024  12:42 AM        Portions of the record may have been created with voice recognition software.  Occasional " "wrong word or \"sound a like\" substitutions may have occurred due to the inherent limitations of voice recognition software.  Read the chart carefully and recognize, using context, where substitutions have occurred    "

## 2024-02-28 PROBLEM — O36.8390 VARIABLE FETAL HEART RATE DECELERATIONS, ANTEPARTUM: Status: ACTIVE | Noted: 2024-02-28

## 2024-02-28 LAB — GP B STREP DNA SPEC QL NAA+PROBE: POSITIVE

## 2024-02-28 PROCEDURE — 76815 OB US LIMITED FETUS(S): CPT | Performed by: OBSTETRICS & GYNECOLOGY

## 2024-02-28 PROCEDURE — 99232 SBSQ HOSP IP/OBS MODERATE 35: CPT | Performed by: OBSTETRICS & GYNECOLOGY

## 2024-02-28 PROCEDURE — NC001 PR NO CHARGE: Performed by: OBSTETRICS & GYNECOLOGY

## 2024-02-28 PROCEDURE — 59025 FETAL NON-STRESS TEST: CPT | Performed by: OBSTETRICS & GYNECOLOGY

## 2024-02-28 RX ORDER — SODIUM CHLORIDE, SODIUM LACTATE, POTASSIUM CHLORIDE, CALCIUM CHLORIDE 600; 310; 30; 20 MG/100ML; MG/100ML; MG/100ML; MG/100ML
125 INJECTION, SOLUTION INTRAVENOUS CONTINUOUS
Status: DISCONTINUED | OUTPATIENT
Start: 2024-02-29 | End: 2024-03-01 | Stop reason: HOSPADM

## 2024-02-28 RX ORDER — ACETAMINOPHEN 325 MG/1
975 TABLET ORAL EVERY 6 HOURS PRN
Status: DISCONTINUED | OUTPATIENT
Start: 2024-02-28 | End: 2024-02-29

## 2024-02-28 RX ADMIN — INDOMETHACIN 25 MG: 25 CAPSULE ORAL at 18:46

## 2024-02-28 RX ADMIN — DOCUSATE SODIUM 100 MG: 100 CAPSULE, LIQUID FILLED ORAL at 09:53

## 2024-02-28 RX ADMIN — ACETAMINOPHEN 975 MG: 325 TABLET, FILM COATED ORAL at 02:38

## 2024-02-28 RX ADMIN — SODIUM CHLORIDE, SODIUM LACTATE, POTASSIUM CHLORIDE, AND CALCIUM CHLORIDE 125 ML: .6; .31; .03; .02 INJECTION, SOLUTION INTRAVENOUS at 06:26

## 2024-02-28 RX ADMIN — LABETALOL HYDROCHLORIDE 100 MG: 100 TABLET, FILM COATED ORAL at 09:53

## 2024-02-28 RX ADMIN — LABETALOL HYDROCHLORIDE 100 MG: 100 TABLET, FILM COATED ORAL at 18:47

## 2024-02-28 RX ADMIN — LEVOTHYROXINE SODIUM 25 MCG: 25 TABLET ORAL at 05:34

## 2024-02-28 RX ADMIN — DOCUSATE SODIUM 100 MG: 100 CAPSULE, LIQUID FILLED ORAL at 18:47

## 2024-02-28 RX ADMIN — SODIUM CHLORIDE, SODIUM LACTATE, POTASSIUM CHLORIDE, AND CALCIUM CHLORIDE 999 ML/HR: .6; .31; .03; .02 INJECTION, SOLUTION INTRAVENOUS at 23:39

## 2024-02-28 RX ADMIN — INDOMETHACIN 25 MG: 25 CAPSULE ORAL at 03:31

## 2024-02-28 RX ADMIN — INDOMETHACIN 25 MG: 25 CAPSULE ORAL at 09:54

## 2024-02-28 RX ADMIN — INDOMETHACIN 25 MG: 25 CAPSULE ORAL at 23:40

## 2024-02-28 NOTE — ASSESSMENT & PLAN NOTE
Dated by LMP  Pregnancy complicated by cHTN on meds, Hypothyroidism, and BMI 43.   Genetic screening: NIPS low risk  Tdap given on   See plan under  uterine contractions

## 2024-02-28 NOTE — PROGRESS NOTES
Patient re-evaluated. Speculum exam performed and cervix noted to be visually closed, no bleeding, normal cervical mucous. SVE 2/50/-3 as performed by Dr. Hoffmann. She has not had any contractions in over 36 hours. Cervical length done earlier today 4.87 cm with funnel visualized. Discussed with patient that given regression of cervical dilation and lengthened cervical length, she would be a candidate for exam-indicated cerclage. Reviewed benefits of prolonging pregnancy, and risks of PPROM, pre-term labor and delivery. Alternative would be to not place a cerclage. She would like to proceed with cerclage placement at this time. She was counseled that she can continue normal activity after, but would need to be on pelvic rest. She understands. Scheduled for cerclage placement at 1000 with Dr. Hoffmann.    Dr. Hoffmann present for evaluation and counseling    Jeremias Ramirez MD  PGY-3  2/28/2024  5:40 PM

## 2024-02-28 NOTE — PROGRESS NOTES
Present at the bedside due to pain complaint of worsening abdominal pain. Patient states that she believes her baby is kicking her more. Says this does not feel like the pain she had on presentation. Cervical exam remains unchanged. Will continue with fetal monitoring given variable decelerations. Plan discussed with Dr. Hernandez.     Odette Mercer MD  OBGYN PGY-3  02/28/24 2:38 AM

## 2024-02-28 NOTE — ASSESSMENT & PLAN NOTE
On admission:  - Speculum exam significant for 4cm of cervical membranes at the cervical os, but not prolapsing into the vagina. Discussed likelihood of  labor given clinical picture and cervical exam of 4/100/-3 with intact membranes and no intact cervix based on bimanual exam. Patient is appropriately tearful.  - TVUS: significant funneling of 6.5 cm and debris.  Difficult to ascertain true cervical length but likely to be no intact cervix given bimanual exam.  - Vaginitis slides negative and UA was negative for infection.    Plan:  - UA wnl, GC/CT neg, GBS pos  - s/p NICU consult   - s/p Mag x12 h  - BTM -  - Indocin x48h on admission  - PCN started on admission, stopped after SVE noted to stay unchanged at 4/100/-3  - Re-exam on  showed VE 2/50/-3, and visually closed cervix. CL: 4 cm. Plan was made to place cerclage on , however tracing noted to have fetal tachycardia and periods of minimal variability.  - Amniocentesis done on  with normal glucose and gram stain  - Osman cerclage placed on  without complication  - Indocin x48h and Toradol s/p cerclage

## 2024-02-28 NOTE — PROGRESS NOTES
"Progress Note - OB/GYN  Latisha Kelly 23 y.o. female MRN: [id number]  Unit/Bed#: -01 Encounter: [id number]        Subjective: Raisa reports that she was feeling no further contractions this morning.  Her magnesium had been stopped and she had been up and out of bed to void.  She completed 1 dose of steroids.       Vitals: Blood pressure 103/55, pulse 98, temperature 98.2 °F (36.8 °C), temperature source Oral, resp. rate 19, height 5' 6\" (1.676 m), weight 119 kg (263 lb), last menstrual period 09/18/2023, SpO2 98%.,Body mass index is 42.45 kg/m².      Intake/Output Summary (Last 24 hours) at 2/27/2024 2116  Last data filed at 2/27/2024 0610  Gross per 24 hour   Intake 1181.66 ml   Output 725 ml   Net 456.66 ml       Invasive Devices       Peripheral Intravenous Line  Duration             Peripheral IV 02/26/24 Dorsal (posterior);Right Hand 1 day                    Review of Systems:  Review of Systems she reports no sign of leaking of fluid or vaginal bleeding reports no further contractions this morning      Physical Exam:   Physical Exam  Constitutional:       General: She is not in acute distress.     Appearance: Normal appearance.      Comments: overweight   HENT:      Head: Normocephalic.      Nose: No congestion.   Eyes:      Conjunctiva/sclera: Conjunctivae normal.   Cardiovascular:      Rate and Rhythm: Normal rate and regular rhythm.      Heart sounds: Normal heart sounds. No murmur heard.  Pulmonary:      Effort: Pulmonary effort is normal.      Breath sounds: Normal breath sounds.   Abdominal:      General: There is no distension.      Tenderness: There is no abdominal tenderness.   Skin:     General: Skin is warm and dry.   Neurological:      General: No focal deficit present.      Mental Status: She is alert and oriented to person, place, and time.   Psychiatric:         Mood and Affect: Mood normal.         Behavior: Behavior normal.         Thought Content: Thought content normal.         " Judgment: Judgment normal.         MEDS:   Current Facility-Administered Medications   Medication Dose Route Frequency    acetaminophen (TYLENOL) tablet 650 mg  650 mg Oral Q4H PRN    calcium carbonate (TUMS) chewable tablet 1,000 mg  1,000 mg Oral Daily PRN    calcium gluconate 10 % injection 1 g  1 g Intravenous Once PRN    docusate sodium (COLACE) capsule 100 mg  100 mg Oral BID    indomethacin (INDOCIN) capsule 25 mg  25 mg Oral Q6H    labetalol (NORMODYNE) tablet 100 mg  100 mg Oral BID    lactated ringers infusion  50 mL/hr Intravenous Continuous    levothyroxine tablet 25 mcg  25 mcg Oral Early Morning    ondansetron (ZOFRAN) injection 4 mg  4 mg Intravenous Q8H PRN    simethicone (MYLICON) chewable tablet 80 mg  80 mg Oral 4x Daily PRN       Labs:   Admission on 02/26/2024   Component Date Value    Color, UA 02/26/2024 Yellow     Clarity, UA 02/26/2024 Clear     pH, UA 02/26/2024 6.5     Leukocytes, UA 02/26/2024 Small (A)     Nitrite, UA 02/26/2024 Negative     Protein, UA 02/26/2024 Negative     Glucose, UA 02/26/2024 Negative     Ketones, UA 02/26/2024 Trace (A)     Urobilinogen, UA 02/26/2024 0.2     Bilirubin, UA 02/26/2024 Negative     Occult Blood, UA 02/26/2024 Negative     Specific Gravity, UA 02/26/2024 1.020     RBC, UA 02/26/2024 1-2     WBC, UA 02/26/2024 1-2     Epithelial Cells 02/26/2024 Occasional     Bacteria, UA 02/26/2024 Occasional     MUCUS THREADS 02/26/2024 Occasional (A)     ABO Grouping 02/26/2024 A     Rh Factor 02/26/2024 Positive     Antibody Screen 02/26/2024 Negative     Specimen Expiration Date 02/26/2024 20240229     WBC 02/26/2024 10.71 (H)     RBC 02/26/2024 3.74 (L)     Hemoglobin 02/26/2024 10.5 (L)     Hematocrit 02/26/2024 32.9 (L)     MCV 02/26/2024 88     MCH 02/26/2024 28.1     MCHC 02/26/2024 31.9     RDW 02/26/2024 13.1     Platelets 02/26/2024 290     MPV 02/26/2024 9.1     N gonorrhoeae, DNA Probe 02/26/2024 Negative     Chlamydia trachomatis, D* 02/26/2024  Negative     Syphilis Total Antibody 2024 Non-reactive     Sodium 2024 137     Potassium 2024 3.7     Chloride 2024 105     CO2 2024 22     ANION GAP 2024 10     BUN 2024 7     Creatinine 2024 0.53 (L)     Glucose 2024 71     Calcium 2024 9.6     AST 2024 12 (L)     ALT 2024 22     Alkaline Phosphatase 2024 58     Total Protein 2024 6.7     Albumin 2024 3.9     Total Bilirubin 2024 0.25     eGFR 2024 135     Hemoglobin A1C 2024 4.2     EAG 2024 74        Imaging and other studies:     Patient Active Problem List   Diagnosis    Obesity affecting pregnancy in second trimester    23 weeks gestation of pregnancy    Chronic hypertension affecting pregnancy    Encounter for care and examination of lactating mother    Hypothyroidism     labor in second trimester      Assessment:   labor with advanced dilation diagnosed yesterday.  Her contractions have resolved.   Plan:  Close observation for any signs that her  labor resumes.  Till then recommend completing her second course of steroids this evening.  Magnesium course completed.  Can stop penicillin as her symptoms of contractions have stalled.  Will continue Indocin till 48 hours of steroid benefit.  Compression boots when in bed to decrease risk of blood clots.  She is agreeable to Tdap and RSV however pharmacy found an article that suggest a small percentage 5% of the population who received the RSV vaccine developed symptoms of  labor and it is very uncommon that babies develop an RSV infection in the NICU per Dr Tolbert as it is usually an infection that is developed after the baby is discharged when it comes in contact with family members at home.  Since she is already having symptoms of  labor we will hold on giving her the RSV vaccine.      The majority of time (greater then 50%) was spent on counseling and coordination  of care of this patient and /or family member. Approximate face to face time was 15 minutes.    Ann Hoffmann MD  Time 815 am

## 2024-02-28 NOTE — PROGRESS NOTES
Present at the bedside for 2-minute spontaneous deceleration with a julio to the 70s which resolved with maternal repositioning.  Patient states that she has not been feeling any contractions and has had adequate fetal movement.  Patient took Tylenol earlier for abdominal pain/discomfort which has since resolved.  Will continue to monitor.  FHT is reassuring with moderate variability of a baseline of 150s to 160s.    Odette Mercer MD  OBGYN PGY-3  02/28/24 5:15 AM

## 2024-02-28 NOTE — PLAN OF CARE
Problem: ANTEPARTUM  Goal: Maintain pregnancy as long as maternal and/or fetal condition is stable  Description: INTERVENTIONS:  - Maternal surveillance  - Fetal surveillance  - Monitor uterine activity  - Medications as ordered  - Bedrest  Outcome: Progressing     Problem: PAIN - ADULT  Goal: Verbalizes/displays adequate comfort level or baseline comfort level  Description: Interventions:  - Encourage patient to monitor pain and request assistance  - Assess pain using appropriate pain scale  - Administer analgesics based on type and severity of pain and evaluate response  - Implement non-pharmacological measures as appropriate and evaluate response  - Consider cultural and social influences on pain and pain management  - Notify physician/advanced practitioner if interventions unsuccessful or patient reports new pain  Outcome: Progressing     Problem: INFECTION - ADULT  Goal: Absence or prevention of progression during hospitalization  Description: INTERVENTIONS:  - Assess and monitor for signs and symptoms of infection  - Monitor lab/diagnostic results  - Monitor all insertion sites, i.e. indwelling lines, tubes, and drains  - Monitor endotracheal if appropriate and nasal secretions for changes in amount and color  - [place] appropriate cooling/warming therapies per order  - Administer medications as ordered  - Instruct and encourage patient and family to use good hand hygiene technique  - Identify and instruct in appropriate isolation precautions for identified infection/condition  Outcome: Progressing  Goal: Absence of fever/infection during neutropenic period  Description: INTERVENTIONS:  - Monitor WBC    Outcome: Progressing     Problem: SAFETY ADULT  Goal: Patient will remain free of falls  Description: INTERVENTIONS:  - Educate patient/family on patient safety including physical limitations  - Instruct patient to call for assistance with activity   - Consult OT/PT to assist with strengthening/mobility   -  Keep Call bell within reach  - Keep bed low and locked with side rails adjusted as appropriate  - Keep care items and personal belongings within reach  - Initiate and maintain comfort rounds  Outcome: Progressing  Goal: Maintain or return to baseline ADL function  Description: INTERVENTIONS:  -  Assess patient's ability to carry out ADLs; assess patient's baseline for ADL function and identify physical deficits which impact ability to perform ADLs (bathing, care of mouth/teeth, toileting, grooming, dressing, etc.)  - Assess/evaluate cause of self-care deficits   - Assess range of motion  - Assess patient's mobility; develop plan if impaired  - Assess patient's need for assistive devices and provide as appropriate  - Encourage maximum independence but intervene and supervise when necessary  - Involve family in performance of ADLs  - Assess for home care needs following discharge   - Consider OT consult to assist with ADL evaluation and planning for discharge  - Provide patient education as appropriate  Outcome: Progressing  Goal: Maintains/Returns to pre admission functional level  Description: INTERVENTIONS:  - Perform AM-PAC 6 Click Basic Mobility/ Daily Activity assessment daily.  - Set and communicate daily mobility goal to care team and patient/family/caregiver.   - Collaborate with rehabilitation services on mobility goals if consulted  Outcome: Progressing     Problem: Knowledge Deficit  Goal: Patient/family/caregiver demonstrates understanding of disease process, treatment plan, medications, and discharge instructions  Description: Complete learning assessment and assess knowledge base.  Interventions:  - Provide teaching at level of understanding  - Provide teaching via preferred learning methods  Outcome: Progressing     Problem: DISCHARGE PLANNING  Goal: Discharge to home or other facility with appropriate resources  Description: INTERVENTIONS:  - Identify barriers to discharge w/patient and caregiver  -  Arrange for needed discharge resources and transportation as appropriate  - Identify discharge learning needs (meds, wound care, etc.)  - Arrange for interpretive services to assist at discharge as needed  - Refer to Case Management Department for coordinating discharge planning if the patient needs post-hospital services based on physician/advanced practitioner order or complex needs related to functional status, cognitive ability, or social support system  Outcome: Progressing

## 2024-02-28 NOTE — PROGRESS NOTES
Progress Note - Maternal Fetal Medicine   Latisha Kelly 23 y.o. female MRN: [id number]  Unit/Bed#: -01 Encounter: [id number]  Admit date: 2024.  Today's date: 24    Assessment/Plan     Ms. Kelly is a 23 y.o.  at 23w2d, Hospital Day: 3, admitted with pre-term contractions concerning for pre-term labor.  By issue:     labor in second trimester  Assessment & Plan  On admission:  - Speculum exam significant for 4cm of cervical membranes at the cervical os, but not prolapsing into the vagina. Discussed likelihood of  labor given clinical picture and cervical exam of 4/100/-3 with intact membranes and no intact cervix based on bimanual exam. Patient is appropriately tearful.  - TVUS: significant funneling of 6.5 cm and debris.  Difficult to ascertain true cervical length but likely to be no intact cervix given bimanual exam.  - Vaginitis slides negative and UA was negative for infection.    Plan:  - UA wnl, GC/CT neg, GBS pending  - s/p NICU consult Recommend neonatology consultation.  - s/p Mag x12 h  - BTM -  - Indocin x48h   - PCN started on admission, stopped after SVE noted to stay unchanged at 4/100/-3    23 weeks gestation of pregnancy  Assessment & Plan  Dated by LMP  Pregnancy complicated by cHTN on meds, Hypothyroidism, and BMI 43.   Genetic screening: NIPS low risk  Tdap given on   See plan under  uterine contractions    Chronic hypertension affecting pregnancy  Assessment & Plan  Blood pressure is well-controlled on home labetalol 100 mg BID  Pre-E labs wnl, P/Cr 0.07    Hypothyroidism  Assessment & Plan  Taking home levothyroxine 25mcg qD    Obesity affecting pregnancy in second trimester  Assessment & Plan  BMI 43  No early Glucola  23 A1c of 4.7  Hba1c 4.2 on admission             Subjective    Contractions: no  Loss of fluid: no  Vaginal bleeding: no  Fetal movement: yes    Pain: no  Headaches: no  Visual changes: no  Chest pain:  no  Shortness of breath: no  Nausea: no  Vomiting/Diarrhea: no  Dysuria: no  Leg pain: no          Objective      Patient Vitals for the past 24 hrs:   BP Temp Temp src Pulse Resp SpO2   02/28/24 0338 108/61 98.2 °F (36.8 °C) Oral 92 18 96 %   02/27/24 2349 (!) 120/49 98.6 °F (37 °C) Oral 100 18 96 %   02/27/24 2011 103/55 98.2 °F (36.8 °C) Oral 98 19 98 %   02/27/24 1803 104/51 -- -- 104 19 98 %   02/27/24 1606 107/51 98.4 °F (36.9 °C) Oral (!) 114 20 98 %   02/27/24 1249 120/72 98.6 °F (37 °C) Oral 85 20 98 %       Physical Exam  Constitutional:       Appearance: She is obese.   HENT:      Head: Normocephalic.   Cardiovascular:      Rate and Rhythm: Normal rate.      Pulses: Normal pulses.   Pulmonary:      Effort: Pulmonary effort is normal.   Abdominal:      Palpations: Abdomen is soft.      Tenderness: There is no abdominal tenderness.   Skin:     General: Skin is warm.   Neurological:      Mental Status: She is alert and oriented to person, place, and time.   Psychiatric:         Mood and Affect: Mood normal.         I/O         02/26 0701  02/27 0700 02/27 0701  02/28 0700    P.O. 170     I.V. (mL/kg) 811.7 (6.8)     IV Piggyback 600     Total Intake(mL/kg) 1581.7 (13.3)     Urine (mL/kg/hr) 1225     Total Output 1225     Net +356.7                   Invasive Devices       Peripheral Intravenous Line  Duration             Peripheral IV 02/26/24 Dorsal (posterior);Right Hand 1 day                    Results from last 7 days   Lab Units 02/26/24 1942   WBC Thousand/uL 10.71*   HEMOGLOBIN g/dL 10.5*   MCV fL 88   PLATELETS Thousands/uL 290     Results from last 7 days   Lab Units 02/26/24 1942   POTASSIUM mmol/L 3.7   CHLORIDE mmol/L 105   CO2 mmol/L 22   BUN mg/dL 7   CREATININE mg/dL 0.53*   EGFR ml/min/1.73sq m 135     Results from last 7 days   Lab Units 02/26/24 1942   AST U/L 12*   ALT U/L 22     Results from last 7 days   Lab Units 02/26/24 1942   PLATELETS Thousands/uL 290                            Brief review of pertinent history:  Past Medical History:   Diagnosis Date    Anemia     Hypertension     chtn started medication with pregnancy    Hypothyroid     Migraine without aura     OTC med management    PCOS (polycystic ovarian syndrome)     Varicella     vaccine    Visual impairment      Past Surgical History:   Procedure Laterality Date    TURBINATE RESECTION       OB History    Para Term  AB Living   1 0 0 0 0 0   SAB IAB Ectopic Multiple Live Births   0 0 0 0 0      # Outcome Date GA Lbr Brian/2nd Weight Sex Delivery Anes PTL Lv   1 Current               Obstetric Comments   Believes she suffered a miscarriage in . Positive pregnancy tests, and then had heavy bleeding shortly ever. Never confirmed with OBGYN    Menarche 13       Fetal data:  Nonstress test: date 24 Time: 0553 - 0625  Baseline:  155  Variability: moderate  Accelerations: present, 10x10  Decelerations:  occasional variable decelerations  Contractions: absent  Assessment: reactive  Plan: continue TID and PRN      MEDS:   Medication Administration - last 24 hours from 2024 0646 to 2024 0646         Date/Time Order Dose Route Action Action by     2024 1943 EST betamethasone acetate-betamethasone sodium phosphate (CELESTONE) injection 12 mg 12 mg Intramuscular Given Elizabeth Joel RN     2024 0750 EST magnesium sulfate 20 g/500 mL infusion (premix) 0 g/hr Intravenous Stopped Karolyn Quezada RN     2024 0750 EST lactated ringers infusion 0 mL/hr Intravenous Stopped Karolyn Quezada RN     2024 1806 EST labetalol (NORMODYNE) tablet 100 mg 100 mg Oral Not Given Alessandra Olivas RN     2024 0846 EST labetalol (NORMODYNE) tablet 100 mg 100 mg Oral Given Karolyn Quezada RN     2024 0534 EST levothyroxine tablet 25 mcg 25 mcg Oral Given Elizabeth Joel RN     2024 1815 EST docusate sodium (COLACE) capsule 100 mg 100 mg Oral Given Alessandra Olivas RN     2024 0846 EST  docusate sodium (COLACE) capsule 100 mg 100 mg Oral Given Karolyn Quezada, BARRY     02/28/2024 0331 EST indomethacin (INDOCIN) capsule 25 mg 25 mg Oral Given Elizabeth Joel, BARRY     02/27/2024 2130 EST indomethacin (INDOCIN) capsule 25 mg 25 mg Oral Given Elizabeth Joel, BARRY     02/27/2024 1545 EST indomethacin (INDOCIN) capsule 25 mg 25 mg Oral Given Alessandra lOivas RN     02/27/2024 0846 EST indomethacin (INDOCIN) capsule 25 mg 25 mg Oral Given Karolyn Quezada, BARRY     02/27/2024 1357 EST tetanus-diphtheria-acellular pertussis (BOOSTRIX) IM injection 0.5 mL 0.5 mL Intramuscular Given Karolyn Quezada RN     02/27/2024 1355 EST RSV Pre-Fusion F A&B Vac Rcmb (Abrysvo) vaccine 0.5 mL 0 mL Intramuscular Hold Karolyn Quezada, BARRY     02/27/2024 2241 EST lactated ringers bolus 1,000 mL 0 mL Intravenous Stopped Elizabeth Joel, BARRY     02/27/2024 2148 EST lactated ringers bolus 1,000 mL 1,000 mL Intravenous New Bag Elizabeth Joel, RN     02/27/2024 2350 EST lactated ringers bolus 1,000 mL 0 mL Intravenous Stopped Elizabeth Joel, BARRY     02/27/2024 2242 EST lactated ringers bolus 1,000 mL 1,000 mL Intravenous New Bag Elizabeth Joel, BARRY     02/28/2024 0626 EST lactated ringers bolus 125 mL 125 mL Intravenous New Bag Elizabeth Joel, BARRY     02/28/2024 0625 EST lactated ringers bolus 125 mL 0 mL Intravenous Stopped Elizabeth Joel, BARRY     02/27/2024 2351 EST lactated ringers bolus 125 mL 125 mL Intravenous New Bag Elizabeth Joel, BARRY     02/28/2024 0238 EST acetaminophen (TYLENOL) tablet 975 mg 975 mg Oral Given Elizabeth Joel RN          Current Facility-Administered Medications   Medication Dose Route Frequency    acetaminophen (TYLENOL) tablet 975 mg  975 mg Oral Q6H PRN    calcium carbonate (TUMS) chewable tablet 1,000 mg  1,000 mg Oral Daily PRN    calcium gluconate 10 % injection 1 g  1 g Intravenous Once PRN    docusate sodium (COLACE) capsule 100 mg  100 mg Oral BID    indomethacin (INDOCIN) capsule 25 mg  25 mg Oral  Q6H    labetalol (NORMODYNE) tablet 100 mg  100 mg Oral BID    lactated ringers bolus 125 mL  125 mL Intravenous Continuous    levothyroxine tablet 25 mcg  25 mcg Oral Early Morning    ondansetron (ZOFRAN) injection 4 mg  4 mg Intravenous Q8H PRN    simethicone (MYLICON) chewable tablet 80 mg  80 mg Oral 4x Daily PRN            Jeremias Ramirez MD  OBGYN, PGY-3  2/28/2024  6:46 AM

## 2024-02-28 NOTE — QUICK NOTE
Present at the bedside for variable decelerations. FHT confirmed on TAUS. Will continue to monitor FHT. Denies VB, contractions, LOF and decreased fetal movement    Odette Sandrita Mercer MD  OBGYN PGY-3  02/27/24 10:03 PM

## 2024-02-29 ENCOUNTER — ANESTHESIA EVENT (INPATIENT)
Dept: LABOR AND DELIVERY | Facility: HOSPITAL | Age: 24
DRG: 817 | End: 2024-02-29
Payer: COMMERCIAL

## 2024-02-29 PROBLEM — O26.872 SHORT CERVIX DURING PREGNANCY IN SECOND TRIMESTER: Status: ACTIVE | Noted: 2024-02-29

## 2024-02-29 LAB
ABO GROUP BLD: NORMAL
BASOPHILS # BLD MANUAL: 0 THOUSAND/UL (ref 0–0.1)
BASOPHILS NFR MAR MANUAL: 0 % (ref 0–1)
BLD GP AB SCN SERPL QL: NEGATIVE
EOSINOPHIL # BLD MANUAL: 0 THOUSAND/UL (ref 0–0.4)
EOSINOPHIL NFR BLD MANUAL: 0 % (ref 0–6)
ERYTHROCYTE [DISTWIDTH] IN BLOOD BY AUTOMATED COUNT: 13.3 % (ref 11.6–15.1)
GLUCOSE FLD-MCNC: 35 MG/DL
GRAM STN SPEC: NORMAL
HCT VFR BLD AUTO: 29.7 % (ref 34.8–46.1)
HGB BLD-MCNC: 9.3 G/DL (ref 11.5–15.4)
LYMPHOCYTES # BLD AUTO: 2.59 THOUSAND/UL (ref 0.6–4.47)
LYMPHOCYTES # BLD AUTO: 21 % (ref 14–44)
MCH RBC QN AUTO: 28.4 PG (ref 26.8–34.3)
MCHC RBC AUTO-ENTMCNC: 31.3 G/DL (ref 31.4–37.4)
MCV RBC AUTO: 91 FL (ref 82–98)
MONOCYTES # BLD AUTO: 0.74 THOUSAND/UL (ref 0–1.22)
MONOCYTES NFR BLD: 6 % (ref 4–12)
MYELOCYTES NFR BLD MANUAL: 3 % (ref 0–1)
NEUTROPHILS # BLD MANUAL: 8.65 THOUSAND/UL (ref 1.85–7.62)
NEUTS BAND NFR BLD MANUAL: 4 % (ref 0–8)
NEUTS SEG NFR BLD AUTO: 66 % (ref 43–75)
PLATELET # BLD AUTO: 271 THOUSANDS/UL (ref 149–390)
PLATELET BLD QL SMEAR: ADEQUATE
PMV BLD AUTO: 9 FL (ref 8.9–12.7)
POLYCHROMASIA BLD QL SMEAR: PRESENT
RBC # BLD AUTO: 3.27 MILLION/UL (ref 3.81–5.12)
RBC MORPH BLD: PRESENT
RH BLD: POSITIVE
SPECIMEN EXPIRATION DATE: NORMAL
WBC # BLD AUTO: 12.35 THOUSAND/UL (ref 4.31–10.16)

## 2024-02-29 PROCEDURE — 76946 ECHO GUIDE FOR AMNIOCENTESIS: CPT | Performed by: OBSTETRICS & GYNECOLOGY

## 2024-02-29 PROCEDURE — 89051 BODY FLUID CELL COUNT: CPT

## 2024-02-29 PROCEDURE — 87205 SMEAR GRAM STAIN: CPT

## 2024-02-29 PROCEDURE — 87070 CULTURE OTHR SPECIMN AEROBIC: CPT

## 2024-02-29 PROCEDURE — 0UVC7ZZ RESTRICTION OF CERVIX, VIA NATURAL OR ARTIFICIAL OPENING: ICD-10-PCS | Performed by: OBSTETRICS & GYNECOLOGY

## 2024-02-29 PROCEDURE — 87075 CULTR BACTERIA EXCEPT BLOOD: CPT

## 2024-02-29 PROCEDURE — 85027 COMPLETE CBC AUTOMATED: CPT

## 2024-02-29 PROCEDURE — 82945 GLUCOSE OTHER FLUID: CPT

## 2024-02-29 PROCEDURE — 99232 SBSQ HOSP IP/OBS MODERATE 35: CPT | Performed by: OBSTETRICS & GYNECOLOGY

## 2024-02-29 PROCEDURE — 85007 BL SMEAR W/DIFF WBC COUNT: CPT

## 2024-02-29 PROCEDURE — 86901 BLOOD TYPING SEROLOGIC RH(D): CPT

## 2024-02-29 PROCEDURE — 86850 RBC ANTIBODY SCREEN: CPT

## 2024-02-29 PROCEDURE — 86900 BLOOD TYPING SEROLOGIC ABO: CPT

## 2024-02-29 PROCEDURE — NC001 PR NO CHARGE: Performed by: OBSTETRICS & GYNECOLOGY

## 2024-02-29 PROCEDURE — 10903ZU DRAINAGE OF AMNIOTIC FLUID, DIAGNOSTIC FROM PRODUCTS OF CONCEPTION, PERCUTANEOUS APPROACH: ICD-10-PCS | Performed by: OBSTETRICS & GYNECOLOGY

## 2024-02-29 PROCEDURE — 59320 REVISION OF CERVIX: CPT | Performed by: OBSTETRICS & GYNECOLOGY

## 2024-02-29 PROCEDURE — 59000 AMNIOCENTESIS DIAGNOSTIC: CPT | Performed by: OBSTETRICS & GYNECOLOGY

## 2024-02-29 RX ORDER — FENTANYL CITRATE 50 UG/ML
INJECTION, SOLUTION INTRAMUSCULAR; INTRAVENOUS AS NEEDED
Status: DISCONTINUED | OUTPATIENT
Start: 2024-02-29 | End: 2024-02-29

## 2024-02-29 RX ORDER — KETOROLAC TROMETHAMINE 30 MG/ML
INJECTION, SOLUTION INTRAMUSCULAR; INTRAVENOUS AS NEEDED
Status: DISCONTINUED | OUTPATIENT
Start: 2024-02-29 | End: 2024-02-29

## 2024-02-29 RX ORDER — CEFAZOLIN SODIUM 2 G/50ML
2000 SOLUTION INTRAVENOUS ONCE
Status: DISCONTINUED | OUTPATIENT
Start: 2024-02-29 | End: 2024-03-01 | Stop reason: HOSPADM

## 2024-02-29 RX ORDER — INDOMETHACIN 25 MG/1
25 CAPSULE ORAL ONCE
Status: DISCONTINUED | OUTPATIENT
Start: 2024-02-29 | End: 2024-02-29

## 2024-02-29 RX ORDER — CEFAZOLIN SODIUM 1 G/50ML
1000 SOLUTION INTRAVENOUS ONCE
Status: DISCONTINUED | OUTPATIENT
Start: 2024-02-29 | End: 2024-03-01 | Stop reason: HOSPADM

## 2024-02-29 RX ORDER — ONDANSETRON 2 MG/ML
4 INJECTION INTRAMUSCULAR; INTRAVENOUS ONCE AS NEEDED
Status: CANCELLED | OUTPATIENT
Start: 2024-02-29

## 2024-02-29 RX ORDER — BUPIVACAINE HYDROCHLORIDE 7.5 MG/ML
INJECTION, SOLUTION INTRASPINAL AS NEEDED
Status: DISCONTINUED | OUTPATIENT
Start: 2024-02-29 | End: 2024-02-29

## 2024-02-29 RX ORDER — INDOMETHACIN 25 MG/1
25 CAPSULE ORAL EVERY 6 HOURS
Status: DISCONTINUED | OUTPATIENT
Start: 2024-02-29 | End: 2024-03-01 | Stop reason: HOSPADM

## 2024-02-29 RX ORDER — ACETAMINOPHEN 325 MG/1
975 TABLET ORAL EVERY 6 HOURS PRN
Status: DISCONTINUED | OUTPATIENT
Start: 2024-02-29 | End: 2024-03-01 | Stop reason: HOSPADM

## 2024-02-29 RX ADMIN — ACETAMINOPHEN 975 MG: 325 TABLET, FILM COATED ORAL at 13:17

## 2024-02-29 RX ADMIN — BUPIVACAINE HYDROCHLORIDE IN DEXTROSE 1.2 ML: 7.5 INJECTION, SOLUTION SUBARACHNOID at 15:33

## 2024-02-29 RX ADMIN — Medication 3000 MG: at 15:40

## 2024-02-29 RX ADMIN — LEVOTHYROXINE SODIUM 25 MCG: 25 TABLET ORAL at 06:24

## 2024-02-29 RX ADMIN — ACETAMINOPHEN 975 MG: 325 TABLET, FILM COATED ORAL at 19:26

## 2024-02-29 RX ADMIN — INDOMETHACIN 25 MG: 25 CAPSULE ORAL at 17:14

## 2024-02-29 RX ADMIN — SODIUM CHLORIDE, SODIUM LACTATE, POTASSIUM CHLORIDE, AND CALCIUM CHLORIDE 125 ML/HR: .6; .31; .03; .02 INJECTION, SOLUTION INTRAVENOUS at 14:31

## 2024-02-29 RX ADMIN — LABETALOL HYDROCHLORIDE 100 MG: 100 TABLET, FILM COATED ORAL at 08:27

## 2024-02-29 RX ADMIN — SODIUM CHLORIDE, SODIUM LACTATE, POTASSIUM CHLORIDE, AND CALCIUM CHLORIDE 1000 ML: .6; .31; .03; .02 INJECTION, SOLUTION INTRAVENOUS at 13:18

## 2024-02-29 RX ADMIN — KETOROLAC TROMETHAMINE 30 MG: 30 INJECTION, SOLUTION INTRAMUSCULAR; INTRAVENOUS at 16:43

## 2024-02-29 RX ADMIN — DOCUSATE SODIUM 100 MG: 100 CAPSULE, LIQUID FILLED ORAL at 19:25

## 2024-02-29 RX ADMIN — LABETALOL HYDROCHLORIDE 100 MG: 100 TABLET, FILM COATED ORAL at 19:25

## 2024-02-29 RX ADMIN — SODIUM CHLORIDE, SODIUM LACTATE, POTASSIUM CHLORIDE, AND CALCIUM CHLORIDE 125 ML/HR: .6; .31; .03; .02 INJECTION, SOLUTION INTRAVENOUS at 06:02

## 2024-02-29 RX ADMIN — FENTANYL CITRATE 0.15 MCG: 50 INJECTION INTRAMUSCULAR; INTRAVENOUS at 15:33

## 2024-02-29 RX ADMIN — SODIUM CHLORIDE, SODIUM LACTATE, POTASSIUM CHLORIDE, AND CALCIUM CHLORIDE 999 ML/HR: .6; .31; .03; .02 INJECTION, SOLUTION INTRAVENOUS at 00:51

## 2024-02-29 NOTE — ANESTHESIA POSTPROCEDURE EVALUATION
Post-Op Assessment Note    CV Status:  Stable  Pain Score: 0    Pain management: adequate       Mental Status:  Alert and awake   Hydration Status:  Euvolemic   PONV Controlled:  Controlled   Airway Patency:  Patent     Post Op Vitals Reviewed: Yes    No anethesia notable event occurred.    Staff: CRNA               BP   117/64   Temp   99.3   Pulse  77   Resp   14   SpO2   99

## 2024-02-29 NOTE — DISCHARGE SUMMARY
Discharge Summary - OB/GYN   Latisha Kelly 23 y.o. female MRN: [id number]  Unit/Bed#: -01 Encounter: [id number]      Admission Date: 2024     Discharge Date: 3/1/2024    Admitting Diagnosis:   1. Pregnancy at 23w3d  2. cHTN  3. Hypothyroidism  4. Obesity  5. Pre-term labor    Discharge Diagnosis:   Same, delivered  S/p Amniocentesis with no signs of infection  Exam-indicated Osman Cerclage    Procedures:  Amniocentesis, Cerclage    Attending: Antonia Hook, *    Hospital Course:     Latisha Kelly is a 23 y.o.  at 23w3d wks who was initially admitted for pre-term contractions and labor. She was noted to be 4/100/-3 on arrival with membranes visible on speculum exam. CL was noted to be < 0.5 cm. She was started on Magnesium, PCN and Indocin. Over the course of the night and the next several days, she no longer had cramping or contractions, so PCN was discontinued. She received 12h of Magnesium and Indocin x48 h. She had repeat SVE and speculum exam on day 3 of admission and was noted to be 2/50/-3 with no dilation visually. TAUS showed CL 4.87 cm with funneling for the majority of the cervical length. Patient was offered a cerclage and opted to go through with it. Overnight from day 3-4 of admission, FHT was noted to have fetal tachycardia. Patient was offered an amniocentesis to rule out III and opted to do so. Amniocentesis was done on  and showed normal glucose levels. Gram stain showed no organisms. Osman cerclage was placed on 24. She was given one dose of Toradol and started on Indocin x48h. She was monitored overnight and was noted to have minimal cramping and spotting. FHT was noted to have normal baseline at 160 with accelerations, and no decelerations the evening of the procedure and the day after. She was discharged home with follow up with MFM in 2 weeks. She requested weekly heart tone monitoring. Message sent to primary Ob/Gyn.     Complications: none  apparent    Condition at discharge: good     Discharge instructions/Information to patient and family:   See after visit summary for information provided to patient and family.      Provisions for Follow-Up Care:  See after visit summary for information related to follow-up care and any pertinent home health orders.      Disposition: Home    Planned Readmission: No    Discharge Medications:  For a complete list of the patient's medications, please refer to her med rec.    Jeremias Ramirez MD  PGY-3  3/1/2024  4:23 PM

## 2024-02-29 NOTE — PROGRESS NOTES
Amniotic fluid glucose level resulted at 35, which is normal. Gram stain pending.  bpm baseline with moderate variability, accelerations, occasional variable decelerations. Overall, reassuring. Discussed with patient that given normal glucose level and reassuring FHT, cerclage is recommended at this time. Nursing and anesthesia staff notified.    Dr. Hoffmann aware    Jeremias Ramirez MD  PGY-3  2/29/2024  2:54 PM

## 2024-02-29 NOTE — PROGRESS NOTES
Late entry. Fetal tachycardia noted on tracing overnight and this morning. She denies abdominal tenderness, fevers, chills, foul smelling discharge. WBC slightly elevated at 12 from 10 on admission; patient has received steroids. Discussed with patient that although she does not have any symptoms, fetal tachycardia can be a sign of intra-amniotic infection, especially given that signs of infection can be seen sooner in fetuses than in the mother. Reviewed performing an amniocentesis to ensure there is no infection prior to placing cerclage. Discussed risks, benefits and alternatives. Risks include bleeding, infection, rupture of membranes, pre-term labor, loss of pregnancy. Benefits include diagnosing infection, and alternative is to not perform the amniocentesis. She opted to proceed with amniocentesis. Consent was signed, all questions answered.    Procedure:  Patient was brought down to triage room. TAUS was performed.  bpm pre-procedure. Placenta was anterior and left-sided. MVP was found on right upper quadrant of abdomen. Chlorohexadine was used to sterilize abdomen. 22 gauge needle was introduced into the skin and uterus under ultrasound guidance. The needle was noted to traverse through the margin of the placenta. No free bleeding was noted. 17 cc of amniotic fluid was withdrawn and noted to be normal straw color. The needle was withdrawn under ultrasound guidance.  bpm post-procedure. Rh positive, no rhogam required. Amniotic fluid sent to lab for glucose levels, gram stain, aerobic and anaerobic culture, and cytology. Please see formal note by Dr. Hoffmann in OB Procedures.    Jeremias Ramirez MD  PGY-3  2/29/2024  12:00 PM

## 2024-02-29 NOTE — ANESTHESIA PREPROCEDURE EVALUATION
Procedure:  CERCLAGE CERVICAL (Cervix)    Relevant Problems   CARDIO   (+) Chronic hypertension affecting pregnancy      ENDO   (+) Hypothyroidism      GYN   (+) 23 weeks gestation of pregnancy      Other   (+) Obesity affecting pregnancy in second trimester      Lab Results   Component Value Date    WBC 12.35 (H) 02/29/2024    HGB 9.3 (L) 02/29/2024    HCT 29.7 (L) 02/29/2024    MCV 91 02/29/2024     02/29/2024     Lab Results   Component Value Date    SODIUM 137 02/26/2024    K 3.7 02/26/2024     02/26/2024    CO2 22 02/26/2024    AGAP 10 02/26/2024    BUN 7 02/26/2024    CREATININE 0.53 (L) 02/26/2024    GLUC 71 02/26/2024    GLUF 90 10/31/2023    CALCIUM 9.6 02/26/2024    AST 12 (L) 02/26/2024    ALT 22 02/26/2024    ALKPHOS 58 02/26/2024    TP 6.7 02/26/2024    TBILI 0.25 02/26/2024    EGFR 135 02/26/2024            Anesthesia Plan  ASA Score- 3     Anesthesia Type- spinal with ASA Monitors.         Additional Monitors:     Airway Plan:            Plan Factors-Exercise tolerance (METS): >4 METS.    Chart reviewed. EKG reviewed. Imaging results reviewed. Existing labs reviewed. Patient summary reviewed.                  Induction-     Postoperative Plan-     Informed Consent-

## 2024-02-29 NOTE — OP NOTE
OPERATIVE REPORT  PATIENT NAME: Latisha Kelly    :  2000  MRN: [id number]  Pt Location: [facility] OR ROOM 01    SURGERY DATE: 2024    Surgeons and Role:     * Ann Hoffmann MD - Primary  Jeremias Ramirez MD -- Assisting    Preop Diagnosis:  Incompetent cervix [N88.3]    Post-Op Diagnosis Codes:     * Incompetent cervix [N88.3]    Procedure(s) (LRB):  CERCLAGE CERVICAL (N/A)    Specimen(s):  None    Surgical QBL:  Surgical QBL (mL): 85 mL      Anesthesia Type: Spinal    Operative Indications:  Incompetent cervix [N88.3]     Operative Findings:  ***    Complications:   None apparent***    Procedure and Technique:  The patient was identified and the procedure verified as a Osman cerclage.  The patient was interviewed by Anesthesia. She was taken to the operating room wher Spinal anesthesia was established without incident.  She was then placed in modified dorsolithotomy position.  Exam under anesthesia was conducted with the results noted above.  She was then prepped and  draped in the usual fashion. A 25 mg dose of indocin and 3 gm dose of Ancef was given prophylactically prior to the procedure.    A timeout was performed.    An ultrasound/fetal doptone was done which showed fetal heart tones were present prior to this procedure. Length of the intact cervix is *** cms. From the external os to the bladder edge is *** cms. Beaking is/is not seen.    A weighted speculum was placed in the posterior vagina.  A right angle retractor was placed anteriorly.  The cervix was identified, grasped with a ringed forceps and placed on gentle traction.  A number 5 Ticron/Ethibond suture was placed in a counterclockwise purse string manner starting at 11 oclock and ending at 1 oclock. Excellent hemostasis was noted after the suture was tied down securely.     Post op  ultrasound showed fetal heart tones were present. Amniotic fluid is normal in amount. The intact cervical length is *** cm. From the external  os to the stitch is *** cms. Beaking is/ is not seen.    The patient  tolerated the procedure well.  Sponge, lap, needle, and instrument counts were correct. Estimated blood loss was minimal***.  She was extubated in the Operating Room and then taken to the Recovery Room in stable condition. She will follow up for first post op visit in 2 weeks with myself. She will be discharged home with prescriptions for Indocin 25 mg orally q 6 hours x 2 days. Her blood type is *** Rhogam was/was not given.    Attestation: I was present and scrubbed for the entire procedure.    Jeremias Ramirez MD  2/29/2024  5:19 PM      Patient Disposition:  PACU         SIGNATURE: Jeremias Ramirez MD  DATE: February 29, 2024  TIME: 5:19 PM

## 2024-02-29 NOTE — PLAN OF CARE
Problem: ANTEPARTUM  Goal: Maintain pregnancy as long as maternal and/or fetal condition is stable  Description: INTERVENTIONS:  - Maternal surveillance  - Fetal surveillance  - Monitor uterine activity  - Medications as ordered  - Bedrest  Outcome: Progressing     Problem: PAIN - ADULT  Goal: Verbalizes/displays adequate comfort level or baseline comfort level  Description: Interventions:  - Encourage patient to monitor pain and request assistance  - Assess pain using appropriate pain scale  - Administer analgesics based on type and severity of pain and evaluate response  - Implement non-pharmacological measures as appropriate and evaluate response  - Consider cultural and social influences on pain and pain management  - Notify physician/advanced practitioner if interventions unsuccessful or patient reports new pain  Outcome: Progressing     Problem: INFECTION - ADULT  Goal: Absence or prevention of progression during hospitalization  Description: INTERVENTIONS:  - Assess and monitor for signs and symptoms of infection  - Monitor lab/diagnostic results  - Monitor all insertion sites, i.e. indwelling lines, tubes, and drains  - Monitor endotracheal if appropriate and nasal secretions for changes in amount and color  - [place] appropriate cooling/warming therapies per order  - Administer medications as ordered  - Instruct and encourage patient and family to use good hand hygiene technique  - Identify and instruct in appropriate isolation precautions for identified infection/condition  Outcome: Progressing  Goal: Absence of fever/infection during neutropenic period  Description: INTERVENTIONS:  - Monitor WBC    Outcome: Progressing     Problem: SAFETY ADULT  Goal: Patient will remain free of falls  Description: INTERVENTIONS:  - Educate patient/family on patient safety including physical limitations  - Instruct patient to call for assistance with activity   - Consult OT/PT to assist with strengthening/mobility   -  Keep Call bell within reach  - Keep bed low and locked with side rails adjusted as appropriate  - Keep care items and personal belongings within reach  - Initiate and maintain comfort rounds  Outcome: Progressing  Goal: Maintain or return to baseline ADL function  Description: INTERVENTIONS:  -  Assess patient's ability to carry out ADLs; assess patient's baseline for ADL function and identify physical deficits which impact ability to perform ADLs (bathing, care of mouth/teeth, toileting, grooming, dressing, etc.)  - Assess/evaluate cause of self-care deficits   - Assess range of motion  - Assess patient's mobility; develop plan if impaired  - Assess patient's need for assistive devices and provide as appropriate  - Encourage maximum independence but intervene and supervise when necessary  - Involve family in performance of ADLs  - Assess for home care needs following discharge   - Consider OT consult to assist with ADL evaluation and planning for discharge  - Provide patient education as appropriate  Outcome: Progressing  Goal: Maintains/Returns to pre admission functional level  Description: INTERVENTIONS:  - Perform AM-PAC 6 Click Basic Mobility/ Daily Activity assessment daily.  - Set and communicate daily mobility goal to care team and patient/family/caregiver.   - Collaborate with rehabilitation services on mobility goals if consulted  Outcome: Progressing     Problem: Knowledge Deficit  Goal: Patient/family/caregiver demonstrates understanding of disease process, treatment plan, medications, and discharge instructions  Description: Complete learning assessment and assess knowledge base.  Interventions:  - Provide teaching at level of understanding  - Provide teaching via preferred learning methods  Outcome: Progressing     Problem: DISCHARGE PLANNING  Goal: Discharge to home or other facility with appropriate resources  Description: INTERVENTIONS:  - Identify barriers to discharge w/patient and caregiver  -  Arrange for needed discharge resources and transportation as appropriate  - Identify discharge learning needs (meds, wound care, etc.)  - Arrange for interpretive services to assist at discharge as needed  - Refer to Case Management Department for coordinating discharge planning if the patient needs post-hospital services based on physician/advanced practitioner order or complex needs related to functional status, cognitive ability, or social support system  Outcome: Progressing

## 2024-02-29 NOTE — PROGRESS NOTES
Progress Note - Maternal Fetal Medicine   Latisha Kelly 23 y.o. female MRN: [id number]  Unit/Bed#: -01 Encounter: [id number]  Admit date: 2024.  Today's date: 24    Assessment/Plan     Ms. Kelly is a 23 y.o.  at 23w3d, Hospital Day: 4, admitted with pre-term contractions concerning for pre-term labor.  By issue:     labor in second trimester  Assessment & Plan  On admission:  - Speculum exam significant for 4cm of cervical membranes at the cervical os, but not prolapsing into the vagina. Discussed likelihood of  labor given clinical picture and cervical exam of 4/100/-3 with intact membranes and no intact cervix based on bimanual exam. Patient is appropriately tearful.  - TVUS: significant funneling of 6.5 cm and debris.  Difficult to ascertain true cervical length but likely to be no intact cervix given bimanual exam.  - Vaginitis slides negative and UA was negative for infection.    Plan:  - UA wnl, GC/CT neg, GBS pending  - s/p NICU consult Recommend neonatology consultation.  - s/p Mag x12 h  - BTM -  - Indocin x48h   - PCN started on admission, stopped after SVE noted to stay unchanged at 4/100/-3  - Re-exam on  showed VE 2/50/-3, and visually closed cervix. CL: 4 cm. Plan was made to place cerclage on , however tracing noted to have fetal tachycardia and periods of minimal variability.  - am CBC with diff ordered, will re-consider cerclage given due to concern for possible intrauterine infection    23 weeks gestation of pregnancy  Assessment & Plan  Dated by LMP  Pregnancy complicated by cHTN on meds, Hypothyroidism, and BMI 43.   Genetic screening: NIPS low risk  Tdap given on   See plan under  uterine contractions    Chronic hypertension affecting pregnancy  Assessment & Plan  Blood pressure is well-controlled on home labetalol 100 mg BID  Pre-E labs wnl, P/Cr 0.07    Hypothyroidism  Assessment & Plan  Taking home levothyroxine 25mcg  qD    Obesity affecting pregnancy in second trimester  Assessment & Plan  BMI 43  No early Glucola  1/23/23 A1c of 4.7  Hba1c 4.2 on admission             Subjective    Contractions: no  Loss of fluid: no  Vaginal bleeding: no  Fetal movement: yes    Pain: no  Headaches: no  Visual changes: no  Chest pain: no  Shortness of breath: no  Nausea: no  Vomiting/Diarrhea: no  Dysuria: no  Leg pain: no          Objective      Patient Vitals for the past 24 hrs:   BP Temp Temp src Pulse Resp SpO2   02/29/24 0600 119/62 97.9 °F (36.6 °C) Oral 90 18 99 %   02/29/24 0020 120/58 97.8 °F (36.6 °C) Oral 102 20 97 %   02/28/24 2105 111/53 98.3 °F (36.8 °C) Oral 95 18 96 %   02/28/24 1620 133/62 98.6 °F (37 °C) Oral 102 18 98 %   02/28/24 1210 (!) 96/46 98.5 °F (36.9 °C) Oral 98 20 99 %   02/28/24 1006 -- -- -- 102 -- 98 %   02/28/24 1005 -- -- -- 95 -- --   02/28/24 0804 116/68 98.1 °F (36.7 °C) Oral 94 20 97 %       Physical Exam  Constitutional:       Appearance: She is obese.   HENT:      Head: Normocephalic.   Cardiovascular:      Rate and Rhythm: Normal rate.      Pulses: Normal pulses.   Pulmonary:      Effort: Pulmonary effort is normal.   Abdominal:      Palpations: Abdomen is soft.      Tenderness: There is no abdominal tenderness.   Skin:     General: Skin is warm.   Neurological:      Mental Status: She is alert and oriented to person, place, and time.   Psychiatric:         Mood and Affect: Mood normal.         I/O         02/26 0701  02/27 0700 02/27 0701 02/28 0700    P.O. 170     I.V. (mL/kg) 811.7 (6.8)     IV Piggyback 600     Total Intake(mL/kg) 1581.7 (13.3)     Urine (mL/kg/hr) 1225     Total Output 1225     Net +356.7                   Invasive Devices       Peripheral Intravenous Line  Duration             Peripheral IV 02/26/24 Dorsal (posterior);Right Hand 2 days                    Results from last 7 days   Lab Units 02/26/24 1942   WBC Thousand/uL 10.71*   HEMOGLOBIN g/dL 10.5*   MCV fL 88   PLATELETS  Thousands/uL 290     Results from last 7 days   Lab Units 24   POTASSIUM mmol/L 3.7   CHLORIDE mmol/L 105   CO2 mmol/L 22   BUN mg/dL 7   CREATININE mg/dL 0.53*   EGFR ml/min/1.73sq m 135     Results from last 7 days   Lab Units 24   AST U/L 12*   ALT U/L 22     Results from last 7 days   Lab Units 24   PLATELETS Thousands/uL 290                           Brief review of pertinent history:  Past Medical History:   Diagnosis Date    Anemia     Hypertension     chtn started medication with pregnancy    Hypothyroid     Migraine without aura     OTC med management    PCOS (polycystic ovarian syndrome)     Varicella     vaccine    Visual impairment      Past Surgical History:   Procedure Laterality Date    TURBINATE RESECTION       OB History    Para Term  AB Living   1 0 0 0 0 0   SAB IAB Ectopic Multiple Live Births   0 0 0 0 0      # Outcome Date GA Lbr Brian/2nd Weight Sex Delivery Anes PTL Lv   1 Current               Obstetric Comments   Believes she suffered a miscarriage in . Positive pregnancy tests, and then had heavy bleeding shortly ever. Never confirmed with OBGYN    Menarche 13       Fetal data:  Nonstress test: date 24   Baseline:  165  Variability:  minimal-moderate  Accelerations: present, 10x10  Decelerations:  occasional variable decelerations  Contractions: absent  Assessment: reactive  Plan:  continuous      MEDS:   Medication Administration - last 24 hours from 2024 0733 to 2024 0733         Date/Time Order Dose Route Action Action by     2024 1847 EST labetalol (NORMODYNE) tablet 100 mg 100 mg Oral Given Katie Green RN     2024 0953 EST labetalol (NORMODYNE) tablet 100 mg 100 mg Oral Given Katie Green RN     2024 0624 EST levothyroxine tablet 25 mcg 25 mcg Oral Given Mary Carrasco RN     2024 1847 EST docusate sodium (COLACE) capsule 100 mg 100 mg Oral Given Katie Green RN     2024  0953 EST docusate sodium (COLACE) capsule 100 mg 100 mg Oral Given Katie Green RN     02/28/2024 2340 EST indomethacin (INDOCIN) capsule 25 mg 25 mg Oral Given Mary Carrasco RN     02/28/2024 1846 EST indomethacin (INDOCIN) capsule 25 mg 25 mg Oral Given Katie Green RN     02/28/2024 0954 EST indomethacin (INDOCIN) capsule 25 mg 25 mg Oral Given Katie Green RN     02/28/2024 0955 EST lactated ringers bolus 125 mL 0 mL Intravenous Stopped Katie Green RN     02/29/2024 0602 EST lactated ringers infusion 125 mL/hr Intravenous New Bag Mary Carrasco RN     02/29/2024 0601 EST lactated ringers infusion 0 mL/hr Intravenous Stopped Mary Carrasco RN     02/29/2024 0118 EST lactated ringers infusion 125 mL/hr Intravenous Rate/Dose Change Mary Carrasco RN     02/29/2024 0051 EST lactated ringers infusion 999 mL/hr Intravenous New Bag Mary Carrasco RN     02/29/2024 0050 EST lactated ringers infusion 0 mL/hr Intravenous Stopped Mary Carrasco RN     02/29/2024 0009 EST lactated ringers infusion 999 mL/hr Intravenous Rate/Dose Change Mary Carrasco RN     02/28/2024 2339 EST lactated ringers infusion 999 mL/hr Intravenous New Bag Mary Carrasco RN          Current Facility-Administered Medications   Medication Dose Route Frequency    acetaminophen (TYLENOL) tablet 975 mg  975 mg Oral Q6H PRN    calcium carbonate (TUMS) chewable tablet 1,000 mg  1,000 mg Oral Daily PRN    calcium gluconate 10 % injection 1 g  1 g Intravenous Once PRN    docusate sodium (COLACE) capsule 100 mg  100 mg Oral BID    labetalol (NORMODYNE) tablet 100 mg  100 mg Oral BID    lactated ringers infusion  125 mL/hr Intravenous Continuous    levothyroxine tablet 25 mcg  25 mcg Oral Early Morning    ondansetron (ZOFRAN) injection 4 mg  4 mg Intravenous Q8H PRN    simethicone (MYLICON) chewable tablet 80 mg  80 mg Oral 4x Daily PRN            Jeremias Ramirez MD  OBGYN, PGY-3  2/29/2024  7:33 AM

## 2024-03-01 VITALS
HEIGHT: 66 IN | TEMPERATURE: 98 F | RESPIRATION RATE: 20 BRPM | SYSTOLIC BLOOD PRESSURE: 112 MMHG | DIASTOLIC BLOOD PRESSURE: 56 MMHG | BODY MASS INDEX: 42.27 KG/M2 | WEIGHT: 263 LBS | OXYGEN SATURATION: 99 % | HEART RATE: 88 BPM

## 2024-03-01 PROBLEM — O34.32 CERVICAL CERCLAGE SUTURE PRESENT IN SECOND TRIMESTER: Status: ACTIVE | Noted: 2024-03-01

## 2024-03-01 LAB
APPEARANCE FLD: CLEAR
COLOR FLD: NORMAL
LYMPHOCYTES NFR BLD AUTO: 33 %
MONOCYTES NFR BLD AUTO: 33 %
NEUTS SEG NFR BLD AUTO: 33 %
PATHOLOGY REVIEW: YES
SITE: NORMAL
TOTAL CELLS COUNTED SPEC: 3
WBC # FLD MANUAL: 7 /UL

## 2024-03-01 PROCEDURE — 59025 FETAL NON-STRESS TEST: CPT | Performed by: OBSTETRICS & GYNECOLOGY

## 2024-03-01 PROCEDURE — 85060 BLOOD SMEAR INTERPRETATION: CPT | Performed by: PATHOLOGY

## 2024-03-01 PROCEDURE — NC001 PR NO CHARGE: Performed by: OBSTETRICS & GYNECOLOGY

## 2024-03-01 PROCEDURE — 99232 SBSQ HOSP IP/OBS MODERATE 35: CPT | Performed by: OBSTETRICS & GYNECOLOGY

## 2024-03-01 RX ORDER — DOCUSATE SODIUM 100 MG/1
100 CAPSULE, LIQUID FILLED ORAL 2 TIMES DAILY
Qty: 30 CAPSULE | Refills: 1 | Status: SHIPPED | OUTPATIENT
Start: 2024-03-01

## 2024-03-01 RX ORDER — POLYETHYLENE GLYCOL 3350 17 G/17G
17 POWDER, FOR SOLUTION ORAL DAILY
Qty: 12 EACH | Refills: 0 | Status: SHIPPED | OUTPATIENT
Start: 2024-03-01 | End: 2024-03-11

## 2024-03-01 RX ORDER — ACETAMINOPHEN 325 MG/1
650 TABLET ORAL EVERY 6 HOURS PRN
Qty: 30 TABLET | Refills: 0 | Status: SHIPPED | OUTPATIENT
Start: 2024-03-01

## 2024-03-01 RX ORDER — LABETALOL 100 MG/1
100 TABLET, FILM COATED ORAL 2 TIMES DAILY
Qty: 30 TABLET | Refills: 0 | Status: SHIPPED | OUTPATIENT
Start: 2024-03-01 | End: 2024-03-15 | Stop reason: SDUPTHER

## 2024-03-01 RX ORDER — INDOMETHACIN 25 MG/1
25 CAPSULE ORAL EVERY 6 HOURS
Qty: 5 CAPSULE | Refills: 0 | Status: SHIPPED | OUTPATIENT
Start: 2024-03-01 | End: 2024-03-04

## 2024-03-01 RX ADMIN — INDOMETHACIN 25 MG: 25 CAPSULE ORAL at 12:30

## 2024-03-01 RX ADMIN — LEVOTHYROXINE SODIUM 25 MCG: 25 TABLET ORAL at 06:29

## 2024-03-01 RX ADMIN — INDOMETHACIN 25 MG: 25 CAPSULE ORAL at 06:29

## 2024-03-01 RX ADMIN — INDOMETHACIN 25 MG: 25 CAPSULE ORAL at 00:02

## 2024-03-01 RX ADMIN — DOCUSATE SODIUM 100 MG: 100 CAPSULE, LIQUID FILLED ORAL at 09:05

## 2024-03-01 RX ADMIN — LABETALOL HYDROCHLORIDE 100 MG: 100 TABLET, FILM COATED ORAL at 09:05

## 2024-03-01 NOTE — OP NOTE
OPERATIVE REPORT  PATIENT NAME: Latisha Kelly    :  2000  MRN: [id number]  Pt Location: [facility] OR ROOM 01    SURGERY DATE: 2024    Surgeons and Role:     * Ann Hoffmann MD - Primary     * Jeremias Ramirez MD - Assisting    Preop Diagnosis:  Incompetent cervix [N88.3]    Post-Op Diagnosis Codes:     * Incompetent cervix [N88.3]       Short cervix  O26.872     Procedure(s) (LRB):  CERCLAGE CERVICAL (N/A)    Specimen(s):  None    Surgical QBL:  Surgical QBL (mL): 85 mL    Drains:  None    Anesthesia Type:   Spinal    Operative Indications:  Incompetent cervix [N88.3]  Short Cervix O26.872     Operative Findings:  Findings   Preop  Amniocentesis with glucola of 35 and stat gram stain showing no bacteria  Measured cervix preop 0.89 cm but she was 2 cm dilated on bimanual so essentially she has no intact cervix.    Measurement from external os to bladder is 1.76 cm  Measurement from the internal to external os is 3.96 cm  Measurement of the width at the internal os is 2.28 cm.   Debrie seen in the funnel    Post op  Cervix length post cerclage is 2.88 cms    Complications:   None    Procedure and Technique:  The patient was identified and the procedure verified as a Osman cerclage.  The patient was interviewed by Anesthesia. She was taken to the operating room where a time out was held and then spinal anesthesia was established without incident.  She was then placed in modified dorsolithotomy position.  Exam under anesthesia was conducted with the results noted above.  She was then prepped and  draped in the usual fashion. A  3 gm dose of Ancef was given prophylactically prior to the procedure.    A weighted speculum was placed in the posterior vagina.  A right angle retractor was placed anteriorly.  The cervix was identified, grasped with a ringed forceps and placed on gentle traction. A weber was gently entered into her cervix and the Weber balloon was insufflated with 30 cc of sterile  saline in an effort to raise the fetal membranes up and away from her cervix.  This was done successfully without any signs of bleeding or rupture membranes and documented by ultrasound. A number 5 Ticron/Ethibond suture was placed in a counterclockwise purse string manner starting at 11 oclock and ending at 1 oclock. Excellent hemostasis was noted after the suture was tied down securely after the weber was removed.     Post op  ultrasound showed fetal heart tones were present. Amniotic fluid is normal in amount. The intact cervical length is 2.88 cm. From the external os to the stitch is 2.3 cms. Beaking is seen to within 5 mm of the cerclage. The suture was 4.4 mm from the bladder edge.  A rectal exam was completed and no suture material was felt in the rectum.    The patient  tolerated the procedure well.  Sponge, lap, needle, and instrument counts were correct. Estimated blood loss was minimal at 85 ccs.  She was taken to the Recovery Room in stable condition. She was restarted on indocin 25 mg q 6 hrs for 48 hrs. Her blood type is A+ Rhogam was not given.    Attestation: I was present and did the surgical portion of this procedure.    Ann Hoffmann MD  2/29/2024  7:33 PM   I was present for the entire procedure.    Patient Disposition:  PACU         SIGNATURE: Ann Hoffmann MD  DATE: February 29, 2024  TIME: 7:25 PM

## 2024-03-01 NOTE — QUICK NOTE
Pt seen s/p cerclage and chart reviewed. Latisha Kelly is a  at 23w3d who presented initially with PTL. Cervical exam initially 4cm, then regressed to 2cm. Felt to be good candidate for cerclage, but amnio performed today to r/o infectious etiology due to fetal tachycardio. Amnio results NL and cerclage performed this evening. Pt feels well overall, just a little crampy. Plan is to keep in house and continue tid monitoring. [facility] care/management appreciated.  Tasha Cosby MD

## 2024-03-01 NOTE — PROGRESS NOTES
Progress Note - Maternal Fetal Medicine   Latisha Kelly 24 y.o. female MRN: [id number]  Unit/Bed#: -01 Encounter: [id number]  Admit date: 2024.  Today's date: 24    Assessment/Plan     Ms. Kelly is a 24 y.o.  at 23w4d, Hospital Day: 5, admitted with pre-term contractions concerning for pre-term labor.  By issue:     labor in second trimester  Assessment & Plan  On admission:  - Speculum exam significant for 4cm of cervical membranes at the cervical os, but not prolapsing into the vagina. Discussed likelihood of  labor given clinical picture and cervical exam of 4/100/-3 with intact membranes and no intact cervix based on bimanual exam. Patient is appropriately tearful.  - TVUS: significant funneling of 6.5 cm and debris.  Difficult to ascertain true cervical length but likely to be no intact cervix given bimanual exam.  - Vaginitis slides negative and UA was negative for infection.    Plan:  - UA wnl, GC/CT neg, GBS pos  - s/p NICU consult   - s/p Mag x12 h  - BTM -  - Indocin x48h on admission  - PCN started on admission, stopped after SVE noted to stay unchanged at 4/100/-3  - Re-exam on  showed VE 2/50/-3, and visually closed cervix. CL: 4 cm. Plan was made to place cerclage on , however tracing noted to have fetal tachycardia and periods of minimal variability.  - Amniocentesis done on  with normal glucose and gram stain  - Osman cerclage placed on  without complication  - Indocin x48h and Toradol s/p cerclage    23 weeks gestation of pregnancy  Assessment & Plan  Dated by LMP  Pregnancy complicated by cHTN on meds, Hypothyroidism, and BMI 43.   Genetic screening: NIPS low risk  Tdap given on   See plan under  uterine contractions    Chronic hypertension affecting pregnancy  Assessment & Plan  Blood pressure is well-controlled on home labetalol 100 mg BID  Pre-E labs wnl, P/Cr 0.07    Hypothyroidism  Assessment & Plan  Taking home  levothyroxine 25mcg qD    Obesity affecting pregnancy in second trimester  Assessment & Plan  BMI 43  No early Glucola  1/23/23 A1c of 4.7  Hba1c 4.2 on admission             Subjective    Contractions: no  Loss of fluid: no  Vaginal bleeding: mild spotting  Fetal movement: yes    Pain: no  Headaches: no  Visual changes: no  Chest pain: no  Shortness of breath: no  Nausea: no  Vomiting/Diarrhea: no  Dysuria: no  Leg pain: no          Objective      Patient Vitals for the past 24 hrs:   BP Temp Temp src Pulse Resp SpO2   03/01/24 0626 111/56 98 °F (36.7 °C) Oral 89 18 99 %   03/01/24 0620 -- -- -- 85 -- 99 %   03/01/24 0000 (!) 87/51 98.2 °F (36.8 °C) Oral 95 18 95 %   02/29/24 1918 128/68 99.8 °F (37.7 °C) Oral 96 18 96 %   02/29/24 1800 -- -- -- 80 -- 97 %   02/29/24 1745 -- -- -- 89 -- 97 %   02/29/24 1730 -- -- -- 92 -- 97 %   02/29/24 1715 -- -- -- 85 -- 97 %   02/29/24 1700 117/64 99.3 °F (37.4 °C) Oral 80 16 99 %   02/29/24 1218 109/65 98.4 °F (36.9 °C) Oral 96 20 99 %   02/29/24 0818 121/71 98 °F (36.7 °C) Oral 100 20 98 %       Physical Exam  Constitutional:       Appearance: She is obese.   HENT:      Head: Normocephalic.   Cardiovascular:      Rate and Rhythm: Normal rate.      Pulses: Normal pulses.   Pulmonary:      Effort: Pulmonary effort is normal.   Abdominal:      Palpations: Abdomen is soft.      Tenderness: There is no abdominal tenderness.   Skin:     General: Skin is warm.   Neurological:      Mental Status: She is alert and oriented to person, place, and time.   Psychiatric:         Mood and Affect: Mood normal.         I/O         02/26 0701  02/27 0700 02/27 0701 02/28 0700    P.O. 170     I.V. (mL/kg) 811.7 (6.8)     IV Piggyback 600     Total Intake(mL/kg) 1581.7 (13.3)     Urine (mL/kg/hr) 1225     Total Output 1225     Net +356.7                   Invasive Devices       Peripheral Intravenous Line  Duration             Peripheral IV 02/26/24 Dorsal (posterior);Right Hand 3 days                     Results from last 7 days   Lab Units 24  0726 24   WBC Thousand/uL 12.35* 10.71*   HEMOGLOBIN g/dL 9.3* 10.5*   MCV fL 91 88   PLATELETS Thousands/uL 271 290     Results from last 7 days   Lab Units 24   POTASSIUM mmol/L 3.7   CHLORIDE mmol/L 105   CO2 mmol/L 22   BUN mg/dL 7   CREATININE mg/dL 0.53*   EGFR ml/min/1.73sq m 135     Results from last 7 days   Lab Units 24  194   AST U/L 12*   ALT U/L 22     Results from last 7 days   Lab Units 24  0726 24  1942   PLATELETS Thousands/uL 271 290                           Brief review of pertinent history:  Past Medical History:   Diagnosis Date    Anemia     Hypertension     chtn started medication with pregnancy    Hypothyroid     Migraine without aura     OTC med management    PCOS (polycystic ovarian syndrome)     Varicella     vaccine    Visual impairment      Past Surgical History:   Procedure Laterality Date    TURBINATE RESECTION       OB History    Para Term  AB Living   1 0 0 0 0 0   SAB IAB Ectopic Multiple Live Births   0 0 0 0 0      # Outcome Date GA Lbr Brian/2nd Weight Sex Delivery Anes PTL Lv   1 Current               Obstetric Comments   Believes she suffered a miscarriage in . Positive pregnancy tests, and then had heavy bleeding shortly ever. Never confirmed with OBGYN    Menarche 13       Fetal data:  Nonstress test: date 24   Baseline:  160  Variability: moderate  Accelerations: present, 10x10  Decelerations: absent  Contractions: absent  Assessment: reactive  Plan: continue TID and PRN      MEDS:   Medication Administration - last 24 hours from 2024 0718 to 2024 0718         Date/Time Order Dose Route Action Action by     2024 1925 EST labetalol (NORMODYNE) tablet 100 mg 100 mg Oral Given Enrique Vick RN     2024 0827 EST labetalol (NORMODYNE) tablet 100 mg 100 mg Oral Given Erin Salazar RN     2024 0629 EST  levothyroxine tablet 25 mcg 25 mcg Oral Given Mary Carrasco RN     02/29/2024 1925 EST docusate sodium (COLACE) capsule 100 mg 100 mg Oral Given Enrique Vick RN     02/29/2024 0828 EST docusate sodium (COLACE) capsule 100 mg 100 mg Oral Not Given Erin Salazar RN     02/29/2024 1639 EST lactated ringers infusion -- Intravenous Rate/Dose Change Dolly Hylton Manjinder, CRNA     02/29/2024 1517 EST lactated ringers infusion -- Intravenous Continue from Pre-op Dolly Desouzakacy Dunbar, CRNA     02/29/2024 1431 EST lactated ringers infusion 125 mL/hr Intravenous New Bag Erin Salazar, BARRY     02/29/2024 1318 EST lactated ringers infusion 0 mL/hr Intravenous Stopped Erin Salazar RN     02/29/2024 1430 EST lactated ringers bolus 1,000 mL 0 mL Intravenous Stopped Erin Salazar RN     02/29/2024 1318 EST lactated ringers bolus 1,000 mL 1,000 mL Intravenous New Bag Erin Salazar, BARRY     02/29/2024 1926 EST acetaminophen (TYLENOL) tablet 975 mg 975 mg Oral Given Enrique Vick RN     02/29/2024 1317 EST acetaminophen (TYLENOL) tablet 975 mg 975 mg Oral Given Erin Salazar RN     03/01/2024 0629 EST indomethacin (INDOCIN) capsule 25 mg 25 mg Oral Given Mary Carrasco RN     03/01/2024 0002 EST indomethacin (INDOCIN) capsule 25 mg 25 mg Oral Given Darryl Flores RN     02/29/2024 1714 EST indomethacin (INDOCIN) capsule 25 mg 25 mg Oral Given Maria L Jeffries RN          Current Facility-Administered Medications   Medication Dose Route Frequency    acetaminophen (TYLENOL) tablet 975 mg  975 mg Oral Q6H PRN    calcium carbonate (TUMS) chewable tablet 1,000 mg  1,000 mg Oral Daily PRN    calcium gluconate 10 % injection 1 g  1 g Intravenous Once PRN    ceFAZolin (ANCEF) IVPB (premix in dextrose) 2,000 mg 50 mL  2,000 mg Intravenous Once    And    ceFAZolin (ANCEF) IVPB (premix in dextrose) 1,000 mg 50 mL  1,000 mg Intravenous Once    docusate sodium (COLACE)  capsule 100 mg  100 mg Oral BID    indomethacin (INDOCIN) capsule 25 mg  25 mg Oral Q6H    labetalol (NORMODYNE) tablet 100 mg  100 mg Oral BID    lactated ringers infusion  125 mL/hr Intravenous Continuous    levothyroxine tablet 25 mcg  25 mcg Oral Early Morning    ondansetron (ZOFRAN) injection 4 mg  4 mg Intravenous Q8H PRN    simethicone (MYLICON) chewable tablet 80 mg  80 mg Oral 4x Daily PRN            Jeremias Ramirez MD  OBGYN, PGY-3  3/1/2024  7:18 AM

## 2024-03-01 NOTE — PLAN OF CARE
Problem: ANTEPARTUM  Goal: Maintain pregnancy as long as maternal and/or fetal condition is stable  Description: INTERVENTIONS:  - Maternal surveillance  - Fetal surveillance  - Monitor uterine activity  - Medications as ordered  - Bedrest  Outcome: Progressing     Problem: PAIN - ADULT  Goal: Verbalizes/displays adequate comfort level or baseline comfort level  Description: Interventions:  - Encourage patient to monitor pain and request assistance  - Assess pain using appropriate pain scale  - Administer analgesics based on type and severity of pain and evaluate response  - Implement non-pharmacological measures as appropriate and evaluate response  - Consider cultural and social influences on pain and pain management  - Notify physician/advanced practitioner if interventions unsuccessful or patient reports new pain  Outcome: Progressing     Problem: INFECTION - ADULT  Goal: Absence or prevention of progression during hospitalization  Description: INTERVENTIONS:  - Assess and monitor for signs and symptoms of infection  - Monitor lab/diagnostic results  - Monitor all insertion sites, i.e. indwelling lines, tubes, and drains  - Monitor endotracheal if appropriate and nasal secretions for changes in amount and color  - [place] appropriate cooling/warming therapies per order  - Administer medications as ordered  - Instruct and encourage patient and family to use good hand hygiene technique  - Identify and instruct in appropriate isolation precautions for identified infection/condition  Outcome: Progressing  Goal: Absence of fever/infection during neutropenic period  Description: INTERVENTIONS:  - Monitor WBC    Outcome: Progressing     Problem: SAFETY ADULT  Goal: Patient will remain free of falls  Description: INTERVENTIONS:  - Educate patient/family on patient safety including physical limitations  - Instruct patient to call for assistance with activity   - Consult OT/PT to assist with strengthening/mobility   -  Keep Call bell within reach  - Keep bed low and locked with side rails adjusted as appropriate  - Keep care items and personal belongings within reach  - Initiate and maintain comfort rounds  Outcome: Progressing  Goal: Maintain or return to baseline ADL function  Description: INTERVENTIONS:  -  Assess patient's ability to carry out ADLs; assess patient's baseline for ADL function and identify physical deficits which impact ability to perform ADLs (bathing, care of mouth/teeth, toileting, grooming, dressing, etc.)  - Assess/evaluate cause of self-care deficits   - Assess range of motion  - Assess patient's mobility; develop plan if impaired  - Assess patient's need for assistive devices and provide as appropriate  - Encourage maximum independence but intervene and supervise when necessary  - Involve family in performance of ADLs  - Assess for home care needs following discharge   - Consider OT consult to assist with ADL evaluation and planning for discharge  - Provide patient education as appropriate  Outcome: Progressing  Goal: Maintains/Returns to pre admission functional level  Description: INTERVENTIONS:  - Perform AM-PAC 6 Click Basic Mobility/ Daily Activity assessment daily.  - Set and communicate daily mobility goal to care team and patient/family/caregiver.   - Collaborate with rehabilitation services on mobility goals if consulted  Outcome: Progressing     Problem: Knowledge Deficit  Goal: Patient/family/caregiver demonstrates understanding of disease process, treatment plan, medications, and discharge instructions  Description: Complete learning assessment and assess knowledge base.  Interventions:  - Provide teaching at level of understanding  - Provide teaching via preferred learning methods  Outcome: Progressing     Problem: DISCHARGE PLANNING  Goal: Discharge to home or other facility with appropriate resources  Description: INTERVENTIONS:  - Identify barriers to discharge w/patient and caregiver  -  Arrange for needed discharge resources and transportation as appropriate  - Identify discharge learning needs (meds, wound care, etc.)  - Arrange for interpretive services to assist at discharge as needed  - Refer to Case Management Department for coordinating discharge planning if the patient needs post-hospital services based on physician/advanced practitioner order or complex needs related to functional status, cognitive ability, or social support system  Outcome: Progressing

## 2024-03-01 NOTE — PLAN OF CARE
Problem: ANTEPARTUM  Goal: Maintain pregnancy as long as maternal and/or fetal condition is stable  Description: INTERVENTIONS:  - Maternal surveillance  - Fetal surveillance  - Monitor uterine activity  - Medications as ordered  - Bedrest  Outcome: Progressing     Problem: PAIN - ADULT  Goal: Verbalizes/displays adequate comfort level or baseline comfort level  Description: Interventions:  - Encourage patient to monitor pain and request assistance  - Assess pain using appropriate pain scale  - Administer analgesics based on type and severity of pain and evaluate response  - Implement non-pharmacological measures as appropriate and evaluate response  - Consider cultural and social influences on pain and pain management  - Notify physician/advanced practitioner if interventions unsuccessful or patient reports new pain  Outcome: Progressing     Problem: INFECTION - ADULT  Goal: Absence or prevention of progression during hospitalization  Description: INTERVENTIONS:  - Assess and monitor for signs and symptoms of infection  - Monitor lab/diagnostic results  - Monitor all insertion sites, i.e. indwelling lines, tubes, and drains  - Monitor endotracheal if appropriate and nasal secretions for changes in amount and color  - [place] appropriate cooling/warming therapies per order  - Administer medications as ordered  - Instruct and encourage patient and family to use good hand hygiene technique  - Identify and instruct in appropriate isolation precautions for identified infection/condition  Outcome: Progressing  Goal: Absence of fever/infection during neutropenic period  Description: INTERVENTIONS:  - Monitor WBC    Outcome: Progressing     Problem: SAFETY ADULT  Goal: Patient will remain free of falls  Description: INTERVENTIONS:  - Educate patient/family on patient safety including physical limitations  - Instruct patient to call for assistance with activity   - Consult OT/PT to assist with strengthening/mobility   -  Keep Call bell within reach  - Keep bed low and locked with side rails adjusted as appropriate  - Keep care items and personal belongings within reach  - Initiate and maintain comfort rounds  Outcome: Progressing  Goal: Maintain or return to baseline ADL function  Description: INTERVENTIONS:  -  Assess patient's ability to carry out ADLs; assess patient's baseline for ADL function and identify physical deficits which impact ability to perform ADLs (bathing, care of mouth/teeth, toileting, grooming, dressing, etc.)  - Assess/evaluate cause of self-care deficits   - Assess range of motion  - Assess patient's mobility; develop plan if impaired  - Assess patient's need for assistive devices and provide as appropriate  - Encourage maximum independence but intervene and supervise when necessary  - Involve family in performance of ADLs  - Assess for home care needs following discharge   - Consider OT consult to assist with ADL evaluation and planning for discharge  - Provide patient education as appropriate  Outcome: Progressing  Goal: Maintains/Returns to pre admission functional level  Description: INTERVENTIONS:  - Perform AM-PAC 6 Click Basic Mobility/ Daily Activity assessment daily.  - Set and communicate daily mobility goal to care team and patient/family/caregiver.   - Collaborate with rehabilitation services on mobility goals if consulted  Outcome: Progressing     Problem: Knowledge Deficit  Goal: Patient/family/caregiver demonstrates understanding of disease process, treatment plan, medications, and discharge instructions  Description: Complete learning assessment and assess knowledge base.  Interventions:  - Provide teaching at level of understanding  - Provide teaching via preferred learning methods  Outcome: Progressing     Problem: DISCHARGE PLANNING  Goal: Discharge to home or other facility with appropriate resources  Description: INTERVENTIONS:  - Identify barriers to discharge w/patient and caregiver  -  Arrange for needed discharge resources and transportation as appropriate  - Identify discharge learning needs (meds, wound care, etc.)  - Arrange for interpretive services to assist at discharge as needed  - Refer to Case Management Department for coordinating discharge planning if the patient needs post-hospital services based on physician/advanced practitioner order or complex needs related to functional status, cognitive ability, or social support system  Outcome: Progressing

## 2024-03-01 NOTE — ANESTHESIA PROCEDURE NOTES
Spinal Block    Patient location during procedure: OR  Start time: 2/29/2024 3:33 PM  Reason for block: procedure for pain and at surgeon's request  Staffing  Performed by: Ravi Costa MD  Authorized by: Ravi Costa MD    Preanesthetic Checklist  Completed: patient identified, IV checked, site marked, risks and benefits discussed, surgical consent, monitors and equipment checked, pre-op evaluation and timeout performed  Spinal Block  Patient position: sitting  Prep: ChloraPrep  Patient monitoring: cardiac monitor and frequent blood pressure checks  Approach: midline  Location: L3-4  Injection technique: single-shot  Needle  Needle type: pencil-tip   Needle gauge: 25 G  Needle length: 10 cm  Assessment  Sensory level: T4  Injection Assessment:  negative aspiration for heme, no paresthesia on injection and positive aspiration for clear CSF.  Post-procedure:  adhesive bandage applied, pressure dressing applied, secured with tape, site cleaned and sterile dressing applied

## 2024-03-01 NOTE — PLAN OF CARE
Problem: ANTEPARTUM  Goal: Maintain pregnancy as long as maternal and/or fetal condition is stable  Description: INTERVENTIONS:  - Maternal surveillance  - Fetal surveillance  - Monitor uterine activity  - Medications as ordered  - Bedrest  3/1/2024 1029 by Erin Salazar RN  Outcome: Completed  3/1/2024 0748 by Erin Salazar RN  Outcome: Progressing     Problem: PAIN - ADULT  Goal: Verbalizes/displays adequate comfort level or baseline comfort level  Description: Interventions:  - Encourage patient to monitor pain and request assistance  - Assess pain using appropriate pain scale  - Administer analgesics based on type and severity of pain and evaluate response  - Implement non-pharmacological measures as appropriate and evaluate response  - Consider cultural and social influences on pain and pain management  - Notify physician/advanced practitioner if interventions unsuccessful or patient reports new pain  3/1/2024 1029 by Erin Salazar RN  Outcome: Completed  3/1/2024 0748 by Erin Salazar RN  Outcome: Progressing     Problem: INFECTION - ADULT  Goal: Absence or prevention of progression during hospitalization  Description: INTERVENTIONS:  - Assess and monitor for signs and symptoms of infection  - Monitor lab/diagnostic results  - Monitor all insertion sites, i.e. indwelling lines, tubes, and drains  - Monitor endotracheal if appropriate and nasal secretions for changes in amount and color  - [place] appropriate cooling/warming therapies per order  - Administer medications as ordered  - Instruct and encourage patient and family to use good hand hygiene technique  - Identify and instruct in appropriate isolation precautions for identified infection/condition  3/1/2024 1029 by Erin Salazar RN  Outcome: Completed  3/1/2024 0748 by Erin Salazar RN  Outcome: Progressing  Goal: Absence of fever/infection during neutropenic period  Description:  INTERVENTIONS:  - Monitor WBC    3/1/2024 1029 by Erin Salazar RN  Outcome: Completed  3/1/2024 0748 by Erin Salazar RN  Outcome: Progressing     Problem: SAFETY ADULT  Goal: Patient will remain free of falls  Description: INTERVENTIONS:  - Educate patient/family on patient safety including physical limitations  - Instruct patient to call for assistance with activity   - Consult OT/PT to assist with strengthening/mobility   - Keep Call bell within reach  - Keep bed low and locked with side rails adjusted as appropriate  - Keep care items and personal belongings within reach  - Initiate and maintain comfort rounds  3/1/2024 1029 by Erin Salazar RN  Outcome: Completed  3/1/2024 0748 by Erin Salazar RN  Outcome: Progressing  Goal: Maintain or return to baseline ADL function  Description: INTERVENTIONS:  -  Assess patient's ability to carry out ADLs; assess patient's baseline for ADL function and identify physical deficits which impact ability to perform ADLs (bathing, care of mouth/teeth, toileting, grooming, dressing, etc.)  - Assess/evaluate cause of self-care deficits   - Assess range of motion  - Assess patient's mobility; develop plan if impaired  - Assess patient's need for assistive devices and provide as appropriate  - Encourage maximum independence but intervene and supervise when necessary  - Involve family in performance of ADLs  - Assess for home care needs following discharge   - Consider OT consult to assist with ADL evaluation and planning for discharge  - Provide patient education as appropriate  3/1/2024 1029 by Erin Salazar RN  Outcome: Completed  3/1/2024 0748 by Erin Salazar RN  Outcome: Progressing  Goal: Maintains/Returns to pre admission functional level  Description: INTERVENTIONS:  - Perform AM-PAC 6 Click Basic Mobility/ Daily Activity assessment daily.  - Set and communicate daily mobility goal to care team and  patient/family/caregiver.   - Collaborate with rehabilitation services on mobility goals if consulted  3/1/2024 1029 by Erin Salazar RN  Outcome: Completed  3/1/2024 0748 by Erin Salazar RN  Outcome: Progressing     Problem: Knowledge Deficit  Goal: Patient/family/caregiver demonstrates understanding of disease process, treatment plan, medications, and discharge instructions  Description: Complete learning assessment and assess knowledge base.  Interventions:  - Provide teaching at level of understanding  - Provide teaching via preferred learning methods  3/1/2024 1029 by Erin Salazar RN  Outcome: Completed  3/1/2024 0748 by Erin Salazar RN  Outcome: Progressing     Problem: DISCHARGE PLANNING  Goal: Discharge to home or other facility with appropriate resources  Description: INTERVENTIONS:  - Identify barriers to discharge w/patient and caregiver  - Arrange for needed discharge resources and transportation as appropriate  - Identify discharge learning needs (meds, wound care, etc.)  - Arrange for interpretive services to assist at discharge as needed  - Refer to Case Management Department for coordinating discharge planning if the patient needs post-hospital services based on physician/advanced practitioner order or complex needs related to functional status, cognitive ability, or social support system  3/1/2024 1029 by Erin Salazar RN  Outcome: Completed  3/1/2024 0748 by Erin Salazar RN  Outcome: Progressing

## 2024-03-03 LAB
BACTERIA SPEC ANAEROBE CULT: NO GROWTH
BACTERIA SPEC BFLD CULT: NO GROWTH
GRAM STN SPEC: NORMAL

## 2024-03-04 ENCOUNTER — HOSPITAL ENCOUNTER (OUTPATIENT)
Facility: HOSPITAL | Age: 24
Discharge: HOME | End: 2024-03-04
Attending: OBSTETRICS & GYNECOLOGY | Admitting: OBSTETRICS & GYNECOLOGY
Payer: COMMERCIAL

## 2024-03-04 VITALS
HEART RATE: 114 BPM | RESPIRATION RATE: 16 BRPM | TEMPERATURE: 98.9 F | SYSTOLIC BLOOD PRESSURE: 137 MMHG | DIASTOLIC BLOOD PRESSURE: 69 MMHG

## 2024-03-04 PROBLEM — R10.2 PELVIC PRESSURE IN PREGNANCY: Status: ACTIVE | Noted: 2024-03-04

## 2024-03-04 PROBLEM — O26.899 PELVIC PRESSURE IN PREGNANCY: Status: ACTIVE | Noted: 2024-03-04

## 2024-03-04 PROCEDURE — 99213 OFFICE O/P EST LOW 20 MIN: CPT | Performed by: OBSTETRICS & GYNECOLOGY

## 2024-03-04 PROCEDURE — 99213 OFFICE O/P EST LOW 20 MIN: CPT

## 2024-03-04 PROCEDURE — 76815 OB US LIMITED FETUS(S): CPT

## 2024-03-04 PROCEDURE — 76815 OB US LIMITED FETUS(S): CPT | Performed by: OBSTETRICS & GYNECOLOGY

## 2024-03-04 NOTE — DISCHARGE INSTR - AVS FIRST PAGE
Please follow up in OB/GYN [place] office for prenatal care and hospital follow up   Please see Dr. Hoffmann at 2 week for post operative visit  Please call the office for fevers, chills, CP , SOB, cramping, vaginal bleeding, loss of fluid.

## 2024-03-04 NOTE — PROGRESS NOTES
"Obstetrics Triage  Latisha Kelly 24 y.o. female MRN: [id number]  Unit/Bed#:  TRIAGE  Encounter: [id number]      Assessment/Plan:  24 y.o.  at 24w0d with a history of gHTN, cerclage placement presents to [facility] L&D Triage with concerns over pelvic pain and discharge, here to rule out rupture of membranes.  She is stable and appropriate for discharge home.    Rule out rupture  Patient was admitted on 24 for cervical insufficiency and cerclage was placed on .  She reports that since placement, she has noticed a red-tinged watery discharge.  For the past 1 to 2 days, she is also experienced pelvic pain that feels \"like my baby is kicking my cervix \".  Denies LOF, VB, abdominal pain, N/V, chest pain, shortness of breath.  DVP: 2.77cm  Speculum: (-) trichomonads, hyphae, clue cells, ferning; small amount of red-tinged discharge noted; intact cerclage noted  SVE: fingertip, cerclage palpated  Dispo: Patient stable for discharge at this time and given clear return precautions    Discharge instructions  Patient instructed to call if experiencing worsening contractions, vaginal bleeding, loss of fluid or decreased fetal movement.    She will follow up with her OBGYN.  Patient was instructed to make an appointment for next week.    Plan of care discussed with Dr. Hook.    Subjective:  MARIANO: Estimated Date of Delivery: 24    HPI Chronology:  24 y.o.  at 24w0d with a history of gHTN, cerclage placement on  presents to [facility] L&D Triage with concerns over pelvic pain and discharge.  Patient reports presence of watery.  Tinged discharge noticed since placement of cerclage on .  Over the past 1-2 days, she noticed increased pelvic pain particularly towards her cervix.  She denies nausea, vomiting, abdominal pain, shortness of breath, chest pain, leg pain/swelling, headaches or lightheadedness or dizziness.    Contractions: Denies  Leakage: Denies  Bleeding: Denies  Fetal " Movement: +FM  Pelvic pain: Endorses    Review of Systems  12-point ROS negative unless stated in the HPI.    Objective:  Vitals:   /69   Pulse (!) 114   Temp 98.9 °F (37.2 °C) (Oral)   Resp 16   LMP 09/18/2023   There is no height or weight on file to calculate BMI.    Physical Exam  GEN:  alert and oriented x 3, no apparent distress, appears well  CARDIAC: RRR, no m/r/g, normal S1 S2  PULMONARY: CTAB, no w/r/r  ABDOMEN: gravid, soft, no tenderness  EXTREMITIES: nontender, no edema  GENITOURINARY: Cerclage seen and palpated, small amount of red-tinged discharge present; speculum exam: (-) trichomonads, hyphae, clue cells, ferning  FETAL ASSESSMENT:  FHT: Baseline Rate (FHR): 155 bpm  Variability: Moderate  Decelerations: None  TOCO: Contraction Frequency (minutes): none      Labs: No results found for this or any previous visit (from the past 24 hour(s)).    Lab, Imaging and other studies: I have personally reviewed pertinent reports.      Maury Garg,    PGY I, FM  3/4/2024, 1:10 PM

## 2024-03-04 NOTE — PROCEDURES
Latisha Kelly, a  at 24w0d with an MARIANO of 2024, by Last Menstrual Period, was seen at [facility] for the following procedure(s): $Procedure Type: ALEKSANDAR]         4 Quadrant ALEKSANDAR  LVP (cm): 2.8 cm (2.77cm)    Adequate DVP         Ultrasound Other  Fetal Presentation: Vertex               Performed by Dr. Jacobs.

## 2024-03-06 NOTE — UTILIZATION REVIEW
NOTIFICATION OF ADMISSION DISCHARGE   This is a Notification of Discharge from [facility]. Please be advised that this patient has been discharge from our facility. Below you will find the admission and discharge date and time including the patient’s disposition.   UTILIZATION REVIEW CONTACT:  Brianne Nelson  Utilization   Network Utilization Review Department  Phone: [phone] x carefully listen to the prompts. All voicemails are confidential.  Email: [email or web address]     ADMISSION INFORMATION  PRESENTATION DATE: 2/26/2024  5:26 PM  OBERVATION ADMISSION DATE:   INPATIENT ADMISSION DATE: 2/26/24  7:23 PM   DISCHARGE DATE: 3/1/2024 12:57 PM   DISPOSITION:Home/Self Care    Network Utilization Review Department  ATTENTION: Please call with any questions or concerns to [phone] and carefully listen to the prompts so that you are directed to the right person. All voicemails are confidential.   For Discharge needs, contact Care Management DC Support Team at [phone] opt. 2  Send all requests for admission clinical reviews, approved or denied determinations and any other requests to dedicated fax number below belonging to the campus where the patient is receiving treatment. List of dedicated fax numbers for the Facilities:  FACILITY NAME UR FAX NUMBER   ADMISSION DENIALS (Administrative/Medical Necessity) [phone]   DISCHARGE SUPPORT TEAM ([facility]) [phone]   PARENT CHILD HEALTH (Maternity/NICU/Pediatrics) [phone]   [facility] [phone]   [facility] [phone]   [facility] [phone]   [facility] [phone]   [facility] [phone]   [facility] [phone]   [facility] [phone]   [facility] [phone]   [facility]  [facility] [phone]   [facility] [phone]   [facility] [phone]   [facility] [phone]

## 2024-03-06 NOTE — RESULT ENCOUNTER NOTE
Latisha,    Great news! Your final amnio cultures came back negative and did not detect any signs of an infection.  Will see you on 3/12 for your cervical length by vaginal ultrasound scheduled with me at 12:45 at our [place] Office    Ann Hoffmann MD

## 2024-03-06 NOTE — UTILIZATION REVIEW
Continued Stay Review    Date: 2/27, 2/28, 2/29, 3/1                          Current Patient Class: inpatient    Current Level of Care: L&D    HPI:24 y.o. female initially admitted on 2/26/2024    Assessment/Plan:  2/27 OB MD   at the bedside for variable decelerations. FHT confirmed on TAUS. Will continue to monitor FHT. Denies VB, contractions, LOF and decreased fetal movement. S/P 1 dose steroids     2/28/2024   OB Provider early AM  Compliant of worsening ABD pain; did not have pain like this on presentation. Cervical exam remains unchanged. Will continue with fetal monitoring given variable decelerations. Noted now for 2-minute spontaneous deceleration with a julio to the 70s which resolved with maternal repositioning.  Patient states that she has not been feeling any contractions and has had adequate fetal movement.  Patient took Tylenol earlier for abdominal pain/discomfort which has since resolved.  Will continue to monitor.  FHT is reassuring with moderate variability of a baseline of 150s to 160s.   2/29    repeat SVE and speculum exam  2/50/-3 with no dilation visually. TAUS showed CL 4.87 cm with funneling for the majority of the cervical length. + fetal tachycardia  Offered amniocentesis then a cerclage . Patient  opted to proceed. CBC obtained prior to rule out infection. Cont fetal HR monitoring TID post OP  Procedure:  CERCLAGE CERVICAL (Cervix)   Anesthesia Plan  ASA Score- 3   SURGERY DATE: 2/29/2024   Anesthesia Type:   Spinal   Operative Findings:  Findings   Preop  Amniocentesis with glucola of 35 and stat gram stain showing no bacteria  Measured cervix preop 0.89 cm but she was 2 cm dilated on bimanual so essentially she has no intact cervix.    Measurement from external os to bladder is 1.76 cm  Measurement from the internal to external os is 3.96 cm  Measurement of the width at the internal os is 2.28 cm.   Debrie seen in the funnel     Post op  Cervix length post cerclage is 2.88  cms    Vital Signs:   Date/Time Temp Pulse Resp BP MAP (mmHg) SpO2 O2 Device Cardiac (WDL) Patient Position - Orthostatic VS   03/01/24 0903 98 °F (36.7 °C) 88 20 112/56 -- -- -- -- --   03/01/24 0820 -- -- -- -- -- -- -- -- --   Comment rows:   OBSERV: according to the patient the baby is active and has some mild cramping. dr montez at [facility]. at 03/01/24 0820 03/01/24 0626 98 °F (36.7 °C) 89 18 111/56 -- 99 % -- -- Lying   03/01/24 0620 -- 85 -- -- -- 99 % -- -- --   03/01/24 0000 98.2 °F (36.8 °C) 95 18 87/51 Abnormal   -- 95 % -- -- Lying   BP: pt asymptomatic, sleeping at this time at 03/01/24 0000   02/29/24 2245 -- -- -- -- -- -- -- WDL --   02/29/24 2222 -- -- -- -- -- -- -- -- --   Comment rows:   OBSERV: pt requesting to come off monitoring at this time due to monitor placement being uncomfortable post procedure and lack of sleep the past two nights from intermittent continuous monitoring d/t FHR tracing at 02/29/24 2222 02/29/24 2125 -- -- -- -- -- -- -- -- --   Comment rows:   OBSERV: pt transferred back to antepartum unit at this time at 02/29/24 2125 02/29/24 1918 99.8 °F (37.7 °C) 96 18 128/68 92 96 % None (Room air) WDL Lying   02/29/24 1800 -- 80 -- -- -- 97 % -- -- --   02/29/24 1745 -- 89 -- -- -- 97 % -- -- --   02/29/24 1730 -- 92 -- -- -- 97 % -- -- --   02/29/24 1715 -- 85 -- -- -- 97 % -- -- --   02/29/24 1700 99.3 °F (37.4 °C) 80 16 117/64 83 99 % -- WDL --   02/29/24 1218 98.4 °F (36.9 °C) 96 20 109/65 -- 99 % -- -- --   02/29/24 1034 -- -- -- -- -- -- -- -- --   Comment rows:   [facility] at 02/29/24 1034   02/29/24 0818 98 °F (36.7 °C) 100 20 121/71 -- 98 % -- -- --   02/29/24 0721 -- -- -- -- -- -- -- -- --   Comment rows:   OBSERV: according to the patient she denies abdominal pain and baby is active. at 02/29/24 0721   02/29/24 0600 97.9 °F (36.6 °C) 90 18 119/62 -- 99 % -- -- Lying   02/29/24 0205 -- -- -- -- -- -- -- -- --   Comment rows:   OBSERV: EFM  "approved for d/c by Dr. Mercer at this time. Plan to monitor FHT/NST at 0600 at 02/29/24 0205   02/29/24 0020 97.8 °F (36.6 °C) 102 20 120/58 -- 97 % -- -- Lying   02/28/24 2200 -- -- -- -- -- -- -- -- --   Comment rows:   OBSERV: pt requesting NST after eating dinner/time spent with family; pt reports +FM, no LOF, vaginal bleeding, or ctx at 02/28/24 2200   02/28/24 2105 98.3 °F (36.8 °C) 95 18 111/53 -- 96 % -- WDL Lying   02/28/24 1620 98.6 °F (37 °C) 102 18 133/62 79 98 % None (Room air) -- Lying   02/28/24 1210 98.5 °F (36.9 °C) 98 20 96/46 Abnormal  -- 99 % -- -- --   02/28/24 1006 -- 102 -- -- -- 98 % -- -- --   02/28/24 1005 -- 95 -- -- -- -- -- -- --   02/28/24 0830 -- -- -- -- -- -- -- WDL --   Comment rows:   OBSERV: Plan of care reviewed w/ pt, RN and MD.  Discussed US to be done this morning. Pt reports positive FM, denies LOF, vaginal bleeding, or contractions/abdominal pain. at 02/28/24 0830   02/28/24 0804 98.1 °F (36.7 °C) 94 20 116/68 -- 97 % -- -- --   02/28/24 0723 -- 91 -- -- -- 97 % -- -- --   02/28/24 0658 -- -- -- -- -- 94 % -- -- --   02/28/24 0653 -- -- -- -- -- 95 % -- -- --   02/28/24 0338 98.2 °F (36.8 °C) 92 18 108/61 -- 96 % None (Room air) -- Lying   02/28/24 0225 -- -- -- -- -- -- -- -- --   Comment rows:   OBSERV: Dr. Odette Mercer At Bedside Evaluating at 02/28/24 0225     Weights (last 14 days) before discharge    Date/Time Weight Height   02/26/24 2150 119 kg (263 lb) 5' 6\" (1.676 m)       Pertinent Labs/Diagnostic Results:       Results from last 7 days   Lab Units 02/29/24  0726   WBC Thousand/uL 12.35*   HEMOGLOBIN g/dL 9.3*   HEMATOCRIT % 29.7*   PLATELETS Thousands/uL 271   BANDS PCT % 4                                 No results found for: \"BETA-HYDROXYBUTYRATE\"                                         Results from last 7 days   Lab Units 02/29/24  1021   GRAM STAIN RESULT  No Polys or Bacteria seen  No No organisms seen  2+ Mononuclear Cells  No Polys   BODY FLUID " CULTURE, STERILE  No growth     Results from last 7 days   Lab Units 02/29/24  1028   TOTAL COUNTED  3   WBC FLUID /ul 7               Medications:   Scheduled Medications:  2/27 & 2/28 PO= indomethacin (INDOCIN) capsule 25 mg  Dose: 25 mg  Freq: Every 6 hours Route: PO  Start: 02/27/24 0204 End: 02/28/24 2340     PO 2/27-3/1 labetalol (NORMODYNE) tablet 100 mg  Dose: 100 mg  Freq: 2 times daily Route: PO  Start: 02/26/24 1930 End: 03/01/24 1517     Continuous IV Infusions:  lactated ringers infusion  Rate: 125 mL/hr Dose: 125 mL/hr  Freq: Continuous Route: IV  Indications of Use: IV Hydration  Last Dose: Stopped (02/29/24 2223)  Start: 02/29/24 0000 End: 03/01/24 1517     PRN Meds:  No current facility-administered medications for this encounter.      Discharge Plan: DCd 3/1    Network Utilization Review Department  ATTENTION: Please call with any questions or concerns to [phone] and carefully listen to the prompts so that you are directed to the right person. All voicemails are confidential.   For Discharge needs, contact Care Management DC Support Team at [phone] opt. 2  Send all requests for admission clinical reviews, approved or denied determinations and any other requests to dedicated fax number below belonging to the campus where the patient is receiving treatment. List of dedicated fax numbers for the Facilities:  FACILITY NAME UR FAX NUMBER   ADMISSION DENIALS (Administrative/Medical Necessity) [phone]   DISCHARGE SUPPORT TEAM (NETWORK) [phone]   PARENT CHILD HEALTH (Maternity/NICU/Pediatrics) [phone]   [facility] [phone]   [facility] [phone]   [facility] [phone]   [facility] [phone]   [facility] [phone]   [facility] [phone]   [facility] [phone]   [facility]  [facility] [phone]   [facility] [phone]   [facility] [phone]   [facility] [phone]   [facility] [phone]

## 2024-03-07 NOTE — UTILIZATION REVIEW
Discharge Summary - OB/GYN   Latisha Kelly 23 y.o. female MRN: [id number]  Unit/Bed#: -01 Encounter: [id number]        Admission Date: 2024      Discharge Date: 3/1/2024     Admitting Diagnosis:   1. Pregnancy at 23w3d  2. cHTN  3. Hypothyroidism  4. Obesity  5. Pre-term labor     Discharge Diagnosis:   Same, delivered  S/p Amniocentesis with no signs of infection  Exam-indicated Osman Cerclage     Procedures:  Amniocentesis, Cerclage     Attending: Antonia Hook, *     Hospital Course:      Latisha Kelly is a 23 y.o.  at 23w3d wks who was initially admitted for pre-term contractions and labor. She was noted to be 4/100/-3 on arrival with membranes visible on speculum exam. CL was noted to be < 0.5 cm. She was started on Magnesium, PCN and Indocin. Over the course of the night and the next several days, she no longer had cramping or contractions, so PCN was discontinued. She received 12h of Magnesium and Indocin x48 h. She had repeat SVE and speculum exam on day 3 of admission and was noted to be 2/50/-3 with no dilation visually. TAUS showed CL 4.87 cm with funneling for the majority of the cervical length. Patient was offered a cerclage and opted to go through with it. Overnight from day 3-4 of admission, FHT was noted to have fetal tachycardia. Patient was offered an amniocentesis to rule out III and opted to do so. Amniocentesis was done on  and showed normal glucose levels. Gram stain showed no organisms. Osman cerclage was placed on 24. She was given one dose of Toradol and started on Indocin x48h. She was monitored overnight and was noted to have minimal cramping and spotting. FHT was noted to have normal baseline at 160 with accelerations, and no decelerations the evening of the procedure and the day after. She was discharged home with follow up with MFM in 2 weeks. She requested weekly heart tone monitoring. Message sent to primary Ob/Gyn.      Complications:  none apparent     Condition at discharge: good      Discharge instructions/Information to patient and family:   See after visit summary for information provided to patient and family.       Provisions for Follow-Up Care:  See after visit summary for information related to follow-up care and any pertinent home health orders.       Disposition: Home     Planned Readmission: No     Discharge Medications:  For a complete list of the patient's medications, please refer to her med rec.     Jeremias Ramirez MD  PGY-3  3/1/2024  4:23 PM                  Cosigned by: Krzysztof Porras MD at 3/1/2024  4:27 PM

## 2024-03-10 PROBLEM — Z3A.24 24 WEEKS GESTATION OF PREGNANCY: Status: ACTIVE | Noted: 2023-12-15

## 2024-03-11 ENCOUNTER — ROUTINE PRENATAL (OUTPATIENT)
Dept: OBGYN CLINIC | Facility: CLINIC | Age: 24
End: 2024-03-11
Payer: COMMERCIAL

## 2024-03-11 VITALS — BODY MASS INDEX: 42.45 KG/M2 | SYSTOLIC BLOOD PRESSURE: 120 MMHG | WEIGHT: 263 LBS | DIASTOLIC BLOOD PRESSURE: 78 MMHG

## 2024-03-11 DIAGNOSIS — E66.01 SEVERE OBESITY DUE TO EXCESS CALORIES AFFECTING PREGNANCY IN SECOND TRIMESTER (HCC): ICD-10-CM

## 2024-03-11 DIAGNOSIS — Z3A.25 25 WEEKS GESTATION OF PREGNANCY: ICD-10-CM

## 2024-03-11 DIAGNOSIS — O99.212 SEVERE OBESITY DUE TO EXCESS CALORIES AFFECTING PREGNANCY IN SECOND TRIMESTER (HCC): ICD-10-CM

## 2024-03-11 DIAGNOSIS — O34.32 CERVICAL CERCLAGE SUTURE PRESENT IN SECOND TRIMESTER: Primary | ICD-10-CM

## 2024-03-11 DIAGNOSIS — O10.919 CHRONIC HYPERTENSION AFFECTING PREGNANCY: ICD-10-CM

## 2024-03-11 LAB
SL AMB  POCT GLUCOSE, UA: NEGATIVE
SL AMB LEUKOCYTE ESTERASE,UA: NEGATIVE
SL AMB POCT BILIRUBIN,UA: NEGATIVE
SL AMB POCT BLOOD,UA: NEGATIVE
SL AMB POCT KETONES,UA: NEGATIVE
SL AMB POCT NITRITE,UA: NEGATIVE
SL AMB POCT PH,UA: 7
SL AMB POCT SPECIFIC GRAVITY,UA: 1.01
SL AMB POCT URINE PROTEIN: NEGATIVE
SL AMB POCT UROBILINOGEN: NEGATIVE

## 2024-03-11 PROCEDURE — 81002 URINALYSIS NONAUTO W/O SCOPE: CPT | Performed by: OBSTETRICS & GYNECOLOGY

## 2024-03-11 PROCEDURE — PNV: Performed by: OBSTETRICS & GYNECOLOGY

## 2024-03-11 NOTE — PROGRESS NOTES
24 y.o.   female at 25 wga for PNV. BP : 120/78. TW  + FM, - LOF, - Ctxn, - bleeding  C/o freq urination, freq loose stools, yellow discharge    Denies fevers, chills, abd pain    UA - neg  Spec - closed cervix, stitch inplace  Nitrazine - neg  KOH/Wet - neg/neg    MFM appt tomorrow  Out of work until 3/18 - I am willing to extend until 30wks gestational age at this point  Reviewed PTL precautions and infection precautions    CHTN - controlled with labetalol 100mg q12h  28 wk labs ordered    F/u 1-2 weeks

## 2024-03-12 ENCOUNTER — ROUTINE PRENATAL (OUTPATIENT)
Dept: PERINATAL CARE | Facility: CLINIC | Age: 24
End: 2024-03-12
Payer: COMMERCIAL

## 2024-03-12 VITALS
BODY MASS INDEX: 42.72 KG/M2 | HEIGHT: 66 IN | HEART RATE: 104 BPM | WEIGHT: 265.8 LBS | SYSTOLIC BLOOD PRESSURE: 132 MMHG | DIASTOLIC BLOOD PRESSURE: 78 MMHG

## 2024-03-12 DIAGNOSIS — O10.919 CHRONIC HYPERTENSION AFFECTING PREGNANCY: ICD-10-CM

## 2024-03-12 DIAGNOSIS — O34.32 CERVICAL CERCLAGE SUTURE PRESENT IN SECOND TRIMESTER: ICD-10-CM

## 2024-03-12 DIAGNOSIS — O60.02 PRETERM LABOR IN SECOND TRIMESTER WITHOUT DELIVERY: Primary | ICD-10-CM

## 2024-03-12 DIAGNOSIS — O99.212 OBESITY DURING PREGNANCY IN SECOND TRIMESTER: ICD-10-CM

## 2024-03-12 DIAGNOSIS — Z3A.25 25 WEEKS GESTATION OF PREGNANCY: ICD-10-CM

## 2024-03-12 PROCEDURE — 99214 OFFICE O/P EST MOD 30 MIN: CPT | Performed by: OBSTETRICS & GYNECOLOGY

## 2024-03-12 PROCEDURE — 76816 OB US FOLLOW-UP PER FETUS: CPT | Performed by: OBSTETRICS & GYNECOLOGY

## 2024-03-12 PROCEDURE — 76817 TRANSVAGINAL US OBSTETRIC: CPT | Performed by: OBSTETRICS & GYNECOLOGY

## 2024-03-12 NOTE — LETTER
2024     Antonia Hook MD   [facility] [id number]    Patient: Latisha Kelly   YOB: 2000   Date of Visit: 3/12/2024       Dear Dr. Hook:    Thank you for referring Latisha Kelly to me for evaluation. Below are my notes for this consultation.    If you have questions, please do not hesitate to call me. I look forward to following your patient along with you.         Sincerely,        Ann Hoffmann MD        CC: No Recipients    Ann Hoffmann MD  3/12/2024  6:58 PM  Sign when Signing Visit    Latisha Kelly complains of what looks like mucousy discharge on a picture on her phone at 25w1d.  She had a visit yesterday with her OB office and a vaginal exam showed no evidence for infection.  She reports fetal movements and does not report any vaginal bleeding or signs of labor.  Her recently completed fetal testing revealed a normal NIPT and normal MSAFP. She is here today for an ultrasound for fetal growth, missed anatomy and cervical length status post cerclage on 2024    Problem list:  Chronic hypertension on labetalol  Maternal hypothyroidism  Maternal obesity based on a pregravid BMI of 39  Admission for  labor at 23 weeks and 3 days with a cervix that was 4 cm, 100% effaced and -3 on arrival with membranes visible on speculum exam.  She was started on magnesium penicillin and Indocin and over the course of the night in the next several days her cervix regressed to approximately 2 cm dilated with a long funnel measuring 4.87 cm that occupied the majority of the cervical length.  During that time fetal heart tones showed evidence of fetal tachycardia and intermittent decelerations suspected to be due to cord prolapsing intermittently within this funneled membranes.  An amniocentesis showed no sign of infection and an emergent cerclage was offered and completed without complication.     Ultrasound findings:  The ultrasound today shows  normal interval fetal growth and fluid and her cervix length measured 2.3 cm with a cerclage intact.  The intact cervical length extends above the cerclage.  Prior missed anatomy of the four-chamber view of the heart is still limited secondary to fetal position.  The fetus is active and moving and various fetal heart rates ranged from 154-169    Pregnancy ultrasound has limitations and is unable to detect all forms of fetal congenital abnormalities.      Prolonged NST is reactive with 10 beat accelerations and has a baseline in the 155 range with acceleration into the 160s. No decelerations were seen.    Specific counseling was provided on the following problems:  We discussed that today's ultrasound and cervical length is very reassuring but with her history she still is at risk for future  delivery and to watch for signs for infection.     Follow up recommended:   Recommend a follow-up ultrasound for growth every 4 weeks till delivery secondary to her increased risk for  delivery.  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  Financially she needs to continue working and requests disability papers and FMLA papers filled out through her OB office that she is able to continue working however with the limitations that she needs to stay in the office.  Since she is not expected to be on bedrest with her cerclage in place and she is doing well I think it is reasonable for her to return to work if they are able to accommodate her request to stay in the office.     Pre visit time reviewing her records   10 minutes  Face to face time 40 minutes  Post visit time on documentation of note, updating her problem list, adding orders and prescriptions 15 minutes.  Procedures that were completed today were charged separately.   The level  of decision making was moderate complexity.    Ann Hoffmann MD

## 2024-03-12 NOTE — PROGRESS NOTES
Latisha Kelly complains of what looks like mucousy discharge on a picture on her phone at 25w1d.  She had a visit yesterday with her OB office and a vaginal exam showed no evidence for infection.  She reports fetal movements and does not report any vaginal bleeding or signs of labor.  Her recently completed fetal testing revealed a normal NIPT and normal MSAFP. She is here today for an ultrasound for fetal growth, missed anatomy and cervical length status post cerclage on 2024    Problem list:  Chronic hypertension on labetalol  Maternal hypothyroidism  Maternal obesity based on a pregravid BMI of 39  Admission for  labor at 23 weeks and 3 days with a cervix that was 4 cm, 100% effaced and -3 on arrival with membranes visible on speculum exam.  She was started on magnesium penicillin and Indocin and over the course of the night in the next several days her cervix regressed to approximately 2 cm dilated with a long funnel measuring 4.87 cm that occupied the majority of the cervical length.  During that time fetal heart tones showed evidence of fetal tachycardia and intermittent decelerations suspected to be due to cord prolapsing intermittently within this funneled membranes.  An amniocentesis showed no sign of infection and an emergent cerclage was offered and completed without complication.     Ultrasound findings:  The ultrasound today shows normal interval fetal growth and fluid and her cervix length measured 2.3 cm with a cerclage intact.  The intact cervical length extends above the cerclage.  Prior missed anatomy of the four-chamber view of the heart is still limited secondary to fetal position.  The fetus is active and moving and various fetal heart rates ranged from 154-169    Pregnancy ultrasound has limitations and is unable to detect all forms of fetal congenital abnormalities.      Prolonged NST is reactive with 10 beat accelerations and has a baseline in the 155 range with acceleration  into the 160s. No decelerations were seen.    Specific counseling was provided on the following problems:  We discussed that today's ultrasound and cervical length is very reassuring but with her history she still is at risk for future  delivery and to watch for signs for infection.     Follow up recommended:   Recommend a follow-up ultrasound for growth every 4 weeks till delivery secondary to her increased risk for  delivery.  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  Financially she needs to continue working and requests disability papers and FMLA papers filled out through her OB office that she is able to continue working however with the limitations that she needs to stay in the office.  Since she is not expected to be on bedrest with her cerclage in place and she is doing well I think it is reasonable for her to return to work if they are able to accommodate her request to stay in the office.     Pre visit time reviewing her records   10 minutes  Face to face time 40 minutes  Post visit time on documentation of note, updating her problem list, adding orders and prescriptions 15 minutes.  Procedures that were completed today were charged separately.   The level of decision making was moderate complexity.    Ann Hoffmann MD

## 2024-03-12 NOTE — PROGRESS NOTES
Ultrasound Probe Disinfection    A transvaginal ultrasound was performed.   Prior to use, disinfection was performed with High Level Disinfection Process (simpleFLOORS).  Probe serial number [id number]: [id number] MOMO was used.      Sri ROLDAN Radice  03/12/24  12:50 PM

## 2024-03-12 NOTE — LETTER
2024     Ursula Douglas MD  [address]  [address]  [place] [address]    Patient: Latisha Kelly   YOB: 2000   Date of Visit: 3/12/2024       Dear Dr. Douglas:    Thank you for referring Latisha Kelly to me for evaluation. Below are my notes for this consultation.    If you have questions, please do not hesitate to call me. I look forward to following your patient along with you.         Sincerely,        Ann Hoffmann MD        CC: No Recipients    Ann Hoffmann MD  3/12/2024  6:58 PM  Sign when Signing Visit    Latisha Kelly complains of what looks like mucousy discharge on a picture on her phone at 25w1d.  She had a visit yesterday with her OB office and a vaginal exam showed no evidence for infection.  She reports fetal movements and does not report any vaginal bleeding or signs of labor.  Her recently completed fetal testing revealed a normal NIPT and normal MSAFP. She is here today for an ultrasound for fetal growth, missed anatomy and cervical length status post cerclage on 2024    Problem list:  Chronic hypertension on labetalol  Maternal hypothyroidism  Maternal obesity based on a pregravid BMI of 39  Admission for  labor at 23 weeks and 3 days with a cervix that was 4 cm, 100% effaced and -3 on arrival with membranes visible on speculum exam.  She was started on magnesium penicillin and Indocin and over the course of the night in the next several days her cervix regressed to approximately 2 cm dilated with a long funnel measuring 4.87 cm that occupied the majority of the cervical length.  During that time fetal heart tones showed evidence of fetal tachycardia and intermittent decelerations suspected to be due to cord prolapsing intermittently within this funneled membranes.  An amniocentesis showed no sign of infection and an emergent cerclage was offered and completed without complication.     Ultrasound findings:  The ultrasound  today shows normal interval fetal growth and fluid and her cervix length measured 2.3 cm with a cerclage intact.  The intact cervical length extends above the cerclage.  Prior missed anatomy of the four-chamber view of the heart is still limited secondary to fetal position.  The fetus is active and moving and various fetal heart rates ranged from 154-169    Pregnancy ultrasound has limitations and is unable to detect all forms of fetal congenital abnormalities.      Prolonged NST is reactive with 10 beat accelerations and has a baseline in the 155 range with acceleration into the 160s. No decelerations were seen.    Specific counseling was provided on the following problems:  We discussed that today's ultrasound and cervical length is very reassuring but with her history she still is at risk for future  delivery and to watch for signs for infection.     Follow up recommended:   Recommend a follow-up ultrasound for growth every 4 weeks till delivery secondary to her increased risk for  delivery.  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  Financially she needs to continue working and requests disability papers and FMLA papers filled out through her OB office that she is able to continue working however with the limitations that she needs to stay in the office.  Since she is not expected to be on bedrest with her cerclage in place and she is doing well I think it is reasonable for her to return to work if they are able to accommodate her request to stay in the office.     Pre visit time reviewing her records   10 minutes  Face to face time 40 minutes  Post visit time on documentation of note, updating her problem list, adding orders and prescriptions 15 minutes.  Procedures that were completed today were charged separately.    The level of decision making was moderate complexity.    Ann Hoffmann MD

## 2024-03-12 NOTE — PROGRESS NOTES
Non-Stress Testing:    Non-Stress test, equipment, procedure, and expected outcomes explained. Reviewed fetal kick counts and when to call OB.Verified patient understanding of fetal kick counts with teach back method. Patient reports feeling daily fetal movements. Patient has no questions or concerns.        Dr. Hoffmann viewed NST strip prior to completion of visit.

## 2024-03-12 NOTE — Clinical Note
Latisha states financially she needs to go back to work.  She states she talked with her job and they would allow her to sit in the office and not make her drive to clients homes and just do paperwork and where the office is actually closer to [facility] than her home is.  I think this is a reasonable request.  She is not on bedrest at home to decrease the risk of developing a thrombosis.  Her cervix was very reassuring today.  The baby has plenty of fluid around it and is active.  Ann

## 2024-03-15 ENCOUNTER — ROUTINE PRENATAL (OUTPATIENT)
Dept: OBGYN CLINIC | Facility: CLINIC | Age: 24
End: 2024-03-15

## 2024-03-15 VITALS — SYSTOLIC BLOOD PRESSURE: 126 MMHG | BODY MASS INDEX: 42.93 KG/M2 | DIASTOLIC BLOOD PRESSURE: 78 MMHG | WEIGHT: 266 LBS

## 2024-03-15 DIAGNOSIS — Z3A.25 25 WEEKS GESTATION OF PREGNANCY: ICD-10-CM

## 2024-03-15 DIAGNOSIS — O10.919 CHRONIC HYPERTENSION AFFECTING PREGNANCY: Primary | ICD-10-CM

## 2024-03-15 DIAGNOSIS — O34.32 CERVICAL CERCLAGE SUTURE PRESENT IN SECOND TRIMESTER: ICD-10-CM

## 2024-03-15 PROCEDURE — PNV: Performed by: PHYSICIAN ASSISTANT

## 2024-03-15 RX ORDER — LABETALOL 100 MG/1
100 TABLET, FILM COATED ORAL 2 TIMES DAILY
Qty: 90 TABLET | Refills: 0 | Status: SHIPPED | OUTPATIENT
Start: 2024-03-15

## 2024-03-15 NOTE — PROGRESS NOTES
Patient is a 25 YO  female presenting to the office at 25.4 weeks for routine OB care.   BP: 126/78  TWlb  Fetal Movement: yes good movement   LOF: no  VB: no  CTX: no  Cerclage in place - weekly FHT for patient reassurance  Will re-submit FMLA - desk duty only for patient  Labetalol refilled  Reviewed precautions  28 week labs ordered  Call for concerns  RTO 1 week for FHT and 3 weeks for routine OB

## 2024-03-20 ENCOUNTER — CLINICAL SUPPORT (OUTPATIENT)
Dept: OBGYN CLINIC | Facility: CLINIC | Age: 24
End: 2024-03-20

## 2024-03-20 DIAGNOSIS — E66.09 OTHER OBESITY DUE TO EXCESS CALORIES AFFECTING PREGNANCY IN SECOND TRIMESTER: ICD-10-CM

## 2024-03-20 DIAGNOSIS — O34.32 CERVICAL CERCLAGE SUTURE PRESENT IN SECOND TRIMESTER: Primary | ICD-10-CM

## 2024-03-20 DIAGNOSIS — O99.212 OTHER OBESITY DUE TO EXCESS CALORIES AFFECTING PREGNANCY IN SECOND TRIMESTER: ICD-10-CM

## 2024-03-20 PROCEDURE — NURSE

## 2024-03-20 NOTE — PROGRESS NOTES
Pt is here to check FHT. She is feeling well today and states baby is very active.    today; per Joyce that is great.     Pt aware and thankful.

## 2024-03-26 ENCOUNTER — APPOINTMENT (OUTPATIENT)
Dept: LAB | Facility: HOSPITAL | Age: 24
End: 2024-03-26
Payer: COMMERCIAL

## 2024-03-26 DIAGNOSIS — E66.01 SEVERE OBESITY DUE TO EXCESS CALORIES AFFECTING PREGNANCY IN SECOND TRIMESTER (HCC): ICD-10-CM

## 2024-03-26 DIAGNOSIS — O99.212 SEVERE OBESITY DUE TO EXCESS CALORIES AFFECTING PREGNANCY IN SECOND TRIMESTER (HCC): ICD-10-CM

## 2024-03-26 DIAGNOSIS — O24.410 DIET CONTROLLED GESTATIONAL DIABETES MELLITUS (GDM) IN THIRD TRIMESTER: Primary | ICD-10-CM

## 2024-03-26 DIAGNOSIS — Z3A.25 25 WEEKS GESTATION OF PREGNANCY: ICD-10-CM

## 2024-03-26 LAB
ERYTHROCYTE [DISTWIDTH] IN BLOOD BY AUTOMATED COUNT: 13.2 % (ref 11.6–15.1)
GLUCOSE 1H P 50 G GLC PO SERPL-MCNC: 190 MG/DL (ref 70–134)
HCT VFR BLD AUTO: 32.5 % (ref 34.8–46.1)
HGB BLD-MCNC: 10.5 G/DL (ref 11.5–15.4)
MCH RBC QN AUTO: 28 PG (ref 26.8–34.3)
MCHC RBC AUTO-ENTMCNC: 32.3 G/DL (ref 31.4–37.4)
MCV RBC AUTO: 87 FL (ref 82–98)
PLATELET # BLD AUTO: 285 THOUSANDS/UL (ref 149–390)
PMV BLD AUTO: 9 FL (ref 8.9–12.7)
RBC # BLD AUTO: 3.75 MILLION/UL (ref 3.81–5.12)
TREPONEMA PALLIDUM IGG+IGM AB [PRESENCE] IN SERUM OR PLASMA BY IMMUNOASSAY: NORMAL
WBC # BLD AUTO: 8.95 THOUSAND/UL (ref 4.31–10.16)

## 2024-03-26 PROCEDURE — 82950 GLUCOSE TEST: CPT

## 2024-03-26 PROCEDURE — 85027 COMPLETE CBC AUTOMATED: CPT

## 2024-03-26 PROCEDURE — 86780 TREPONEMA PALLIDUM: CPT

## 2024-03-26 PROCEDURE — 36415 COLL VENOUS BLD VENIPUNCTURE: CPT

## 2024-03-27 ENCOUNTER — TELEPHONE (OUTPATIENT)
Age: 24
End: 2024-03-27

## 2024-03-27 NOTE — TELEPHONE ENCOUNTER
Patient returned call to [facility] regarding GDM referral. Patient was not aware of their 1 hr glucose results, not yet interpreted by provider. The patient requested 3 hr glucose prior to being referred to M. Please review. Pt has appt with Dr. Hook 3/28 to discuss.

## 2024-03-28 ENCOUNTER — ROUTINE PRENATAL (OUTPATIENT)
Dept: OBGYN CLINIC | Facility: CLINIC | Age: 24
End: 2024-03-28

## 2024-03-28 VITALS — BODY MASS INDEX: 43.48 KG/M2 | DIASTOLIC BLOOD PRESSURE: 76 MMHG | WEIGHT: 269.4 LBS | SYSTOLIC BLOOD PRESSURE: 132 MMHG

## 2024-03-28 DIAGNOSIS — O10.919 CHRONIC HYPERTENSION AFFECTING PREGNANCY: ICD-10-CM

## 2024-03-28 DIAGNOSIS — O24.410 DIET CONTROLLED GESTATIONAL DIABETES MELLITUS (GDM) IN THIRD TRIMESTER: Primary | ICD-10-CM

## 2024-03-28 DIAGNOSIS — O34.32 CERVICAL CERCLAGE SUTURE PRESENT IN SECOND TRIMESTER: ICD-10-CM

## 2024-03-28 PROBLEM — Z3A.27 27 WEEKS GESTATION OF PREGNANCY: Status: ACTIVE | Noted: 2023-12-15

## 2024-03-28 PROCEDURE — PNV: Performed by: OBSTETRICS & GYNECOLOGY

## 2024-03-28 RX ORDER — LABETALOL 100 MG/1
200 TABLET, FILM COATED ORAL 2 TIMES DAILY
Qty: 90 TABLET | Refills: 0 | Status: SHIPPED | OUTPATIENT
Start: 2024-03-28

## 2024-03-28 NOTE — PROGRESS NOTES
24 y.o.   female at 27.3 wga for PNV. BP : 132/76. TW  + FM, - LOF, - Ctxn, - bleeding    CHTN, worsening HA, BP's 130-140's at home - Labetalol increased to 200mg Q12h  GDM - referral to DM ed placed      Glucola, RPR and CBC reviewed today  RhoGam needed No  Tdap needed Yes - given    FKC reviewed  28 week booklet, Baby and Me and birth plan given and reviewed today.   Consent signed, full code, ok with blood transfusion  Delayed cord clamping, skin to skin, rooming in and breast feeding reviewed    F/u 2 weeks

## 2024-04-01 ENCOUNTER — NURSE TRIAGE (OUTPATIENT)
Age: 24
End: 2024-04-01

## 2024-04-01 ENCOUNTER — HOSPITAL ENCOUNTER (OUTPATIENT)
Facility: HOSPITAL | Age: 24
Discharge: HOME | End: 2024-04-01
Attending: OBSTETRICS & GYNECOLOGY | Admitting: OBSTETRICS & GYNECOLOGY
Payer: COMMERCIAL

## 2024-04-01 VITALS
TEMPERATURE: 98.8 F | HEART RATE: 118 BPM | DIASTOLIC BLOOD PRESSURE: 83 MMHG | SYSTOLIC BLOOD PRESSURE: 141 MMHG | RESPIRATION RATE: 20 BRPM

## 2024-04-01 PROBLEM — Z3A.28 28 WEEKS GESTATION OF PREGNANCY: Status: ACTIVE | Noted: 2023-12-15

## 2024-04-01 LAB
BILIRUB UR QL STRIP: NEGATIVE
CLARITY UR: CLEAR
COLOR UR: YELLOW
GLUCOSE UR STRIP-MCNC: NEGATIVE MG/DL
HGB UR QL STRIP.AUTO: NEGATIVE
KETONES UR STRIP-MCNC: NEGATIVE MG/DL
LEUKOCYTE ESTERASE UR QL STRIP: NEGATIVE
NITRITE UR QL STRIP: NEGATIVE
PH UR STRIP.AUTO: 6.5 [PH] (ref 4.5–8)
PROT UR STRIP-MCNC: NEGATIVE MG/DL
SP GR UR STRIP.AUTO: 1.02 (ref 1–1.03)
UROBILINOGEN UR QL STRIP.AUTO: 0.2 E.U./DL

## 2024-04-01 PROCEDURE — 99213 OFFICE O/P EST LOW 20 MIN: CPT

## 2024-04-01 PROCEDURE — 81003 URINALYSIS AUTO W/O SCOPE: CPT

## 2024-04-01 PROCEDURE — NC001 PR NO CHARGE: Performed by: OBSTETRICS & GYNECOLOGY

## 2024-04-01 RX ORDER — ACETAMINOPHEN 325 MG/1
975 TABLET ORAL ONCE
Status: COMPLETED | OUTPATIENT
Start: 2024-04-01 | End: 2024-04-01

## 2024-04-01 RX ADMIN — ACETAMINOPHEN 975 MG: 325 TABLET, FILM COATED ORAL at 16:58

## 2024-04-01 NOTE — PROGRESS NOTES
L&D Triage Note - OB/GYN  Latisha Kelly 24 y.o. female MRN: [id number]  Unit/Bed#:  TRIAGE 3-01 Encounter: [id number]      ASSESSMENT:    Latisha Kelly is a 24 y.o.  at 28w0d who was evaluated today in triage to rule out  labor in the setting of known cerclage. Microscopy wnl, SVE closed, cerclage not on tension on exam. Reassuring work up, the patient does not appear to be in  labor and it is safe to discharge home.     PLAN:    1) Abdominal pain - likely musculoskeletal vs round ligament pain  - Speculum exam  - Microscopy   - SVE    2) 28 weeks gestation of pregnancy  - Continue routine prenatal care  - Discharge from OB triage with  labor precautions    - Reviewed rupture of membranes, false vs true labor, decreased fetal movement, and vaginal bleeding   - Pt to call provider with any concerns and follow up at her next scheduled prenatal appointment    - Case discussed with Dr. Douglas    SUBJECTIVE:    Latisha Kelly 24 y.o.  at 28w0d with an Estimated Date of Delivery: 24 presenting with abdominal pain. Today was Latisha's first day back at work after her cerclage placement on 24. She felt normal until mid-day when she stood up and felt bilateral lower abdominal/groin pain. The pain is worse with movement. She denies contractions. She does complain of right sided lower back/sciatic pain which is not new for her.     This pregnancy has been complicated by exam indicated cerclage on . Her cervical exam was 4/100/-3 and cervical length was 2.3cm.     Contractions: denies  Leakage of fluid: Denies  Vaginal Bleeding: Denies  Fetal movement: present    OBJECTIVE:    BP: 141/83  HR: 95    ROS:  Constitutional: Negative  Respiratory: Negative  Cardiovascular: Negative    Gastrointestinal: Negative    General Physical Exam:  General: Well appearing, no distress  Respiratory: Unlabored breathing  Cardiovascular: Regular rate.  Abdomen: Soft, gravid,  nontender  Fundal Height: Appropriate for gestational age.  Extremities: Warm and well perfused.  Non tender.      Fetal monitoring:  Fetal heart rate: Baseline Rate (FHR): 155 bpm  Variability: Moderate  Accelerations: 10 x 10 (<32 weeks)  Decelerations: None  [place]: Contraction Frequency (minutes): 0      Cervical Exam  Speculum: Cervical os is closed, cerclage visible and not on tension    KOH/WTMT:     Infection:   - no clue cells    - no hyphae   - no trichomonads present    Membrane status   - no ferning   - negative nitrazene   - no pooling       SVE: closed      Urine Dip    - wnl        Chantel Ivey MD  OBGYN, PGY-1  04/01/24  6:00 PM

## 2024-04-01 NOTE — TELEPHONE ENCOUNTER
Call made to patient to advise of provider recommendation. Per Dr. Douglas - send to L&D triage. Patient agreeable and states she will arrive in about 5 minutes to [place]. Charge nurse and provider made aware. Patient verbalized understanding. No further questions

## 2024-04-01 NOTE — TELEPHONE ENCOUNTER
"Patient called states she is 28 weeks pregnant and had cerclage placed end of Feb. About 2 hours ago she has been experiencing 6/10 (when walking) sudden pelvic pain. Patient unable to describe pain location very well but states near vagina and difficult to walk. Patient states she left work early due to pain and the only thing that helps is sitting down - hurts to stand and more pain to walk. Patient denies LOF, abdominal pain, vaginal bleeding, or contractions. She hasn't taken anything for the pain. She also states last evening she had abdominal tightness for about 15-20 min on and off until it subsided. Patient states difficult to do kick counts due to anterior placenta and difficult to tell if any fetal movement changes. Advised will reach out to provider for recommendation and will give patient a call back.    TT sent to on call provider. Awaiting recommendation.    Reason for Disposition  • MILD constant abdominal pain (e.g., doesn't interfere with normal activities) or tightening, and present > 2 hours    Answer Assessment - Initial Assessment Questions  1. LOCATION: \"Where does it hurt?\"       Vaginal/pelvic pain  2. RADIATION: \"Does the pain shoot anywhere else?\" (e.g., chest, back)      denies  3. ONSET: \"When did the pain begin?\" (Minutes, hours or days ago)       About 1 o'clock in afternoon  4. ONSET: \"Gradual or sudden onset?\"      Sudden upon standing up at work   5. PATTERN: \"Does the pain come and go, or has it been constant since it started?\"       Everytime standing feels pain in pelvis  6. SEVERITY: \"How bad is the pain?\" \"What does it keep you from doing?\"  (e.g., Scale 1-10; mild, moderate, or severe)    - MILD (1-3): doesn't interfere with normal activities, abdomen soft and not tender to touch     - MODERATE (4-7): interferes with normal activities or awakens from sleep, tender to touch     - SEVERE (8-10): excruciating pain, doubled over, unable to do any normal activities      Walking " "6/10  7. RECURRENT SYMPTOM: \"Have you ever had this type of stomach pain before?\" If Yes, ask: \"When was the last time?\" and \"What happened that time?\"       denies  8. CAUSE: \"What do you think is causing the stomach pain?      unknown  9. RELIEVING/AGGRAVATING FACTORS: \"What makes it better or worse?\" (e.g., antacids, bowel movement, movement)      Walking makes it worse. Once sit down pain goes away  10. FETAL MOVEMENT: \"Has the baby's movement decreased or changed significantly from normal?\"        No changes- anterior placenta  11. OTHER SYMPTOMS: \"Has there been any vaginal bleeding, fever, vomiting, diarrhea, or urine problems?\"        Diarrhea. Nauseous last evening  12. MARIANO: \"What date are you expecting to deliver?\"        6/24/2024    Protocols used: Pregnancy - Abdominal Pain Greater Than 20 Weeks EGA-ADULT-[place]    "

## 2024-04-03 ENCOUNTER — HOSPITAL ENCOUNTER (OUTPATIENT)
Facility: HOSPITAL | Age: 24
Discharge: HOME | End: 2024-04-03
Attending: OBSTETRICS & GYNECOLOGY | Admitting: OBSTETRICS & GYNECOLOGY
Payer: COMMERCIAL

## 2024-04-03 ENCOUNTER — NURSE TRIAGE (OUTPATIENT)
Dept: OTHER | Facility: OTHER | Age: 24
End: 2024-04-03

## 2024-04-03 VITALS
RESPIRATION RATE: 20 BRPM | SYSTOLIC BLOOD PRESSURE: 123 MMHG | OXYGEN SATURATION: 98 % | TEMPERATURE: 97.9 F | DIASTOLIC BLOOD PRESSURE: 64 MMHG | HEART RATE: 106 BPM

## 2024-04-03 PROBLEM — N89.8 VAGINAL DISCHARGE: Status: ACTIVE | Noted: 2024-04-03

## 2024-04-03 PROCEDURE — 99214 OFFICE O/P EST MOD 30 MIN: CPT

## 2024-04-03 PROCEDURE — NC001 PR NO CHARGE: Performed by: OBSTETRICS & GYNECOLOGY

## 2024-04-03 PROCEDURE — 76815 OB US LIMITED FETUS(S): CPT

## 2024-04-03 NOTE — PROGRESS NOTES
Vitals:    04/03/24 0420   BP: 123/64   Pulse: (!) 106   Resp: 20   Temp: 97.9 °F (36.6 °C)   SpO2:      Patient's FHT reevaluated after another 30 minutes on the monitor: baseline is 150 bpm with moderate variability and no decelerations noted. Tocometry remains acontractile. Patient stable for d/c home with strict return precautions.     D/w Dr. Hernandez.    Rhonda Boswell MD  PGY-1 OBGYN

## 2024-04-03 NOTE — TELEPHONE ENCOUNTER
"Reason for Disposition   Leakage of fluid from vagina    Answer Assessment - Initial Assessment Questions  1. ONSET: \"When did the symptoms begin?\"         A few minutes ago, underwear and bed sheets soaked    2. CONTRACTIONS: \"Are you having any contractions?\" If yes, ask: \"Describe the contractions that you are having.\" (e.g., duration, frequency, regularity, severity)      Denies     3. MARIANO: \"What date are you expecting to deliver?\"      6/24/24    4. PARITY: \"Have you had a baby before?\" If Yes, ask: \"How long did the labor last?\"      First pregnancy    5. FETAL MOVEMENT: \"Has the baby's movement decreased or changed significantly from normal?\"      Decreased yesterday, pt reports that she did not do kick counts yesterday    6. OTHER SYMPTOMS: \"Do you have any other symptoms?\" (e.g., abdominal pain, vaginal bleeding, fever, hand/facial swelling)      Denies     28w2d.    Contacted on call provider who recommends patient go to L&D for evaluation.     Contacted patient to make her aware of on-call provider's recommendation.  She was agreeable and will be leaving shortly.     TC message sent to L&D charge nurse to make aware of above information.    Protocols used: Pregnancy - Rupture of Membranes-ADULT-[place]    "

## 2024-04-03 NOTE — PROCEDURES
Latisha Kelly, a  at 28w2d with an MARIANO of 2024, by Last Menstrual Period, was seen at [facility] for the following procedure(s): $Procedure Type: ALEKSANDAR]         4 Quadrant ALEKSANDAR  ALEKSANDAR Q1 (cm): 1.5 cm  ALEKSANDAR Q2 (cm): 6.8 cm  ALEKSANDAR Q3 (cm): 3.7 cm  ALEKSANDAR Q4 (cm): 5.3 cm  ALEKSANDAR TOTAL (cm): 17.3 cm         Ultrasound Other  Fetal Presentation: Breech                   Rhonda Boswell MD  PGY-1 OBGYN

## 2024-04-03 NOTE — PROGRESS NOTES
"L&D Triage Note - OB/GYN  Latisha Kelly 24 y.o. female MRN: [id number]  Unit/Bed#:  TRIAGE  Encounter: [id number]    Patient is seen by [facility].    ASSESSMENT  Latisha Kelly is a 24 y.o.  at 28w2d here with leakage of fluid in setting of cerclage, for r/o ROM. Membranes found to be intact, SVE remains closed. NST with occasional decelerations that appear late in nature, but not associated with contractions as patient is not sarahi subjectively or on tocometry. Plan for extended fetal monitoring for minimum of 30 - 60 minutes. Disposition pending EFM.     PLAN  #1. Leakage of fluid:   No pooling, negative nitrazine, negative ferning  ALEKSANDAR 17  SVE closed/thick/high upon gentle examination   Membrane status intact    #2. Decelerations appreciated on NST:   NST with decelerations, appearing late in nature but not in relation to any appreciable contractions on tocometry   Plan for extended fetal monitoring, minimum 30 - 60 minutes  Disposition per pending results of EFM      D/w Dr. Hernandez.  ______________    SUBJECTIVE    MARIANO: Estimated Date of Delivery: 24    HPI:  24 y.o.  28w2d presents with complaint of leakage of fluid. States she woke up in the middle of the night and realized that her sheets and the pillow between her legs was soaked through with clear fluid. She has not continued to leak since that time.  She has no other obstetric complaints. Of note, she does describe a mild \"head cold,\" with minimal congestion and a runny nose. Denies fever, chills, chest pain, difficulty breathing, cough, palpitations.     Contractions: denies  Leakage of fluid: large amount  Vaginal Bleeding: denies  Fetal movement: present    Her current pregnancy is complicated by exam-indicated cerclage.    Her obstetrical history is not significant; this is her first pregnancy.    ROS:  Constitutional: Negative  Respiratory: +congestion, otherwise negative  Cardiovascular: Negative    Gastrointestinal: " Negative    OBJECTIVE:    Vitals:  /64 (BP Location: Right arm)   Pulse (!) 106   Temp 97.9 °F (36.6 °C) (Oral)   Resp 20   LMP 09/18/2023   SpO2 98%   There is no height or weight on file to calculate BMI.    Physical Exam    Speculum exam:  Normal external female genitalia  Cervix fully visualized and not visibly dilated with cerclage fully visualized and not on tension  Physiologic discharge  No bleeding, pooling, abnormal discharge, or lesions noted    Microscopy:     Infection:   - neg clue cells    - neg hyphae   - neg trichomonads present    Membrane status   - neg ferning   - neg nitrazene   - neg pooling     SVE: closed/thick/high  Examiner: Aleksey    FHT:  Baseline Rate (FHR): 155 bpm  Variability: Moderate  Accelerations: 10 x 10 (<32 weeks)  Decelerations: Variable    TOCO:   Contraction Frequency (minutes): 0    IMAGING:      TAUS   ALEKSANDAR      - Q1 1.54 cm     - Q2 6.75 cm     - Q3 3.65 cm     - Q4 5.34 cm     - Total: 17.28 cm   Presentation: breech    Labs: No results found for this or any previous visit (from the past 24 hour(s)).    Rhonda Boswell MD  4/3/2024  5:29 AM

## 2024-04-04 NOTE — PROGRESS NOTES
"CLASS 1 - Individual  (in-person office visit )    Thank you for referring your patient to [facility] Maternal Fetal Medicine Diabetes and Pregnancy Program.     Subjective:     Latisha Kelly is a 24 y.o. female who presents today unaccompanied for Patient Education (Class 1 ) and Gestational Diabetes (28w4d/). Patient is at 28w3d gestation, Estimated Date of Delivery: 24.     Learns best by: Visual (Images/Graphics, Videos, PowerPoint Presentation), Reading/Writing (Note Taking, Reading Provided Booklet), Kinesthetic (Interactive Small Groups, Hands-on, Tactile Representation) , and Auditory (Lecture, Group Discussion)  Primary Support Person: Spouse and Family Member (Mother)  Lianet with stress by:  Social Media  How do you feel the diabetes diagnosis will affect the rest of your pregnancy? Answer: \"My blood sugars were fine while I was in the hospital\"     Reviewed and updated the following from patients medical record: Demographics, Education, Occupation, PMH, Problem List, Allergies, and Current Medications. D&P Assessment responses have been reported throughout the following note.    Visit Diagnosis:  Encounter Diagnosis     ICD-10-CM    1. Diet controlled gestational diabetes mellitus (GDM) in third trimester  O24.410 Ambulatory Referral to Maternal Fetal Medicine      2. 28 weeks gestation of pregnancy  Z3A.28            Discussed with patient:  Pathophysiology of Diet controlled gestational diabetes mellitus (GDM) in third trimester [O24.410].  Untreated hyperglycemia in pregnancy and maternal fetal complications (fetal macrosomia,  hypoglycemia, polyhydramnios, increased incidence of  section,  labor, and in severe cases fetal demise and still birth).   Importance of blood glucose monitoring, nutrition, and medication if necessary in achieving BG goals.     HPI    Diabetes Hx:   Personal History? No  Family History of Diabetes? No     Family History   Problem Relation Age of " "Onset    No Known Problems Mother     Hypertension Father     No Known Problems Sister     No Known Problems Brother     No Known Problems Brother     No Known Problems Brother     Colon cancer Neg Hx     Ovarian cancer Neg Hx     Breast cancer Neg Hx     Cancer Neg Hx         Labs  GDM LABS:  1 Hour GTT:   Lab Results   Component Value Date/Time    [id number] 190 (H) 03/26/2024 01:32 PM      3 Hour GTT:   Lab Results   Component Value Date/Time    GLUF 90 10/31/2023 04:02 PM        A1C:  Lab Results   Component Value Date/Time    HGBA1C 4.2 02/26/2024 07:42 PM    HGBA1C 4.7 01/23/2023 04:16 PM    HGBA1C 4.7 12/10/2020 11:22 AM    HGBA1C 4.8 01/28/2020 11:23 AM        Labs Ordered This Visit: None    Current Outpatient Medications    Current Outpatient Medications:     acetaminophen (TYLENOL) 325 mg tablet, Take 2 tablets (650 mg total) by mouth every 6 (six) hours as needed for mild pain, moderate pain, fever or headaches, Disp: 30 tablet, Rfl: 0    aspirin 81 mg chewable tablet, Chew 2 tablets (162 mg total) daily, Disp: 180 tablet, Rfl: 1    labetalol (NORMODYNE) 100 mg tablet, Take 2 tablets (200 mg total) by mouth 2 (two) times a day (Patient taking differently: Take 200 mg by mouth 2 (two) times a day Taking 1 tablet 2 time daily), Disp: 90 tablet, Rfl: 0    levothyroxine (Euthyrox) 25 mcg tablet, Take 1 tablet (25 mcg total) by mouth daily, Disp: 90 tablet, Rfl: 0    Prenatal Vit-Iron Carbonyl-FA (prenatal multivitamin) TABS, Take 1 tablet by mouth daily, Disp: , Rfl:      Anthropometrics:  Ht Readings from Last 1 Encounters:   04/05/24 5' 6\" (1.676 m)      Wt Readings from Last 3 Encounters:   04/05/24 123 kg (272 lb 3.2 oz)   03/28/24 122 kg (269 lb 6.4 oz)   03/15/24 121 kg (266 lb)        Pre-Gravid Wt Pre-Gravid BMI TWG   110 kg (242 lb) 39.08 13.7 kg (30 lb 3.2 oz)     Total Pregnancy Weight Gain Recommendations: BMI (> 30) 11-20 lbs  Current Wt Status Compared to Recommendations: Exceeding -- " Recommended to maintain wt for remainder of pregnancy    Most Recent Ultrasound Results:  Findings: NML Growth/ALEKSANDAR - 3/12/24  Further Fetal Surveillance:  Growth ultrasound every 4 weeks  Next US date: Scheduled Appropriately - 24    Blood Glucose Monitoring:     Glucose Meter: OneTouch Verio Flex   *orders for supplies (meter, lancets, strips) based on patients needs pended/routed to appropriate provider after today's visit for review/approval     Instructed on Testing Blood Sugars: 4 x per day (Fasting, 2 hour after start of each meal)  Goals: (Fasting) 60-95mg/dL // (2hr PP) <120mg/dL  Meter Teaching: Gave instruction on site selection, skin preparation, loading strips and lancet device, meter activation, obtaining blood sample, test strip and lancet disposal and storage, and recording log book entries.   Blood Sugar Tested in Office? Yes --- (POCT BG) - Time: 1:30pm  BG Readin  (LAST MEAL) - Time: 9:30am  Description of Meal Breakfast sandwich  Instructed to report blood sugar results weekly via Phone: [phone] OR nuvo[employer] (Message with image attachment, or Glucose Flowsheet)    Meal Plan: Patient was provided with a meal plan including 3 meals and 3 snacks  *Calories: 2200 calorie (CHO: 94-66-82-30-60-30) (PRO: 3-2-3/4-2-3/4-2)    Review of Patient's Current Diet:  Type of Diet: Regular   Special or ethnic dietary preferences? no  Food Allergies: None  Food Intolerances: Possible Lactose Intolerance; Drinks 1% or FF milk  Food Dislikes: Fruits Including Avocado  Receiving WIC or food stamps? Yes, WIC  Frequency of dining out/getting take out? Once weekly - [employer], [employer], [employer]    Typical Timing/Frequency of Meals and Snacks:   # Meals Daily: 3  Timing of Meals: Breakfast: 8:30am  Lunch: 12-1pm  Dinner: 9pm  (Breakfast: [employer] Breakfast [place], Lunch: 6 inch tuna sandwich - [employer], Dinner: [employer] - Spicy Chicken [place], Small Mac and Cheese; Sweetened iced tea)  #  "Snacks Daily? 1  Timing of Snacks:  4pm     Drinks 40 oz water x 3 day; Occasionally flavored with Gatorade Zero, Propel, Liquid IV)    Meal Plan Tips Reviewed at Today's Visit:  Appropriate amounts of CHO, PRO, and Fat at each meal and snack.   CHO exchange list, and portion sizes for both CHO and PRO via food models  How to read a food label  Suggested meal/snack options to increase nutrition and maintain consistent meal and snack intakes  Eat every 2.0-3.5 hours while awake  Go no longer than 8-10 hours fasting overnight until first meal of the day.     Physical Activity:  Currently physically active? No, currently on bedrest    Reviewed w/ Pt:   Benefits of physical activity to optimize blood glucose control, encouraged activity at patient is physically able.   Instructed pt to always consult a physician prior to starting an exercise program.     Patient Stated Goal: \"I will plan my meals and snacks every day\"    Expected Compliance: very good  Barriers to Learning/Change: No Barriers  Diabetes Self Management Support Plan (outside of ongoing care): Spouse/Family     Date to Report Blood Sugars: Day Before Class 2, Then Weekly (till delivery)    Class 2: Return in about 1 week (around 4/12/2024) for Class 2 w/ Latisha Garg.   *Patient instructed to bring 3 day food diary and/or write down foods associated with elevated after meal blood sugars    Begin Time: 1:05 pm    End Time: 2:15 pm    It was a pleasure working with them today. Please feel free to call ([phone]) with any questions or concerns.    Olivia Basurto   Diabetes Educator  [facility] Maternal Fetal Medicine  Diabetes and Pregnancy Program    "

## 2024-04-05 ENCOUNTER — OFFICE VISIT (OUTPATIENT)
Facility: HOSPITAL | Age: 24
End: 2024-04-05
Payer: COMMERCIAL

## 2024-04-05 VITALS
SYSTOLIC BLOOD PRESSURE: 124 MMHG | HEIGHT: 66 IN | DIASTOLIC BLOOD PRESSURE: 74 MMHG | WEIGHT: 272.2 LBS | HEART RATE: 103 BPM | BODY MASS INDEX: 43.75 KG/M2

## 2024-04-05 DIAGNOSIS — O24.410 DIET CONTROLLED GESTATIONAL DIABETES MELLITUS (GDM) IN THIRD TRIMESTER: Primary | ICD-10-CM

## 2024-04-05 DIAGNOSIS — Z3A.28 28 WEEKS GESTATION OF PREGNANCY: ICD-10-CM

## 2024-04-05 PROCEDURE — G0108 DIAB MANAGE TRN  PER INDIV: HCPCS | Performed by: DIETITIAN, REGISTERED

## 2024-04-05 RX ORDER — LANCETS 33 GAUGE
EACH MISCELLANEOUS
Qty: 100 EACH | Refills: 3 | Status: SHIPPED | OUTPATIENT
Start: 2024-04-05 | End: 2024-06-24

## 2024-04-05 RX ORDER — BLOOD SUGAR DIAGNOSTIC
STRIP MISCELLANEOUS
Qty: 100 STRIP | Refills: 3 | Status: SHIPPED | OUTPATIENT
Start: 2024-04-05 | End: 2024-06-24

## 2024-04-05 NOTE — PATIENT INSTRUCTIONS
CLASS 1  Diabetes and Pregnancy Program   formerly [facility] - Maternal Fetal Medicine    Diabetes Team:   VALENTIN Zimmerman CDE   Alysha Conner RD (Judy) CDE MS Godo (Lashawn) ARYAN Garg MS  Olivia Basurto, MPH, RDN, LDN, CDCES  Olivia Basurto RD CDE MPH    Resource: Gestational Diabetes  ACOG     CHECKING BLOOD SUGARS    Always carry your meter and testing supplies with you at all times. Bring your glucose meter to all your appointments in our [facility] office.     Prescription for Meter/Strips/Lancets:   A request for a glucose meter, test strips and lancets was sent to the provider for approval.   Once your prescriptions are approved by the provider, your prescription will be sent electronically to your pharmacy.   Be mindful the provider is seeing patients in the office today so allow ample time for your prescriptions to be approved.   Call your pharmacy to verify your medication was received electronically and is ready for pick-up before going to pharmacy. If you have any issues with coverage or your meter is unavailable, please reach out to our office at [phone].     Coupons/Savings:   One Touch Verio: RX Finder  Automatic Savings for Diabetes Supplies  OneTouch®   Contour Next: Contour® NEXT Test Strips Discounts  [facility]     How to Check Blood Sugars:  Wash your hands with soap and water or use waterless  to clean your hands.  Alcohol can dry your fingers and is not recommended.  Alcohol can also cause false, elevated glucose readings.  AVOID scented soaps and hand sanitizers - scented soaps may include sugar which can cause inaccurate/elevated readings   Gather your glucose meter kit and supplies.  Prepare your meter by inserting a new test strip.   This will automatically turn on your meter  Make sure test strips are not .  Use a new test strip each time.   Prepare your lancing device by inserting the lancet               After the lancet is securely in  "place, twist off the top to reveal needle.   Reattach lancing device top   Once lancing device top is reattached, you are now ready to prick the side of your fingertip to get a blood sample.  You can adjust the depth setting as needed. We recommend to start at \"4\".   Apply the blood sample to test strip according to instructions.   Make sure your sharp container is: heavy duty plastic, puncture-resistant lid, upright and stable, leak resistant, properly labeled.   Record your blood sugar     Additional References and Video:   One Touch Verio: OneTouch Verio Flex®  Blood Glucose Meter  OneTouch®   Contour Next: Instructional Videos - [facility]     Frequency: 4 Times Daily     Timing:   Fasting   Test when you wake up before you have anything to eat or drink  At least 8hrs but no more than 10hrs from your bedtime snack  Exceeding 10hrs may result in inaccurate readings  2 hrs after the first bite of your meal (breakfast, lunch, dinner) **do not test for snacks    Goals:    Fasting  60-95   1 Hour   After Meals <140   2 Hours   After Meals <120   *please inform member of diabetes team if you begin testing 1hr blood sugars    REPORTING BLOOD SUGARS:   Please only pick ONE way to report to our team. Choose the best option for you.     [employer] Blood Glucose Flow sheet under \"Track my Health\"   Remember to click \"save\" for your readings to automatically upload into Epic.   [employer] Message with Attachment(s)  Send a picture of a written blood sugar log   To: VALENTIN Zimmerman (Medical Question - Non Urgent)  You may include up to 3 images per message  Leave Voicemail at [phone]   Include: Name, Date of Birth, and Date + Blood Sugars    The diabetes team will review your blood sugar log weekly till delivery.             MEAL PLAN AND DIET INSTRUCTIONS    *Carbohydrates: Sources of food that increase your blood sugar (include: starches, fruits, milk, desserts, starchy vegetables such as corn, peas, " squash)    Meal Plan (3 Meals and 3 Snacks)  Outlines grams of carbohydrates to have at each meal and snack  Minimum Carbohydrate Intake Daily (avoid ketosis): 175g *to be distributed throughout day as instructed in meal plan  Include Protein at Every Meal and Snack  Eat all 3 meals and 3 snacks  Eating every 2 to 3.5 hours during the day.   Preferably at the same times every day.   Avoid going long periods of time without eating.        Be sure to have bedtime snack that includes at least 30 grams of carbohydrates and 2 ounces (14 grams) of protein.  Refer to Food Lists in Booklet (pages 15-17)   Each food listed is equal to 15g (1 Carbohydrate Serving)  Read Food Label   Check Total Carbohydrate  15g = 1 Carbohydrate Serving  Check Serving Size!   The total carbohydrate amount listed is based on 1 serving size  Be careful as many items contain more than one serving per package  Check Total Sugars  Choose items with <15g per serving of total sugar    Exercise:  If ok by your OB try adding a 20-30 minute walk after dinner to help keep fasting blood sugar within range.  Try incorporating at least 10 minutes of walking after each meal  Resource: Exercise During Pregnancy  ACOG     Additional Information: (to be reviewed at class 2)  Continue to follow up with your OB and MFM providers as recommended.  Always have glucose available for hypoglycemia, use 15 by 15 rule. (Page 29 in booklet)  While sick or feeling ill, follow our sick day guidelines in our GDM booklet. (Page 28 in booklet)  For travel and dining out tips refer to your GDM booklet. See attachment (Page 31 in booklet)    Class 2 Information: Approximately 1 week following Class 1  Bring 3 Day Food Log (everything you eat and drink for each meal and snack) or write down meals associated with elevated after meal blood sugars  Will review diet more in depth and provide feedback     Remember: Importance of maintaining tight control of blood sugars during  pregnancy = decrease risk factors including fetal macrosomia; birth injury; risk of ; polyhydramnios; pre-term labor; pre-eclampsia;  hypoglycemia; jaundice and stillbirth.    Any questions give us a call at [phone] or send us a MyChart message to VALENTIN Zimmerman.     Olivia Basurto  Diabetes Educator   The Diabetes and Pregnancy Program  At [facility] - Maternal Fetal Medicine

## 2024-04-10 ENCOUNTER — ROUTINE PRENATAL (OUTPATIENT)
Dept: OBGYN CLINIC | Facility: CLINIC | Age: 24
End: 2024-04-10

## 2024-04-10 VITALS — WEIGHT: 272 LBS | SYSTOLIC BLOOD PRESSURE: 122 MMHG | BODY MASS INDEX: 43.9 KG/M2 | DIASTOLIC BLOOD PRESSURE: 68 MMHG

## 2024-04-10 DIAGNOSIS — O34.33 CERVICAL CERCLAGE SUTURE PRESENT IN THIRD TRIMESTER: ICD-10-CM

## 2024-04-10 DIAGNOSIS — O24.410 DIET CONTROLLED GESTATIONAL DIABETES MELLITUS (GDM) IN THIRD TRIMESTER: Primary | ICD-10-CM

## 2024-04-10 DIAGNOSIS — R10.2 PELVIC PAIN IN PREGNANCY: ICD-10-CM

## 2024-04-10 DIAGNOSIS — E03.8 SUBCLINICAL HYPOTHYROIDISM: ICD-10-CM

## 2024-04-10 DIAGNOSIS — O26.899 PELVIC PAIN IN PREGNANCY: ICD-10-CM

## 2024-04-10 DIAGNOSIS — O10.919 CHRONIC HYPERTENSION AFFECTING PREGNANCY: ICD-10-CM

## 2024-04-10 DIAGNOSIS — E66.09 OTHER OBESITY DUE TO EXCESS CALORIES AFFECTING PREGNANCY IN THIRD TRIMESTER: ICD-10-CM

## 2024-04-10 DIAGNOSIS — O99.213 OTHER OBESITY DUE TO EXCESS CALORIES AFFECTING PREGNANCY IN THIRD TRIMESTER: ICD-10-CM

## 2024-04-10 DIAGNOSIS — Z3A.29 29 WEEKS GESTATION OF PREGNANCY: ICD-10-CM

## 2024-04-10 PROCEDURE — PNV

## 2024-04-10 NOTE — PROGRESS NOTES
Patient is experiencing extreme pelvic pain-google describes the pain as pelvic girdle pain-states it stated when she went back to work.  Urine dip neg/neg.

## 2024-04-10 NOTE — PROGRESS NOTES
Patient is a 23 YO  female presenting to the office at 29w2d for routine OB care.     Patient is feeling well today. She has been having pelvic girdle pain, worse when working. She does sit for a majority of the day at work and has lower pelvic pain bilaterally when standing up after work. Discussed use of a donut pillow, sitting on an exercise ball, belly band, warm heating pad, pelvic floor PT. Referral placed for pelvic floor PT today.     Patient was seen on L&D on  and 4/3. Membranes were intact, on SVE cervix remained closed. NST with variability. ALEKSANDAR was 17.3cm. Patient was discharged home with strict return precautions after both visits to L&D.     cHTN on labetalol 200mg BID  Hypothyroid on levothyroxine 25mcg  GDM following with diabetic education.   Cerclage placed 24    Fetal heart rate: 150  BP: 122/68  TWlb  Fetal Movement: yes  LOF: no  VB: no  CTX: no  Reviewed precautions  Call for concerns  RTO 2 weeks

## 2024-04-12 ENCOUNTER — TELEMEDICINE (OUTPATIENT)
Facility: HOSPITAL | Age: 24
End: 2024-04-12
Payer: COMMERCIAL

## 2024-04-12 ENCOUNTER — TELEPHONE (OUTPATIENT)
Age: 24
End: 2024-04-12

## 2024-04-12 DIAGNOSIS — Z3A.29 29 WEEKS GESTATION OF PREGNANCY: ICD-10-CM

## 2024-04-12 DIAGNOSIS — O24.414 INSULIN CONTROLLED GESTATIONAL DIABETES MELLITUS (GDM) IN THIRD TRIMESTER: Primary | ICD-10-CM

## 2024-04-12 DIAGNOSIS — O99.213 OBESITY AFFECTING PREGNANCY IN THIRD TRIMESTER, UNSPECIFIED OBESITY TYPE: ICD-10-CM

## 2024-04-12 PROCEDURE — G0108 DIAB MANAGE TRN  PER INDIV: HCPCS

## 2024-04-12 RX ORDER — INSULIN GLARGINE-YFGN 100 [IU]/ML
INJECTION, SOLUTION SUBCUTANEOUS
Qty: 15 ML | Refills: 0 | Status: SHIPPED | OUTPATIENT
Start: 2024-04-12 | End: 2024-06-24

## 2024-04-12 RX ORDER — PEN NEEDLE, DIABETIC 32GX 5/32"
NEEDLE, DISPOSABLE MISCELLANEOUS
Qty: 100 EACH | Refills: 3 | Status: SHIPPED | OUTPATIENT
Start: 2024-04-12 | End: 2024-06-24

## 2024-04-12 NOTE — PROGRESS NOTES
CLASS 2 - Individual  (virtual visit)    Thank you for referring your patient to [facility] Maternal Fetal Medicine Diabetes and Pregnancy Program.     Latisha Kelly is a  24 y.o. female who presents today unaccompanied for Virtual Regular Visit, Patient Education, and Gestational Diabetes. Patient is at 29w4d gestation, Estimated Date of Delivery: 6/24/24.     Visit Diagnosis:  Encounter Diagnosis     ICD-10-CM    1. Insulin controlled gestational diabetes mellitus (GDM) in third trimester  O24.414       2. 29 weeks gestation of pregnancy  Z3A.29       3. Obesity affecting pregnancy in third trimester, unspecified obesity type  O99.213          Reviewed and updated the following from patients medical record: PMH, Problem List, Allergies, and Current Medications.    Labs  GDM LABS: See Class 1 Note    A1C:  Lab Results   Component Value Date/Time    HGBA1C 4.2 02/26/2024 07:42 PM    HGBA1C 4.7 01/23/2023 04:16 PM    HGBA1C 4.7 12/10/2020 11:22 AM    HGBA1C 4.8 01/28/2020 11:23 AM      Labs Ordered This Visit: A1C    Current Medications:    Current Outpatient Medications:     acetaminophen (TYLENOL) 325 mg tablet, Take 2 tablets (650 mg total) by mouth every 6 (six) hours as needed for mild pain, moderate pain, fever or headaches, Disp: 30 tablet, Rfl: 0    aspirin 81 mg chewable tablet, Chew 2 tablets (162 mg total) daily, Disp: 180 tablet, Rfl: 1    labetalol (NORMODYNE) 100 mg tablet, Take 2 tablets (200 mg total) by mouth 2 (two) times a day (Patient taking differently: Take 200 mg by mouth 2 (two) times a day Taking 1 tablet 2 time daily), Disp: 90 tablet, Rfl: 0    OneTouch Delica Lancets 33G MISC, Use 4 a Day or as instructed, Disp: 100 each, Rfl: 3    OneTouch Verio test strip, Test 4 Times Daily or as instructed, Disp: 100 strip, Rfl: 3    Prenatal Vit-Iron Carbonyl-FA (prenatal multivitamin) TABS, Take 1 tablet by mouth daily, Disp: , Rfl:     levothyroxine (Euthyrox) 25 mcg tablet, Take 1 tablet (25 mcg  "total) by mouth daily, Disp: 90 tablet, Rfl: 0     Anthropometrics:  Ht Readings from Last 1 Encounters:   24 5' 6\" (1.676 m)      Wt Readings from Last 3 Encounters:   04/10/24 123 kg (272 lb)   24 123 kg (272 lb 3.2 oz)   24 122 kg (269 lb 6.4 oz)      Pre-Gravid Wt Pre-Gravid BMI TWG   110 kg (242 lb) 39.08 13.6 kg (30 lb)     Total Pregnancy Weight Gain Recommendations: BMI (> 30) 11-20 lbs  Current Wt Status Compared to Recommendations: Exceeding -- Recommended to maintain wt for remainder of pregnancy    Most Recent Ultrasound Results:  Findings: NML Growth/ALEKSANDAR  Further Fetal Surveillance: Yes, NST twice weekly and ALEKSANDAR once weekly starting at 32wk GA d/t insulin controlled GDM  Next US date: Scheduled Appropriately    BLOOD GLUCOSE MONITORING:   Glucometer: OneTouch Verio Flex     Reinforced at Today's Visit:   Timing/Frequency of SMB x per day (Fasting, 2 hour after start of each meal)  Goals: (Fasting) 60-90mg/dL // (2hr PP) <120mg/dL  Reporting Guidelines: Weekly via Phone: [phone] OR My Chart (Message with image attachment) OR Glucose Flowsheet  Method of Reporting: [employer] Glucose Flowsheet    BG LOG:       Review of Blood Glucose Log:   FBG = Not well controlled  Post-Prandial BG = Not well controlled    MEAL PLAN (Patient was provided with a meal plan including 3 meals and 3 snacks at class 1)  *Calories: 2200 calorie (CHO:85-32-84-30-60-30) (PRO: 3-2-3/4-2-3/4-2)    Review of Patient's Current Diet: refer to class 1 note for additional details    Breakfast: 2 Eggs + Allyn Sausage + 1 Whole Wheat Bread OR Owen Omelet Bites + Orange Juice   Lunch: 6 inch tuna sub for subway OR bunless burger + chili from [employer]  Dinner: Chicken and Rice OR [employer] (nuggets + mac and cheese)    Beverages: Sugar Sweetened Beverages Including Orange Juice at Breakfast  Dining Out Frequency:  Typically for lunch at work (subway, [employer])    Meal Plan Recommendations Compliant? Comments:  "   Consistent CHO Intake No  - Not CHO Counting   3 Meals and 3 Snacks No  - Skipping snacks   Protein w/ Every Meal and Snack Yes     Eating every 2-3.5hrs while awake  No  - Skipping snacks   8-10hrs Fasting (from time of bedtime snack until first meal of the day) No  - Exceeding 10hrs Fasting Overnight: Reinforced recommended time frame of (8-10hrs) from time of bedtime snack     Reinforced Diet Instructions:  Individualized meal plan.   Importance of consistent carbohydrate intake via 3 meals and 3 snacks per day   Importance of protein as it relates to blood glucose control.   Encouraged  patient to eat every 2.0-3.5 hours while awake  Encouraged patient to go no longer than 8-10 hours fasting overnight until first meal of the day.  Provided suggested meal/snack options to increase nutrition and maintain consistent meal and snack intakes.    Physical Activity:  Currently physically active? No, is supposed to be on bedrest but is now back to work; should still follow minimal activity    Reviewed w/ Pt:   Benefits of physical activity to optimize blood glucose control, encouraged activity at patient is physically able.   Instructed pt to always consult a physician prior to starting an exercise program.   Recommend 20-30 minutes daily.    Additional Topics Reviewed:    Medications: (reviewed options available with pt)  Discussed if blood sugars are not within normal range with meal planning and exercise  Reviewed medication such as metformin and/or basal/bolus insulin may be needed for better glucose control  Maternal-Fetal Testing:   Ultrasounds: growth scans every 4 weeks.  NST: twice weekly starting at 32nd week GA  ALEKSANDAR:  weekly starting at 32 weeks GA  Sick day Guidelines:   Advised that sickness will raise blood sugar   If blood sugar is > 160 mg/dL twice in one day call doctor  If on diabetes medications, continue as instructed   If unable to consume normal meal plan, instructed to remain well hydrated  "  Hypoglycemia & Treatment Guidelines:  Reviewed what hypoglycemia is, signs and symptoms, and how to treat via the 15:15 rule.  Post-Partum Guidelines:  Completion of 75 gm CHO 2 hr gtt at 6 weeks post-partum to check for Type 2 DM diagnosis  Breastfeeding Guidelines:  Continue GDM meal plan plus additional 350-500 calories daily  Examples of protein and carbohydrate snacks provided.  Stay hydrated by drinking 8-10 (8 oz.) fluids daily.  Dining Out & Travel Guidelines:  Patient advised to be prepared with extra diabetes supplies, medications, and snacks, as well as sticking to the same time schedule and portions eaten at home for meals and snacks.      INSULIN EDUCATION - Begin 30 units of Semglee once daily at bedtime (10-11pm) - Reviewed/Approved per Dr. Briones    The patient was instructed on the following:  Side effects of hyperglycemia in pregnancy including macrosomia,  hypoglycemia, polyhydramnios, pre-term labor and stillbirth.  Insulin Administration Time: Once daily at bedtime  Insulin Action: Approx. 24hours   How to Inject Insulin  Injection Sites     Monitoring:  Hypoglycemia signs, symptoms and treatment.   Advised patient to test blood sugar at 3:00 am for first 3 mornings following insulin start to monitor for hypoglycemia  Input in \"Night-time Glucose\" on [employer] Glucose Flowsheet  Increase in maternal-fetal surveillance with insulin initiation.  Non-stress testing twice weekly and ALEKSANDAR once weekly beginning at 32 weeks gestation.        Continue ultrasounds every 4 weeks at the [facility] M[facility]  Continue to test blood sugars 4 time daily:   Fasting (goal 60 mg/dl to 90 mg/dl)  2hrs After the START of each meal (goal less than 120 mg/dl).  Labs: HbA1c every 6 to 8 weeks    Reporting Blood Sugars:   Diabetes team will continue to review blood sugars once weekly till delivery  Pt instructed to message diabetes team via [employer] message every 3-5 days for insulin adjustment " "if fasting blood sugars remain >90    Patient Stated Goal: \"I will plan my meals and snacks every day\"  Goal Assessment: Not on track    Diabetes Self Management Support Plan outside of ongoing care: Spouse/Family    Barriers to Learning/Change: No Barriers  Expected Compliance: good    Date to report blood sugars: Weekly   Follow up:  No follow-ups on file.     Begin Time: 9:00am  End Time: 10:11am    It was a pleasure working with them today. Please feel free to call ([phone]) with any questions or concerns.    Latisha Garg RD   Diabetes Educator  [facility] Maternal Fetal Medicine  Diabetes and Pregnancy Program  [address], [address]  [place] [address]     Virtual Regular Visit    Verification of patient location:    Patient is located at Home in the following state in which I hold an active license [place]      Assessment/Plan:    Problem List Items Addressed This Visit          Pregnancy    28 weeks gestation of pregnancy    Obesity affecting pregnancy in second trimester     Other Visit Diagnoses       Insulin controlled gestational diabetes mellitus (GDM) in third trimester    -  Primary                 Reason for visit is   Chief Complaint   Patient presents with    Virtual Regular Visit    Patient Education    Gestational Diabetes          Encounter provider Latisha Garg RD    Provider located at [address] [id number]      Recent Visits  Date Type Provider Dept   24 Office Visit Olivia Basurto [facility]    Showing recent visits within past 7 days and meeting all other requirements  Today's Visits  Date Type Provider Dept   24 Telemedicine Latisha Garg RD [facility]    Showing today's visits and meeting all other requirements  Future Appointments  No visits were found meeting these conditions.  Showing future appointments within next 150 days and meeting all other requirements   "     The patient was identified by name and date of birth. Latisha Kelly was informed that this is a telemedicine visit and that the visit is being conducted through the Epic Embedded platform. She agrees to proceed..  My office door was closed. No one else was in the room.  She acknowledged consent and understanding of privacy and security of the video platform. The patient has agreed to participate and understands they can discontinue the visit at any time.    Patient is aware this is a billable service.     Subjective  Latisha Kelly is a 24 y.o. female pregnant.      HPI     Past Medical History:   Diagnosis Date    Anemia     Hypertension     chtn started medication with pregnancy    Hypothyroid     Migraine without aura     OTC med management    PCOS (polycystic ovarian syndrome)     Varicella     vaccine    Visual impairment        Past Surgical History:   Procedure Laterality Date    MN CERCLAGE UTERINE CERVIX NONOBSTETRICAL N/A 2/29/2024    Procedure: CERCLAGE CERVICAL;  Surgeon: Ann Hoffmann MD;  Location: [facility] ;  Service: Obstetrics    TURBINATE RESECTION         Current Outpatient Medications   Medication Sig Dispense Refill    acetaminophen (TYLENOL) 325 mg tablet Take 2 tablets (650 mg total) by mouth every 6 (six) hours as needed for mild pain, moderate pain, fever or headaches 30 tablet 0    aspirin 81 mg chewable tablet Chew 2 tablets (162 mg total) daily 180 tablet 1    labetalol (NORMODYNE) 100 mg tablet Take 2 tablets (200 mg total) by mouth 2 (two) times a day (Patient taking differently: Take 200 mg by mouth 2 (two) times a day Taking 1 tablet 2 time daily) 90 tablet 0    OneTouch Delica Lancets 33G MISC Use 4 a Day or as instructed 100 each 3    OneTouch Verio test strip Test 4 Times Daily or as instructed 100 strip 3    Prenatal Vit-Iron Carbonyl-FA (prenatal multivitamin) TABS Take 1 tablet by mouth daily      levothyroxine (Euthyrox) 25 mcg tablet Take 1 tablet (25 mcg total) by  mouth daily 90 tablet 0     No current facility-administered medications for this visit.        Allergies   Allergen Reactions    Latex Rash       Review of Systems    Video Exam    There were no vitals filed for this visit.    Physical Exam     Visit Time  Total Visit Duration: 71min

## 2024-04-12 NOTE — TELEPHONE ENCOUNTER
Patient calling back asking to discuss concerns for insulin prices and coverage with MFM team member. RN warm transferred to [facility] office and linked patient with clinical staff. No further questions.

## 2024-04-12 NOTE — TELEPHONE ENCOUNTER
Spoke to patient. Patient states she called her insurance but they were not much help. She looked online and a 30 day supply would only be $30 which is a lot more affordable than the $90. Would like to know if its possible to have the script changed to a 30 day supply. She can be reached at [phone].    [facility]

## 2024-04-12 NOTE — TELEPHONE ENCOUNTER
Incoming call from patient to discuss scripts after speaking with insurance. Requesting to speak with Krishna Liao transferred call to clinical staff to help discuss scripts with patient.

## 2024-04-12 NOTE — TELEPHONE ENCOUNTER
Spoke w/PT regarding insulin and GDM supplies cost. Per PT, she has a Rx coverage plan through [employer]. I told PT that she would need to call them and see if there is a specific brand of insulin and/or supplies that they would cover completely or more than others. Also told PT that if the cost she is being given is her copay or out of pocket price after insurance, unfortunately we would not be able to change this as this would be from her insurance. PT verbalized understanding and said she would call her insurance company and get back to [facility]. I told her that the best way to contact Latisha would be through a [employer] message. If she is unable to get a hold of her insurance before Monday, she should reach out to Latisha through [employer] for the recommendation.

## 2024-04-12 NOTE — PATIENT INSTRUCTIONS
INSULIN EDUCATION    Thank you for attending our insulin education class.     For a quick recap on what you learned today you can review the [employer] educational video for insulin pens. https://www.[employer]ortilia/how-to-use-basaglar    Medications are being recommended to decrease the risk of side effects resulting from hyperglycemia in pregnancy including macrosomia,  hypoglycemia, polyhydramnios, pre-term labor and stillbirth.    Insulin Education:  You will receive 5 pens from the pharmacy  Each insulin pen has 300 units in one pen. Can deliver up to 80 units at one time.   When opening a new pen, remove one insulin pen 15 minutes up to 2 hours before administering to bring insulin to room temperature.   Once you open an insulin pen, it is only good for 28 days at room temperature.   Insulin can be titrated every 3-5 days as needed to control blood sugars.  Insulin doses vary as the pregnancy progresses depending on your weekly blood sugars.     Preparing your pen:  Remove pen cap.  Wipe rubber seal with alcohol wipe. Let dry.  Make sure insulin is clear and colorless. Check expiration date. (Do not use if it's cloudy, colored, or had particles or clumps in it.)  Select a new needle each time. Remove tab, connect and twist on top of insulin pen.   Remove outer and inner needle shields that help protect needle. Keep outer shield to recap your used needle once you are done.     Priming, dosing and injecting:  Perform a 2 unit test dose before each injection. Repeat priming steps until you see insulin at tip of needle. (Do not repeat more than 4 times. If after 4 times you do not see insulin at tip of needle, remove and insert a new needle)   Turn your dose knob to the prescribed units. (Do not count clicks, make sure the number and line matches your prescribed dose)  Choose your site. Remember to rotate injection sites and stay away from stretch marks or scars.   Wipe injection site clean with alcohol and  let dry completely.   Insert needle into your skin, press dose knob and inject slowly. Count to 10 before removing needle from skin.     Clean up:  Replace outer needle shield to cover the needle back up. Unscrew capped needle and throw it away in sharps container.  Make sure your sharp container is: heavy duty plastic, puncture-resistant lid, upright and stable, leak resistant, properly labeled.     Hypoglycemia:  Test your blood sugar at 3:00 am for first 3 mornings following insulin start to monitor for hypoglycemia.   Goal range for 2-3 am:  mg/dL.  Treat low blood sugars <60 or <80 using the 15-15 rule  Always have glucose available for hypoglycemia, use 15 by 15 rule. You can find the 15:15 rule in our GDM booklet.     Scheduling:  Non-stress testing two times weekly and ALEKSANDAR testing beginning at 32 weeks gestation. Call [phone] to schedule if not already scheduled.   Ultrasounds every 4 weeks at the [facility]. Call [phone] to schedule if not already scheduled.     Reporting Blood Sugars:  When adding your blood sugar readings to your glucose flow sheet please ADD UNITS of insulin. This will allow our team see what day you started and when you made any possible adjustments. Important for our team to know in regards to making any changes since it takes 3-5 days to see a difference. We would appreciate this and thank you for all you do to communicate with our team.     Blood Sugar Ranges:  Fastin-90 mg/dL   Before meals:  mg/dL  1 hour after the start of each meal: 140 mg/dL or <   2 hours after start of each meal: 120 mg/dL or <  2-3 am:  mg/dL    Continue Recommendations:  Testing Blood Sugars: 4 times daily (fasting and 1 or 2hrs after the start of each meal) - as instructed    Reporting Blood Sugars: via [employer] Glucose Flowsheet on a weekly basis until you deliver.  Meal Plan: 3 meals and 3 snacks daily.   Eating every 2 to 3.5 hours during the day.    Be sure to have bedtime snack that includes at least 15 grams of carbohydrates and 2 to 3 servings of protein.  Minimum of total carbohydrates of 175 grams daily.   Carbohydrates always need to be paired with protein.   Physical Activity: If ok by your OB try adding a 20-30 minute walk in evening after dinner to help keep fasting glucose within range.  Continue follow up with OB and MFM as recommended.  Stay in close contact with diabetes education team.   While sick or feeling ill, follow our sick day guidelines in our GDM booklet.   For travel and dining out tips refer to our GDM booklet.    If you have any questions or concerns, please do not hesitate to call us at [phone].      Latisha Garg RD   Diabetes Educator  The [facility]   at [facility]     Insulin Pens   AMBULATORY CARE:   An insulin pen  is a device used to inject insulin.       Call your doctor or diabetes care team provider if:   You feel or see hard lumps in your skin where you inject your insulin.    You think you gave yourself too much or not enough insulin.    Your injections are very painful.    You see blood or clear fluid on your injection site more than once after you inject insulin.    You have questions about how to give the injection.    You cannot afford to buy your diabetes supplies.    You have questions or concerns about your condition or care.    Types of insulin pens:   Most insulin pens are disposable.  A disposable pen contains a prefilled amount of insulin. When this type of pen is empty, it is thrown away.    A few insulin pens are reusable.  A reusable pen contains an insulin cartridge that can be replaced. When the cartridge is empty, it is thrown away. Then a new, prefilled cartridge is put in. Always use a new needle every time you inject insulin.    How to get the insulin ready to use:   Check the label and color of the insulin.  Check that you have  the correct type and strength of insulin. Also check the expiration date on the label. Use a new cartridge or pen if the expiration date has passed. Use a new cartridge or pen if the insulin does not look right. Follow the pen 's instructions for inserting a new cartridge into a reusable pen.    Mix cloudy insulin.  Sometimes cloudy insulin separates. The insulin will remain cloudy after mixing, but it will all be cloudy. Gently roll the pen back and forth between the palms of your hands. Repeat this 10 times. Do not shake the pen.  This can make the insulin clump together. Next, gently tip the pen up and down 10 times. Do not use the insulin if you see clumps in it after you mix it.    How to get the pen ready to use:  No matter the type of pen you use, the steps for using it are the same:  Remove a new pen from the refrigerator 30 minutes before you use it.  Insulin should be injected at room temperature.    Wash your hands.  Use soap and water or an alcohol-based hand rub. This will help decrease your risk for an infection.         Remove the cap from the pen.  Wipe the needle attachment area with an alcohol swab.    Attach a new needle to the pen.  Remove the tab from the needle. Do not remove the outer cap on the needle. Push the needle straight onto the pen. Turn the needle clockwise until you cannot turn it more. Make sure the needle is straight.    Remove the needle caps.  Remove the outer cap and save it. Remove the inner cap and throw it away.    Remove air from the pen.  Air may cause pain during injection. Turn the dial to 2 units. For most insulin pens, you will hear a click for each unit of insulin that you dial. Hold the pen and point the needle up. Gently tap the pen to move air bubbles to the top of the pen. Press the injection button. You should see a drop of insulin on the tip of the pen. If you do not see a drop, change the needle and repeat this step. If you do not see a drop after  you repeat this step 3 times, use a new pen.    Select the correct dose on the pen.  Turn the dial to the number of units you need to inject. The pointer on the side of your pen should line up with your dose. The dial can be turned in either direction to choose the correct dose. You cannot choose a dose larger than the number of units left in the pen. Use another pen if there is not enough insulin. Instead you can inject part of your dose with the insulin that is left. Next, you can use a new pen to inject the rest of your dose.    Where to inject insulin:   You can inject insulin into your abdomen, upper arm, buttocks, hip, or the front or side of the thigh.  Insulin works fastest when it is injected into the abdomen. Do not inject insulin within 2 inches of your belly button or into any stretch marks.         Do not inject insulin into areas where you have a wound or a bruise.  Insulin injected into wounds or bruises may not get into your body correctly. Do not inject insulin through your clothes. Injecting through clothes can contaminate the needle and may cause an infection.    Use a different area within the site each time you inject insulin.  For example, inject insulin into different areas in your abdomen. Insulin injected into the same area can cause lumps, swelling, or thickened skin.    How to inject insulin with a pen:   Clean the skin where you will inject the insulin.  You can use an alcohol pad or a cotton swab dipped in alcohol. Let the area dry before you inject. This will decrease pain.    Grab a fold of your skin.  Gently pinch the skin and fat between your thumb and first finger.    Insert the needle straight into your skin.  Do not hold the syringe at an angle. Make sure the needle is all the way into the skin. Let go of the pinched tissue.    Push the injection button to inject the insulin.  Continue to press on the injection button. Keep the needle in place for 10 seconds.    Pull the needle  out.  Replace the needle cap. Press on your injection site for 5 to 10 seconds. Do not rub. This will keep insulin from leaking out.    Remove the needle from the pen.  Twist the capped needle counter clockwise. Place the needle in a heavy-duty laundry detergent bottle or a metal coffee can. The container should have a cap or lid that fits securely.    Replace the pen cap.  Store the pen as directed.    What to do with used needles:  Ask your local waste authority if you need to follow certain rules for getting rid of your needles. Bring your used needles home with you when you travel. Pack them in a plastic or metal container with a secure lid.  How to store an insulin pen:  Follow the storage directions that came with the insulin. The following are general directions:  Unopened pens can be stored in the refrigerator until you are ready to use them.    Most insulin pens can be opened and kept at room temperature. Store your pen in a cool, dry place. Throw away pens that have been frozen or exposed to temperatures above 85°F (30°C). If you travel, keep the pen in a cool pack.    Do not keep your pen in direct sunlight or in your car.    Do not store your pen with a needle attached.    Follow up with your doctor or diabetes care team provider as directed:  Write down your questions so you remember to ask them during your visits.  © Copyright [employer] 2023 Information is for End User's use only and may not be sold, redistributed or otherwise used for commercial purposes.  The above information is an  only. It is not intended as medical advice for individual conditions or treatments. Talk to your doctor, nurse or pharmacist before following any medical regimen to see if it is safe and effective for you.

## 2024-04-12 NOTE — TELEPHONE ENCOUNTER
Patient looked up prices for her insulin and the needles. 1 cost 90 one 50. She said she was told to call back if they were a ridiculous price.

## 2024-04-16 PROBLEM — O34.33 CERVICAL CERCLAGE SUTURE PRESENT IN THIRD TRIMESTER: Status: ACTIVE | Noted: 2024-03-01

## 2024-04-16 PROBLEM — O99.213 OBESITY AFFECTING PREGNANCY IN THIRD TRIMESTER: Status: ACTIVE | Noted: 2023-12-05

## 2024-04-17 ENCOUNTER — ULTRASOUND (OUTPATIENT)
Dept: PERINATAL CARE | Facility: OTHER | Age: 24
End: 2024-04-17
Payer: COMMERCIAL

## 2024-04-17 VITALS
BODY MASS INDEX: 44.39 KG/M2 | SYSTOLIC BLOOD PRESSURE: 130 MMHG | HEART RATE: 104 BPM | WEIGHT: 276.2 LBS | DIASTOLIC BLOOD PRESSURE: 70 MMHG | HEIGHT: 66 IN

## 2024-04-17 DIAGNOSIS — O34.33 CERVICAL CERCLAGE SUTURE PRESENT IN THIRD TRIMESTER: ICD-10-CM

## 2024-04-17 DIAGNOSIS — O24.414 INSULIN CONTROLLED GESTATIONAL DIABETES MELLITUS (GDM) IN THIRD TRIMESTER: Primary | ICD-10-CM

## 2024-04-17 DIAGNOSIS — O10.919 CHRONIC HYPERTENSION AFFECTING PREGNANCY: ICD-10-CM

## 2024-04-17 DIAGNOSIS — O26.872 SHORT CERVIX DURING PREGNANCY IN SECOND TRIMESTER: ICD-10-CM

## 2024-04-17 DIAGNOSIS — Z36.89 ENCOUNTER FOR ULTRASOUND TO ASSESS FETAL GROWTH: ICD-10-CM

## 2024-04-17 DIAGNOSIS — O60.02 PRETERM LABOR IN SECOND TRIMESTER WITHOUT DELIVERY: ICD-10-CM

## 2024-04-17 DIAGNOSIS — Z3A.30 30 WEEKS GESTATION OF PREGNANCY: ICD-10-CM

## 2024-04-17 DIAGNOSIS — O99.213 OBESITY AFFECTING PREGNANCY IN THIRD TRIMESTER, UNSPECIFIED OBESITY TYPE: ICD-10-CM

## 2024-04-17 PROCEDURE — 76816 OB US FOLLOW-UP PER FETUS: CPT | Performed by: OBSTETRICS & GYNECOLOGY

## 2024-04-17 PROCEDURE — 99214 OFFICE O/P EST MOD 30 MIN: CPT | Performed by: OBSTETRICS & GYNECOLOGY

## 2024-04-17 NOTE — PROGRESS NOTES
Latisha Kelly has no complaints today.  She reports regular fetal movements and does not report any problems.  She is here today at 30w2d for an ultrasound for fetal growth and to review the missed anatomy of the four-chamber of the heart not seen well on a prior scan    Problem list:  GDMA2 currently on 30 units nightly of Semglee since 2024.  She has no updated sugars since this time but reports verbally that her fastings are now less than 95 and her 2 hours are usually less than 120 although her post dinner blood sugars still at times are elevated.  She is supposed to report her blood sugars on  so that a plan can be generated by our staff on .   Chronic hypertension on labetalol 100 mg twice daily  Hypothyroidism on Euthyrox  History of periviability  labor with advanced dilation and a short cervix (4 cm 100% effaced) at 23 weeks and contractions stopped with tocolysis and she received steroids.  While inpatient her cervix regressed to a tight 2 cm dilated and thick on bimanual exam with a long funnel seen by TVS allowing a cerclage to be placed.  She had a normal amniocentesis prior to her cerclage.    Ultrasound findings:  The ultrasound today shows normal interval fetal growth and fluid.  Abdominally her cervix appears long at 3 cm without any funneling.  Umbilical Dopplers are normal.  The prior missed anatomy of the four-chamber view of the heart is seen and appears normal.  This completes the anatomical survey.    Pregnancy ultrasound has limitations and is unable to detect all forms of fetal congenital abnormalities.  The inaccuracy in the EFW can be off by 1 lb either way in the third trimester.    Follow up recommended:   Recommend a follow-up ultrasound in 4 weeks.  Recommend starting twice weekly nonstress test and weekly fluid scans at 32 weeks.  Recommend planning removal of her cerclage at 37 weeks if she is stable otherwise earlier removal may be needed if she  develops signs of  labor.  She is aware to report her blood sugars in her flowsheet by Thursday.     Pre visit time reviewing her records   10 minutes  Face to face time 10 minutes  Post visit time on documentation of note, updating her problem list, adding orders and prescriptions 10 minutes.  Procedures that were completed today were charged separately.   The level of decision making was moderate complexity.    Ann Hoffmann MD

## 2024-04-17 NOTE — LETTER
2024     Joyce Ceron PA-C  [address]   [address]  [place] [address]    Patient: Latisha Kelly   YOB: 2000   Date of Visit: 2024       Dear Dr. Ceron:    Thank you for referring Latisha Kelly to me for evaluation. Below are my notes for this consultation.    If you have questions, please do not hesitate to call me. I look forward to following your patient along with you.         Sincerely,        Ann Hoffmann MD        CC: No Recipients    Ann Hoffmann MD  2024  1:09 PM  Sign when Signing Visit  Latisha Kelly has no complaints today.  She reports regular fetal movements and does not report any problems.  She is here today at 30w2d for an ultrasound for fetal growth and to review the missed anatomy of the four-chamber of the heart not seen well on a prior scan    Problem list:  GDMA2 currently on 30 units nightly of Semglee since 2024.  She has no updated sugars since this time but reports verbally that her fastings are now less than 95 and her 2 hours are usually less than 120 although her post dinner blood sugars still at times are elevated.  She is supposed to report her blood sugars on  so that a plan can be generated by our staff on .   Chronic hypertension on labetalol 100 mg twice daily  Hypothyroidism on Euthyrox  History of periviability  labor with advanced dilation and a short cervix (4 cm 100% effaced) at 23 weeks and contractions stopped with tocolysis and she received steroids.  While inpatient her cervix regressed to a tight 2 cm dilated and thick on bimanual exam with a long funnel seen by TVS allowing a cerclage to be placed.  She had a normal amniocentesis prior to her cerclage.    Ultrasound findings:  The ultrasound today shows normal interval fetal growth and fluid.  Abdominally her cervix appears long at 3 cm without any funneling.  Umbilical Dopplers are normal.  The prior missed anatomy  of the four-chamber view of the heart is seen and appears normal.  This completes the anatomical survey.    Pregnancy ultrasound has limitations and is unable to detect all forms of fetal congenital abnormalities.  The inaccuracy in the EFW can be off by 1 lb either way in the third trimester.    Follow up recommended:   Recommend a follow-up ultrasound in 4 weeks.  Recommend starting twice weekly nonstress test and weekly fluid scans at 32 weeks.  Recommend planning removal of her cerclage at 37 weeks if she is stable otherwise earlier removal may be needed if she develops signs of  labor.  She is aware to report her blood sugars in her flowsheet by Thursday.     Pre visit time reviewing her records   10 minutes  Face to face time 10 minutes  Post visit time on documentation of note, updating her problem list, adding orders and prescriptions 10 minutes.  Procedures that were completed today were charged separately.   The level of decision making was moderate complexity.    Ann Hoffmann MD

## 2024-04-19 ENCOUNTER — TREATMENT (OUTPATIENT)
Dept: PERINATAL CARE | Facility: CLINIC | Age: 24
End: 2024-04-19

## 2024-04-19 DIAGNOSIS — O24.414 INSULIN CONTROLLED GESTATIONAL DIABETES MELLITUS (GDM) IN THIRD TRIMESTER: Primary | ICD-10-CM

## 2024-04-19 NOTE — PROGRESS NOTES
"  Date: 04/19/24  Latisha Kelly  2000  Estimated Date of Delivery: 6/24/24  30w4d  OB/GYN:[facility]    Diagnosis:  Insulin controlled GDM    Blood Sugar Logs Submitted via: Glucose Flowsheet      Assessment and Plan:  Semglee--30 nits at bedtimes ( 10-11 PM). FBS well-controlled with Semglee. High BG p dinner now.   2200 calorie (CHO:[id number]) (PRO: 3-2-3/4-2-3/4-2) meal plan consisting of 3 meals and 3 snacks daily including protein at each  Advised patient to  Change dinner to 3 CHO servings ( 45 gm) & increase to 4 oz protein. Make sure to eat afternoon snack to avoid over eating at dinner.   Avoid fasting for > 10 hours overnight  Continue SMBG 4 x per day (Fasting, 2 hour after start of each meal) with a One-Touch Verio glucose meter  None--supposed to be on bedrest, but now back to work but with minimal activity.     Lab Work:   Lab Results   Component Value Date    HGBA1C 4.2 02/26/2024      HbA1c was reordered on  4/12/24--encouraged to complete soon.     Ultrasound:       Date:4/17/24       Fetal Growth: Normal       ALEKSANDAR: Normal  Next:5/14/24 & begin weekly ALEKSANDAR's.     Diabetes Self Management Support Plan outside of ongoing care: Spouse/Family   Patient Stated Goal: \"I will plan my meals and snacks every day\"   Goal Assessment: Not on track    Date to report next: Tuesday, 4/23/24.     Alysha Conner, MS, RD, [facility] [place]  Diabetes Educator  [facility]  Diabetes in Pregnancy Program  [address], [address]  [place] [address]    "

## 2024-04-23 ENCOUNTER — ULTRASOUND (OUTPATIENT)
Facility: HOSPITAL | Age: 24
End: 2024-04-23
Payer: COMMERCIAL

## 2024-04-23 ENCOUNTER — TELEPHONE (OUTPATIENT)
Age: 24
End: 2024-04-23

## 2024-04-23 ENCOUNTER — ROUTINE PRENATAL (OUTPATIENT)
Dept: OBGYN CLINIC | Facility: CLINIC | Age: 24
End: 2024-04-23

## 2024-04-23 VITALS — DIASTOLIC BLOOD PRESSURE: 76 MMHG | WEIGHT: 275 LBS | SYSTOLIC BLOOD PRESSURE: 118 MMHG | BODY MASS INDEX: 44.39 KG/M2

## 2024-04-23 VITALS
SYSTOLIC BLOOD PRESSURE: 130 MMHG | DIASTOLIC BLOOD PRESSURE: 80 MMHG | BODY MASS INDEX: 44.39 KG/M2 | HEIGHT: 66 IN | WEIGHT: 276.2 LBS | HEART RATE: 102 BPM

## 2024-04-23 DIAGNOSIS — Z3A.31 31 WEEKS GESTATION OF PREGNANCY: Primary | ICD-10-CM

## 2024-04-23 DIAGNOSIS — O34.33 CERVICAL CERCLAGE SUTURE PRESENT IN THIRD TRIMESTER: ICD-10-CM

## 2024-04-23 DIAGNOSIS — Z3A.31 31 WEEKS GESTATION OF PREGNANCY: ICD-10-CM

## 2024-04-23 DIAGNOSIS — O10.919 CHRONIC HYPERTENSION AFFECTING PREGNANCY: ICD-10-CM

## 2024-04-23 DIAGNOSIS — O99.213 OTHER OBESITY DUE TO EXCESS CALORIES AFFECTING PREGNANCY IN THIRD TRIMESTER: ICD-10-CM

## 2024-04-23 DIAGNOSIS — E03.8 SUBCLINICAL HYPOTHYROIDISM: Primary | ICD-10-CM

## 2024-04-23 DIAGNOSIS — E66.09 OTHER OBESITY DUE TO EXCESS CALORIES AFFECTING PREGNANCY IN THIRD TRIMESTER: ICD-10-CM

## 2024-04-23 DIAGNOSIS — O24.414 INSULIN CONTROLLED GESTATIONAL DIABETES MELLITUS (GDM) IN THIRD TRIMESTER: ICD-10-CM

## 2024-04-23 DIAGNOSIS — O26.872 SHORT CERVIX DURING PREGNANCY IN SECOND TRIMESTER: ICD-10-CM

## 2024-04-23 PROCEDURE — 59025 FETAL NON-STRESS TEST: CPT | Performed by: OBSTETRICS & GYNECOLOGY

## 2024-04-23 PROCEDURE — 76815 OB US LIMITED FETUS(S): CPT | Performed by: OBSTETRICS & GYNECOLOGY

## 2024-04-23 PROCEDURE — PNV

## 2024-04-23 NOTE — LETTER
NST sleeve cover sheet    Patient name: Latisha Kelly  : 2000  MRN: [id number]    MARIANO: Estimated Date of Delivery: 24    Obstetrician: Maddie     Reason(s) for testing:  CHTN, BMI >35  ___________________ _______________________      Testing frequency:    ___ 2x/wk  ___ 1x/wk  ___ Dopplers  ___ BPP?      Last growth scan: __________________________________________

## 2024-04-23 NOTE — PROGRESS NOTES
Patient has a few things she would like to discuss with you today-fetal movement, swelling in her extremities and BP are a few of the things. Urine dip neg/neg.

## 2024-04-23 NOTE — LETTER
April 23, 2024     Latisha Flores PA-C  [address] [address]    Patient: Latisha Kelly   YOB: 2000   Date of Visit: 4/23/2024       Dear AUSTIN Flores:    Thank you for referring Latisha Kelly to me for evaluation. Below are my notes for this consultation.    If you have questions, please do not hesitate to call me. I look forward to following your patient along with you.         Sincerely,        Suzanne Briones MD        CC: No Recipients

## 2024-04-23 NOTE — PROGRESS NOTES
Repeat Non-Stress Testing:    Patient verbalizes +FM. Pt denies ALL:               Leaking of fluid   Contractions   Vaginal bleeding   Decreased fetal movement    Patient is performing daily kick counts. Patient has no questions or concerns.   NST strip reviewed by Dr. Briones.

## 2024-04-23 NOTE — TELEPHONE ENCOUNTER
Pt calling after receiving results of NST/ALEKSANDAR at [facility] office today. Questioning results stating placenta is posterior, when she's had an anterior placenta the entire time. RN advised can send a message to provider for further clarification. Pt stated she is in the [facility] parking lot and will go back into office to ask. No further questions.

## 2024-04-23 NOTE — PROGRESS NOTES
"Patient is a 23 YO  female presenting to the office at 31w1d for routine OB care.     Patient c/o decreased fetal movement x 3 days, she did have a day where baby did not move at all. Patient notes that she called yesterday for an appointment for an NST. She has not been conducting kick counts as \"baby never passes them\".   Patient has an appointment at [facility] today for an NST. She declined NST here today as she would like to get something to eat prior to the NST. We discussed that patient is to call the office or present to L&D for further decreased/absent movement.     We discussed warning signs of preeclampsia and when to go to L&D as patient had questions regarding this today.     She has been experiencing heartburn most afternoons/evenings especially if she lies flat. We discussed trial of Pepcid 20mg, smaller more frequent meals, avoiding lying for 30 mins to 1 hour following meals.     cHTN on labetalol 200mg BID  Hypothyroid on levothyroxine 25mcg   A2GDM following with diabetic education, currently on 30U Semglee at bedtime. Recently has been having elevated   Cerclage placed 24    Patient to begin twice weekly NST and weekly ALEKSANDAR at 32 weeks, has appt set up with [facility] on 24 and 5/3/24 of next week.     Fetal heart rate: 145-150, heart tones monitored via Doppler for 3 minutes.   BP: 118/76  TWlb  Fetal Movement: decreased/absent per patient, see above   LOF: no  VB: no  CTX: no  Reviewed precautions  Call for concerns  RTO 2 weeks     "

## 2024-04-24 ENCOUNTER — TELEPHONE (OUTPATIENT)
Facility: HOSPITAL | Age: 24
End: 2024-04-24

## 2024-04-24 NOTE — TELEPHONE ENCOUNTER
I returned Latisha's phone call requesting a call regarding yesterday's ultrasound results. We discussed placenta location as well as loose nuchal cord. I advised her that I would addend the ultrasound report regarding placenta location to reflect that is is more right lateral than posterior.     She is not consisently feeling fetal movement that meets kick counts daily, and is understandably concerned about this in the context of a nuchal cord. She has not felt baby meeting kick counts yet today, but has felt some movement. I offered increased antepartum surveillance up to daily NSTs. At this time she does want to pursue daily NSTs and would appreciate another ALEKSANDAR later this week to re-evaluate the nuchal cord. We did discuss that nuchal cords are a common finding on prenatal ultrasound and ordinarily not disclosed as they do not alter obstetric management, and are a common finding after vaginal delivery. This information was disclosed only because she specifically asked yesterday. She was receptive to this information and appreciative of the option to increase antepartum surveillance frequency.    Suzanne Briones MD

## 2024-04-24 NOTE — TELEPHONE ENCOUNTER
Patient called states she is 31.2 weeks and went for NST/ALEKSANDAR yesterday at [facility] office. Patient questioning results States she noticed cord around baby's neck and verbalized concern. Has doppler at home and noticed baby had hiccups this morning. Patient verbalized concern due to strong hiccups and cord around baby's neck she does not feel comfortable. Patient also has questions regarding placenta. Patient states she left appointment yesterday unsure how to move forward from here. Advised will reach out to provider/clinical team for further recommendation. Patient aware will receive a call back to discuss. Patient verbalized understanding. No further questions at this time.     Call made to [facility] office and spoke with clinical staff. Advised will send message to provider to call patient back today.

## 2024-04-25 ENCOUNTER — ULTRASOUND (OUTPATIENT)
Dept: PERINATAL CARE | Facility: OTHER | Age: 24
End: 2024-04-25
Payer: COMMERCIAL

## 2024-04-25 VITALS
DIASTOLIC BLOOD PRESSURE: 60 MMHG | HEIGHT: 66 IN | HEART RATE: 94 BPM | WEIGHT: 275.6 LBS | SYSTOLIC BLOOD PRESSURE: 120 MMHG | BODY MASS INDEX: 44.29 KG/M2

## 2024-04-25 DIAGNOSIS — O10.919 CHRONIC HYPERTENSION AFFECTING PREGNANCY: ICD-10-CM

## 2024-04-25 DIAGNOSIS — Z3A.31 31 WEEKS GESTATION OF PREGNANCY: ICD-10-CM

## 2024-04-25 DIAGNOSIS — O36.8130 DECREASED FETAL MOVEMENTS IN THIRD TRIMESTER, SINGLE OR UNSPECIFIED FETUS: ICD-10-CM

## 2024-04-25 DIAGNOSIS — O24.414 INSULIN CONTROLLED GESTATIONAL DIABETES MELLITUS (GDM) IN THIRD TRIMESTER: Primary | ICD-10-CM

## 2024-04-25 PROBLEM — O36.8390 VARIABLE FETAL HEART RATE DECELERATIONS, ANTEPARTUM: Status: RESOLVED | Noted: 2024-02-28 | Resolved: 2024-04-25

## 2024-04-25 PROBLEM — O36.8190 DECREASED FETAL MOVEMENT: Status: ACTIVE | Noted: 2024-04-25

## 2024-04-25 PROCEDURE — 76815 OB US LIMITED FETUS(S): CPT | Performed by: OBSTETRICS & GYNECOLOGY

## 2024-04-25 PROCEDURE — 59025 FETAL NON-STRESS TEST: CPT | Performed by: OBSTETRICS & GYNECOLOGY

## 2024-04-25 PROCEDURE — 99214 OFFICE O/P EST MOD 30 MIN: CPT | Performed by: OBSTETRICS & GYNECOLOGY

## 2024-04-26 ENCOUNTER — ROUTINE PRENATAL (OUTPATIENT)
Facility: HOSPITAL | Age: 24
End: 2024-04-26
Payer: COMMERCIAL

## 2024-04-26 VITALS
HEART RATE: 90 BPM | WEIGHT: 276.4 LBS | BODY MASS INDEX: 44.42 KG/M2 | DIASTOLIC BLOOD PRESSURE: 70 MMHG | SYSTOLIC BLOOD PRESSURE: 124 MMHG | HEIGHT: 66 IN

## 2024-04-26 DIAGNOSIS — O36.8130 DECREASED FETAL MOVEMENTS IN THIRD TRIMESTER, SINGLE OR UNSPECIFIED FETUS: ICD-10-CM

## 2024-04-26 DIAGNOSIS — Z3A.31 31 WEEKS GESTATION OF PREGNANCY: Primary | ICD-10-CM

## 2024-04-26 DIAGNOSIS — O24.414 INSULIN CONTROLLED GESTATIONAL DIABETES MELLITUS (GDM) IN THIRD TRIMESTER: ICD-10-CM

## 2024-04-26 PROCEDURE — 59025 FETAL NON-STRESS TEST: CPT | Performed by: OBSTETRICS & GYNECOLOGY

## 2024-04-29 ENCOUNTER — ROUTINE PRENATAL (OUTPATIENT)
Facility: HOSPITAL | Age: 24
End: 2024-04-29
Payer: COMMERCIAL

## 2024-04-29 ENCOUNTER — TREATMENT (OUTPATIENT)
Dept: PERINATAL CARE | Facility: CLINIC | Age: 24
End: 2024-04-29

## 2024-04-29 VITALS — HEIGHT: 66 IN | WEIGHT: 277 LBS | BODY MASS INDEX: 44.52 KG/M2 | HEART RATE: 85 BPM

## 2024-04-29 DIAGNOSIS — Z3A.32 32 WEEKS GESTATION OF PREGNANCY: Primary | ICD-10-CM

## 2024-04-29 DIAGNOSIS — Z3A.32 32 WEEKS GESTATION OF PREGNANCY: ICD-10-CM

## 2024-04-29 DIAGNOSIS — O24.414 INSULIN CONTROLLED GESTATIONAL DIABETES MELLITUS (GDM) IN THIRD TRIMESTER: Primary | ICD-10-CM

## 2024-04-29 PROCEDURE — 59025 FETAL NON-STRESS TEST: CPT | Performed by: OBSTETRICS & GYNECOLOGY

## 2024-04-29 NOTE — PROGRESS NOTES
Repeat Non-Stress Testing:    Patient verbalizes +FM. Pt denies ALL:               Leaking of fluid   Contractions   Vaginal bleeding   Decreased fetal movement    Patient is performing daily kick counts. Patient has no questions or concerns.   NST strip reviewed by Dr. Stewart prior to completion of appointment.

## 2024-04-29 NOTE — PROGRESS NOTES
"  Date: 04/29/24  Latisha Kelly  2000  Estimated Date of Delivery: 6/24/24  32w0d  OB/GYN:[facility]    Diagnosis:  Insulin controlled GDM    Blood Sugar Logs Submitted via: Glucose Flowsheet        Assessment and Plan:    Due to majority of fasting blood sugars <90,  Continue 30 units of Semglee once daily at bedtime (10-11 PM)    Continue:  - 2200 calorie (CHO:41-80-28-30-60-30) (PRO: 3-2-3/4-2-3/4-2) meal plan consisting of 3 meals and 3 snacks daily including protein at each  - Avoid fasting for > 10 hours overnight  - SMBG 4 x per day (Fasting, 2 hour after start of each meal) with a One-Touch Verio glucose meter  - Minimal physical activity     Lab Work:   Lab Results   Component Value Date    HGBA1C 4.2 02/26/2024      HbA1c was reordered on  4/12/24--encouraged to complete soon.     Ultrasound:       Date:4/17/24       Fetal Growth: Normal       ALEKSANDAR: Normal  Next: 5/14/24 & begin weekly ALEKSANDAR's.     Diabetes Self Management Support Plan outside of ongoing care: Spouse/Family   Patient Stated Goal: \"I will plan my meals and snacks every day\"   Goal Assessment: Not on track    Date to report next: Weekly    Latisha Garg RD, MS  Diabetes Educator  [facility] Maternal Fetal Medicine  Diabetes and Pregnancy Program  [address], [address]  [place] [address]    "

## 2024-04-29 NOTE — PATIENT INSTRUCTIONS
Due to majority of fasting blood sugars <90,  Continue 30 units of Semglee once daily at bedtime (10-11 PM)    Continue:  - 2200 calorie (CHO:74-78-12-30-60-30) (PRO: 3-2-3/4-2-3/4-2) meal plan consisting of 3 meals and 3 snacks daily including protein at each  - Avoid fasting for > 10 hours overnight  - SMBG 4 x per day (Fasting, 2 hour after start of each meal) with a One-Touch Verio glucose meter  - Minimal physical activity     Complete Lab: A1c   You do not need to fast for this lab  Measures average blood sugar over the past six weeks  Drawn every six to eight weeks during your pregnancy  Goal Range: 4% to 5.6%

## 2024-04-30 ENCOUNTER — ROUTINE PRENATAL (OUTPATIENT)
Dept: PERINATAL CARE | Facility: OTHER | Age: 24
End: 2024-04-30
Payer: COMMERCIAL

## 2024-04-30 VITALS
WEIGHT: 275.8 LBS | SYSTOLIC BLOOD PRESSURE: 123 MMHG | HEART RATE: 98 BPM | DIASTOLIC BLOOD PRESSURE: 71 MMHG | BODY MASS INDEX: 44.32 KG/M2 | HEIGHT: 66 IN

## 2024-04-30 DIAGNOSIS — O10.919 CHRONIC HYPERTENSION AFFECTING PREGNANCY: ICD-10-CM

## 2024-04-30 DIAGNOSIS — O36.8130 DECREASED FETAL MOVEMENTS IN THIRD TRIMESTER, SINGLE OR UNSPECIFIED FETUS: ICD-10-CM

## 2024-04-30 DIAGNOSIS — Z3A.32 32 WEEKS GESTATION OF PREGNANCY: ICD-10-CM

## 2024-04-30 DIAGNOSIS — O24.414 INSULIN CONTROLLED GESTATIONAL DIABETES MELLITUS IN THIRD TRIMESTER: Primary | ICD-10-CM

## 2024-04-30 PROCEDURE — 59025 FETAL NON-STRESS TEST: CPT | Performed by: STUDENT IN AN ORGANIZED HEALTH CARE EDUCATION/TRAINING PROGRAM

## 2024-04-30 NOTE — LETTER
Latisha Kelly  MRN: [id number] MRN: [id number] MRN: [id number]  : [birth year]  MARIANO/GA: MARIANO/GA: MARIANO/GA:  Date:  Date:  Date:     Latisha Kelly  MRN: [id number] MRN: [id number] MRN: [id number]  : [birth year]  MARIANO/GA: MARIANO/GA: MARIANO/GA:  Date:  Date:  Date:     Latisha Kelly  MRN: [id number] MRN: [id number] MRN: [id number]  : [birth year]  MARIANO/GA: MARIANO/GA: MARIANO/GA:  Date:  Date:  Date:     Latisha Kelly  MRN: [id number] MRN: [id number] MRN: [id number]  : [birth year]  MARIANO/GA: MARIANO/GA: MARIANO/GA:  Date:  Date:  Date:     Latisha Kelly  MRN: [id number] MRN: [id number] MRN: [id number]  : [birth year]  MARIANO/GA: MARIANO/GA: MARIANO/GA:  Date:  Date:  Date:     Latisha Kelly  MRN: [id number] MRN: [id number] MRN: [id number]  : [birth year]  MARIANO/GA: MARIANO/GA: MARIANO/GA:  Date:  Date:  Date:     Latisha Kelly Latisha Kelly  MRN: [id number] MRN: [id number] MRN: [id number]  : [birth year]  MARIANO/GA: MARIANO/GA: MARIANO/GA:  Date:  Date:  Date:

## 2024-05-01 ENCOUNTER — ULTRASOUND (OUTPATIENT)
Dept: PERINATAL CARE | Facility: OTHER | Age: 24
End: 2024-05-01
Payer: COMMERCIAL

## 2024-05-01 VITALS
HEIGHT: 66 IN | BODY MASS INDEX: 44.74 KG/M2 | DIASTOLIC BLOOD PRESSURE: 80 MMHG | WEIGHT: 278.4 LBS | HEART RATE: 96 BPM | SYSTOLIC BLOOD PRESSURE: 116 MMHG

## 2024-05-01 DIAGNOSIS — Z3A.32 32 WEEKS GESTATION OF PREGNANCY: ICD-10-CM

## 2024-05-01 DIAGNOSIS — O24.414 INSULIN CONTROLLED GESTATIONAL DIABETES MELLITUS (GDM) IN THIRD TRIMESTER: ICD-10-CM

## 2024-05-01 DIAGNOSIS — O10.919 CHRONIC HYPERTENSION AFFECTING PREGNANCY: Primary | ICD-10-CM

## 2024-05-01 PROCEDURE — 76818 FETAL BIOPHYS PROFILE W/NST: CPT | Performed by: NURSE PRACTITIONER

## 2024-05-01 PROCEDURE — 76815 OB US LIMITED FETUS(S): CPT | Performed by: NURSE PRACTITIONER

## 2024-05-01 PROCEDURE — NC001 PR NO CHARGE: Performed by: NURSE PRACTITIONER

## 2024-05-01 PROCEDURE — 99213 OFFICE O/P EST LOW 20 MIN: CPT | Performed by: NURSE PRACTITIONER

## 2024-05-01 NOTE — PROGRESS NOTES
Repeat Non-Stress Testing:    Patient verbalizes +FM. Pt denies ALL:               Leaking of fluid   Contractions   Vaginal bleeding   Decreased fetal movement    Patient is performing daily kick counts. Patient has no questions or concerns.   NST strip reviewed by VALENTIN Mittal

## 2024-05-01 NOTE — PROGRESS NOTES
"[facility]: Ms. Kelly was seen today at 32w2d gestational age for NST (found under the pregnancy episode) which I reviewed the RN assessment and agree, and ALEKSANDAR (see ultrasound report under OB procedures tab).  See ultrasound report under \"OB Procedures\" tab.    María Elena HANSON    Decision-making was low (diagnosis low, data moderate and risk low)  " Post Fall Note    Date of Fall: 07/19/23  Observer/Reported time of Fall: 1600  Name of Provider Notified: Dr. Rufus Seymour  Time Provider Notified: [id number]  Assessment of Patient Injury: Pain  Post Fall Interventions: Physician notified  Family/Next of kin notified?: Yes      Brief Description of Events  Patient found by PCA on floor. She states that she was trying to get up to use the bathroom. Patient denies head strike. No new bruises or skin tears noted. Physician ordered x-rays and CT to rule out fractures and bleeding. Patient transferred safely back to bed after being assessed.      Vitals recorded upon fall:   - BP: 117/68   - HR: 68  - Temp: 97.7       Principal Problem:    Leg hematoma, left, initial encounter  Active Problems:    Chronic pain syndrome    Mild intermittent asthma without complication    Seronegative arthropathy of multiple sites (HCC)    Gastroesophageal reflux disease without esophagitis    Mixed stress and urge urinary incontinence    CHF (congestive heart failure) (HCC)    Anemia    Osteoporosis    Severe protein-calorie malnutrition (HCC)    Bipolar depression (HCC)    HTN (hypertension)    Hypercalcemia    Obstructive sleep apnea    History of stroke    Urinary tract infection without hematuria    Dysphagia    Sinus tachycardia    Hypomagnesemia    Thrush    Night terrors    Sundowning    Cervical dystonia Trilobed Flap Text: The defect edges were debeveled with a #15 scalpel blade.  Given the location of the defect and the proximity to free margins a trilobed flap was deemed most appropriate.  Using a sterile surgical marker, an appropriate trilobed flap drawn around the defect.    The area thus outlined was incised deep to adipose tissue with a #15 scalpel blade.  The skin margins were undermined to an appropriate distance in all directions utilizing iris scissors.

## 2024-05-02 ENCOUNTER — ROUTINE PRENATAL (OUTPATIENT)
Facility: HOSPITAL | Age: 24
End: 2024-05-02
Payer: COMMERCIAL

## 2024-05-02 VITALS
DIASTOLIC BLOOD PRESSURE: 70 MMHG | BODY MASS INDEX: 45.06 KG/M2 | WEIGHT: 280.4 LBS | HEART RATE: 93 BPM | SYSTOLIC BLOOD PRESSURE: 118 MMHG | HEIGHT: 66 IN

## 2024-05-02 DIAGNOSIS — O10.919 CHRONIC HYPERTENSION AFFECTING PREGNANCY: ICD-10-CM

## 2024-05-02 DIAGNOSIS — O36.8130 DECREASED FETAL MOVEMENTS IN THIRD TRIMESTER, SINGLE OR UNSPECIFIED FETUS: ICD-10-CM

## 2024-05-02 DIAGNOSIS — O24.414 INSULIN CONTROLLED GESTATIONAL DIABETES MELLITUS (GDM) IN THIRD TRIMESTER: ICD-10-CM

## 2024-05-02 DIAGNOSIS — Z3A.32 32 WEEKS GESTATION OF PREGNANCY: Primary | ICD-10-CM

## 2024-05-02 PROCEDURE — 59025 FETAL NON-STRESS TEST: CPT | Performed by: STUDENT IN AN ORGANIZED HEALTH CARE EDUCATION/TRAINING PROGRAM

## 2024-05-02 NOTE — PROGRESS NOTES
Repeat Non-Stress Testing:    Patient denies ALL:               Leaking of fluid   Contractions   Vaginal bleeding   Decreased fetal movement  Pt states she does kick counts, but gets movements, just not kicks.  Explained that they are just movements and do not need to be specifically kicks.  PT states she will notice the movement at work while sitting at her desk, but not at other times.  PT educated that they only need to happen once a day at the same time.     Patient has no questions or concerns.   NST strip and kick counting concerns reviewed with Dr. Atwood prior to completion of appointment.

## 2024-05-03 ENCOUNTER — ROUTINE PRENATAL (OUTPATIENT)
Facility: HOSPITAL | Age: 24
End: 2024-05-03
Payer: COMMERCIAL

## 2024-05-03 VITALS
HEIGHT: 66 IN | HEART RATE: 94 BPM | SYSTOLIC BLOOD PRESSURE: 118 MMHG | DIASTOLIC BLOOD PRESSURE: 64 MMHG | BODY MASS INDEX: 44.87 KG/M2 | WEIGHT: 279.2 LBS | OXYGEN SATURATION: 95 %

## 2024-05-03 DIAGNOSIS — O24.414 INSULIN CONTROLLED GESTATIONAL DIABETES MELLITUS (GDM) IN THIRD TRIMESTER: ICD-10-CM

## 2024-05-03 DIAGNOSIS — O34.33 CERVICAL CERCLAGE SUTURE PRESENT IN THIRD TRIMESTER: ICD-10-CM

## 2024-05-03 DIAGNOSIS — O10.919 CHRONIC HYPERTENSION AFFECTING PREGNANCY: ICD-10-CM

## 2024-05-03 DIAGNOSIS — O36.8130 DECREASED FETAL MOVEMENT AFFECTING MANAGEMENT OF PREGNANCY IN THIRD TRIMESTER, SINGLE OR UNSPECIFIED FETUS: Primary | ICD-10-CM

## 2024-05-03 DIAGNOSIS — Z3A.32 32 WEEKS GESTATION OF PREGNANCY: ICD-10-CM

## 2024-05-03 PROCEDURE — 59025 FETAL NON-STRESS TEST: CPT | Performed by: OBSTETRICS & GYNECOLOGY

## 2024-05-03 NOTE — PROGRESS NOTES
Patient is on daily NST 5 days a week and kay weekly for decreased FM. NST is reactive.  Ann Hoffmann M.D.

## 2024-05-03 NOTE — LETTER
May 3, 2024     Mary Lou Castellanos PA-C  [address]  [address]  [place] [address]    Patient: Latisha Kelly   YOB: 2000   Date of Visit: 5/3/2024       Dear Dr. Castellanos:    Thank you for referring Latisha Kelly to me for evaluation. Below are my notes for this consultation.    If you have questions, please do not hesitate to call me. I look forward to following your patient along with you.         Sincerely,        Ann Hoffmann MD        CC: No Recipients    Ann Hoffmann MD  5/3/2024  6:15 PM  Sign when Signing Visit  Patient is on daily NST 5 days a week and kay weekly for decreased FM. NST is reactive.  Ann Hoffmann M.D.

## 2024-05-03 NOTE — PROGRESS NOTES
Non-stress Test (NST)  24  Latisha Kelly  2000  Estimated Date of Delivery: 24    Reason for testing: CHTN, BMI, A2GDM  Repeat Non-Stress Test at 32w4d. Difficult to trace at times due to fetal movement. Nurse held monitor for duration of non-stress test.     Vitals:  Pre- Vitals      Flowsheet Row Most Recent Value   Blood Pressure 118/64   Weight - Scale 127 kg (279 lb 3.2 oz)     Patient verbalizes fetal movement.   Patient is performing daily kick counts.   Patient denies ALL:               Leaking of fluid   Contractions   Vaginal bleeding   Decreased fetal movement    Patient verbalized understanding. Patient has no questions or concerns.   NST strip reviewed by Dr. Hoffmann.     Kamila Gillespie RN

## 2024-05-06 ENCOUNTER — ROUTINE PRENATAL (OUTPATIENT)
Facility: HOSPITAL | Age: 24
End: 2024-05-06
Payer: COMMERCIAL

## 2024-05-06 VITALS
WEIGHT: 281.2 LBS | HEIGHT: 66 IN | HEART RATE: 94 BPM | SYSTOLIC BLOOD PRESSURE: 120 MMHG | DIASTOLIC BLOOD PRESSURE: 72 MMHG | BODY MASS INDEX: 45.19 KG/M2

## 2024-05-06 DIAGNOSIS — O24.414 INSULIN CONTROLLED GESTATIONAL DIABETES MELLITUS (GDM) IN THIRD TRIMESTER: ICD-10-CM

## 2024-05-06 DIAGNOSIS — O10.919 CHRONIC HYPERTENSION AFFECTING PREGNANCY: ICD-10-CM

## 2024-05-06 DIAGNOSIS — Z3A.33 33 WEEKS GESTATION OF PREGNANCY: Primary | ICD-10-CM

## 2024-05-06 PROCEDURE — 59025 FETAL NON-STRESS TEST: CPT | Performed by: OBSTETRICS & GYNECOLOGY

## 2024-05-06 NOTE — PROGRESS NOTES
NST has one acceleration meeting 15x15 criteria and one acceleration that does not meet 15x15 criteria.  Moderate variability is present throughout and there are no decelerations therefore I recommend continued outpatient followup with twice weekly testing.  Ester Stewart    Addendum; she is in NST every weekday.  No changes made to plan of care. -LISETTEB

## 2024-05-07 ENCOUNTER — ANESTHESIA EVENT (INPATIENT)
Dept: LABOR AND DELIVERY | Facility: HOSPITAL | Age: 24
End: 2024-05-07
Payer: COMMERCIAL

## 2024-05-07 ENCOUNTER — ANESTHESIA (INPATIENT)
Dept: LABOR AND DELIVERY | Facility: HOSPITAL | Age: 24
End: 2024-05-07
Payer: COMMERCIAL

## 2024-05-07 ENCOUNTER — HOSPITAL ENCOUNTER (INPATIENT)
Facility: HOSPITAL | Age: 24
LOS: 3 days | Discharge: HOME | End: 2024-05-10
Attending: OBSTETRICS & GYNECOLOGY | Admitting: OBSTETRICS & GYNECOLOGY
Payer: COMMERCIAL

## 2024-05-07 ENCOUNTER — ULTRASOUND (OUTPATIENT)
Dept: PERINATAL CARE | Facility: OTHER | Age: 24
End: 2024-05-07
Payer: COMMERCIAL

## 2024-05-07 VITALS
HEIGHT: 66 IN | WEIGHT: 277.8 LBS | SYSTOLIC BLOOD PRESSURE: 131 MMHG | HEART RATE: 101 BPM | BODY MASS INDEX: 44.65 KG/M2 | DIASTOLIC BLOOD PRESSURE: 60 MMHG

## 2024-05-07 DIAGNOSIS — Z3A.33 33 WEEKS GESTATION OF PREGNANCY: ICD-10-CM

## 2024-05-07 DIAGNOSIS — O36.8130 DECREASED FETAL MOVEMENT AFFECTING MANAGEMENT OF PREGNANCY IN THIRD TRIMESTER, SINGLE OR UNSPECIFIED FETUS: ICD-10-CM

## 2024-05-07 DIAGNOSIS — O24.414 INSULIN CONTROLLED GESTATIONAL DIABETES MELLITUS (GDM) IN THIRD TRIMESTER: Primary | ICD-10-CM

## 2024-05-07 DIAGNOSIS — Z98.891 STATUS POST PRIMARY LOW TRANSVERSE CESAREAN SECTION: ICD-10-CM

## 2024-05-07 DIAGNOSIS — Z3A.33 33 WEEKS GESTATION OF PREGNANCY: Primary | ICD-10-CM

## 2024-05-07 PROBLEM — O36.8390 NON-REASSURING FETAL HEART TONES COMPLICATING PREGNANCY, ANTEPARTUM: Status: ACTIVE | Noted: 2024-05-07

## 2024-05-07 LAB
ABO GROUP BLD: NORMAL
BASE EXCESS BLDCOV CALC-SCNC: -5.8 MMOL/L (ref 1–9)
BASOPHILS # BLD AUTO: 0.04 THOUSANDS/ÂΜL (ref 0–0.1)
BASOPHILS NFR BLD AUTO: 0 % (ref 0–1)
BLD GP AB SCN SERPL QL: NEGATIVE
CREAT UR-MCNC: 153.3 MG/DL
EOSINOPHIL # BLD AUTO: 0.32 THOUSAND/ÂΜL (ref 0–0.61)
EOSINOPHIL NFR BLD AUTO: 3 % (ref 0–6)
ERYTHROCYTE [DISTWIDTH] IN BLOOD BY AUTOMATED COUNT: 14.3 % (ref 11.6–15.1)
GLUCOSE SERPL-MCNC: 71 MG/DL (ref 65–140)
GLUCOSE SERPL-MCNC: 72 MG/DL (ref 65–140)
GLUCOSE SERPL-MCNC: 75 MG/DL (ref 65–140)
HCO3 BLDCOV-SCNC: 22.1 MMOL/L (ref 12.2–28.6)
HCT VFR BLD AUTO: 33.7 % (ref 34.8–46.1)
HGB BLD-MCNC: 10.4 G/DL (ref 11.5–15.4)
HOLD SPECIMEN: NORMAL
IMM GRANULOCYTES # BLD AUTO: 0.15 THOUSAND/UL (ref 0–0.2)
IMM GRANULOCYTES NFR BLD AUTO: 2 % (ref 0–2)
LYMPHOCYTES # BLD AUTO: 1.96 THOUSANDS/ÂΜL (ref 0.6–4.47)
LYMPHOCYTES NFR BLD AUTO: 20 % (ref 14–44)
MCH RBC QN AUTO: 27.2 PG (ref 26.8–34.3)
MCHC RBC AUTO-ENTMCNC: 30.9 G/DL (ref 31.4–37.4)
MCV RBC AUTO: 88 FL (ref 82–98)
MONOCYTES # BLD AUTO: 0.84 THOUSAND/ÂΜL (ref 0.17–1.22)
MONOCYTES NFR BLD AUTO: 9 % (ref 4–12)
NEUTROPHILS # BLD AUTO: 6.38 THOUSANDS/ÂΜL (ref 1.85–7.62)
NEUTS SEG NFR BLD AUTO: 66 % (ref 43–75)
NRBC BLD AUTO-RTO: 0 /100 WBCS
OXYHGB MFR BLDCOV: 17.5 %
PCO2 BLDCOV: 53.6 MM HG (ref 27–43)
PH BLDCOV: 7.23 [PH] (ref 7.19–7.49)
PLATELET # BLD AUTO: 269 THOUSANDS/UL (ref 149–390)
PMV BLD AUTO: 9.1 FL (ref 8.9–12.7)
PO2 BLDCOV: 12.1 MM HG (ref 15–45)
PROT UR-MCNC: 129 MG/DL
PROT/CREAT UR: 0.84 MG/G{CREAT} (ref 0–0.1)
RBC # BLD AUTO: 3.83 MILLION/UL (ref 3.81–5.12)
RH BLD: POSITIVE
SAO2 % BLDCOV: 3.3 ML/DL
SPECIMEN EXPIRATION DATE: NORMAL
WBC # BLD AUTO: 9.69 THOUSAND/UL (ref 4.31–10.16)

## 2024-05-07 PROCEDURE — 99214 OFFICE O/P EST MOD 30 MIN: CPT | Performed by: OBSTETRICS & GYNECOLOGY

## 2024-05-07 PROCEDURE — 99223 1ST HOSP IP/OBS HIGH 75: CPT | Performed by: OBSTETRICS & GYNECOLOGY

## 2024-05-07 PROCEDURE — 82570 ASSAY OF URINE CREATININE: CPT

## 2024-05-07 PROCEDURE — 86901 BLOOD TYPING SEROLOGIC RH(D): CPT | Performed by: OBSTETRICS & GYNECOLOGY

## 2024-05-07 PROCEDURE — 86900 BLOOD TYPING SEROLOGIC ABO: CPT | Performed by: OBSTETRICS & GYNECOLOGY

## 2024-05-07 PROCEDURE — 86850 RBC ANTIBODY SCREEN: CPT | Performed by: OBSTETRICS & GYNECOLOGY

## 2024-05-07 PROCEDURE — 84156 ASSAY OF PROTEIN URINE: CPT

## 2024-05-07 PROCEDURE — 86780 TREPONEMA PALLIDUM: CPT | Performed by: OBSTETRICS & GYNECOLOGY

## 2024-05-07 PROCEDURE — 85025 COMPLETE CBC W/AUTO DIFF WBC: CPT | Performed by: OBSTETRICS & GYNECOLOGY

## 2024-05-07 PROCEDURE — 88307 TISSUE EXAM BY PATHOLOGIST: CPT | Performed by: PATHOLOGY

## 2024-05-07 PROCEDURE — 76818 FETAL BIOPHYS PROFILE W/NST: CPT | Performed by: OBSTETRICS & GYNECOLOGY

## 2024-05-07 PROCEDURE — 82948 REAGENT STRIP/BLOOD GLUCOSE: CPT

## 2024-05-07 PROCEDURE — 0UCC7ZZ EXTIRPATION OF MATTER FROM CERVIX, VIA NATURAL OR ARTIFICIAL OPENING: ICD-10-PCS | Performed by: OBSTETRICS & GYNECOLOGY

## 2024-05-07 PROCEDURE — 59510 CESAREAN DELIVERY: CPT | Performed by: OBSTETRICS & GYNECOLOGY

## 2024-05-07 PROCEDURE — 82805 BLOOD GASES W/O2 SATURATION: CPT | Performed by: OBSTETRICS & GYNECOLOGY

## 2024-05-07 PROCEDURE — 80053 COMPREHEN METABOLIC PANEL: CPT

## 2024-05-07 RX ORDER — SODIUM CHLORIDE, SODIUM LACTATE, POTASSIUM CHLORIDE, CALCIUM CHLORIDE 600; 310; 30; 20 MG/100ML; MG/100ML; MG/100ML; MG/100ML
125 INJECTION, SOLUTION INTRAVENOUS CONTINUOUS
Status: DISCONTINUED | OUTPATIENT
Start: 2024-05-07 | End: 2024-05-10 | Stop reason: HOSPADM

## 2024-05-07 RX ORDER — ONDANSETRON 2 MG/ML
INJECTION INTRAMUSCULAR; INTRAVENOUS AS NEEDED
Status: DISCONTINUED | OUTPATIENT
Start: 2024-05-07 | End: 2024-05-07

## 2024-05-07 RX ORDER — SODIUM CHLORIDE, SODIUM LACTATE, POTASSIUM CHLORIDE, CALCIUM CHLORIDE 600; 310; 30; 20 MG/100ML; MG/100ML; MG/100ML; MG/100ML
INJECTION, SOLUTION INTRAVENOUS CONTINUOUS PRN
Status: DISCONTINUED | OUTPATIENT
Start: 2024-05-07 | End: 2024-05-07

## 2024-05-07 RX ORDER — MORPHINE SULFATE 0.5 MG/ML
INJECTION, SOLUTION EPIDURAL; INTRATHECAL; INTRAVENOUS AS NEEDED
Status: DISCONTINUED | OUTPATIENT
Start: 2024-05-07 | End: 2024-05-07

## 2024-05-07 RX ORDER — CEFAZOLIN SODIUM 2 G/50ML
2000 SOLUTION INTRAVENOUS ONCE
Status: DISCONTINUED | OUTPATIENT
Start: 2024-05-07 | End: 2024-05-07

## 2024-05-07 RX ORDER — KETOROLAC TROMETHAMINE 30 MG/ML
15 INJECTION, SOLUTION INTRAMUSCULAR; INTRAVENOUS EVERY 6 HOURS
Status: DISPENSED | OUTPATIENT
Start: 2024-05-07 | End: 2024-05-08

## 2024-05-07 RX ORDER — OXYCODONE HYDROCHLORIDE 5 MG/1
5 TABLET ORAL EVERY 4 HOURS PRN
Status: DISCONTINUED | OUTPATIENT
Start: 2024-05-08 | End: 2024-05-10 | Stop reason: HOSPADM

## 2024-05-07 RX ORDER — KETOROLAC TROMETHAMINE 30 MG/ML
INJECTION, SOLUTION INTRAMUSCULAR; INTRAVENOUS AS NEEDED
Status: DISCONTINUED | OUTPATIENT
Start: 2024-05-07 | End: 2024-05-07

## 2024-05-07 RX ORDER — DIPHENHYDRAMINE HYDROCHLORIDE 50 MG/ML
INJECTION INTRAMUSCULAR; INTRAVENOUS AS NEEDED
Status: DISCONTINUED | OUTPATIENT
Start: 2024-05-07 | End: 2024-05-07

## 2024-05-07 RX ORDER — DOCUSATE SODIUM 100 MG/1
100 CAPSULE, LIQUID FILLED ORAL 2 TIMES DAILY
Status: DISCONTINUED | OUTPATIENT
Start: 2024-05-07 | End: 2024-05-10 | Stop reason: HOSPADM

## 2024-05-07 RX ORDER — OXYCODONE HYDROCHLORIDE 10 MG/1
10 TABLET ORAL EVERY 4 HOURS PRN
Status: DISCONTINUED | OUTPATIENT
Start: 2024-05-08 | End: 2024-05-10 | Stop reason: HOSPADM

## 2024-05-07 RX ORDER — DEXAMETHASONE SODIUM PHOSPHATE 10 MG/ML
INJECTION, SOLUTION INTRAMUSCULAR; INTRAVENOUS AS NEEDED
Status: DISCONTINUED | OUTPATIENT
Start: 2024-05-07 | End: 2024-05-07

## 2024-05-07 RX ORDER — ONDANSETRON 2 MG/ML
4 INJECTION INTRAMUSCULAR; INTRAVENOUS EVERY 8 HOURS PRN
Status: DISCONTINUED | OUTPATIENT
Start: 2024-05-07 | End: 2024-05-07

## 2024-05-07 RX ORDER — IBUPROFEN 600 MG/1
600 TABLET ORAL EVERY 6 HOURS SCHEDULED
Status: DISCONTINUED | OUTPATIENT
Start: 2024-05-08 | End: 2024-05-10 | Stop reason: HOSPADM

## 2024-05-07 RX ORDER — LEVOTHYROXINE SODIUM 0.03 MG/1
25 TABLET ORAL
Status: DISCONTINUED | OUTPATIENT
Start: 2024-05-08 | End: 2024-05-10 | Stop reason: HOSPADM

## 2024-05-07 RX ORDER — OXYCODONE HYDROCHLORIDE 5 MG/1
5 TABLET ORAL EVERY 4 HOURS PRN
Status: DISCONTINUED | OUTPATIENT
Start: 2024-05-08 | End: 2024-05-07 | Stop reason: SDUPTHER

## 2024-05-07 RX ORDER — ONDANSETRON 2 MG/ML
4 INJECTION INTRAMUSCULAR; INTRAVENOUS ONCE AS NEEDED
Status: DISCONTINUED | OUTPATIENT
Start: 2024-05-07 | End: 2024-05-10 | Stop reason: HOSPADM

## 2024-05-07 RX ORDER — NALOXONE HYDROCHLORIDE 0.4 MG/ML
0.1 INJECTION, SOLUTION INTRAMUSCULAR; INTRAVENOUS; SUBCUTANEOUS
Status: ACTIVE | OUTPATIENT
Start: 2024-05-07 | End: 2024-05-08

## 2024-05-07 RX ORDER — NALOXONE HYDROCHLORIDE 0.4 MG/ML
0.1 INJECTION, SOLUTION INTRAMUSCULAR; INTRAVENOUS; SUBCUTANEOUS
Status: DISCONTINUED | OUTPATIENT
Start: 2024-05-07 | End: 2024-05-07 | Stop reason: SDUPTHER

## 2024-05-07 RX ORDER — CALCIUM CARBONATE 500 MG/1
1000 TABLET, CHEWABLE ORAL DAILY PRN
Status: DISCONTINUED | OUTPATIENT
Start: 2024-05-07 | End: 2024-05-10 | Stop reason: HOSPADM

## 2024-05-07 RX ORDER — HYDROMORPHONE HCL/PF 1 MG/ML
0.5 SYRINGE (ML) INJECTION
Status: DISCONTINUED | OUTPATIENT
Start: 2024-05-07 | End: 2024-05-10 | Stop reason: HOSPADM

## 2024-05-07 RX ORDER — ENOXAPARIN SODIUM 100 MG/ML
40 INJECTION SUBCUTANEOUS EVERY 12 HOURS
Status: DISCONTINUED | OUTPATIENT
Start: 2024-05-08 | End: 2024-05-10 | Stop reason: HOSPADM

## 2024-05-07 RX ORDER — LABETALOL 200 MG/1
200 TABLET, FILM COATED ORAL 2 TIMES DAILY
Status: DISCONTINUED | OUTPATIENT
Start: 2024-05-07 | End: 2024-05-10 | Stop reason: HOSPADM

## 2024-05-07 RX ORDER — CEFAZOLIN SODIUM 1 G/50ML
1000 SOLUTION INTRAVENOUS ONCE
Status: DISCONTINUED | OUTPATIENT
Start: 2024-05-07 | End: 2024-05-07

## 2024-05-07 RX ORDER — ENOXAPARIN SODIUM 100 MG/ML
40 INJECTION SUBCUTANEOUS EVERY 12 HOURS
Status: DISCONTINUED | OUTPATIENT
Start: 2024-05-07 | End: 2024-05-07

## 2024-05-07 RX ORDER — FENTANYL CITRATE/PF 50 MCG/ML
25 SYRINGE (ML) INJECTION
Status: DISCONTINUED | OUTPATIENT
Start: 2024-05-07 | End: 2024-05-10 | Stop reason: HOSPADM

## 2024-05-07 RX ORDER — OXYTOCIN/RINGER'S LACTATE 30/500 ML
62.5 PLASTIC BAG, INJECTION (ML) INTRAVENOUS ONCE
Status: COMPLETED | OUTPATIENT
Start: 2024-05-07 | End: 2024-05-08

## 2024-05-07 RX ORDER — HYDROMORPHONE HCL/PF 1 MG/ML
0.5 SYRINGE (ML) INJECTION EVERY 2 HOUR PRN
Status: DISCONTINUED | OUTPATIENT
Start: 2024-05-07 | End: 2024-05-10 | Stop reason: HOSPADM

## 2024-05-07 RX ORDER — NALBUPHINE HYDROCHLORIDE 10 MG/ML
5 INJECTION, SOLUTION INTRAMUSCULAR; INTRAVENOUS; SUBCUTANEOUS
Status: ACTIVE | OUTPATIENT
Start: 2024-05-07 | End: 2024-05-08

## 2024-05-07 RX ORDER — OXYCODONE HYDROCHLORIDE 10 MG/1
10 TABLET ORAL EVERY 4 HOURS PRN
Status: DISCONTINUED | OUTPATIENT
Start: 2024-05-08 | End: 2024-05-07 | Stop reason: SDUPTHER

## 2024-05-07 RX ORDER — METOCLOPRAMIDE HYDROCHLORIDE 5 MG/ML
INJECTION INTRAMUSCULAR; INTRAVENOUS AS NEEDED
Status: DISCONTINUED | OUTPATIENT
Start: 2024-05-07 | End: 2024-05-07

## 2024-05-07 RX ORDER — SODIUM CHLORIDE 9 MG/ML
125 INJECTION, SOLUTION INTRAVENOUS CONTINUOUS
Status: DISCONTINUED | OUTPATIENT
Start: 2024-05-07 | End: 2024-05-07

## 2024-05-07 RX ORDER — FENTANYL CITRATE 50 UG/ML
INJECTION, SOLUTION INTRAMUSCULAR; INTRAVENOUS AS NEEDED
Status: DISCONTINUED | OUTPATIENT
Start: 2024-05-07 | End: 2024-05-07

## 2024-05-07 RX ORDER — ACETAMINOPHEN 325 MG/1
975 TABLET ORAL EVERY 6 HOURS SCHEDULED
Status: DISCONTINUED | OUTPATIENT
Start: 2024-05-08 | End: 2024-05-10 | Stop reason: HOSPADM

## 2024-05-07 RX ORDER — OXYTOCIN/RINGER'S LACTATE 30/500 ML
PLASTIC BAG, INJECTION (ML) INTRAVENOUS CONTINUOUS PRN
Status: DISCONTINUED | OUTPATIENT
Start: 2024-05-07 | End: 2024-05-07

## 2024-05-07 RX ORDER — PROPOFOL 10 MG/ML
INJECTION, EMULSION INTRAVENOUS AS NEEDED
Status: DISCONTINUED | OUTPATIENT
Start: 2024-05-07 | End: 2024-05-07

## 2024-05-07 RX ADMIN — ONDANSETRON 4 MG: 2 INJECTION INTRAMUSCULAR; INTRAVENOUS at 16:50

## 2024-05-07 RX ADMIN — FENTANYL CITRATE 25 MCG: 50 INJECTION INTRAMUSCULAR; INTRAVENOUS at 19:28

## 2024-05-07 RX ADMIN — SODIUM CHLORIDE, SODIUM LACTATE, POTASSIUM CHLORIDE, AND CALCIUM CHLORIDE: .6; .31; .03; .02 INJECTION, SOLUTION INTRAVENOUS at 17:32

## 2024-05-07 RX ADMIN — DOCUSATE SODIUM 100 MG: 100 CAPSULE, LIQUID FILLED ORAL at 21:31

## 2024-05-07 RX ADMIN — METOCLOPRAMIDE 10 MG: 5 INJECTION, SOLUTION INTRAMUSCULAR; INTRAVENOUS at 16:50

## 2024-05-07 RX ADMIN — NALBUPHINE HYDROCHLORIDE 5 MG: 10 INJECTION, SOLUTION INTRAMUSCULAR; INTRAVENOUS; SUBCUTANEOUS at 19:03

## 2024-05-07 RX ADMIN — SODIUM CHLORIDE, SODIUM LACTATE, POTASSIUM CHLORIDE, AND CALCIUM CHLORIDE 125 ML/HR: .6; .31; .03; .02 INJECTION, SOLUTION INTRAVENOUS at 20:52

## 2024-05-07 RX ADMIN — PROPOFOL 50 MG: 10 INJECTION, EMULSION INTRAVENOUS at 17:23

## 2024-05-07 RX ADMIN — FENTANYL CITRATE 15 MCG: 50 INJECTION INTRAMUSCULAR; INTRAVENOUS at 16:47

## 2024-05-07 RX ADMIN — OXYTOCIN 62.5 MILLI-UNITS/MIN: 10 INJECTION INTRAVENOUS at 20:53

## 2024-05-07 RX ADMIN — Medication 3000 MG: at 16:48

## 2024-05-07 RX ADMIN — DEXAMETHASONE SODIUM PHOSPHATE 10 MG: 10 INJECTION, SOLUTION INTRAMUSCULAR; INTRAVENOUS at 16:50

## 2024-05-07 RX ADMIN — Medication 999 MILLI-UNITS/MIN: at 17:10

## 2024-05-07 RX ADMIN — DIPHENHYDRAMINE HYDROCHLORIDE 25 MG: 50 INJECTION, SOLUTION INTRAMUSCULAR; INTRAVENOUS at 17:19

## 2024-05-07 RX ADMIN — KETOROLAC TROMETHAMINE 30 MG: 30 INJECTION, SOLUTION INTRAMUSCULAR; INTRAVENOUS at 17:48

## 2024-05-07 RX ADMIN — LABETALOL HYDROCHLORIDE 200 MG: 200 TABLET, FILM COATED ORAL at 21:31

## 2024-05-07 RX ADMIN — KETOROLAC TROMETHAMINE 15 MG: 30 INJECTION, SOLUTION INTRAMUSCULAR; INTRAVENOUS at 23:10

## 2024-05-07 RX ADMIN — SODIUM CHLORIDE 125 ML/HR: 0.9 INJECTION, SOLUTION INTRAVENOUS at 11:42

## 2024-05-07 RX ADMIN — PHENYLEPHRINE HYDROCHLORIDE 20 MCG/MIN: 50 INJECTION INTRAVENOUS at 16:50

## 2024-05-07 RX ADMIN — SODIUM CHLORIDE 999 ML/HR: 0.9 INJECTION, SOLUTION INTRAVENOUS at 11:02

## 2024-05-07 RX ADMIN — MORPHINE SULFATE 0.15 MG: 0.5 INJECTION, SOLUTION EPIDURAL; INTRATHECAL; INTRAVENOUS at 16:47

## 2024-05-07 RX ADMIN — DEXMEDETOMIDINE 12 MCG: 100 INJECTION, SOLUTION INTRAVENOUS at 17:23

## 2024-05-07 NOTE — ANESTHESIA PREPROCEDURE EVALUATION
Procedure:   SECTION () (Uterus)    Relevant Problems   CARDIO   (+) Chronic hypertension affecting pregnancy      ENDO   (+) Hypothyroidism      GYN   (+) 33 weeks gestation of pregnancy     BMI 44     Physical Exam    Airway    Mallampati score: III  TM Distance: >3 FB  Neck ROM: full     Dental       Cardiovascular  Cardiovascular exam normal    Pulmonary  Pulmonary exam normal     Other Findings  post-pubertal.      Anesthesia Plan  ASA Score- 3     Anesthesia Type- spinal with ASA Monitors.         Additional Monitors:     Airway Plan:            Plan Factors-    Chart reviewed. EKG reviewed.  Existing labs reviewed. Patient summary reviewed.                  Induction- intravenous.    Postoperative Plan-     Informed Consent- Anesthetic plan and risks discussed with patient.  I personally reviewed this patient with the CRNA. Discussed and agreed on the Anesthesia Plan with the CRNA..

## 2024-05-07 NOTE — PROGRESS NOTES
Raisa presents today for  surveillance secondary to chronic decreased fetal movement, chronic hypertension, maternal BMI, and A2 GDM.  Today's biophysical profile is very concerning for only 2/10.  There is polyhydramnios.  I communicated the patient immediately and sent her to labor and delivery for further evaluation and management for likely delivery.  The team on labor and delivery is aware of the patient's impending arrival.

## 2024-05-07 NOTE — OP NOTE
OPERATIVE REPORT  PATIENT NAME: Latisha Kelly    :  2000  MRN: [id number]  Pt Location: [facility] L&D OR ROOM 02    SURGERY DATE: 2024    Surgeons and Role:     * Lizzette Hernandez DO - Primary     * Siria Baumann MD - Assisting    Preop Diagnosis:  Pregnancy at 33w1d gestational age   Non-reassuring fetal status, delivered, current hospitalization [O75.89] & 2/10 biophysical profile  Exam indicated Cerclage  Polyhydramnios   Insulin controlled Diabetes  Chronic Hypertension  Obesity      Post-Op Diagnosis Codes:     * Non-reassuring fetal status, delivered, current hospitalization [O75.89]    Procedure(s) (LRB):   SECTION () (N/A)  CERCLAGE REMOVAL (N/A)  Cerclage removal    Specimen(s):  ID Type Source Tests Collected by Time Destination   1 :  Tissue (Placenta on Hold) OB Only Placenta TISSUE EXAM OB (PLACENTA) ONLY Lizzette Hernandez DO 2024 1711    A :  Cord Blood Cord BLOOD GAS, VENOUS, CORD, BLOOD GAS, ARTERIAL, CORD (Canceled) Lizzette Hernandez DO 2024 1710        Surgical QBL:  Surgical QBL (mL): 1043 mL      Drains:  Urethral Catheter Double-lumen;Non-latex;Straight-tip 16 Fr. (Active)   Reasons to continue Urinary Catheter  Post-operative urological requirements 24 1651   Goal for Removal Remove POD#1 24 1651   Site Assessment Clean;Skin intact;Pink;Patent 24 1651   Collection Container Standard drainage bag 24 1651   Securement Method Securing device (Describe) 24 1651   Number of days: 0       Anesthesia Type:   Spinal     Operative Indications:  Non-reassuring fetal status, delivered, current hospitalization [O75.89]  2/10 Biophysical profile     Kilo Group Classification System:  No Multiple pregnancy, No Transverse or oblique lie, No Breech lie, Gestational age is < 37 weeks +  is KILO GROUP 10    Operative Findings:    1. Viable female  at 1710 with APGARs of 6 and 8 at 1 and 5 minutes. Fetus weight pending  at time of dictation  2. Normal intact placenta with centrally inserted 3VC expressed at 1711.   3. Normal uterus, bilateral tubes and ovaries.  4. Blood gases:   Arterial pH: insufficient sample   Arterial base excess: insufficient sample   Venous pH: 7.234   Venous base excess: -5.8    The patient was taken to the operating room. Spinal anesthesia was adequately established and 2g of Ancef was given for preoperative prophylaxis.  The patient was then placed in the dorsal supine position with a left tilt of the hips. The patient was then prepped with betadine for vaginal prep and chloraprep for abdominal prep and draped in the usual sterile fashion for a Pfannenstiel skin incision.      A time out was performed to confirm correct patient and correct procedure. An incision was made in the skin with a surgical scalpel and sharp dissection was carried out over subsequent layers of tissue including the fascia, followed by the Bovie electrocautery for hemostasis.  The fascia was incised at the midline and the fascial incision was extended bilaterally bluntly and using the curved Jolly scissors.      The superior edge of the fascial incision was grasped with Kocher clamps, tented up and the underlying rectus muscles were dissected off bluntly. The rectus muscles were then divided at midline and the peritoneum was identified, tented up at its upper margin taking care to avoid the bladder, and then entered.  The peritoneal incision was extended superiorly and inferiorly.  The bladder blade was inserted and  transverse incision was made in the lower uterine segment using a new surgical blade.  The uterine incision was extended cephalad and caudal using blunt dissection.  The amniotic sac was entered and the amniotic fluid was noted to be clear.    The surgeon's hand was placed into the uterine cavity. The fetal head was identified and an attempt was made to elevate it  through the uterine incision and fundal pressure. Due to  difficulty elevating the fetal head through the hysterotomy, the bandage scissors were used to make a 1 cm T incision at the midline of the hysterotomy. On subsequent attempt the fetal head was successfully delivered through the uterine incision and with gentle traction, the shoulder was delivered, followed by the rest of the fetal body. There was no nuchal cord noted. On delivery the cord was doubly clamped and cut after delayed cord clamping.  The infant was then passed off the table to the awaiting  staff.  The placenta delivery was then sent to storage. Placenta was noted to be intact with a centrally inserted three-vessel cord.  Oxytocin was administered by IV infusion to enhance uterine contraction.  The uterus was exteriorized and cleared of all clots and remaining products of conception.      The uterine incision was re approximated using a 0-Monocryl in a running locked fashion.  A second vertical imbricating stitch with 0- Monocryl was applied.  The uterine incision was examined and noted to have continued oozing at the midline. Additional figure of eight sutures were applied and hemostasis confirmed. Nuknit was applied at the hysterotomy to ensure hemostasis.  The posterior cul-de-sac was cleared of all clots and products of conception.  The uterus was replaced into the abdomen and the pericolic gutters were cleared of all clots.  The uterine incision was once again reexamined and noted to be hemostatic.  The fascia was re approximated using 0- vicryl in a running nonlocked fashion. The subcutaneous tissue was irrigated and cleared of all clots and debris.  Good hemostasis was noted with Bovie electrocautery. The subcutaneous  tissue was reapproximated with 2-0 plan gut.  The skin incision was closed using Stratafix. Good hemostasis was noted. Mepilex dressing was applied to reduce risk of surgical site infection. Patient was then repositioned and sterile cervical exam performed and found to be 1  cm dilated. A bivalve speculum placed in the vagina to visualize the cervix. Once Ethibond suture was then visualized and hemostat used to secure one end. Jolly scissors were then used to cut and remove the cerclage. There was no apparent injury and cervix was hemostatic after removal.  Patient tolerated the procedure well.  All needle, sponge, and instrument counts were noted to be correct x 2 at the end of the procedure.  Patient was transferred to the recovery room in stable condition.      Dr. Hernandez was present for the procedure.     Complications:   None      Patient Disposition:  PACU         SIGNATURE: Siria Baumann MD  DATE: May 7, 2024  TIME: 7:55 PM

## 2024-05-07 NOTE — ANESTHESIA POSTPROCEDURE EVALUATION
ASSESSMENT/PLAN     RAPID FLUA/B=PENDING  COVID =PENDING  RSV=PENDNG     Viral Syndrome    TREAT SYMPTOMS  Tylenol for fever and pain     Follow-up with primary doctor 2 days  Go to emergency room for chest pain shortness of breath or dizziness     Post-Op Assessment Note    CV Status:  Stable  Pain Score: 0    Pain management: adequate       Mental Status:  Alert and awake   Hydration Status:  Euvolemic   PONV Controlled:  Controlled   Airway Patency:  Patent     Post Op Vitals Reviewed: Yes    No anethesia notable event occurred.    Staff: CRNA               /65 (05/07/24 1811)    Temp   98.2   Pulse 90 (05/07/24 1811)   Resp      SpO2 97 % (05/07/24 1811)

## 2024-05-07 NOTE — ASSESSMENT & PLAN NOTE
On 30 units nightly of Semglee since 4/12/2024      Lab Results   Component Value Date    HGBA1C 4.2 02/26/2024       Recent Labs     05/07/24  1102   POCGLU 75       Blood Sugar Average: Last 72 hrs:  (P) 75

## 2024-05-07 NOTE — PROGRESS NOTES
To assess patient due to decelerations.    Baseline 150, moderate variability, positive accelerations, recently at 5-4.  Variable decelerations noted with julio in 60s, resolved with patient repositioning.    Discussed with patient and patient's mother that, in the setting of a 2/10 BPP this morning and now category 2 fetal heart tracing, recommendation for primary low-transverse  section.  Will have NICU speak with patient about expectations for 33-week .    Antibiotics ordered.    Discussed with Dr. Hernandez.    Tiffanie Molina, PGY4

## 2024-05-07 NOTE — ANESTHESIA PROCEDURE NOTES
Spinal Block    Patient location during procedure: OR  Start time: 5/7/2024 4:47 PM  Reason for block: procedure for pain and at surgeon's request  Staffing  Performed by: Anam Anglin MD  Authorized by: Anam Anglin MD    Preanesthetic Checklist  Completed: patient identified, IV checked, site marked, risks and benefits discussed, surgical consent, monitors and equipment checked, pre-op evaluation and timeout performed  Spinal Block  Patient position: sitting  Prep: ChloraPrep and site prepped and draped  Patient monitoring: frequent blood pressure checks, continuous pulse ox and heart rate  Approach: midline  Location: L3-4  Needle  Needle type: Pencan   Needle gauge: 24 G  Needle length: 4 in  Assessment  Sensory level: T4  Injection Assessment:  negative aspiration for heme, no paresthesia on injection and positive aspiration for clear CSF.  Post-procedure:  site cleaned  Additional Notes  1st attempt crna

## 2024-05-07 NOTE — H&P
H & P- Obstetrics   Latisha Kelly 24 y.o. female MRN: [id number]  Unit/Bed#: LD PACU-03 Encounter: [id number]      Assessment: 24 y.o.  at 33w1d admitted for observation in the setting of a 2/10 BPP (polyhydramnios) with subsequent reactive tracing.  FHT: Baseline Rate (FHR): 145 bpm  Variability: Moderate  Accelerations: 15 x 15 or greater  Decelerations: None  FHR Category: Category I Contraction Frequency (minutes): 0  Clinical EFW: EFW 16%, 1408g, HC 27%, AC 29% (24); Vertex confirmed by U/S  GBS status: positive (24)       Plan:   * Non-reassuring fetal heart tones complicating pregnancy, antepartum  Assessment & Plan  BPP 2/10 at [facility] 24; points for polyhydramnios with ALEKSANDAR of 26.4.  Nonreactive with no decelerations.  Surveillance secondary to chronic decreased fetal movement, chronic hypertension, maternal BMI 44, A2 GDM.    Subsequent monitoring in triage active, with moderate variability, accelerations, no decelerations, and no contractions on tocometry.    33 weeks gestation of pregnancy  Assessment & Plan  Admit   T&S, CBC, Total syphilis/RPR  NPO  IV NS @125cc/hr    Insulin controlled gestational diabetes mellitus (GDM) in third trimester  Assessment & Plan  On 30 units nightly of Semglee since 2024      Lab Results   Component Value Date    HGBA1C 4.2 2024       Recent Labs     24  1102   POCGLU 75       Blood Sugar Average: Last 72 hrs:  (P) 75      Cervical cerclage suture present in third trimester  Assessment & Plan  Osman cerclage placed 2024  Presented at 23w with 4cm dilation and 100% effacement. Amnio performed and negative for infection. Received BTM x2. Cervix regressed to a tight 2 cm dilated and thick on bimanual exam with a long funnel seen by TVUS. Exam-indicated cerclage placed 24.        Chronic hypertension affecting pregnancy  Assessment & Plan  On home labetalol 200mg BID  Admission CBC/CMP, UP:C ordered      Discussed with   Warholic.        SUBJECTIVE:    Chief Complaint: 2/10 BPP at [facility]    HPI: Latisha Kelly is a 24 y.o.  with an MARIANO of 2024, by Last Menstrual Period at 33w1d who is being admitted for observation in the setting of BPP 2/10 with subsequent reactive FHT. She reports feeling well overall; she denies contractions, had no VB, no LOF. She feels fetal movement as usual, and reports robust fetal movement this morning before BPP.    Pregnancy complications:   Patient Active Problem List   Diagnosis    Obesity affecting pregnancy in third trimester    33 weeks gestation of pregnancy    Chronic hypertension affecting pregnancy    Encounter for care and examination of lactating mother    Hypothyroidism     labor in second trimester    Short cervix during pregnancy in second trimester    Cervical cerclage suture present in third trimester    Insulin controlled gestational diabetes mellitus (GDM) in third trimester    Nuchal cord, single gestation    Decreased fetal movement    Decreased fetal movement affecting management of pregnancy in third trimester       Baby complications/comments: none    Review of Systems   Constitutional:  Negative for chills and fever.   HENT:  Negative for sore throat.    Eyes:  Negative for visual disturbance.   Respiratory:  Negative for cough and shortness of breath.    Cardiovascular:  Negative for chest pain and palpitations.   Gastrointestinal:  Negative for abdominal pain, constipation, diarrhea, nausea and vomiting.   Genitourinary:  Negative for dysuria and hematuria.   Skin:  Negative for rash.   Neurological:  Negative for dizziness and headaches.   Psychiatric/Behavioral:  Negative for dysphoric mood.        OB History    Para Term  AB Living   1 0 0 0 0 0   SAB IAB Ectopic Multiple Live Births   0 0 0 0 0      # Outcome Date GA Lbr Brian/2nd Weight Sex Delivery Anes PTL Lv   1 Current               Obstetric Comments   Believes she suffered a miscarriage in  2022. Positive pregnancy tests, and then had heavy bleeding shortly ever. Never confirmed with OBGYN    Menarche 13       Past Medical History:   Diagnosis Date    Anemia     Hypertension     chtn started medication with pregnancy    Hypothyroid     Migraine without aura     OTC med management    PCOS (polycystic ovarian syndrome)     Varicella     vaccine    Visual impairment        Past Surgical History:   Procedure Laterality Date    TN CERCLAGE UTERINE CERVIX NONOBSTETRICAL N/A 2/29/2024    Procedure: CERCLAGE CERVICAL;  Surgeon: Ann Hoffmann MD;  Location: [facility] ;  Service: Obstetrics    TURBINATE RESECTION         Social History     Tobacco Use    Smoking status: Never    Smokeless tobacco: Never   Substance Use Topics    Alcohol use: Not Currently     Alcohol/week: 7.0 standard drinks of alcohol     Types: 7 Glasses of wine per week     Comment: 1 daily-prior to pregnancy       Allergies   Allergen Reactions    Latex Rash       Medications Prior to Admission   Medication    acetaminophen (TYLENOL) 325 mg tablet    aspirin 81 mg chewable tablet    Insulin Pen Needle (BD Pen Needle Susan U/F) 32G X 4 MM MISC    labetalol (NORMODYNE) 100 mg tablet    levothyroxine (Euthyrox) 25 mcg tablet    OneTouch Delica Lancets 33G MISC    OneTouch Verio test strip    Prenatal Vit-Iron Carbonyl-FA (prenatal multivitamin) TABS    Semglee, yfgn, 100 UNIT/ML SOPN         OBJECTIVE:  Vitals:  HR:  [] 101  BP: (120-131)/(60-72) 131/60  There is no height or weight on file to calculate BMI.     Physical Exam:  Physical Exam  Constitutional:       Appearance: Normal appearance. She is not ill-appearing.   Cardiovascular:      Rate and Rhythm: Normal rate and regular rhythm.      Heart sounds: Normal heart sounds. No murmur heard.     No friction rub. No gallop.   Pulmonary:      Effort: Pulmonary effort is normal. No respiratory distress.      Breath sounds: Normal breath sounds. No stridor. No wheezing, rhonchi or  rales.   Abdominal:      General: There is no distension.      Palpations: Abdomen is soft.      Tenderness: There is no abdominal tenderness. There is no guarding or rebound.   Musculoskeletal:         General: No swelling or tenderness.   Neurological:      Mental Status: She is alert.   Skin:     General: Skin is warm.      Coloration: Skin is not pale.      Findings: No erythema.          SVE:   Deferred, not feeling ctx    FHT:  Baseline Rate (FHR): 145 bpm  Variability: Moderate  Accelerations: 15 x 15 or greater  Decelerations: None  FHR Category: Category I    TOCO:   Contraction Frequency (minutes): 0    Lab Results   Component Value Date    WBC 8.95 03/26/2024    HGB 10.5 (L) 03/26/2024    HCT 32.5 (L) 03/26/2024     03/26/2024     Lab Results   Component Value Date    K 3.7 02/26/2024     02/26/2024    CO2 22 02/26/2024    BUN 7 02/26/2024    CREATININE 0.53 (L) 02/26/2024    AST 12 (L) 02/26/2024    ALT 22 02/26/2024       Prenatal Labs: Reviewed     Prenatal Labs: Blood Type:   Lab Results   Component Value Date/Time    ABO Grouping A 02/29/2024 07:26 AM     D (Rh type):   Lab Results   Component Value Date/Time    Rh Factor Positive 02/29/2024 07:26 AM       HCT/HGB:   Lab Results   Component Value Date/Time    Hematocrit 32.5 (L) 03/26/2024 01:32 PM    Hemoglobin 10.5 (L) 03/26/2024 01:32 PM     MCV:   Lab Results   Component Value Date/Time    MCV 87 03/26/2024 01:32 PM     Platelets:   Lab Results   Component Value Date/Time    Platelets 285 03/26/2024 01:32 PM     1 hour Glucola:   Lab Results   Component Value Date/Time    Glucose 190 (H) 03/26/2024 01:32 PM     Varicella:   Lab Results   Component Value Date/Time    Varicella IgG Equivocal (A) 11/30/2023 05:27 PM     Rubella:   Lab Results   Component Value Date/Time    Rubella IgG Quant 35.8 11/30/2023 05:27 PM     VDRL/RPR: Nonreactive (3/26/2024)    Urine Culture/Screen:   Lab Results   Component Value Date/Time    Urine Culture  10,000-19,000 cfu/ml 01/09/2024 02:00 PM       Hep B:   Lab Results   Component Value Date/Time    Hepatitis B Surface Ag Non-reactive 11/30/2023 05:27 PM       Hep C antibody: Nonreactive (11/30/2023)    HIV: Nonreactive (11/30/2023)    Chlamydia: Negative (2/26/2024)    Gonorrhea:   Lab Results   Component Value Date/Time    N gonorrhoeae, DNA Probe Negative 02/26/2024 07:42 PM     Group B Strep:    Lab Results   Component Value Date/Time    Strep Grp B PCR Positive (A) 02/26/2024 07:42 PM            <2 Midnights  OBSERVATION      Tiffanie Molina MD  OB/GYN PGY-4  5/7/2024  10:52 AM

## 2024-05-07 NOTE — ASSESSMENT & PLAN NOTE
BPP 2/10 at [facility] 5/7/24; points for polyhydramnios with ALEKSANDAR of 26.4.  Nonreactive with no decelerations.  Surveillance secondary to chronic decreased fetal movement, chronic hypertension, maternal BMI 44, A2 GDM.    Subsequent monitoring in triage active, with moderate variability, accelerations, no decelerations, and no contractions on tocometry.

## 2024-05-07 NOTE — ASSESSMENT & PLAN NOTE
QBL 1043, Nuknit on hysterotomy Hgb 10.4 --> 8.7 > PO iron  Voiding spontaneously  Pain: Tylenol and toradol scheduled, debi 5/10 PRN    FEN: Tolerating regular diet  DVT ppx: SCDs and  Lovenox 40mg qD  Passing flatus and has had a BM  Routine postpartum care

## 2024-05-07 NOTE — ASSESSMENT & PLAN NOTE
Osman cerclage placed 2/29/2024  Presented at 23w with 4cm dilation and 100% effacement. Amnio performed and negative for infection. Received BTM x2. Cervix regressed to a tight 2 cm dilated and thick on bimanual exam with a long funnel seen by TVUS. Exam-indicated cerclage placed 2/29/24.  Removed intra-op on 05/07, no cervical injury noted

## 2024-05-07 NOTE — CONSULTS
NICU Prenatal Consult   Latisha Kelly 24 y.o. female MRN: [id number]  Unit/Bed#: LD PACU-03 Encounter: [id number]    Consulting Physician: Lizzette Hernandez DO      A NICU Prenatal Consult has been requested by Dr. Lizzette Hernandez MD due to  delivery.     Latisha Kelly is a 24 y.o. , with an MARIANO of 2024 at 33w1d GA     Latisha is expecting a girl, to be named Yin. Estimated fetal weight is 1408 gms (as of ). She intends to breastfeed and formula feed.     Along with Latisha her mom was present for the consultation.     Prenatal Care:Yes, see Presenting Problem above    Prenatal Labs:  Lab Results   Component Value Date/Time    ABO Grouping A 2024 10:57 AM    Rh Factor Positive 2024 10:57 AM    Hepatitis B Surface Ag Non-reactive 2023 05:27 PM    Hepatitis C Ab Non-reactive 2023 05:27 PM    Rubella IgG Quant 35.8 2023 05:27 PM        GBS                                             positive                                               2024       Syphilis Total antibody                 Non-reactive                                       3/26/2024    Unknown labs at this time: none    Pregnancy complications:   Patient Active Problem List   Diagnosis    Obesity affecting pregnancy in third trimester    33 weeks gestation of pregnancy    Chronic hypertension affecting pregnancy    Encounter for care and examination of lactating mother    Hypothyroidism     labor in second trimester    Short cervix during pregnancy in second trimester    Cervical cerclage suture present in third trimester    Insulin controlled gestational diabetes mellitus (GDM) in third trimester    Nuchal cord, single gestation    Decreased fetal movement    Decreased fetal movement affecting management of pregnancy in third trimester    Non-reassuring fetal heart tones complicating pregnancy, antepartum     Maternal medical history:   Past Medical History:   Diagnosis  Date    Anemia     Hypertension     chtn started medication with pregnancy    Hypothyroid     Migraine without aura     OTC med management    PCOS (polycystic ovarian syndrome)     Varicella     vaccine    Visual impairment      Medications at home:   Medications Prior to Admission   Medication    aspirin 81 mg chewable tablet    Insulin Pen Needle (BD Pen Needle Susan U/F) 32G X 4 MM MISC    labetalol (NORMODYNE) 100 mg tablet    levothyroxine (Euthyrox) 25 mcg tablet    OneTouch Delica Lancets 33G MISC    OneTouch Verio test strip    Prenatal Vit-Iron Carbonyl-FA (prenatal multivitamin) TABS    Semglee, yfgn, 100 UNIT/ML SOPN    acetaminophen (TYLENOL) 325 mg tablet     Maternal social history:  Social History     Tobacco Use    Smoking status: Never    Smokeless tobacco: Never   Substance Use Topics    Alcohol use: Not Currently     Alcohol/week: 7.0 standard drinks of alcohol     Types: 7 Glasses of wine per week     Comment: 1 daily-prior to pregnancy     Maternal  medications:  steroids: Betamethasone on  and 2024  Magnesium Sulfate 2024    I spoke with Latisha Kelly today regarding the upcoming birth of her daughter.  We discussed the baby's possible medical problems and the specialized care needed to address these problems.  This discussion included, but was not limited to:       Attendance of physician/JYOTI, nursing, and/or respiratory therapist in the delivery and delivery room management , Risk of feedings problems and potential need for IV fluids or gavage tube feeds, Risk of hypoglycemia requiring formula feeding if breastmilk is unavailable or IV dextrose infusion, Risk of hypothermia and need for radiant warmer and/or isolette, Risk of immature cardiorespiratory control and need for monitoring and/or caffeine , Risk of jaundice requiring phototherapy, Risk of NEC and advantages of breast milk , Risk of respiratory distress and possible need for support  (oxygen, CPAP, intubation, and/or surfactant), and Risk of sepsis and possible need for lab tests and IV antibiotics      All of Latisha's questions were answered to the best of my ability, and she was encouraged to contact us with any further questions.      Thank you for including us in the care of Latisha Kelly. Please reach out to the on-call Neonatologist via [employer] Connect for any further questions that may arise.    I spent 30 minutes in consultation with Latisha Kelly, of which 50% was in direct communication.    VALENTIN Barajas  - Medicine

## 2024-05-07 NOTE — ASSESSMENT & PLAN NOTE
Systolic (24hrs), Av , Min:109 , Max:132     Diastolic (24hrs), Av, Min:52, Max:70      On home labetalol 200mg BID  Asymptomatic  Admission CBC/CMP, UP:C 0.84  Continue to monitor blood pressures

## 2024-05-07 NOTE — DISCHARGE SUMMARY
Obstetrics Discharge Summary   Latisha Kelly 24 y.o. female MRN: [id number]  Unit/Bed#: LD PACU-01 Encounter: [id number]    Admission Date: 2024     Discharge Date: 5/10/24    Admitting Diagnoses:   Pregnancy at 33w1d gestational age   Non-reassuring fetal status, delivered, current hospitalization [O75.89] & 2/10 biophysical profile  Exam indicated Cerclage  Polyhydramnios   Insulin controlled Diabetes  Chronic Hypertension  Obesity       Discharge Diagnoses:   Same, delivered  Superimposed Preeclampsia w/o SF    Procedures:   primary  section, low transverse incision      Admitting Attending: Dr. Hernandez   Delivery Attending: Dr. Hernandez  Discharge Attending: Dr. Hook    [facility] Course:   Latisha Kelly is now a 24 y.o.  who was initially sent to triage after BPP of 2/10 during an M visit. During her time in triage her FHT was initially reactive but later progressed to CAT II and expedited delivery was recommended via primary  section delivery.     She underwent a primary low transverse  section delivery on 2024 and delivered a viable female  at 1710. APGARS were 6, 8 at 1 and 5 minutes, respectively. 's birth weight was 4lb 6.8oz. Placenta delivered without difficulty.   was admitted to the NICU due to respiratory distress and prematurity Patient tolerated the procedure well and was transferred to recovery in stable condition.     The patient's post partum course was remarkable for postpartum hemorrhage . Her preoperative hemoglobin was 10.4g/dL, postoperative was 8.7 and received venofer infusion . Her postoperative pain was well controlled with oral analgesics.    On day of discharge, she was ambulating and able to reasonably perform all ADLs. She was voiding and had appropriate bowel function. Pain was well controlled. She was discharged home on post-operative day #3 without complications. Patient was instructed to follow up with her  OB as an outpatient and was given appropriate warnings to call provider if she develops signs of infection or uncontrolled pain. She is breast feeding .  Mom's blood type is A positive, RhoGAM was not indicated      Complications:   Postpartum hemorrhage    Condition at discharge:   stable     Provisions for Follow-Up Care:  See after visit summary for information related to follow-up care and any pertinent home health orders.      Disposition:   Home    Planned Readmission:   No    Discharge Medications:   Please see AVS for a complete list of discharge medications.    Discharge instructions :   Please see AVS for complete discharge instructions.    Jessy Cook MD   PGY-I, OBGYN  5/10/2024  4:52 PM

## 2024-05-08 LAB
ALBUMIN SERPL BCP-MCNC: 3.5 G/DL (ref 3.5–5)
ALP SERPL-CCNC: 101 U/L (ref 34–104)
ALT SERPL W P-5'-P-CCNC: 15 U/L (ref 7–52)
ANION GAP SERPL CALCULATED.3IONS-SCNC: 8 MMOL/L (ref 4–13)
AST SERPL W P-5'-P-CCNC: 12 U/L (ref 13–39)
BILIRUB SERPL-MCNC: 0.31 MG/DL (ref 0.2–1)
BUN SERPL-MCNC: 7 MG/DL (ref 5–25)
CALCIUM SERPL-MCNC: 9 MG/DL (ref 8.4–10.2)
CHLORIDE SERPL-SCNC: 105 MMOL/L (ref 96–108)
CO2 SERPL-SCNC: 21 MMOL/L (ref 21–32)
CREAT SERPL-MCNC: 0.49 MG/DL (ref 0.6–1.3)
ERYTHROCYTE [DISTWIDTH] IN BLOOD BY AUTOMATED COUNT: 14 % (ref 11.6–15.1)
GFR SERPL CREATININE-BSD FRML MDRD: 137 ML/MIN/1.73SQ M
GLUCOSE SERPL-MCNC: 72 MG/DL (ref 65–140)
HCT VFR BLD AUTO: 27.5 % (ref 34.8–46.1)
HGB BLD-MCNC: 8.7 G/DL (ref 11.5–15.4)
MCH RBC QN AUTO: 28.5 PG (ref 26.8–34.3)
MCHC RBC AUTO-ENTMCNC: 31.6 G/DL (ref 31.4–37.4)
MCV RBC AUTO: 90 FL (ref 82–98)
PLATELET # BLD AUTO: 245 THOUSANDS/UL (ref 149–390)
PMV BLD AUTO: 9.1 FL (ref 8.9–12.7)
POTASSIUM SERPL-SCNC: 4 MMOL/L (ref 3.5–5.3)
PROT SERPL-MCNC: 6.5 G/DL (ref 6.4–8.4)
RBC # BLD AUTO: 3.05 MILLION/UL (ref 3.81–5.12)
SODIUM SERPL-SCNC: 134 MMOL/L (ref 135–147)
TREPONEMA PALLIDUM IGG+IGM AB [PRESENCE] IN SERUM OR PLASMA BY IMMUNOASSAY: NORMAL
WBC # BLD AUTO: 16.3 THOUSAND/UL (ref 4.31–10.16)

## 2024-05-08 PROCEDURE — 99024 POSTOP FOLLOW-UP VISIT: CPT | Performed by: OBSTETRICS & GYNECOLOGY

## 2024-05-08 PROCEDURE — 85027 COMPLETE CBC AUTOMATED: CPT

## 2024-05-08 RX ORDER — FERROUS GLUCONATE 324(38)MG
324 TABLET ORAL
Status: DISCONTINUED | OUTPATIENT
Start: 2024-05-09 | End: 2024-05-10 | Stop reason: HOSPADM

## 2024-05-08 RX ADMIN — IBUPROFEN 600 MG: 600 TABLET, FILM COATED ORAL at 12:18

## 2024-05-08 RX ADMIN — KETOROLAC TROMETHAMINE 15 MG: 30 INJECTION, SOLUTION INTRAMUSCULAR; INTRAVENOUS at 05:56

## 2024-05-08 RX ADMIN — ACETAMINOPHEN 975 MG: 325 TABLET ORAL at 18:23

## 2024-05-08 RX ADMIN — KETOROLAC TROMETHAMINE 15 MG: 30 INJECTION, SOLUTION INTRAMUSCULAR; INTRAVENOUS at 09:35

## 2024-05-08 RX ADMIN — ENOXAPARIN SODIUM 40 MG: 40 INJECTION SUBCUTANEOUS at 05:56

## 2024-05-08 RX ADMIN — ACETAMINOPHEN 975 MG: 325 TABLET ORAL at 05:56

## 2024-05-08 RX ADMIN — ENOXAPARIN SODIUM 40 MG: 40 INJECTION SUBCUTANEOUS at 18:23

## 2024-05-08 RX ADMIN — DOCUSATE SODIUM 100 MG: 100 CAPSULE, LIQUID FILLED ORAL at 09:35

## 2024-05-08 RX ADMIN — LABETALOL HYDROCHLORIDE 200 MG: 200 TABLET, FILM COATED ORAL at 09:35

## 2024-05-08 RX ADMIN — IBUPROFEN 600 MG: 600 TABLET, FILM COATED ORAL at 18:23

## 2024-05-08 RX ADMIN — IRON SUCROSE 200 MG: 20 INJECTION, SOLUTION INTRAVENOUS at 20:06

## 2024-05-08 RX ADMIN — ACETAMINOPHEN 975 MG: 325 TABLET ORAL at 12:18

## 2024-05-08 RX ADMIN — DOCUSATE SODIUM 100 MG: 100 CAPSULE, LIQUID FILLED ORAL at 18:23

## 2024-05-08 RX ADMIN — ACETAMINOPHEN 975 MG: 325 TABLET ORAL at 00:05

## 2024-05-08 RX ADMIN — LABETALOL HYDROCHLORIDE 200 MG: 200 TABLET, FILM COATED ORAL at 18:23

## 2024-05-08 NOTE — PLAN OF CARE
Problem: PAIN - ADULT  Goal: Verbalizes/displays adequate comfort level or baseline comfort level  Description: Interventions:  - Encourage patient to monitor pain and request assistance  - Assess pain using appropriate pain scale  - Administer analgesics based on type and severity of pain and evaluate response  - Implement non-pharmacological measures as appropriate and evaluate response  - Consider cultural and social influences on pain and pain management  - Notify physician/advanced practitioner if interventions unsuccessful or patient reports new pain  Outcome: Progressing     Problem: INFECTION - ADULT  Goal: Absence or prevention of progression during hospitalization  Description: INTERVENTIONS:  - Assess and monitor for signs and symptoms of infection  - Monitor lab/diagnostic results  - Monitor all insertion sites, i.e. indwelling lines, tubes, and drains  - Monitor endotracheal if appropriate and nasal secretions for changes in amount and color  - Mcalester appropriate cooling/warming therapies per order  - Administer medications as ordered  - Instruct and encourage patient and family to use good hand hygiene technique  - Identify and instruct in appropriate isolation precautions for identified infection/condition  Outcome: Progressing  Goal: Absence of fever/infection during neutropenic period  Description: INTERVENTIONS:  - Monitor WBC    Outcome: Progressing     Problem: SAFETY ADULT  Goal: Patient will remain free of falls  Description: INTERVENTIONS:  - Educate patient/family on patient safety including physical limitations  - Instruct patient to call for assistance with activity   - Consult OT/PT to assist with strengthening/mobility   - Keep Call bell within reach  - Keep bed low and locked with side rails adjusted as appropriate  - Keep care items and personal belongings within reach  - Initiate and maintain comfort rounds  - Consider moving patient to room near nurses station  Outcome:  Progressing  Goal: Maintain or return to baseline ADL function  Description: INTERVENTIONS:  -  Assess patient's ability to carry out ADLs; assess patient's baseline for ADL function and identify physical deficits which impact ability to perform ADLs (bathing, care of mouth/teeth, toileting, grooming, dressing, etc.)  - Assess/evaluate cause of self-care deficits   - Assess range of motion  - Assess patient's mobility; develop plan if impaired  - Assess patient's need for assistive devices and provide as appropriate  - Encourage maximum independence but intervene and supervise when necessary  - Involve family in performance of ADLs  - Assess for home care needs following discharge   - Consider OT consult to assist with ADL evaluation and planning for discharge  - Provide patient education as appropriate  Outcome: Progressing  Goal: Maintains/Returns to pre admission functional level  Description: INTERVENTIONS:  - Perform AM-PAC 6 Click Basic Mobility/ Daily Activity assessment daily.  - Set and communicate daily mobility goal to care team and patient/family/caregiver.   - Collaborate with rehabilitation services on mobility goals if consulted  - Out of bed for toileting  - Record patient progress and toleration of activity level   Outcome: Progressing     Problem: Knowledge Deficit  Goal: Patient/family/caregiver demonstrates understanding of disease process, treatment plan, medications, and discharge instructions  Description: Complete learning assessment and assess knowledge base.  Interventions:  - Provide teaching at level of understanding  - Provide teaching via preferred learning methods  Outcome: Progressing     Problem: DISCHARGE PLANNING  Goal: Discharge to home or other facility with appropriate resources  Description: INTERVENTIONS:  - Identify barriers to discharge w/patient and caregiver  - Arrange for needed discharge resources and transportation as appropriate  - Identify discharge learning needs  (meds, wound care, etc.)  - Arrange for interpretive services to assist at discharge as needed  - Refer to Case Management Department for coordinating discharge planning if the patient needs post-hospital services based on physician/advanced practitioner order or complex needs related to functional status, cognitive ability, or social support system  Outcome: Progressing     Problem: POSTPARTUM  Goal: Experiences normal postpartum course  Description: INTERVENTIONS:  - Monitor maternal vital signs  - Assess uterine involution and lochia  Outcome: Progressing  Goal: Appropriate maternal -  bonding  Description: INTERVENTIONS:  - Identify family support  - Assess for appropriate maternal/infant bonding   -Encourage maternal/infant bonding opportunities  - Referral to  or  as needed  Outcome: Progressing  Goal: Establishment of infant feeding pattern  Description: INTERVENTIONS:  - Assess breast/bottle feeding  - Refer to lactation as needed  Outcome: Progressing  Goal: Incision(s), wounds(s) or drain site(s) healing without S/S of infection  Description: INTERVENTIONS  - Assess and document dressing, incision, wound bed, drain sites and surrounding tissue  - Provide patient and family education  - Perform skin care/dressing changes   Outcome: Progressing

## 2024-05-08 NOTE — PROGRESS NOTES
Progress Note - OB/GYN  Latisha Kelly 24 y.o. female MRN: [id number]  Unit/Bed#: -01 Encounter: [id number]    Assessment and Plan     Latisha Kelly is a patient of: [facility]. She is PPD# 1 s/p 1LTCS due to CAT II and BPP of 2/10. .  Recovering well and is stable       * Status post primary low transverse  section  Assessment & Plan  QBL 1043, Nuknit on hysterotomy Hgb 10.4 --> post op Hgb pending  Lines: weber in place , adequate output  Pain: Tylenol and toradol scheduled, debi 5/10 PRN    FEN: Tolerating regular diet  DVT ppx: SCDs and  Lovenox 40mg qD  Passing flatus   Incision C/D/I      Chronic hypertension with super imposed preeclampsia without severe features  Assessment & Plan    Systolic (24hrs), Av , Min:111 , Max:133     Diastolic (24hrs), Av, Min:53, Max:77      On home labetalol 200mg BID  Admission CBC/CMP, UP:C 0.84    Postpartum hemorrhage  Assessment & Plan  QBL 1043, Nuknit on hysterotomy Hgb 10.4 --> post op Hgb pending  Hemodynamically stable  Asymptomatic     Non-reassuring fetal heart tones complicating pregnancy, antepartum  Assessment & Plan  BPP 2/10 at MFM 24; points for polyhydramnios with ALEKSANDAR of 26.4.  Nonreactive with no decelerations.  Surveillance secondary to chronic decreased fetal movement, chronic hypertension, maternal BMI 44, A2 GDM.    Subsequent monitoring in triage active, with moderate variability, accelerations, no decelerations, and no contractions on tocometry.    Cervical cerclage suture present in third trimester  Assessment & Plan  Osman cerclage placed 2024  Presented at 23w with 4cm dilation and 100% effacement. Amnio performed and negative for infection. Received BTM x2. Cervix regressed to a tight 2 cm dilated and thick on bimanual exam with a long funnel seen by TVUS. Exam-indicated cerclage placed 24.  Removed intra-op on , no cervical injury noted            Disposition    - Anticipate discharge  "home on POD# 2-4 ( baby in NICU)      Subjective/Objective     Chief Complaint: Postpartum State     Subjective:    Latisha Kelly is POD#1 s/p 1LTCS. She has no current complaints.  Pain is well controlled.  Patient is currently voiding via weber and adequate ouput.  She is not ambulating.  Patient is not currently passing flatus and has had no bowel movement. She is tolerating PO, and denies nausea or vomitting. Patient denies headache, vision changes, fever, chills, chest pain, shortness of breath, RUQ pain or calf tenderness. Lochia is normal. She is  Using breast pump. She is recovering well and is stable.           Vitals:   /57 (BP Location: Right arm)   Pulse 88   Temp 98.1 °F (36.7 °C) (Oral)   Resp 16   Ht 5' 6\" (1.676 m)   Wt 126 kg (277 lb 12.8 oz)   LMP 09/18/2023   SpO2 97%   Breastfeeding Unknown   BMI 44.84 kg/m²       Intake/Output Summary (Last 24 hours) at 5/8/2024 0557  Last data filed at 5/8/2024 0016  Gross per 24 hour   Intake 3712.5 ml   Output 1793 ml   Net 1919.5 ml       Invasive Devices       Peripheral Intravenous Line  Duration             Peripheral IV 05/07/24 Distal;Left;Ventral (anterior) Forearm <1 day              Drain  Duration             Urethral Catheter Double-lumen;Non-latex;Straight-tip 16 Fr. <1 day                    Physical Exam:   GEN: Latisha Kelly appears well, alert and oriented x 3, pleasant and cooperative   CARDIO: RRR, no murmurs or rubs  RESP:  CTAB, no wheezes or rales  ABDOMEN: soft, no tenderness, no distention, fundus @ 2cm below u, mepilex dressing in place   EXTREMITIES: SCDs on, non tender, no erythema      Labs:     Hemoglobin   Date Value Ref Range Status   05/07/2024 10.4 (L) 11.5 - 15.4 g/dL Final   03/26/2024 10.5 (L) 11.5 - 15.4 g/dL Final     WBC   Date Value Ref Range Status   05/07/2024 9.69 4.31 - 10.16 Thousand/uL Final   03/26/2024 8.95 4.31 - 10.16 Thousand/uL Final     Platelets   Date Value Ref Range Status "   05/07/2024 269 149 - 390 Thousands/uL Final   03/26/2024 285 149 - 390 Thousands/uL Final     Creatinine   Date Value Ref Range Status   05/07/2024 0.49 (L) 0.60 - 1.30 mg/dL Final     Comment:     Standardized to IDMS reference method   02/26/2024 0.53 (L) 0.60 - 1.30 mg/dL Final     Comment:     Standardized to IDMS reference method   01/28/2020 0.83 0.40 - 1.10 mg/dL Final     AST   Date Value Ref Range Status   05/07/2024 12 (L) 13 - 39 U/L Final   02/26/2024 12 (L) 13 - 39 U/L Final   01/28/2020 7 <41 U/L Final     ALT   Date Value Ref Range Status   05/07/2024 15 7 - 52 U/L Final     Comment:     Specimen collection should occur prior to Sulfasalazine administration due to the potential for falsely depressed results.    02/26/2024 22 7 - 52 U/L Final     Comment:     Specimen collection should occur prior to Sulfasalazine administration due to the potential for falsely depressed results.    01/28/2020 22 <56 U/L Final          Siria Baumann MD  5/8/2024  5:57 AM

## 2024-05-08 NOTE — LACTATION NOTE
This note was copied from a baby's chart.  CONSULT - LACTATION  Baby Girl Kelly (Brianna) 1 days female MRN: [id number]    [facility] NICU Room / Bed: NICU 16/NICU 16- Encounter: [id number]    Maternal Information     MOTHER:  Latisha Kelly  Maternal Age: 24 y.o.   OB History: # 1 - Date: 24, Sex: Female, Weight: None, GA: 33w1d, Delivery: , Low Transverse, Apgar1: 6, Apgar5: 8, Living: Living, Birth Comments: None   Previouse breast reduction surgery? No    Lactation history:   Has patient previously breast fed:     How long had patient previously breast fed:     Previous breast feeding complications:       Past Surgical History:   Procedure Laterality Date    MD CERCLAGE UTERINE CERVIX NONOBSTETRICAL N/A 2024    Procedure: CERCLAGE CERVICAL;  Surgeon: Ann Hoffmann MD;  Location: [facility] ;  Service: Obstetrics    TURBINATE RESECTION          Birth information:  YOB: 2023   Time of birth: 5:10 PM   Sex: female   Delivery type: , Low Transverse   Birth Weight: 2010 g (4 lb 6.9 oz)   Percent of Weight Change: 0%     Gestational Age: 33w1d   [unfilled]    Assessment     Breast and nipple assessment:  bilateral large breasts       24 1200   Lactation Consultation   Reason for Consult 20   Breasts/Nipples   Date Pumping Initiated 24   Left Breast Soft   Right Breast Soft   Left Nipple Everted   Right Nipple Everted   Intervention Breast pump;Hand expression   Breastfeeding Status Yes   Breastfeeding Progress Pumping only   Reasons for not Breastfeeding Infant medical condition   Other OB Lactation Tools   Feeding Devices Syringe   Breast Pump   Pump 3;2;1  (Mom Cozy)   Pump Review/Education Setup, frequency, and cleaning;Milk storage   Initiated by nursing staff   Date Initiated 24   Patient Follow-Up   Lactation Consult Status 2   Follow-Up Type Inpatient;Call as needed   Other OB Lactation Documentation     Additional Problem Noted Mom pumping for baby in NICU. Enc mom to pump every 2-3 hours. Reviewed how to cycle through a pumping session. Reviewed breast milk storage guidelines. Mom has PCOS and thyroid issues. Referred to baby and me for additional support and follow up.  (RSB and DC booklets reviewed.)       Feeding recommendations:  pump every 2-3 hours  Mom pumping for baby in NICU. Enc mom to pump every 2-3 hours. Reviewed how to cycle through a pumping session. Reviewed breast milk storage guidelines. Mom has PCOS and thyroid issues. Education on milk production and PCOS and thryoid issues. Referred to baby and me for additional support and follow up.    Pumping:   - When pumping, begin in stimulation mode (high cycle, low vacuum) until milk begins to express. Change pump to expression mode (low cycle, high vacuum). Use hands on pumping techniques to assist with milk transfer. When milk stops expressing, change back to stimulation mode. When milk begins to flow, change to expression mode. You may cycle pump up to three times in a pumping session.  Instructions given on pumping.  Discussed when to start, frequency, different pumps available versus manual expression.    Mom provided with and discussed RBS, Hand expression/2nd night handout and increase supply for NICU baby. Reviewed pumping log and expectations for pumping output in the first week. Reviewed cycle pumping and appropriate pump settings, as well as pumping for 10-15 min 8-12 times per day.       Enc Mom to discussed putting baby to the breast with the NICU team when baby is medically stable to do so. Enc her to call for lactation support as needed throughout her stay.     Met with mother. Provided mother with Ready, Set, Baby booklet which contained information on:  Hand expression with access to QR codes to review hand expression.  Positioning and latch reviewed as well as showing images of other feeding positions.  Discussed the properties of a  good latch in any position.   Feeding on cue and what that means for recognizing infant's hunger, s/s that baby is getting enough milk and some s/s that breastfeeding dyad may need further help  Skin to Skin contact an benefits to mom and baby  Avoidance of pacifiers for the first month discussed.   Gave information on common concerns, what to expect the first few weeks after delivery, preparing for other caregivers, and how partners can help. Resources for support also provided.  Met with mother to go over discharge breastfeeding booklet including the feeding log. Emphasized 8 or more (12) feedings in a 24 hour period, what to expect for the number of diapers per day of life and the progression of properties of the  stooling pattern.    List of reasons to call a lactation consultant.  Feeding logs  Feeding cues  Hand expression  Baby's Second day (cluster feeding)  Breastfeeding and Your Lifestyle (Medications, Alcohol, Caffeine, Smoking, Street Drugs, Methadone)  First Two Weeks Survival Guide for Breastfeeding  Breast Changes  Physical Therapy  Storage and Handling of Breast milk  How to Keep Your Breast Pump Kit Clean  The Employed Breastfeeding Mother  Mixed feeding  Bottle feeding like breastfeeding (paced bottle feeding)  astfeeding and your lifestyle, storage and preparation of breast milk, how to keep you breast pump clean, the employed breastfeeding mother and paced bottle feeding handouts.     Booklet included Breastfeeding Resources for after discharge including access to the number for the [facility].      Dolly Slaughter 2024 1:00 PM

## 2024-05-08 NOTE — PLAN OF CARE
Problem: PAIN - ADULT  Goal: Verbalizes/displays adequate comfort level or baseline comfort level  Description: Interventions:  - Encourage patient to monitor pain and request assistance  - Assess pain using appropriate pain scale  - Administer analgesics based on type and severity of pain and evaluate response  - Implement non-pharmacological measures as appropriate and evaluate response  - Consider cultural and social influences on pain and pain management  - Notify physician/advanced practitioner if interventions unsuccessful or patient reports new pain  Outcome: Progressing     Problem: INFECTION - ADULT  Goal: Absence or prevention of progression during hospitalization  Description: INTERVENTIONS:  - Assess and monitor for signs and symptoms of infection  - Monitor lab/diagnostic results  - Monitor all insertion sites, i.e. indwelling lines, tubes, and drains  - Monitor endotracheal if appropriate and nasal secretions for changes in amount and color  - [place] appropriate cooling/warming therapies per order  - Administer medications as ordered  - Instruct and encourage patient and family to use good hand hygiene technique  - Identify and instruct in appropriate isolation precautions for identified infection/condition  Outcome: Progressing  Goal: Absence of fever/infection during neutropenic period  Description: INTERVENTIONS:  - Monitor WBC    Outcome: Progressing     Problem: SAFETY ADULT  Goal: Patient will remain free of falls  Description: INTERVENTIONS:  - Educate patient/family on patient safety including physical limitations  - Instruct patient to call for assistance with activity   - Consult OT/PT to assist with strengthening/mobility   - Keep Call bell within reach  - Keep bed low and locked with side rails adjusted as appropriate  - Keep care items and personal belongings within reach  - Initiate and maintain comfort rounds  - Consider moving patient to room near nurses station  Outcome:  Progressing  Goal: Maintain or return to baseline ADL function  Description: INTERVENTIONS:  -  Assess patient's ability to carry out ADLs; assess patient's baseline for ADL function and identify physical deficits which impact ability to perform ADLs (bathing, care of mouth/teeth, toileting, grooming, dressing, etc.)  - Assess/evaluate cause of self-care deficits   - Assess range of motion  - Assess patient's mobility; develop plan if impaired  - Assess patient's need for assistive devices and provide as appropriate  - Encourage maximum independence but intervene and supervise when necessary  - Involve family in performance of ADLs  - Assess for home care needs following discharge   - Consider OT consult to assist with ADL evaluation and planning for discharge  - Provide patient education as appropriate  Outcome: Progressing  Goal: Maintains/Returns to pre admission functional level  Description: INTERVENTIONS:  - Perform AM-PAC 6 Click Basic Mobility/ Daily Activity assessment daily.  - Set and communicate daily mobility goal to care team and patient/family/caregiver.   - Collaborate with rehabilitation services on mobility goals if consulted  - Out of bed for toileting  - Record patient progress and toleration of activity level   Outcome: Progressing     Problem: Knowledge Deficit  Goal: Patient/family/caregiver demonstrates understanding of disease process, treatment plan, medications, and discharge instructions  Description: Complete learning assessment and assess knowledge base.  Interventions:  - Provide teaching at level of understanding  - Provide teaching via preferred learning methods  Outcome: Progressing     Problem: DISCHARGE PLANNING  Goal: Discharge to home or other facility with appropriate resources  Description: INTERVENTIONS:  - Identify barriers to discharge w/patient and caregiver  - Arrange for needed discharge resources and transportation as appropriate  - Identify discharge learning needs  (meds, wound care, etc.)  - Arrange for interpretive services to assist at discharge as needed  - Refer to Case Management Department for coordinating discharge planning if the patient needs post-hospital services based on physician/advanced practitioner order or complex needs related to functional status, cognitive ability, or social support system  Outcome: Progressing     Problem: POSTPARTUM  Goal: Experiences normal postpartum course  Description: INTERVENTIONS:  - Monitor maternal vital signs  - Assess uterine involution and lochia  Outcome: Progressing  Goal: Appropriate maternal -  bonding  Description: INTERVENTIONS:  - Identify family support  - Assess for appropriate maternal/infant bonding   -Encourage maternal/infant bonding opportunities  - Referral to  or  as needed  Outcome: Progressing  Goal: Establishment of infant feeding pattern  Description: INTERVENTIONS:  - Assess breast/bottle feeding  - Refer to lactation as needed  Outcome: Progressing  Goal: Incision(s), wounds(s) or drain site(s) healing without S/S of infection  Description: INTERVENTIONS  - Assess and document dressing, incision, wound bed, drain sites and surrounding tissue  - Provide patient and family education  - Perform skin care/dressing changes   Outcome: Progressing

## 2024-05-08 NOTE — ASSESSMENT & PLAN NOTE
QBL 1043, Nuknit on hysterotomy Hgb 10.4 --> 8.7, declines venofer > PO Iron  Hemodynamically stable  Asymptomatic

## 2024-05-09 PROCEDURE — 99024 POSTOP FOLLOW-UP VISIT: CPT | Performed by: OBSTETRICS & GYNECOLOGY

## 2024-05-09 RX ORDER — POLYETHYLENE GLYCOL 3350 17 G/17G
17 POWDER, FOR SOLUTION ORAL DAILY PRN
Status: DISCONTINUED | OUTPATIENT
Start: 2024-05-09 | End: 2024-05-10 | Stop reason: HOSPADM

## 2024-05-09 RX ORDER — SIMETHICONE 80 MG
80 TABLET,CHEWABLE ORAL EVERY 6 HOURS PRN
Status: DISCONTINUED | OUTPATIENT
Start: 2024-05-09 | End: 2024-05-10 | Stop reason: HOSPADM

## 2024-05-09 RX ADMIN — DOCUSATE SODIUM 100 MG: 100 CAPSULE, LIQUID FILLED ORAL at 17:20

## 2024-05-09 RX ADMIN — ACETAMINOPHEN 975 MG: 325 TABLET ORAL at 14:34

## 2024-05-09 RX ADMIN — DOCUSATE SODIUM 100 MG: 100 CAPSULE, LIQUID FILLED ORAL at 08:58

## 2024-05-09 RX ADMIN — ENOXAPARIN SODIUM 40 MG: 40 INJECTION SUBCUTANEOUS at 17:11

## 2024-05-09 RX ADMIN — SIMETHICONE 80 MG: 80 TABLET, CHEWABLE ORAL at 17:11

## 2024-05-09 RX ADMIN — LABETALOL HYDROCHLORIDE 200 MG: 200 TABLET, FILM COATED ORAL at 08:58

## 2024-05-09 RX ADMIN — POLYETHYLENE GLYCOL 3350 17 G: 17 POWDER, FOR SOLUTION ORAL at 17:12

## 2024-05-09 RX ADMIN — IBUPROFEN 600 MG: 600 TABLET, FILM COATED ORAL at 14:34

## 2024-05-09 RX ADMIN — IBUPROFEN 600 MG: 600 TABLET, FILM COATED ORAL at 06:19

## 2024-05-09 RX ADMIN — ACETAMINOPHEN 975 MG: 325 TABLET ORAL at 00:43

## 2024-05-09 RX ADMIN — IBUPROFEN 600 MG: 600 TABLET, FILM COATED ORAL at 00:43

## 2024-05-09 RX ADMIN — ACETAMINOPHEN 975 MG: 325 TABLET ORAL at 06:19

## 2024-05-09 RX ADMIN — FERROUS GLUCONATE 324 MG: 324 TABLET ORAL at 08:59

## 2024-05-09 RX ADMIN — LABETALOL HYDROCHLORIDE 200 MG: 200 TABLET, FILM COATED ORAL at 17:19

## 2024-05-09 RX ADMIN — LEVOTHYROXINE SODIUM 25 MCG: 25 TABLET ORAL at 06:19

## 2024-05-09 RX ADMIN — ENOXAPARIN SODIUM 40 MG: 40 INJECTION SUBCUTANEOUS at 06:19

## 2024-05-09 NOTE — PLAN OF CARE
Problem: PAIN - ADULT  Goal: Verbalizes/displays adequate comfort level or baseline comfort level  Description: Interventions:  - Encourage patient to monitor pain and request assistance  - Assess pain using appropriate pain scale  - Administer analgesics based on type and severity of pain and evaluate response  - Implement non-pharmacological measures as appropriate and evaluate response  - Consider cultural and social influences on pain and pain management  - Notify physician/advanced practitioner if interventions unsuccessful or patient reports new pain  Outcome: Progressing     Problem: INFECTION - ADULT  Goal: Absence or prevention of progression during hospitalization  Description: INTERVENTIONS:  - Assess and monitor for signs and symptoms of infection  - Monitor lab/diagnostic results  - Monitor all insertion sites, i.e. indwelling lines, tubes, and drains  - Monitor endotracheal if appropriate and nasal secretions for changes in amount and color  - [place] appropriate cooling/warming therapies per order  - Administer medications as ordered  - Instruct and encourage patient and family to use good hand hygiene technique  - Identify and instruct in appropriate isolation precautions for identified infection/condition  Outcome: Progressing  Goal: Absence of fever/infection during neutropenic period  Description: INTERVENTIONS:  - Monitor WBC    Outcome: Progressing     Problem: SAFETY ADULT  Goal: Patient will remain free of falls  Description: INTERVENTIONS:  - Educate patient/family on patient safety including physical limitations  - Instruct patient to call for assistance with activity   - Consult OT/PT to assist with strengthening/mobility   - Keep Call bell within reach  - Keep bed low and locked with side rails adjusted as appropriate  - Keep care items and personal belongings within reach  - Initiate and maintain comfort rounds  - Consider moving patient to room near nurses station  Outcome:  Progressing  Goal: Maintain or return to baseline ADL function  Description: INTERVENTIONS:  -  Assess patient's ability to carry out ADLs; assess patient's baseline for ADL function and identify physical deficits which impact ability to perform ADLs (bathing, care of mouth/teeth, toileting, grooming, dressing, etc.)  - Assess/evaluate cause of self-care deficits   - Assess range of motion  - Assess patient's mobility; develop plan if impaired  - Assess patient's need for assistive devices and provide as appropriate  - Encourage maximum independence but intervene and supervise when necessary  - Involve family in performance of ADLs  - Assess for home care needs following discharge   - Consider OT consult to assist with ADL evaluation and planning for discharge  - Provide patient education as appropriate  Outcome: Progressing  Goal: Maintains/Returns to pre admission functional level  Description: INTERVENTIONS:  - Perform AM-PAC 6 Click Basic Mobility/ Daily Activity assessment daily.  - Set and communicate daily mobility goal to care team and patient/family/caregiver.   - Collaborate with rehabilitation services on mobility goals if consulted  - Out of bed for toileting  - Record patient progress and toleration of activity level   Outcome: Progressing     Problem: Knowledge Deficit  Goal: Patient/family/caregiver demonstrates understanding of disease process, treatment plan, medications, and discharge instructions  Description: Complete learning assessment and assess knowledge base.  Interventions:  - Provide teaching at level of understanding  - Provide teaching via preferred learning methods  Outcome: Progressing     Problem: DISCHARGE PLANNING  Goal: Discharge to home or other facility with appropriate resources  Description: INTERVENTIONS:  - Identify barriers to discharge w/patient and caregiver  - Arrange for needed discharge resources and transportation as appropriate  - Identify discharge learning needs  (meds, wound care, etc.)  - Arrange for interpretive services to assist at discharge as needed  - Refer to Case Management Department for coordinating discharge planning if the patient needs post-hospital services based on physician/advanced practitioner order or complex needs related to functional status, cognitive ability, or social support system  Outcome: Progressing     Problem: POSTPARTUM  Goal: Experiences normal postpartum course  Description: INTERVENTIONS:  - Monitor maternal vital signs  - Assess uterine involution and lochia  Outcome: Progressing  Goal: Appropriate maternal -  bonding  Description: INTERVENTIONS:  - Identify family support  - Assess for appropriate maternal/infant bonding   -Encourage maternal/infant bonding opportunities  - Referral to  or  as needed  Outcome: Progressing  Goal: Establishment of infant feeding pattern  Description: INTERVENTIONS:  - Assess breast/bottle feeding  - Refer to lactation as needed  Outcome: Progressing  Goal: Incision(s), wounds(s) or drain site(s) healing without S/S of infection  Description: INTERVENTIONS  - Assess and document dressing, incision, wound bed, drain sites and surrounding tissue  - Provide patient and family education  - Perform skin care/dressing changes   Outcome: Progressing

## 2024-05-09 NOTE — PROGRESS NOTES
Obstetrics Progress Note  Latisha Kelly 24 y.o. female MRN: [id number]  Unit/Bed#: -01 Encounter: [id number]    Assessment/Plan:  Postoperative day #2 s/p primary low transverse  delivery. Stable.  Baby in NICU. By issue:  Postpartum hemorrhage  Assessment & Plan  QBL 1043, Nuknit on hysterotomy Hgb 10.4 --> 8.7, declines venofer > PO Iron  Hemodynamically stable  Asymptomatic     Chronic hypertension with super imposed preeclampsia without severe features  Assessment & Plan    Systolic (24hrs), Av , Min:103 , Max:133     Diastolic (24hrs), Av, Min:56, Max:73      On home labetalol 200mg BID  Admission CBC/CMP, UP:C 0.84  Continue to monitor blood pressures    * Status post primary low transverse  section  Assessment & Plan  QBL 1043, Nuknit on hysterotomy Hgb 10.4 --> 8.7 > PO iron  Voiding spontaneously  Pain: Tylenol and toradol scheduled, debi 5/10 PRN    FEN: Tolerating regular diet  DVT ppx: SCDs and  Lovenox 40mg qD  Passing flatus  Routine postpartum care      Anticipate discharge POD #3 vs #4.      Subjective/Objective   Chief Complaint:   Post delivery.     Subjective:   Pain: well controlled. Tolerating PO: yes. Voiding: yes. Flatus: yes. Bowel Movement: no. Ambulating: yes. Chest pain: no. Shortness of breath: no. Leg pain: no. Lochia: within normal limits. Infant feeding: breast.    Objective:   Vitals:   Temp:  [97.7 °F (36.5 °C)-99.1 °F (37.3 °C)] 98.9 °F (37.2 °C)  HR:  [89-93] 90  Resp:  [18] 18  BP: (116-133)/(56-73) 122/56     Intake/Output Summary (Last 24 hours) at 2024 0612  Last data filed at 2024 1901  Gross per 24 hour   Intake --   Output 500 ml   Net -500 ml       Physical Exam:   -General: alert and oriented x 3, in no apparent distress  -Cardiovascular: regular rate and rhythm  -Pulmonary: normal effort, clear to auscultation bilaterally  -Abdomen/Pelvis: soft, non-tender, non-distended, no rebound or guarding. Uterine fundus firm and  non-tender, at the umbilicus. Incision: mepilex dressing in place, clean and dry  -Extremities: Nontender    Lab Results   Component Value Date    WBC 16.30 (H) 05/08/2024    HGB 8.7 (L) 05/08/2024    HCT 27.5 (L) 05/08/2024    MCV 90 05/08/2024     05/08/2024         Jessy Cook MD  PGY-I, OBGYN  5/9/2024, 6:12 AM

## 2024-05-09 NOTE — CASE MANAGEMENT
Case Management Progress Note    Patient name Latsiha Kelly  Location /-01 MRN [id number]  : 2000 Date 2024       LOS (days): 2  Geometric Mean LOS (GMLOS) (days):   Days to GMLOS:        OBJECTIVE:        Current admission status: Inpatient  Preferred Pharmacy:   [id number] [address]  [address] [address]  Phone: [phone] Fax: [phone]    [facility] [place] - [address]  [address]  [facility] [address]  Phone: [phone] Fax: [phone]    Primary Care Provider: VALENTIN Parrish    Primary Insurance: BLUE CROSS  Secondary Insurance:     PROGRESS NOTE:      CM met with MOB to introduce CM services, complete assessment, and provide CM contact info.    MOB reported the following:    Assessment:  Consult reason: NICU Admission  Gestational Age at Birth: 33 Weeks + 1 Days  MOB Name (& age if teen):   Latisha Kelly  FOB Name (& age if teen MOB):   Isa Gutiérrez  Other Legal Guardian(s) for Baby:    n/a  Other Children:   None  Housing Plan/Lives with:   FOB  Insurance Coverage/Plan for Baby: MOB verbalizes that they will contact their insurance to add baby ASAP.   Support System: Family and Spouse/Significant Other  Care Items: Car Seat, Crib/Bassinet (Safe Sleep Space), Diapers/Wipes, and Clothing  Method of Feeding: Breast Feeding  Breast Pump: Breast pump obtained prior to admission  Government Assistance Programs: WIC (Special Supplemental Nutrition Program for Women, Infants, and Children)   Arrangements: MOB  Current Employment/Schooling: MOB employed Full Time  Mental Health History and/or Treatment:   None reported   Substance Use History and/or Treatment:   None reported   Urine Drug Screen Results: Not Applicable  Children & Youth History: None  Current Legal Issues: N/A  Domestic/Intimate Partner Violence History: Denies.  NICU  Resources: NICU Packet Provided    Discharge Plan:  Pediatrician:   [facility]  Prenatal/ Care:  [facility]  Follow-Up Appointments Needed/Scheduled: TBD  Medications/DME/Other Referrals: TBD  Transportation Plan: MOB has a vehicle    Follow-Up Needed from Care Management:     NICU packet provided, CM will be available for any needs.

## 2024-05-09 NOTE — LACTATION NOTE
Checked on Latisha who is pumping for her baby girl in NICU.     Mom states that she is starting to get a few drops of colostrum with pumping, Stressed importance of pumping every 2-3 hours to protect/establish her milk supply.    Mom states that she was seen by Lactation and pumping, increasing supply and breast care information was reviewed with her.     Encouraged her to call with additional questions and for Lactation/pumping support as needed.

## 2024-05-10 ENCOUNTER — TELEPHONE (OUTPATIENT)
Dept: PERINATAL CARE | Facility: CLINIC | Age: 24
End: 2024-05-10

## 2024-05-10 VITALS
WEIGHT: 277.8 LBS | DIASTOLIC BLOOD PRESSURE: 60 MMHG | RESPIRATION RATE: 18 BRPM | OXYGEN SATURATION: 100 % | HEIGHT: 66 IN | BODY MASS INDEX: 44.65 KG/M2 | SYSTOLIC BLOOD PRESSURE: 130 MMHG | TEMPERATURE: 98.9 F | HEART RATE: 99 BPM

## 2024-05-10 DIAGNOSIS — Z13.1 DIABETES MELLITUS SCREENING: Primary | ICD-10-CM

## 2024-05-10 DIAGNOSIS — Z86.32 HISTORY OF GESTATIONAL DIABETES MELLITUS, NOT CURRENTLY PREGNANT: ICD-10-CM

## 2024-05-10 PROCEDURE — 99024 POSTOP FOLLOW-UP VISIT: CPT | Performed by: OBSTETRICS & GYNECOLOGY

## 2024-05-10 PROCEDURE — NC001 PR NO CHARGE: Performed by: OBSTETRICS & GYNECOLOGY

## 2024-05-10 RX ORDER — LABETALOL 200 MG/1
200 TABLET, FILM COATED ORAL 2 TIMES DAILY
Start: 2024-05-10

## 2024-05-10 RX ORDER — IBUPROFEN 600 MG/1
600 TABLET ORAL EVERY 6 HOURS SCHEDULED
Qty: 30 TABLET | Refills: 0 | Status: SHIPPED | OUTPATIENT
Start: 2024-05-10

## 2024-05-10 RX ADMIN — ENOXAPARIN SODIUM 40 MG: 40 INJECTION SUBCUTANEOUS at 05:48

## 2024-05-10 RX ADMIN — IBUPROFEN 600 MG: 600 TABLET, FILM COATED ORAL at 05:48

## 2024-05-10 RX ADMIN — LEVOTHYROXINE SODIUM 25 MCG: 25 TABLET ORAL at 05:48

## 2024-05-10 RX ADMIN — ACETAMINOPHEN 975 MG: 325 TABLET ORAL at 12:31

## 2024-05-10 RX ADMIN — ACETAMINOPHEN 975 MG: 325 TABLET ORAL at 05:48

## 2024-05-10 RX ADMIN — IBUPROFEN 600 MG: 600 TABLET, FILM COATED ORAL at 19:08

## 2024-05-10 RX ADMIN — LABETALOL HYDROCHLORIDE 200 MG: 200 TABLET, FILM COATED ORAL at 08:23

## 2024-05-10 RX ADMIN — ACETAMINOPHEN 975 MG: 325 TABLET ORAL at 19:08

## 2024-05-10 RX ADMIN — ENOXAPARIN SODIUM 40 MG: 40 INJECTION SUBCUTANEOUS at 19:07

## 2024-05-10 RX ADMIN — IBUPROFEN 600 MG: 600 TABLET, FILM COATED ORAL at 12:31

## 2024-05-10 RX ADMIN — ACETAMINOPHEN 975 MG: 325 TABLET ORAL at 00:12

## 2024-05-10 RX ADMIN — DOCUSATE SODIUM 100 MG: 100 CAPSULE, LIQUID FILLED ORAL at 08:23

## 2024-05-10 RX ADMIN — FERROUS GLUCONATE 324 MG: 324 TABLET ORAL at 08:23

## 2024-05-10 RX ADMIN — IBUPROFEN 600 MG: 600 TABLET, FILM COATED ORAL at 00:12

## 2024-05-10 NOTE — PLAN OF CARE
Problem: PAIN - ADULT  Goal: Verbalizes/displays adequate comfort level or baseline comfort level  Description: Interventions:  - Encourage patient to monitor pain and request assistance  - Assess pain using appropriate pain scale  - Administer analgesics based on type and severity of pain and evaluate response  - Implement non-pharmacological measures as appropriate and evaluate response  - Consider cultural and social influences on pain and pain management  - Notify physician/advanced practitioner if interventions unsuccessful or patient reports new pain  5/10/2024 0453 by Francesca Brody RN  Outcome: Progressing  5/10/2024 0453 by Francesca Brody RN  Outcome: Progressing     Problem: INFECTION - ADULT  Goal: Absence or prevention of progression during hospitalization  Description: INTERVENTIONS:  - Assess and monitor for signs and symptoms of infection  - Monitor lab/diagnostic results  - Monitor all insertion sites, i.e. indwelling lines, tubes, and drains  - Monitor endotracheal if appropriate and nasal secretions for changes in amount and color  - [place] appropriate cooling/warming therapies per order  - Administer medications as ordered  - Instruct and encourage patient and family to use good hand hygiene technique  - Identify and instruct in appropriate isolation precautions for identified infection/condition  5/10/2024 0453 by Francesca Brody RN  Outcome: Progressing  5/10/2024 0453 by Francesca Brody RN  Outcome: Progressing  Goal: Absence of fever/infection during neutropenic period  Description: INTERVENTIONS:  - Monitor WBC    5/10/2024 0453 by Francesca Brody RN  Outcome: Progressing  5/10/2024 0453 by Francesca Brody RN  Outcome: Progressing     Problem: SAFETY ADULT  Goal: Patient will remain free of falls  Description: INTERVENTIONS:  - Educate patient/family on patient safety including physical limitations  - Instruct patient to call for assistance with activity   - Consult OT/PT to assist with  strengthening/mobility   - Keep Call bell within reach  - Keep bed low and locked with side rails adjusted as appropriate  - Keep care items and personal belongings within reach  - Initiate and maintain comfort rounds  - Consider moving patient to room near nurses station  5/10/2024 0453 by Francesca Brody RN  Outcome: Progressing  5/10/2024 0453 by Francesca Brody RN  Outcome: Progressing  Goal: Maintain or return to baseline ADL function  Description: INTERVENTIONS:  -  Assess patient's ability to carry out ADLs; assess patient's baseline for ADL function and identify physical deficits which impact ability to perform ADLs (bathing, care of mouth/teeth, toileting, grooming, dressing, etc.)  - Assess/evaluate cause of self-care deficits   - Assess range of motion  - Assess patient's mobility; develop plan if impaired  - Assess patient's need for assistive devices and provide as appropriate  - Encourage maximum independence but intervene and supervise when necessary  - Involve family in performance of ADLs  - Assess for home care needs following discharge   - Consider OT consult to assist with ADL evaluation and planning for discharge  - Provide patient education as appropriate  5/10/2024 0453 by Francesca Brody RN  Outcome: Progressing  5/10/2024 0453 by Francesca Brody RN  Outcome: Progressing  Goal: Maintains/Returns to pre admission functional level  Description: INTERVENTIONS:  - Perform AM-PAC 6 Click Basic Mobility/ Daily Activity assessment daily.  - Set and communicate daily mobility goal to care team and patient/family/caregiver.   - Collaborate with rehabilitation services on mobility goals if consulted  - Out of bed for toileting  - Record patient progress and toleration of activity level   5/10/2024 0453 by Francesca Brody RN  Outcome: Progressing  5/10/2024 0453 by Francesca Brody RN  Outcome: Progressing     Problem: Knowledge Deficit  Goal: Patient/family/caregiver demonstrates understanding of disease process,  treatment plan, medications, and discharge instructions  Description: Complete learning assessment and assess knowledge base.  Interventions:  - Provide teaching at level of understanding  - Provide teaching via preferred learning methods  5/10/2024 0453 by Francesca Brody RN  Outcome: Progressing  5/10/2024 0453 by Francesca Brody RN  Outcome: Progressing     Problem: DISCHARGE PLANNING  Goal: Discharge to home or other facility with appropriate resources  Description: INTERVENTIONS:  - Identify barriers to discharge w/patient and caregiver  - Arrange for needed discharge resources and transportation as appropriate  - Identify discharge learning needs (meds, wound care, etc.)  - Arrange for interpretive services to assist at discharge as needed  - Refer to Case Management Department for coordinating discharge planning if the patient needs post-hospital services based on physician/advanced practitioner order or complex needs related to functional status, cognitive ability, or social support system  5/10/2024 0453 by Francesca Brody RN  Outcome: Progressing  5/10/2024 0453 by Francesca Brody RN  Outcome: Progressing     Problem: POSTPARTUM  Goal: Experiences normal postpartum course  Description: INTERVENTIONS:  - Monitor maternal vital signs  - Assess uterine involution and lochia  5/10/2024 0453 by Francesca Brody RN  Outcome: Progressing  5/10/2024 0453 by Francesca Brody RN  Outcome: Progressing  Goal: Appropriate maternal -  bonding  Description: INTERVENTIONS:  - Identify family support  - Assess for appropriate maternal/infant bonding   -Encourage maternal/infant bonding opportunities  - Referral to  or  as needed  5/10/2024 0453 by Francesca Brody RN  Outcome: Progressing  5/10/2024 0453 by Francesca Brody RN  Outcome: Progressing  Goal: Establishment of infant feeding pattern  Description: INTERVENTIONS:  - Assess breast/bottle feeding  - Refer to lactation as needed  5/10/2024 0453 by Francesca Brody  RN  Outcome: Progressing  5/10/2024 0453 by Francesca Brody RN  Outcome: Progressing  Goal: Incision(s), wounds(s) or drain site(s) healing without S/S of infection  Description: INTERVENTIONS  - Assess and document dressing, incision, wound bed, drain sites and surrounding tissue  - Provide patient and family education  - Perform skin care/dressing changes   5/10/2024 0453 by Francesca Brody RN  Outcome: Progressing  5/10/2024 0453 by Francesca Brody RN  Outcome: Progressing

## 2024-05-10 NOTE — LACTATION NOTE
Discharge Lactation: mom states she is pumping and seeing more milk. Mom states right breast is not transferring as much milk.    Ed. On how nipple fits into multi-user and hands free pumps - measured R at 21 mm flange and L at 24 mm flange. Mom states nipples are stretchy during pumping. Ed. On pumpin pals    Enc. Baby and me appt for increase milk supply    Handouts: increase milk supply; pumping for NICU; large breasts    D/c and handouts reviewed    Enc. Pumping plan    Discharge feeding plan for NICU Pumping     Set alarms to pump every 2 hrs during the day and every 3 hrs at night  Use massage, warmth, & hand expression to stimulate glandular tissue prior to pumping.  May use nipple cream/butter/oil where tunnel and funnel meet on flange to assist movement of breast tissue inside the flange  Use breast compressions, hands on pumping techniques to assist in expressing milk    Cycle pumping - Begin the pump in stimulation mode. Once milk is visible in the tunnel of the flange, change pump setting to expression mode. Once milk is NOT seen in the tunnel, cycle back to stimulation mode.Continue cycle pumping until end of feeding.    Pump both breasts simultaneously  Use all 5 senses when pumping to increase milk transfer.  Store expressed milk or feed expressed milk via syringe, NG tube or paced bottle feeding method.   Continue to hand express between feeds to stimulate the breasts frequently    Review Milkmob on youtube or scan QR code for MilkMob video  When with baby, place baby skin to skin. Attempt to latch when medically cleared.         Milk Mob     Milk Supply:   - Allow for non-nutritive suck at the breast to stimulate supply   - Allow for skin to skin during and after each breastfeeding session   - Use massage, heat, and hand expression prior to feedings to assist with deep latch   - Increase pumping sessions and pump after every feeding    Education of breastfeeding with large breasts. Demonstration and  teach back of positioning and alignment. Use pillows, tables, rolled towels/blankets to lift breast. Lift baby up to breast level. Education on hand expression prior to latch, positioning of hand to compress the breast, and positioning and alignment of baby for deep latch with large breasts.

## 2024-05-10 NOTE — PROGRESS NOTES
Obstetrics Progress Note  Latisha Kelly 24 y.o. female MRN: [id number]  Unit/Bed#:  319-01 Encounter: [id number]    Assessment/Plan:  Postoperative day #3 s/p primary low transverse  delivery. Stable.  Baby in NICU. By issue:  * Status post primary low transverse  section  Assessment & Plan  QBL 1043, [place] on hysterotomy Hgb 10.4 --> 8.7 > PO iron  Voiding spontaneously  Pain: Tylenol and toradol scheduled, debi 5/10 PRN    FEN: Tolerating regular diet  DVT ppx: SCDs and  Lovenox 40mg qD  Passing flatus and has had a BM  Routine postpartum care    Postpartum hemorrhage  Assessment & Plan  QBL 1043, [place] on hysterotomy Hgb 10.4 --> 8.7, declines venofer > PO Iron  Hemodynamically stable  Asymptomatic     Insulin controlled gestational diabetes mellitus (GDM) in third trimester  Assessment & Plan  Lab Results   Component Value Date    HGBA1C 4.2 2024       Recent Labs     24  1102 24  1541 24  1847   POCGLU 75 71 72       Blood Sugar Average: Last 72 hrs:  (P) [id number]  2hr GTT 6w pp    Chronic hypertension with super imposed preeclampsia without severe features  Assessment & Plan    Systolic (24hrs), Av , Min:109 , Max:132     Diastolic (24hrs), Av, Min:52, Max:70      On home labetalol 200mg BID  Asymptomatic  Admission CBC/CMP, UP:C 0.84  Continue to monitor blood pressures      Anticipate discharge POD #3 vs #4.      Subjective/Objective   Chief Complaint:   Post delivery.     Subjective:   Pain: well controlled. Tolerating PO: yes. Voiding: yes. Flatus: yes. Bowel Movement: yes. Ambulating: yes. Chest pain: no. Shortness of breath: no. Leg pain: no. Lochia: within normal limits. Infant feeding: breast. Denies headache, vision changes, or RUQ pain.    Objective:   Vitals:   Temp:  [97.9 °F (36.6 °C)-98.5 °F (36.9 °C)] 98 °F (36.7 °C)  HR:  [] 105  Resp:  [16-20] 18  BP: (103-132)/(52-70) 124/70   No intake or output data in the 24 hours  ending 05/10/24 0612    Physical Exam:   -General: alert and oriented x 3, in no apparent distress  -Cardiovascular: regular rate and rhythm  -Pulmonary: normal effort, clear to auscultation bilaterally  -Abdomen/Pelvis: soft, non-tender, non-distended, no rebound or guarding. Uterine fundus firm and non-tender, -1 cm below the umbilicus. Incision: Mepilex dressing in place, clean and dry  -Extremities: Nontender    Lab Results   Component Value Date    WBC 16.30 (H) 05/08/2024    HGB 8.7 (L) 05/08/2024    HCT 27.5 (L) 05/08/2024    MCV 90 05/08/2024     05/08/2024         Jessy Cook MD  PGY-I, OBGYN  5/10/2024, 6:12 AM

## 2024-05-10 NOTE — ASSESSMENT & PLAN NOTE
Lab Results   Component Value Date    HGBA1C 4.2 02/26/2024       Recent Labs     05/07/24  1102 05/07/24  1541 05/07/24  1847   POCGLU 75 71 72       Blood Sugar Average: Last 72 hrs:  (P) [id number]  2hr GTT 6w pp

## 2024-05-10 NOTE — PLAN OF CARE
Problem: PAIN - ADULT  Goal: Verbalizes/displays adequate comfort level or baseline comfort level  Description: Interventions:  - Encourage patient to monitor pain and request assistance  - Assess pain using appropriate pain scale  - Administer analgesics based on type and severity of pain and evaluate response  - Implement non-pharmacological measures as appropriate and evaluate response  - Consider cultural and social influences on pain and pain management  - Notify physician/advanced practitioner if interventions unsuccessful or patient reports new pain  Outcome: Progressing     Problem: INFECTION - ADULT  Goal: Absence or prevention of progression during hospitalization  Description: INTERVENTIONS:  - Assess and monitor for signs and symptoms of infection  - Monitor lab/diagnostic results  - Monitor all insertion sites, i.e. indwelling lines, tubes, and drains  - Monitor endotracheal if appropriate and nasal secretions for changes in amount and color  - [place] appropriate cooling/warming therapies per order  - Administer medications as ordered  - Instruct and encourage patient and family to use good hand hygiene technique  - Identify and instruct in appropriate isolation precautions for identified infection/condition  Outcome: Progressing  Goal: Absence of fever/infection during neutropenic period  Description: INTERVENTIONS:  - Monitor WBC    Outcome: Progressing     Problem: SAFETY ADULT  Goal: Patient will remain free of falls  Description: INTERVENTIONS:  - Educate patient/family on patient safety including physical limitations  - Instruct patient to call for assistance with activity   - Consult OT/PT to assist with strengthening/mobility   - Keep Call bell within reach  - Keep bed low and locked with side rails adjusted as appropriate  - Keep care items and personal belongings within reach  - Initiate and maintain comfort rounds  - Consider moving patient to room near nurses station  Outcome:  Progressing  Goal: Maintain or return to baseline ADL function  Description: INTERVENTIONS:  -  Assess patient's ability to carry out ADLs; assess patient's baseline for ADL function and identify physical deficits which impact ability to perform ADLs (bathing, care of mouth/teeth, toileting, grooming, dressing, etc.)  - Assess/evaluate cause of self-care deficits   - Assess range of motion  - Assess patient's mobility; develop plan if impaired  - Assess patient's need for assistive devices and provide as appropriate  - Encourage maximum independence but intervene and supervise when necessary  - Involve family in performance of ADLs  - Assess for home care needs following discharge   - Consider OT consult to assist with ADL evaluation and planning for discharge  - Provide patient education as appropriate  Outcome: Progressing  Goal: Maintains/Returns to pre admission functional level  Description: INTERVENTIONS:  - Perform AM-PAC 6 Click Basic Mobility/ Daily Activity assessment daily.  - Set and communicate daily mobility goal to care team and patient/family/caregiver.   - Collaborate with rehabilitation services on mobility goals if consulted  - Out of bed for toileting  - Record patient progress and toleration of activity level   Outcome: Progressing     Problem: Knowledge Deficit  Goal: Patient/family/caregiver demonstrates understanding of disease process, treatment plan, medications, and discharge instructions  Description: Complete learning assessment and assess knowledge base.  Interventions:  - Provide teaching at level of understanding  - Provide teaching via preferred learning methods  Outcome: Progressing     Problem: DISCHARGE PLANNING  Goal: Discharge to home or other facility with appropriate resources  Description: INTERVENTIONS:  - Identify barriers to discharge w/patient and caregiver  - Arrange for needed discharge resources and transportation as appropriate  - Identify discharge learning needs  (meds, wound care, etc.)  - Arrange for interpretive services to assist at discharge as needed  - Refer to Case Management Department for coordinating discharge planning if the patient needs post-hospital services based on physician/advanced practitioner order or complex needs related to functional status, cognitive ability, or social support system  Outcome: Progressing     Problem: POSTPARTUM  Goal: Experiences normal postpartum course  Description: INTERVENTIONS:  - Monitor maternal vital signs  - Assess uterine involution and lochia  Outcome: Progressing  Goal: Appropriate maternal -  bonding  Description: INTERVENTIONS:  - Identify family support  - Assess for appropriate maternal/infant bonding   -Encourage maternal/infant bonding opportunities  - Referral to  or  as needed  Outcome: Progressing  Goal: Establishment of infant feeding pattern  Description: INTERVENTIONS:  - Assess breast/bottle feeding  - Refer to lactation as needed  Outcome: Progressing  Goal: Incision(s), wounds(s) or drain site(s) healing without S/S of infection  Description: INTERVENTIONS  - Assess and document dressing, incision, wound bed, drain sites and surrounding tissue  - Provide patient and family education  - Perform skin care/dressing changes   Outcome: Progressing

## 2024-05-12 ENCOUNTER — APPOINTMENT (EMERGENCY)
Dept: RADIOLOGY | Facility: HOSPITAL | Age: 24
End: 2024-05-12
Payer: COMMERCIAL

## 2024-05-12 ENCOUNTER — APPOINTMENT (EMERGENCY)
Dept: CT IMAGING | Facility: HOSPITAL | Age: 24
End: 2024-05-12
Payer: COMMERCIAL

## 2024-05-12 ENCOUNTER — HOSPITAL ENCOUNTER (EMERGENCY)
Facility: HOSPITAL | Age: 24
Discharge: HOME | End: 2024-05-12
Attending: EMERGENCY MEDICINE
Payer: COMMERCIAL

## 2024-05-12 VITALS
TEMPERATURE: 98.8 F | HEART RATE: 85 BPM | RESPIRATION RATE: 18 BRPM | SYSTOLIC BLOOD PRESSURE: 152 MMHG | DIASTOLIC BLOOD PRESSURE: 77 MMHG | OXYGEN SATURATION: 99 %

## 2024-05-12 DIAGNOSIS — J18.9 COMMUNITY ACQUIRED PNEUMONIA: Primary | ICD-10-CM

## 2024-05-12 DIAGNOSIS — D64.9 ANEMIA: ICD-10-CM

## 2024-05-12 LAB
ALBUMIN SERPL BCP-MCNC: 3.4 G/DL (ref 3.5–5)
ALP SERPL-CCNC: 74 U/L (ref 34–104)
ALT SERPL W P-5'-P-CCNC: 30 U/L (ref 7–52)
ANION GAP SERPL CALCULATED.3IONS-SCNC: 8 MMOL/L (ref 4–13)
AST SERPL W P-5'-P-CCNC: 14 U/L (ref 13–39)
ATRIAL RATE: 101 BPM
BASOPHILS # BLD AUTO: 0.03 THOUSANDS/ÂΜL (ref 0–0.1)
BASOPHILS NFR BLD AUTO: 0 % (ref 0–1)
BILIRUB SERPL-MCNC: 0.22 MG/DL (ref 0.2–1)
BNP SERPL-MCNC: 151 PG/ML (ref 0–100)
BUN SERPL-MCNC: 8 MG/DL (ref 5–25)
CALCIUM ALBUM COR SERPL-MCNC: 9.1 MG/DL (ref 8.3–10.1)
CALCIUM SERPL-MCNC: 8.6 MG/DL (ref 8.4–10.2)
CARDIAC TROPONIN I PNL SERPL HS: <2 NG/L
CARDIAC TROPONIN I PNL SERPL HS: <2 NG/L
CHLORIDE SERPL-SCNC: 108 MMOL/L (ref 96–108)
CO2 SERPL-SCNC: 24 MMOL/L (ref 21–32)
CREAT SERPL-MCNC: 0.55 MG/DL (ref 0.6–1.3)
D DIMER PPP FEU-MCNC: 1.26 UG/ML FEU
EOSINOPHIL # BLD AUTO: 0.37 THOUSAND/ÂΜL (ref 0–0.61)
EOSINOPHIL NFR BLD AUTO: 4 % (ref 0–6)
ERYTHROCYTE [DISTWIDTH] IN BLOOD BY AUTOMATED COUNT: 14.3 % (ref 11.6–15.1)
GFR SERPL CREATININE-BSD FRML MDRD: 132 ML/MIN/1.73SQ M
GLUCOSE SERPL-MCNC: 106 MG/DL (ref 65–140)
HCT VFR BLD AUTO: 27.7 % (ref 34.8–46.1)
HGB BLD-MCNC: 8.5 G/DL (ref 11.5–15.4)
IMM GRANULOCYTES # BLD AUTO: 0.14 THOUSAND/UL (ref 0–0.2)
IMM GRANULOCYTES NFR BLD AUTO: 2 % (ref 0–2)
LYMPHOCYTES # BLD AUTO: 1.97 THOUSANDS/ÂΜL (ref 0.6–4.47)
LYMPHOCYTES NFR BLD AUTO: 22 % (ref 14–44)
MCH RBC QN AUTO: 27.7 PG (ref 26.8–34.3)
MCHC RBC AUTO-ENTMCNC: 30.7 G/DL (ref 31.4–37.4)
MCV RBC AUTO: 90 FL (ref 82–98)
MONOCYTES # BLD AUTO: 0.75 THOUSAND/ÂΜL (ref 0.17–1.22)
MONOCYTES NFR BLD AUTO: 8 % (ref 4–12)
NEUTROPHILS # BLD AUTO: 5.75 THOUSANDS/ÂΜL (ref 1.85–7.62)
NEUTS SEG NFR BLD AUTO: 64 % (ref 43–75)
NRBC BLD AUTO-RTO: 0 /100 WBCS
P AXIS: 56 DEGREES
PLATELET # BLD AUTO: 302 THOUSANDS/UL (ref 149–390)
PMV BLD AUTO: 8.9 FL (ref 8.9–12.7)
POTASSIUM SERPL-SCNC: 3.6 MMOL/L (ref 3.5–5.3)
PR INTERVAL: 150 MS
PROT SERPL-MCNC: 6 G/DL (ref 6.4–8.4)
QRS AXIS: 59 DEGREES
QRSD INTERVAL: 68 MS
QT INTERVAL: 334 MS
QTC INTERVAL: 433 MS
RBC # BLD AUTO: 3.07 MILLION/UL (ref 3.81–5.12)
SODIUM SERPL-SCNC: 140 MMOL/L (ref 135–147)
T WAVE AXIS: 32 DEGREES
VENTRICULAR RATE: 101 BPM
WBC # BLD AUTO: 9.01 THOUSAND/UL (ref 4.31–10.16)

## 2024-05-12 PROCEDURE — 85379 FIBRIN DEGRADATION QUANT: CPT

## 2024-05-12 PROCEDURE — 93010 ELECTROCARDIOGRAM REPORT: CPT | Performed by: INTERNAL MEDICINE

## 2024-05-12 PROCEDURE — 93308 TTE F-UP OR LMTD: CPT | Performed by: EMERGENCY MEDICINE

## 2024-05-12 PROCEDURE — 84484 ASSAY OF TROPONIN QUANT: CPT | Performed by: EMERGENCY MEDICINE

## 2024-05-12 PROCEDURE — 76705 ECHO EXAM OF ABDOMEN: CPT | Performed by: EMERGENCY MEDICINE

## 2024-05-12 PROCEDURE — 99285 EMERGENCY DEPT VISIT HI MDM: CPT

## 2024-05-12 PROCEDURE — 71045 X-RAY EXAM CHEST 1 VIEW: CPT

## 2024-05-12 PROCEDURE — 99285 EMERGENCY DEPT VISIT HI MDM: CPT | Performed by: EMERGENCY MEDICINE

## 2024-05-12 PROCEDURE — 85025 COMPLETE CBC W/AUTO DIFF WBC: CPT | Performed by: EMERGENCY MEDICINE

## 2024-05-12 PROCEDURE — 74177 CT ABD & PELVIS W/CONTRAST: CPT

## 2024-05-12 PROCEDURE — 71275 CT ANGIOGRAPHY CHEST: CPT

## 2024-05-12 PROCEDURE — 80053 COMPREHEN METABOLIC PANEL: CPT | Performed by: EMERGENCY MEDICINE

## 2024-05-12 PROCEDURE — 36415 COLL VENOUS BLD VENIPUNCTURE: CPT

## 2024-05-12 PROCEDURE — 93005 ELECTROCARDIOGRAM TRACING: CPT

## 2024-05-12 PROCEDURE — 83880 ASSAY OF NATRIURETIC PEPTIDE: CPT

## 2024-05-12 RX ORDER — QUINIDINE GLUCONATE 324 MG
240 TABLET, EXTENDED RELEASE ORAL DAILY
Qty: 20 TABLET | Refills: 0 | Status: SHIPPED | OUTPATIENT
Start: 2024-05-12 | End: 2024-06-01

## 2024-05-12 RX ORDER — AZITHROMYCIN 250 MG/1
500 TABLET, FILM COATED ORAL ONCE
Status: COMPLETED | OUTPATIENT
Start: 2024-05-12 | End: 2024-05-12

## 2024-05-12 RX ORDER — AMOXICILLIN 500 MG/1
1000 CAPSULE ORAL 3 TIMES DAILY
Qty: 42 CAPSULE | Refills: 0 | Status: SHIPPED | OUTPATIENT
Start: 2024-05-12 | End: 2024-05-19

## 2024-05-12 RX ORDER — ACETAMINOPHEN 325 MG/1
975 TABLET ORAL ONCE
Status: COMPLETED | OUTPATIENT
Start: 2024-05-12 | End: 2024-05-12

## 2024-05-12 RX ORDER — IBUPROFEN 600 MG/1
600 TABLET ORAL ONCE
Status: COMPLETED | OUTPATIENT
Start: 2024-05-12 | End: 2024-05-12

## 2024-05-12 RX ORDER — AZITHROMYCIN 250 MG/1
250 TABLET, FILM COATED ORAL EVERY 24 HOURS
Qty: 5 TABLET | Refills: 0 | Status: SHIPPED | OUTPATIENT
Start: 2024-05-12 | End: 2024-05-17

## 2024-05-12 RX ORDER — AMOXICILLIN 250 MG/1
1000 CAPSULE ORAL ONCE
Status: COMPLETED | OUTPATIENT
Start: 2024-05-12 | End: 2024-05-12

## 2024-05-12 RX ADMIN — AZITHROMYCIN DIHYDRATE 500 MG: 250 TABLET, FILM COATED ORAL at 19:42

## 2024-05-12 RX ADMIN — AMOXICILLIN 1000 MG: 250 CAPSULE ORAL at 19:42

## 2024-05-12 RX ADMIN — IOHEXOL 100 ML: 350 INJECTION, SOLUTION INTRAVENOUS at 17:40

## 2024-05-12 RX ADMIN — IBUPROFEN 600 MG: 600 TABLET, FILM COATED ORAL at 19:50

## 2024-05-12 RX ADMIN — ACETAMINOPHEN 975 MG: 325 TABLET ORAL at 19:50

## 2024-05-12 NOTE — ED PROVIDER NOTES
History  Chief Complaint   Patient presents with    Shortness of Breath      on , SOB onset today, dx with pre-eclampsia     (Latisha Kelly) Latisha Kelly is a 24 y.o. female     They presented to the emergency department on May 12, 2024. Patient presents with:  Shortness of Breath and mild chest pressure that started this morning at 6 am while laying in bed. PMH includes  on  at a 33 wks gestation due to gestational diabetes and preeclampsia. Family history includes an aunt that had a PE post  in her early 30s. The patient states the breathing and chest pressure improves when sitting up and forward and worsens when laying down. Patient has been having swelling in the lower extremities bilaterally since the . Patient takes twice daily baby aspirin and labetalol. Patient has been compliant with her medications. The patient denies fever, chills, cough, palpitations, abdominal pain, dysuria, hematuria, polyuria, diarrhea, constipation, vaginal discharge, rash, or any other complaint at this time.                Prior to Admission Medications   Prescriptions Last Dose Informant Patient Reported? Taking?   Insulin Pen Needle (BD Pen Needle Susan U/F) 32G X 4 MM MISC  Self No No   Sig: Use daily at bedtime   OneTouch Delica Lancets 33G MISC  Self No No   Sig: Use 4 a Day or as instructed   OneTouch Verio test strip  Self No No   Sig: Test 4 Times Daily or as instructed   Prenatal Vit-Iron Carbonyl-FA (prenatal multivitamin) TABS  Self Yes No   Sig: Take 1 tablet by mouth daily   aquiles Gautam, 100 UNIT/ML SOPN  Self No No   Sig: Inject 30 units once daily at bedtime (10-11pm); Titrate as instructed   acetaminophen (TYLENOL) 325 mg tablet  Self No No   Sig: Take 2 tablets (650 mg total) by mouth every 6 (six) hours as needed for mild pain, moderate pain, fever or headaches   aspirin 81 mg chewable tablet  Self No No   Sig: Chew 2 tablets (162 mg total) daily   ibuprofen  (MOTRIN) 600 mg tablet   No No   Sig: Take 1 tablet (600 mg total) by mouth every 6 (six) hours   labetalol (NORMODYNE) 200 mg tablet   No No   Sig: Take 1 tablet (200 mg total) by mouth 2 (two) times a day   levothyroxine (Euthyrox) 25 mcg tablet  Self No No   Sig: Take 1 tablet (25 mcg total) by mouth daily      Facility-Administered Medications: None       Past Medical History:   Diagnosis Date    Anemia     Hypertension     chtn started medication with pregnancy    Hypothyroid     Migraine without aura     OTC med management    PCOS (polycystic ovarian syndrome)     Varicella     vaccine    Visual impairment        Past Surgical History:   Procedure Laterality Date    IL CERCLAGE UTERINE CERVIX NONOBSTETRICAL N/A 2024    Procedure: CERCLAGE CERVICAL;  Surgeon: Ann Hoffmann MD;  [facility];  Service: Obstetrics    IL  DELIVERY ONLY N/A 2024    Procedure:  SECTION ();  Surgeon: Lizzette Hernandez DO;  [facility];  Service: Obstetrics    IL REMOVAL CERCLAGE SUTURE UNDER ANESTHESIA N/A 2024    Procedure: CERCLAGE REMOVAL;  Surgeon: Lizzette Hernandez DO;  [facility];  Service: Obstetrics    TURBINATE RESECTION         Family History   Problem Relation Age of Onset    No Known Problems Mother     Hypertension Father     No Known Problems Sister     No Known Problems Brother     No Known Problems Brother     No Known Problems Brother     Colon cancer Neg Hx     Ovarian cancer Neg Hx     Breast cancer Neg Hx     Cancer Neg Hx      I have reviewed and agree with the history as documented.    E-Cigarette/Vaping    E-Cigarette Use Former User     Quit Date 24     Comments very seldom, 3 times a year-quit pror to recent pregnancy      E-Cigarette/Vaping Substances    Nicotine Yes     THC No     CBD No     Flavoring Yes     Other No     Unknown No      Social History     Tobacco Use    Smoking status: Never    Smokeless tobacco: Never   Vaping Use     Vaping status: Former    Quit date: 1/1/2023    Substances: Nicotine, Flavoring   Substance Use Topics    Alcohol use: Not Currently     Alcohol/week: 7.0 standard drinks of alcohol     Types: 7 Glasses of wine per week     Comment: 1 daily-prior to pregnancy    Drug use: Not Currently     Types: Marijuana     Comment: last used THC over 2 years ago (as of 11/29/23)        Review of Systems   Constitutional:  Negative for chills and fever.   HENT:  Negative for ear pain and sore throat.    Eyes:  Negative for pain and visual disturbance.   Respiratory:  Positive for shortness of breath. Negative for cough.    Cardiovascular:  Positive for chest pain. Negative for palpitations.   Gastrointestinal:  Negative for abdominal pain, constipation, diarrhea, nausea and vomiting.   Genitourinary:  Negative for dysuria and hematuria.   Musculoskeletal:  Negative for arthralgias and back pain.   Skin:  Negative for color change and rash.   Neurological:  Negative for seizures and syncope.   All other systems reviewed and are negative.      Physical Exam  ED Triage Vitals   Temperature Pulse Respirations Blood Pressure SpO2   05/12/24 1352 05/12/24 1352 05/12/24 1352 05/12/24 1352 05/12/24 1352   98.8 °F (37.1 °C) 100 18 132/72 98 %      Temp Source Heart Rate Source Patient Position - Orthostatic VS BP Location FiO2 (%)   05/12/24 1352 05/12/24 1352 05/12/24 1352 05/12/24 1352 --   Oral Monitor Sitting Right arm       Pain Score       05/12/24 1950       3             Orthostatic Vital Signs  Vitals:    05/12/24 1830 05/12/24 1900 05/12/24 1930 05/12/24 2000   BP: 132/79 142/73 146/78 152/77   Pulse: 91 91 86 85   Patient Position - Orthostatic VS:           Physical Exam  Vitals and nursing note reviewed.   Constitutional:       General: She is not in acute distress.     Appearance: She is well-developed.      Interventions: She is not intubated.  HENT:      Head: Normocephalic and atraumatic.   Eyes:      Conjunctiva/sclera:  Conjunctivae normal.   Cardiovascular:      Rate and Rhythm: Normal rate and regular rhythm.      Heart sounds: No murmur heard.  Pulmonary:      Effort: Pulmonary effort is normal. No tachypnea, bradypnea, accessory muscle usage or respiratory distress. She is not intubated.      Breath sounds: Normal breath sounds. No stridor. No decreased breath sounds, wheezing, rhonchi or rales.   Chest:      Chest wall: No tenderness.   Abdominal:      Palpations: Abdomen is soft.      Tenderness: There is abdominal tenderness (tenderness to LLQ to deep palpitation).   Musculoskeletal:         General: No swelling.      Cervical back: Neck supple.      Right lower leg: No tenderness. Edema present.      Left lower leg: No tenderness. Edema present.   Skin:     General: Skin is warm and dry.      Capillary Refill: Capillary refill takes less than 2 seconds.   Neurological:      Mental Status: She is alert.   Psychiatric:         Mood and Affect: Mood normal.         ED Medications  Medications   iohexol (OMNIPAQUE) 350 MG/ML injection (MULTI-DOSE) 100 mL (100 mL Intravenous Given 5/12/24 1740)   amoxicillin (AMOXIL) capsule 1,000 mg (1,000 mg Oral Given 5/12/24 1942)   azithromycin (ZITHROMAX) tablet 500 mg (500 mg Oral Given 5/12/24 1942)   acetaminophen (TYLENOL) tablet 975 mg (975 mg Oral Given 5/12/24 1950)   ibuprofen (MOTRIN) tablet 600 mg (600 mg Oral Given 5/12/24 1950)       Diagnostic Studies  Results Reviewed       Procedure Component Value Units Date/Time    HS Troponin I 2hr [[id number]] Collected: 05/12/24 1626    Lab Status: Final result Specimen: Blood from Arm, Left Updated: 05/12/24 1659     hs TnI 2hr <2 ng/L      Delta 2hr hsTnI --    B-Type Natriuretic Peptide(BNP) [[id number]]  (Abnormal) Collected: 05/12/24 1358    Lab Status: Final result Specimen: Blood from Arm, Left Updated: 05/12/24 1532      pg/mL     D-Dimer [[id number]]  (Abnormal) Collected: 05/12/24 1358    Lab Status: Final result Specimen:  Blood from Arm, Left Updated: 05/12/24 1514     D-Dimer, Quant 1.26 ug/ml FEU     HS Troponin 0hr (reflex protocol) [[id number]]  (Normal) Collected: 05/12/24 1358    Lab Status: Final result Specimen: Blood from Arm, Left Updated: 05/12/24 1433     hs TnI 0hr <2 ng/L     Comprehensive metabolic panel [[id number]]  (Abnormal) Collected: 05/12/24 1358    Lab Status: Final result Specimen: Blood from Arm, Left Updated: 05/12/24 1425     Sodium 140 mmol/L      Potassium 3.6 mmol/L      Chloride 108 mmol/L      CO2 24 mmol/L      ANION GAP 8 mmol/L      BUN 8 mg/dL      Creatinine 0.55 mg/dL      Glucose 106 mg/dL      Calcium 8.6 mg/dL      Corrected Calcium 9.1 mg/dL      AST 14 U/L      ALT 30 U/L      Alkaline Phosphatase 74 U/L      Total Protein 6.0 g/dL      Albumin 3.4 g/dL      Total Bilirubin 0.22 mg/dL      eGFR 132 ml/min/1.73sq m     Narrative:      National Kidney Disease Foundation guidelines for Chronic Kidney Disease (CKD):     Stage 1 with normal or high GFR (GFR > 90 mL/min/1.73 square meters)    Stage 2 Mild CKD (GFR = 60-89 mL/min/1.73 square meters)    Stage 3A Moderate CKD (GFR = 45-59 mL/min/1.73 square meters)    Stage 3B Moderate CKD (GFR = 30-44 mL/min/1.73 square meters)    Stage 4 Severe CKD (GFR = 15-29 mL/min/1.73 square meters)    Stage 5 End Stage CKD (GFR <15 mL/min/1.73 square meters)  Note: GFR calculation is accurate only with a steady state creatinine    CBC and differential [[id number]]  (Abnormal) Collected: 05/12/24 1358    Lab Status: Final result Specimen: Blood from Arm, Left Updated: 05/12/24 1412     WBC 9.01 Thousand/uL      RBC 3.07 Million/uL      Hemoglobin 8.5 g/dL      Hematocrit 27.7 %      MCV 90 fL      MCH 27.7 pg      MCHC 30.7 g/dL      RDW 14.3 %      MPV 8.9 fL      Platelets 302 Thousands/uL      nRBC 0 /100 WBCs      Segmented % 64 %      Immature Grans % 2 %      Lymphocytes % 22 %      Monocytes % 8 %      Eosinophils Relative 4 %      Basophils Relative 0 %       Absolute Neutrophils 5.75 Thousands/µL      Absolute Immature Grans 0.14 Thousand/uL      Absolute Lymphocytes 1.97 Thousands/µL      Absolute Monocytes 0.75 Thousand/µL      Eosinophils Absolute 0.37 Thousand/µL      Basophils Absolute 0.03 Thousands/µL                    PE Study with CT Abdomen and Pelvis with contrast   Final Result by Arthur Garcia MD ( [id number])      CHEST:      1.  No pulmonary embolus.   2.  Right lower lobe pneumonia with patchy consolidative opacities. Recommend 3 months follow-up imaging to resolution.      ABDOMEN/PELVIS:      Bulky and heterogeneous uterus with fluid within the endometrial cavity which is expected for post gravid uterus. Given patient's low hemoglobin, may consider pelvic ultrasound if there is high clinical concern for intrauterine bleeding/hematoma.               The study was marked in EPIC for immediate notification.      Workstation performed: [id number]         XR chest 1 view portable   ED Interpretation by Osbaldo Johnson MD ( [id number])   No acute cardiopulmonary abnormalities present.      Final Result by Cinthia Guzmán MD ( [id number])      Right lower lobe pneumonia on subsequent CT not visible.            Workstation performed: [id number]               Procedures  ECG 12 Lead Documentation Only    Date/Time: 2024 4:13 PM    Performed by: Osbaldo Johnson MD  Authorized by: Osbaldo Johnson MD    Indications / Diagnosis:  SOB post   ECG reviewed by me, the ED Provider: yes    Patient location:  ED  Previous ECG:     Previous ECG:  Compared to current    Comparison ECG info:  New sinus tachycardia    Similarity:  Changes noted  Interpretation:     Interpretation: normal    Rate:     ECG rate:  101    ECG rate assessment: tachycardic    Rhythm:     Rhythm: sinus tachycardia    Ectopy:     Ectopy: none    QRS:     QRS axis:  Normal    QRS intervals:  Normal  Conduction:     Conduction: normal    ST segments:     ST segments:  Normal  T waves:     T waves:  normal    POC Cardiac US    Date/Time: 5/13/2024 12:22 AM    Performed by: Osbaldo Johnson MD  Authorized by: Osbaldo Johnson MD    Patient location:  ED  Other Assisting Provider: Yes (comment) ([email or web address])    Procedure details:     Exam Type:  Diagnostic    Indications: dyspnea and respiratory distress      Assessment / Evaluation for: cardiac function      Exam Type: initial exam      Image quality: limited diagnostic      Image availability:  Video obtained  Patient Details:     Cardiac Rhythm:  Regular    Mechanical ventilation: No    Cardiac findings:     Echo technique: limited 2D      Views obtained: parasternal long axis, parasternal short axis, subcostal and apical      Pericardial effusion: absent      Tamponade physiology: absent      Wall motion: normal      LV systolic function: normal      RV dilation: none    Interpretation:     Fluid Status:  Euvolemic  POC FAST US    Date/Time: 5/13/2024 12:23 AM    Performed by: Osbaldo Johnson MD  Authorized by: Osbaldo Johnson MD    Patient location:  ED  Procedure details:     Exam Type:  Diagnostic    Indications: abdominal pain      Assess for:  Intra-abdominal fluid    Technique: FAST      Views obtained:  Suprapubic - Pouch of Malcolm, LUQ - Splenorenal space, RUQ - Leyva's Pouch and Heart - Pericardial sac    Image quality: limited diagnostic      Image availability:  Video obtained  FAST Findings:     RUQ (Hepatorenal) free fluid: absent      LUQ (Splenorenal) free fluid: absent      Suprapubic free fluid: absent      Cardiac wall motion: identified      Pericardial effusion: absent    Interpretation:     Impressions: negative          ED Course             HEART Risk Score      Flowsheet Row Most Recent Value   Heart Score Risk Calculator    History 1 Filed at: 05/12/2024 2348   ECG 0 Filed at: 05/12/2024 2348   Age 0 Filed at: 05/12/2024 2348   Risk Factors 1 Filed at: 05/12/2024 2348   Troponin 0 Filed at: 05/12/2024 2348   HEART Score 2 Filed at: 05/12/2024 2348                 PERC Rule for PE      Flowsheet Row Most Recent Value   PERC Rule for PE    Age >=50 0 Filed at: 2024 1439   HR >=100 1 Filed at: 2024 1439   O2 Sat on room air < 95% 0 Filed at: 2024 1439   History of PE or DVT 0 Filed at: 2024 1439   Recent trauma or surgery 1 Filed at: 2024 1439   Hemoptysis 0 Filed at: 2024 1439   Exogenous estrogen 0 Filed at: 2024 1439   Unilateral leg swelling 0 Filed at: 2024 1439   PERC Rule for PE Results 2 Filed at: 2024 1439                SBIRT 22yo+      Flowsheet Row Most Recent Value   Initial Alcohol Screen: US AUDIT-C     1. How often do you have a drink containing alcohol? 0 Filed at: 2024 1619   2. How many drinks containing alcohol do you have on a typical day you are drinking?  0 Filed at: 2024 1619   3b. FEMALE Any Age, or MALE 65+: How often do you have 4 or more drinks on one occassion? 0 Filed at: 2024 1619   Audit-C Score 0 Filed at: 2024 1619   MERISSA: How many times in the past year have you...    Used an illegal drug or used a prescription medication for non-medical reasons? Never Filed at: 2024 1619            Wells' Criteria for PE      Flowsheet Row Most Recent Value   Wells' Criteria for PE    Clinical signs and symptoms of DVT 0 Filed at: 2024 1417   PE is primary diagnosis or equally likely 3 Filed at: 2024 1417   HR >100 0 Filed at: 2024 1417   Immobilization at least 3 days or Surgery in the previous 4 weeks 1.5 Filed at: 2024 1417   Previous, objectively diagnosed PE or DVT 0 Filed at: 2024 1417   Hemoptysis 0 Filed at: 2024 1417   Malignancy with treatment within 6 months or palliative 0 Filed at: 2024 1417   Wells' Criteria Total 4.5 Filed at: 2024 1417              Medical Decision Making  Shortness of Breath and mild chest pressure that started this morning at 6 am while laying in bed. PMH includes  on  at a  33 wks gestation due to gestational diabetes and preeclampsia. Family history includes an aunt that had a PE post  in her early 30s. The patient states the breathing and chest pressure improves when sitting up and forward and worsens when laying down.       Patient seen and examined noted to have Left lower quadrant abdominal tenderness, SOB.      Differential diagnosis includes but is not limited to pulmonary embolism, pneumonia, CHF, ACS, anemia.      Patient's labs notable for: elevated D-dimer and BNP. Hemoglobin of 8.5 which is lower than 8.7 that was attained after the . Unremarkable CMP, Troponin    Imaging revealed: Right lower lobe pneumonia with patchy consolidative opacities.     EKG: interpreted by myself as above.     Patient was started on amoxicillin and azithromycin for community acquired pneumonia.    Patient appears well, is nontoxic appearing, Latisha Kelly expresses understanding and agrees with plan of care at this time.  In light of this patient would benefit from outpatient management.    Patient was rx'd as below     Amount and/or Complexity of Data Reviewed  Labs: ordered.  Radiology: ordered and independent interpretation performed.    Risk  OTC drugs.  Prescription drug management.          Disposition  Final diagnoses:   Community acquired pneumonia   Anemia     Time reflects when diagnosis was documented in both MDM as applicable and the Disposition within this note       Time User Action Codes Description Comment    2024  7:36 PM Osbaldo Johnson Add [J18.9] Community acquired pneumonia     2024  7:46 PM Osbaldo Johnson Add [D64.9] Anemia           ED Disposition       ED Disposition   Discharge    Condition   Stable    Date/Time   Sun May 12, 2024 2003    Comment   Latisha Kelly discharge to home/self care.                   Follow-up Information       Follow up With Specialties Details Why Contact Info Additional Information    [facility]  Obstetrics and Gynecology  follow up for   [facility]  [address]  [facility] [address] [phone] [facility] [address], [address] [facility] [address], [place], [place], [address] [phone]    [facility], Nurse Practitioner  As needed, If symptoms worsen [address] [place] [address]  [place] [address] [phone]               Discharge Medication List as of 2024  8:03 PM        START taking these medications    Details   amoxicillin (AMOXIL) 500 mg capsule Take 2 capsules (1,000 mg total) by mouth 3 (three) times a day for 7 days, Starting Sun 2024, Until Sun 2024, Normal      azithromycin (ZITHROMAX) 250 mg tablet Take 1 tablet (250 mg total) by mouth every 24 hours for 5 days Take 2 tablets today then 1 tablet daily x 4 days, Starting Sun 2024, Until Fri 2024, Normal      ferrous gluconate (FERGON) 240 (27 FE) MG tablet Take 1 tablet (240 mg total) by mouth in the morning for 20 days, Starting Sun 2024, Until Sat 2024, Normal           CONTINUE these medications which have NOT CHANGED    Details   acetaminophen (TYLENOL) 325 mg tablet Take 2 tablets (650 mg total) by mouth every 6 (six) hours as needed for mild pain, moderate pain, fever or headaches, Starting Fri 3/1/2024, Normal      aspirin 81 mg chewable tablet Chew 2 tablets (162 mg total) daily, Starting Fri 12/15/2023, Normal      ibuprofen (MOTRIN) 600 mg tablet Take 1 tablet (600 mg total) by mouth every 6 (six) hours, Starting Fri 5/10/2024, Normal      Insulin Pen Needle (BD Pen Needle Susan U/F) 32G X 4 MM MISC Use daily at bedtime, Starting 2024, Until Mon 2024, Normal      labetalol (NORMODYNE) 200 mg tablet Take 1 tablet (200 mg total) by mouth 2 (two) times a day, Starting Fri 5/10/2024, No Print      levothyroxine (Euthyrox) 25 mcg tablet Take 1 tablet (25 mcg total) by mouth daily, Starting Thu 2024, Until Tue 2024, Normal       OneTouch Delica Lancets 33G MISC Use 4 a Day or as instructed, Normal      OneTouch Verio test strip Test 4 Times Daily or as instructed, Normal      Prenatal Vit-Iron Carbonyl-FA (prenatal multivitamin) TABS Take 1 tablet by mouth daily, Historical Med      Semglee, yfgn, 100 UNIT/ML SOPN Inject 30 units once daily at bedtime (10-11pm); Titrate as instructed, Normal           No discharge procedures on file.    PDMP Review       None             ED Provider  Attending physically available and evaluated Latisha Kelly. I managed the patient along with the ED Attending.    Electronically Signed by           Osbaldo Johnson MD  05/12/24 7100       Osbaldo Johnson MD  05/13/24 2343

## 2024-05-12 NOTE — ED ATTENDING ATTESTATION
2024  IPanda DO, saw and evaluated the patient. I have discussed the patient with the resident/non-physician practitioner and agree with the resident's/non-physician practitioner's findings, Plan of Care, and MDM as documented in the resident's/non-physician practitioner's note, except where noted. All available labs and Radiology studies were reviewed.  I was present for key portions of any procedure(s) performed by the resident/non-physician practitioner and I was immediately available to provide assistance.       At this point I agree with the current assessment done in the Emergency Department.  I have conducted an independent evaluation of this patient a history and physical is as follows:    25 yo F in the ED for eval of shortness of breath, starting this morning around 6am.  She is 5 days post partum from a  delivery.  Delivery complicated by delivery at 33w1d gestation, polyhydramnios, gestational diabetes and hypertension, pre-eclampsia.    PE:  The patient is well appearing, non-toxic, in NAD. Head: normocephalic, atraumatic. HEENT: mucous membranes moist.  Lungs: CTA b/l, no resp distress. Heart: RRR. No M/R/G. Abdomen: NT, ND, no R/R/G. Neuro: CN2-12 intact, GCS 15. Normal strength and sensation, normal speech and gait. Cap refill < 2 sec, skin warm and dry. No rashes or lesions.    Bedside US unremarkable - no right heart strain, normal LV EF, IVC not dilated or collapsing.     ED workup: assess for PE with moderate risk, d-dimer ordered and positive, > 1. CT PE study performed and negative for PE, but does show evidence of developing pneumonia.  This would explain her shortness of breath - start on antibiotics, stable for outpatient treatment.  Case run by OB GYN on call, given was recently in the hospital delivered a  via . Recommend d/c home, do not need to see her at this time as pre-eclampsia labs look negative.    ED Course         Critical Care  Time  Procedures

## 2024-05-13 ENCOUNTER — TELEPHONE (OUTPATIENT)
Age: 24
End: 2024-05-13

## 2024-05-13 DIAGNOSIS — Z91.89 AT RISK FOR POSTPARTUM DEPRESSION: Primary | ICD-10-CM

## 2024-05-13 PROCEDURE — 88307 TISSUE EXAM BY PATHOLOGIST: CPT | Performed by: PATHOLOGY

## 2024-05-13 NOTE — TELEPHONE ENCOUNTER
Patient gave birth 24 by  and was given an acknowledgement paper for the date she gave birth. Patients hailey was 24 and she is requesting a new FMLA form be filled out. Advised patient that a new form will not be needed, she will need to contact her employer to let them know she gave birth and they will confirm with the practice.

## 2024-05-14 ENCOUNTER — PATIENT OUTREACH (OUTPATIENT)
Dept: FAMILY MEDICINE CLINIC | Facility: CLINIC | Age: 24
End: 2024-05-14

## 2024-05-14 ENCOUNTER — POSTPARTUM VISIT (OUTPATIENT)
Dept: OBGYN CLINIC | Facility: CLINIC | Age: 24
End: 2024-05-14

## 2024-05-14 VITALS
BODY MASS INDEX: 44.2 KG/M2 | HEIGHT: 66 IN | WEIGHT: 275 LBS | SYSTOLIC BLOOD PRESSURE: 136 MMHG | DIASTOLIC BLOOD PRESSURE: 88 MMHG

## 2024-05-14 DIAGNOSIS — Z98.891 STATUS POST PRIMARY LOW TRANSVERSE CESAREAN SECTION: Primary | ICD-10-CM

## 2024-05-14 PROCEDURE — 99024 POSTOP FOLLOW-UP VISIT: CPT | Performed by: OBSTETRICS & GYNECOLOGY

## 2024-05-14 NOTE — PROGRESS NOTES
"Madelyn Kelly is a 24 y.o. female who presents to the clinic 2 weeks status post  1LTCS  for  Non reassuring FHT . Eating a regular diet without difficulty. Bowel movements are normal. Pain is controlled with current analgesics. Medications being used: acetaminophen and ibuprofen (OTC).  Min lochia.  On abx for a pneumonia    The following portions of the patient's history were reviewed and updated as appropriate: allergies, current medications, past family history, past medical history, past social history, past surgical history, and problem list.    Review of Systems  Pertinent items are noted in HPI.      Objective     /88 (BP Location: Right arm, Patient Position: Sitting, Cuff Size: Large)   Ht 5' 6\" (1.676 m)   Wt 125 kg (275 lb)   LMP 09/18/2023   Breastfeeding Yes   BMI 44.39 kg/m²   General:  alert and oriented, in no acute distress   Abdomen: soft, non-tender   Incision:   healing well, no drainage, no erythema, no hernia, no seroma, no swelling, no dehiscence, incision well approximated         Assessment      Doing well postoperatively.  Operative findings again reviewed. Pathology report discussed.      Plan     1. Continue any current medications.  2. Wound care discussed.  3. Activity restrictions: no lifting more than 10 pounds  4. GHTN - normotensive, continue Procardia  5. Anticipated return to work:  8 weeks .  6. Follow up: 2 week for postpartum visit  "

## 2024-05-14 NOTE — PROGRESS NOTES
HIMANSHU OSUNA received referral for patient from MILEY Rose stating that patient was at risk for postpartum depression.    HIMANSHU OSUNA placed initial outreach call to patient and left a voicemail. HIMANSHU OSUNA to place second outreach call in a few days if return call is not received prior.

## 2024-05-15 ENCOUNTER — OFFICE VISIT (OUTPATIENT)
Dept: FAMILY MEDICINE CLINIC | Facility: CLINIC | Age: 24
End: 2024-05-15
Payer: COMMERCIAL

## 2024-05-15 VITALS
OXYGEN SATURATION: 98 % | TEMPERATURE: 99.4 F | WEIGHT: 274 LBS | SYSTOLIC BLOOD PRESSURE: 118 MMHG | HEIGHT: 66 IN | HEART RATE: 98 BPM | BODY MASS INDEX: 44.03 KG/M2 | DIASTOLIC BLOOD PRESSURE: 76 MMHG

## 2024-05-15 DIAGNOSIS — E03.8 SUBCLINICAL HYPOTHYROIDISM: ICD-10-CM

## 2024-05-15 DIAGNOSIS — Z87.09 HISTORY OF ASTHMA: ICD-10-CM

## 2024-05-15 DIAGNOSIS — J18.9 PNEUMONIA OF RIGHT LOWER LOBE DUE TO INFECTIOUS ORGANISM: Primary | ICD-10-CM

## 2024-05-15 PROCEDURE — 99214 OFFICE O/P EST MOD 30 MIN: CPT | Performed by: NURSE PRACTITIONER

## 2024-05-15 RX ORDER — ALBUTEROL SULFATE 90 UG/1
2 AEROSOL, METERED RESPIRATORY (INHALATION) EVERY 6 HOURS PRN
Qty: 6.7 G | Refills: 0 | Status: SHIPPED | OUTPATIENT
Start: 2024-05-15 | End: 2024-06-14

## 2024-05-15 RX ORDER — LEVOTHYROXINE SODIUM 0.03 MG/1
25 TABLET ORAL DAILY
Qty: 90 TABLET | Refills: 0 | Status: SHIPPED | OUTPATIENT
Start: 2024-05-15 | End: 2024-08-13

## 2024-05-15 NOTE — PROGRESS NOTES
Assessment/Plan:    Pt is a 24 yr old female   Presents in office for ER follow up   She is few weeks post partum and post c section - baby still in NICU - delivered at 33 weeks   Patient developed shortness of breath - seen in ER showed RLL pneumonia. Reviewed CT scan   She is currently on Zithromax  Addling albuterol as she is still having shortness of breath   Pelvis c section suture line looks ok  - no redness swelling or separation - still few stiches noted coming out   No drainage - discussed keeping area dry   Referred to PULM for follow up   Follow up with GYN and repeat labs in 8 weeks        Problem List Items Addressed This Visit    None  Visit Diagnoses       Pneumonia of right lower lobe due to infectious organism    -  Primary    post ER follow up  on zithromax   still some shortness of breath   added albuterol since she does have history of asthma    Relevant Medications    albuterol (Proventil HFA) 90 mcg/act inhaler    Other Relevant Orders    Ambulatory Referral to Pulmonology    CBC and differential    Comprehensive metabolic panel    TSH, 3rd generation with Free T4 reflex    UA (URINE) with reflex to Scope    History of asthma        will have her follow up with PULM    Relevant Medications    albuterol (Proventil HFA) 90 mcg/act inhaler    Subclinical hypothyroidism        currenlty on levothyroxien will follow up labs in few months post partum    Relevant Medications    levothyroxine (Euthyrox) 25 mcg tablet    Other Relevant Orders    CBC and differential    Comprehensive metabolic panel    TSH, 3rd generation with Free T4 reflex    UA (URINE) with reflex to Scope    Postpartum care and examination        worried about c section - looks ok   healing ok              Subjective:      Patient ID: Latisha Kelly is a 24 y.o. female.    Pt is a 24 yr old female   Presents in office for ER follow up   She is few weeks post partum and post c section developed shortness of breath - seen in ER  showed RLL pneumonia. Reviewed CT scan   She is currently on Zithromax  Addling albuterol as she is still having shortness of breath   Pelvis c section suture line looks ok  - no redness swelling or separation - still few stiches noted coming out   No drainage - discussed keeping area dry   Referred to PULM for follow up   Follow up with GYN and repeat labs in 8 weeks         The following portions of the patient's history were reviewed and updated as appropriate:   Past Medical History:  She has a past medical history of Anemia, Hypertension, Hypothyroid, Migraine without aura, PCOS (polycystic ovarian syndrome), Varicella, and Visual impairment.,  _______________________________________________________________________  Medical Problems:  does not have any pertinent problems on file.,  _______________________________________________________________________  Past Surgical History:   has a past surgical history that includes Turbinate resection; pr cerclage uterine cervix nonobstetrical (N/A, 2024); pr  delivery only (N/A, 2024); and pr removal cerclage suture under anesthesia (N/A, 2024).,  _______________________________________________________________________  Family History:  family history includes Hypertension in her father; No Known Problems in her brother, brother, brother, mother, and sister.,  _______________________________________________________________________  Social History:   reports that she has never smoked. She has never used smokeless tobacco. She reports that she does not currently use alcohol after a past usage of about 7.0 standard drinks of alcohol per week. She reports that she does not currently use drugs after having used the following drugs: Marijuana.,  _______________________________________________________________________  Allergies:  is allergic to latex..  _______________________________________________________________________  Current Outpatient Medications    Medication Sig Dispense Refill    acetaminophen (TYLENOL) 325 mg tablet Take 2 tablets (650 mg total) by mouth every 6 (six) hours as needed for mild pain, moderate pain, fever or headaches 30 tablet 0    albuterol (Proventil HFA) 90 mcg/act inhaler Inhale 2 puffs every 6 (six) hours as needed for wheezing 6.7 g 0    amoxicillin (AMOXIL) 500 mg capsule Take 2 capsules (1,000 mg total) by mouth 3 (three) times a day for 7 days 42 capsule 0    aspirin 81 mg chewable tablet Chew 2 tablets (162 mg total) daily 180 tablet 1    azithromycin (ZITHROMAX) 250 mg tablet Take 1 tablet (250 mg total) by mouth every 24 hours for 5 days Take 2 tablets today then 1 tablet daily x 4 days 5 tablet 0    ferrous gluconate (FERGON) 240 (27 FE) MG tablet Take 1 tablet (240 mg total) by mouth in the morning for 20 days 20 tablet 0    ibuprofen (MOTRIN) 600 mg tablet Take 1 tablet (600 mg total) by mouth every 6 (six) hours 30 tablet 0    Insulin Pen Needle (BD Pen Needle Susan U/F) 32G X 4 MM MISC Use daily at bedtime 100 each 3    labetalol (NORMODYNE) 200 mg tablet Take 1 tablet (200 mg total) by mouth 2 (two) times a day      levothyroxine (Euthyrox) 25 mcg tablet Take 1 tablet (25 mcg total) by mouth daily 90 tablet 0    Prenatal Vit-Iron Carbonyl-FA (prenatal multivitamin) TABS Take 1 tablet by mouth daily       No current facility-administered medications for this visit.     _______________________________________________________________________  Review of Systems   Constitutional:  Positive for fatigue. Negative for fever and unexpected weight change.   HENT:  Negative for congestion and postnasal drip.    Eyes: Negative.    Respiratory:  Positive for cough and shortness of breath.    Cardiovascular:  Negative for chest pain and palpitations.   Gastrointestinal:  Negative for abdominal distention, abdominal pain, nausea and vomiting.        C section suture line c/d/I    Endocrine:        Breastfeeding    Genitourinary:  Negative  "for difficulty urinating and flank pain.   Musculoskeletal:  Positive for arthralgias and myalgias.   Skin:  Negative for rash.   Allergic/Immunologic: Positive for environmental allergies.   Neurological:  Negative for headaches.   Hematological:  Negative for adenopathy.   Psychiatric/Behavioral:  Negative for sleep disturbance and suicidal ideas. The patient is nervous/anxious.          Objective:  Vitals:    05/15/24 1118   BP: 118/76   BP Location: Right arm   Patient Position: Sitting   Cuff Size: Large   Pulse: 98   Temp: 99.4 °F (37.4 °C)   SpO2: 98%   Weight: 124 kg (274 lb)   Height: 5' 6\" (1.676 m)     Body mass index is 44.22 kg/m².     Physical Exam  Vitals and nursing note reviewed.   HENT:      Head: Atraumatic.   Eyes:      Extraocular Movements: Extraocular movements intact.   Cardiovascular:      Rate and Rhythm: Normal rate and regular rhythm.   Pulmonary:      Breath sounds: Normal breath sounds.      Comments: Decreased to b/l bases   Abdominal:      Palpations: Abdomen is soft.   Musculoskeletal:      Right lower leg: No edema.      Left lower leg: No edema.   Skin:     General: Skin is warm.   Neurological:      Mental Status: She is alert and oriented to person, place, and time.   Psychiatric:         Mood and Affect: Mood normal.         Behavior: Behavior normal.         "

## 2024-05-16 ENCOUNTER — APPOINTMENT (EMERGENCY)
Dept: CT IMAGING | Facility: HOSPITAL | Age: 24
End: 2024-05-16
Payer: COMMERCIAL

## 2024-05-16 ENCOUNTER — PATIENT MESSAGE (OUTPATIENT)
Dept: OBGYN CLINIC | Facility: CLINIC | Age: 24
End: 2024-05-16

## 2024-05-16 ENCOUNTER — HOSPITAL ENCOUNTER (EMERGENCY)
Facility: HOSPITAL | Age: 24
Discharge: HOME | End: 2024-05-16
Attending: EMERGENCY MEDICINE
Payer: COMMERCIAL

## 2024-05-16 ENCOUNTER — PATIENT OUTREACH (OUTPATIENT)
Dept: FAMILY MEDICINE CLINIC | Facility: CLINIC | Age: 24
End: 2024-05-16

## 2024-05-16 VITALS
TEMPERATURE: 98.6 F | RESPIRATION RATE: 20 BRPM | OXYGEN SATURATION: 100 % | DIASTOLIC BLOOD PRESSURE: 73 MMHG | SYSTOLIC BLOOD PRESSURE: 137 MMHG | HEART RATE: 63 BPM

## 2024-05-16 DIAGNOSIS — Z98.891 HISTORY OF C-SECTION: ICD-10-CM

## 2024-05-16 DIAGNOSIS — R10.9 ABDOMINAL PAIN: ICD-10-CM
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
## 2024-05-16 LAB
ALBUMIN SERPL BCP-MCNC: 3.7 G/DL (ref 3.5–5)
ALP SERPL-CCNC: 72 U/L (ref 34–104)
ALT SERPL W P-5'-P-CCNC: 14 U/L (ref 7–52)
ANION GAP SERPL CALCULATED.3IONS-SCNC: 8 MMOL/L (ref 4–13)
AST SERPL W P-5'-P-CCNC: 10 U/L (ref 13–39)
BASOPHILS # BLD AUTO: 0.03 THOUSANDS/ÂΜL (ref 0–0.1)
BASOPHILS NFR BLD AUTO: 1 % (ref 0–1)
BILIRUB SERPL-MCNC: 0.27 MG/DL (ref 0.2–1)
BUN SERPL-MCNC: 9 MG/DL (ref 5–25)
CALCIUM SERPL-MCNC: 8.9 MG/DL (ref 8.4–10.2)
CHLORIDE SERPL-SCNC: 108 MMOL/L (ref 96–108)
CO2 SERPL-SCNC: 25 MMOL/L (ref 21–32)
CREAT SERPL-MCNC: 0.66 MG/DL (ref 0.6–1.3)
EOSINOPHIL # BLD AUTO: 0.32 THOUSAND/ÂΜL (ref 0–0.61)
EOSINOPHIL NFR BLD AUTO: 5 % (ref 0–6)
ERYTHROCYTE [DISTWIDTH] IN BLOOD BY AUTOMATED COUNT: 14.1 % (ref 11.6–15.1)
GFR SERPL CREATININE-BSD FRML MDRD: 124 ML/MIN/1.73SQ M
GLUCOSE SERPL-MCNC: 80 MG/DL (ref 65–140)
HCT VFR BLD AUTO: 31.2 % (ref 34.8–46.1)
HGB BLD-MCNC: 9.4 G/DL (ref 11.5–15.4)
IMM GRANULOCYTES # BLD AUTO: 0.06 THOUSAND/UL (ref 0–0.2)
IMM GRANULOCYTES NFR BLD AUTO: 1 % (ref 0–2)
LYMPHOCYTES # BLD AUTO: 1.83 THOUSANDS/ÂΜL (ref 0.6–4.47)
LYMPHOCYTES NFR BLD AUTO: 30 % (ref 14–44)
MCH RBC QN AUTO: 27.2 PG (ref 26.8–34.3)
MCHC RBC AUTO-ENTMCNC: 30.1 G/DL (ref 31.4–37.4)
MCV RBC AUTO: 90 FL (ref 82–98)
MONOCYTES # BLD AUTO: 0.58 THOUSAND/ÂΜL (ref 0.17–1.22)
MONOCYTES NFR BLD AUTO: 10 % (ref 4–12)
NEUTROPHILS # BLD AUTO: 3.21 THOUSANDS/ÂΜL (ref 1.85–7.62)
NEUTS SEG NFR BLD AUTO: 53 % (ref 43–75)
NRBC BLD AUTO-RTO: 0 /100 WBCS
PLATELET # BLD AUTO: 426 THOUSANDS/UL (ref 149–390)
PMV BLD AUTO: 8.9 FL (ref 8.9–12.7)
POTASSIUM SERPL-SCNC: 4.2 MMOL/L (ref 3.5–5.3)
PROT SERPL-MCNC: 6.5 G/DL (ref 6.4–8.4)
RBC # BLD AUTO: 3.45 MILLION/UL (ref 3.81–5.12)
SODIUM SERPL-SCNC: 141 MMOL/L (ref 135–147)
WBC # BLD AUTO: 6.03 THOUSAND/UL (ref 4.31–10.16)

## 2024-05-16 PROCEDURE — 85025 COMPLETE CBC W/AUTO DIFF WBC: CPT | Performed by: EMERGENCY MEDICINE

## 2024-05-16 PROCEDURE — 99284 EMERGENCY DEPT VISIT MOD MDM: CPT

## 2024-05-16 PROCEDURE — 80053 COMPREHEN METABOLIC PANEL: CPT | Performed by: EMERGENCY MEDICINE

## 2024-05-16 PROCEDURE — 74177 CT ABD & PELVIS W/CONTRAST: CPT

## 2024-05-16 PROCEDURE — 96361 HYDRATE IV INFUSION ADD-ON: CPT

## 2024-05-16 PROCEDURE — 99285 EMERGENCY DEPT VISIT HI MDM: CPT | Performed by: EMERGENCY MEDICINE

## 2024-05-16 PROCEDURE — 96374 THER/PROPH/DIAG INJ IV PUSH: CPT

## 2024-05-16 PROCEDURE — 36415 COLL VENOUS BLD VENIPUNCTURE: CPT | Performed by: EMERGENCY MEDICINE

## 2024-05-16 RX ORDER — ONDANSETRON 2 MG/ML
4 INJECTION INTRAMUSCULAR; INTRAVENOUS ONCE
Status: COMPLETED | OUTPATIENT
Start: 2024-05-16 | End: 2024-05-16

## 2024-05-16 RX ADMIN — ONDANSETRON 4 MG: 2 INJECTION INTRAMUSCULAR; INTRAVENOUS at 14:05

## 2024-05-16 RX ADMIN — SODIUM CHLORIDE 1000 ML: 0.9 INJECTION, SOLUTION INTRAVENOUS at 13:15

## 2024-05-16 RX ADMIN — IOHEXOL 100 ML: 350 INJECTION, SOLUTION INTRAVENOUS at 14:17

## 2024-05-16 NOTE — PROGRESS NOTES
HIMANSHU OSUNA placed second outreach call to patient and left a voicemail. Patient quickly returned HIMANSHU OSUNA's call and reported that she did not need any assistance or resources at this time.    HIMANSHU OSUNA to close referral.
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
## 2024-05-20 ENCOUNTER — APPOINTMENT (OUTPATIENT)
Dept: RADIOLOGY | Facility: AMBULARY SURGERY CENTER | Age: 24
End: 2024-05-20
Attending: ORTHOPAEDIC SURGERY
Payer: COMMERCIAL

## 2024-05-20 ENCOUNTER — OFFICE VISIT (OUTPATIENT)
Dept: OBGYN CLINIC | Facility: CLINIC | Age: 24
End: 2024-05-20
Payer: COMMERCIAL

## 2024-05-20 ENCOUNTER — TELEPHONE (OUTPATIENT)
Dept: OBGYN CLINIC | Facility: CLINIC | Age: 24
End: 2024-05-20

## 2024-05-20 VITALS — WEIGHT: 274 LBS | BODY MASS INDEX: 44.03 KG/M2 | HEIGHT: 66 IN

## 2024-05-20 DIAGNOSIS — M25.532 LEFT WRIST PAIN: ICD-10-CM

## 2024-05-20 DIAGNOSIS — M65.4 DE QUERVAIN'S TENOSYNOVITIS, LEFT: Primary | ICD-10-CM

## 2024-05-20 PROCEDURE — 73110 X-RAY EXAM OF WRIST: CPT

## 2024-05-20 PROCEDURE — 99204 OFFICE O/P NEW MOD 45 MIN: CPT | Performed by: ORTHOPAEDIC SURGERY

## 2024-05-20 NOTE — PROGRESS NOTES
Assessment:  1. De Quervain's tenosynovitis, left  Brace      2. Left wrist pain  XR wrist 3+ vw left        Patient Active Problem List   Diagnosis    Obesity affecting pregnancy in third trimester    Status post primary low transverse  section    Chronic hypertension with super imposed preeclampsia without severe features    Encounter for care and examination of lactating mother    Hypothyroidism     labor in second trimester    Short cervix during pregnancy in second trimester    Insulin controlled gestational diabetes mellitus (GDM) in third trimester    Nuchal cord, single gestation    Decreased fetal movement    Decreased fetal movement affecting management of pregnancy in third trimester    Postpartum hemorrhage    De Quervain's tenosynovitis, left           Plan      24 y.o. female with left De Quervain's tenosynovitis  Diagnostics reviewed and physical exam performed.  Diagnosis, treatment options and associated risks were discussed with the patient including no treatment, nonsurgical treatment and potential for surgical intervention.  The patient was given the opportunity to ask questions regarding each.  Voltaren gel can be applied 4 times daily  Apply ice as needed for pain relief  Thumb spica ordered today to be worn as needed   Follow up for cortisone injection after receiving clearance from OBGYN due to currently breastfeeding  Return for re-evaluation, follow up on an as-needed basis (PRN).          Subjective:     Patient ID: Latisha Kelly 24 y.o. female    HPI    Patient comes in today for initial evaluation of left wrist pain that began a few months ago with no obvious mechanism of injury, trauma, or inciting event. She notes ongoing radial based wrist pain that is aggravated with any movement of the wrist or thumb. She recently delivered her first child and notes she was pregnant at time of wrist pain onset. She was evaluated at an Urgent Care where a cock up wrist splint was  provided, which ultimately did not provide any relief. She does not use topical cream. She takes Tylenol and Aspirin without relief. She denies mechanical symptoms, previous injury surgery or trauma. She is currently breastfeeding.       The following portions of the patient's history were reviewed and updated as appropriate: allergies, current medications, past family history, past social history, past surgical history and problem list.      Objective:    Review of Systems  Pertinent items are noted in HPI.  All other systems were reviewed and are negative.    Past Medical History:   Diagnosis Date    Anemia     Hypertension     chtn started medication with pregnancy    Hypothyroid     Migraine without aura     OTC med management    PCOS (polycystic ovarian syndrome)     Varicella     vaccine    Visual impairment        Past Surgical History:   Procedure Laterality Date    LA CERCLAGE UTERINE CERVIX NONOBSTETRICAL N/A 2024    Procedure: CERCLAGE CERVICAL;  Surgeon: Ann Hoffmann MD;  [facility];  Service: Obstetrics    LA  DELIVERY ONLY N/A 2024    Procedure:  SECTION ();  Surgeon: Lizzette Hernandez DO;  [facility];  Service: Obstetrics    LA REMOVAL CERCLAGE SUTURE UNDER ANESTHESIA N/A 2024    Procedure: CERCLAGE REMOVAL;  Surgeon: Lizzette Hernandez DO;  [facility];  Service: Obstetrics    TURBINATE RESECTION         Family History   Problem Relation Age of Onset    No Known Problems Mother     Hypertension Father     No Known Problems Sister     No Known Problems Brother     No Known Problems Brother     No Known Problems Brother     Colon cancer Neg Hx     Ovarian cancer Neg Hx     Breast cancer Neg Hx     Cancer Neg Hx        Social History     Occupational History    Not on file   Tobacco Use    Smoking status: Never    Smokeless tobacco: Never   Vaping Use    Vaping status: Former    Quit date: 2023    Substances: Nicotine, Flavoring  "  Substance and Sexual Activity    Alcohol use: Not Currently     Alcohol/week: 7.0 standard drinks of alcohol     Types: 7 Glasses of wine per week     Comment: 1 daily-prior to pregnancy    Drug use: Not Currently     Types: Marijuana     Comment: last used THC over 2 years ago (as of 11/29/23)    Sexual activity: Yes     Partners: Male     Birth control/protection: None         Current Outpatient Medications:     acetaminophen (TYLENOL) 325 mg tablet, Take 2 tablets (650 mg total) by mouth every 6 (six) hours as needed for mild pain, moderate pain, fever or headaches, Disp: 30 tablet, Rfl: 0    albuterol (Proventil HFA) 90 mcg/act inhaler, Inhale 2 puffs every 6 (six) hours as needed for wheezing, Disp: 6.7 g, Rfl: 0    aspirin 81 mg chewable tablet, Chew 2 tablets (162 mg total) daily, Disp: 180 tablet, Rfl: 1    ferrous gluconate (FERGON) 240 (27 FE) MG tablet, Take 1 tablet (240 mg total) by mouth in the morning for 20 days, Disp: 20 tablet, Rfl: 0    ibuprofen (MOTRIN) 600 mg tablet, Take 1 tablet (600 mg total) by mouth every 6 (six) hours, Disp: 30 tablet, Rfl: 0    Insulin Pen Needle (BD Pen Needle Susan U/F) 32G X 4 MM MISC, Use daily at bedtime, Disp: 100 each, Rfl: 3    labetalol (NORMODYNE) 200 mg tablet, Take 1 tablet (200 mg total) by mouth 2 (two) times a day, Disp: , Rfl:     levothyroxine (Euthyrox) 25 mcg tablet, Take 1 tablet (25 mcg total) by mouth daily, Disp: 90 tablet, Rfl: 0    Prenatal Vit-Iron Carbonyl-FA (prenatal multivitamin) TABS, Take 1 tablet by mouth daily, Disp: , Rfl:     Allergies   Allergen Reactions    Latex Rash       Physical Exam  Ht 5' 6\" (1.676 m)   Wt 124 kg (274 lb)   LMP 09/18/2023   BMI 44.22 kg/m²   Cons: Appears well.  No apparent distress.  Psych: Alert. Oriented x3.  Mood and affect normal.  Eyes: PERRLA, EOMI  Resp: Normal effort.  No audible wheezing or stridor.  CV: Palpable pulse.  No discernable arrhythmia.  No LE edema.  Lymph:  No palpable cervical, " "axillary, or inguinal lymphadenopathy.  Skin: Warm.  No palpable masses.  No visible lesions.  Neuro: Normal muscle tone.  Normal and symmetric DTR's.    Ortho Exam  Positive tenderness to palpation over 1st dorsal extensor compartment   Negative crepitus over 1st dorsal extensor compartment   Positive Finkelstein's test    Nontender thumb CMC joint or A1 pulley      Procedures  No Procedures performed today      I have personally reviewed pertinent films in PACS and my interpretation is no acute osseous abnormalities.      Scribe Attestation      I,:  Paola Jose am acting as a scribe while in the presence of the attending physician.:       I,:  Christian Jimenes, DO personally performed the services described in this documentation    as scribed in my presence.:                Portions of the record may have been created with voice recognition software.  Occasional wrong word or \"sound a like\" substitutions may have occurred due to the inherent limitations of voice recognition software.  Read the chart carefully and recognize, using context, where substitutions have occurred.  "

## 2024-05-20 NOTE — TELEPHONE ENCOUNTER
Placed call to the patient to check and see how her incision was doing- she stated that she has been using antibacterial soap and keeping it clean / dry. She has seen a lot of improvement in it since we last spoke. Patient is agreeable to contact the office with any changes.

## 2024-05-22 ENCOUNTER — CONSULT (OUTPATIENT)
Dept: PULMONOLOGY | Facility: CLINIC | Age: 24
End: 2024-05-22
Payer: COMMERCIAL

## 2024-05-22 VITALS
TEMPERATURE: 99.4 F | SYSTOLIC BLOOD PRESSURE: 120 MMHG | DIASTOLIC BLOOD PRESSURE: 82 MMHG | HEIGHT: 66 IN | WEIGHT: 260 LBS | HEART RATE: 80 BPM | BODY MASS INDEX: 41.78 KG/M2 | OXYGEN SATURATION: 98 %

## 2024-05-22 DIAGNOSIS — R06.09 DYSPNEA ON EXERTION: Primary | ICD-10-CM

## 2024-05-22 DIAGNOSIS — J18.9 PNEUMONIA OF RIGHT LOWER LOBE DUE TO INFECTIOUS ORGANISM: ICD-10-CM

## 2024-05-22 PROCEDURE — 99244 OFF/OP CNSLTJ NEW/EST MOD 40: CPT | Performed by: INTERNAL MEDICINE

## 2024-05-22 RX ORDER — BUDESONIDE AND FORMOTEROL FUMARATE DIHYDRATE 80; 4.5 UG/1; UG/1
2 AEROSOL RESPIRATORY (INHALATION) 2 TIMES DAILY
Qty: 10.2 G | Refills: 5 | Status: SHIPPED | OUTPATIENT
Start: 2024-05-22

## 2024-05-22 NOTE — PROGRESS NOTES
Assessment/Plan:    Dyspnea on exertion  Dyspnea on exertion which brought patient to emergency on May 12.  There was a tentative diagnosis of pneumonia in spite of a normal white cell count and no fever.  Patient senses perhaps some improvement on antibiotic.  Still has some dyspnea.  Patient states background of asthma and intermittent wheezing so I decided to treat her with regular doses of Symbicort and she has already been prescribed as needed albuterol.  We will see if there is improvement with this therapy.  If not she will need presumed PFTs, another chest x-ray and possibly even an echocardiogram to screen for postpartum cardiomyopathy.  Nothing was found today on examination that is helpful.  Note that patient does have longstanding chronic anemia of unclear type with normal iron studies reviewed previously.  She states that she has already lost her weight gained with pregnancy.     Diagnoses and all orders for this visit:    Dyspnea on exertion  -     budesonide-formoterol (Symbicort) 80-4.5 MCG/ACT inhaler; Inhale 2 puffs 2 (two) times a day Rinse mouth after use.    Pneumonia of right lower lobe due to infectious organism  Comments:  post ER follow up  on zithromax   still some shortness of breath   added albuterol since she does have history of asthma  Orders:  -     Ambulatory Referral to Pulmonology          Subjective:      Patient ID: Latisha Kelly is a 24 y.o. female.    This patient is referred by katerina HANSON for pneumonia diagnosed in emergency on May 12.  At that point the patient was 4 days postpartum, having had a  section.  Patient still senses some mild dyspnea.  She did feel that she improved with the antibiotic therapy ordered by emergency.  She does hear somewhat intermittent wheezing.  This patient states she had asthma as a child and took nebulized bronchodilators.  She thinks she had symptoms as a teenager but was not on any therapy at that point.  Does have  clear-cut allergies and allergies were quite severe this spring.  Note is a history of corrected hypothyroidism.  Note a history of chronic anemia without diagnosis.  Recent iron studies have been normal.  Patient does describe to me orthopnea.  Note BNP modestly elevated on May 12.  CT scan shows tiny areas of inflammatory disease in the right lower lobe compatible with pneumonia but not very striking in terms of the volume of tissue involved.  Wheezing was not detected but patient was in emergency.  CT scan did not show evidence of pulmonary embolism.  I have been ordered albuterol inhaler by her PCP but did not get this medication yet.  States she has lost some weight gain with pregnancy but her weight is still up 14 pounds from a presumed prepregnancy weight in September.  I neglected to inquire about symptoms of sleep apnea and will do so at the next visit.    Pneumonia  She complains of shortness of breath and wheezing. There is no cough. Associated symptoms include appetite change. Pertinent negatives include no chest pain.       The following portions of the patient's history were reviewed and updated as appropriate: allergies, current medications, past family history, past medical history, past social history, past surgical history and problem list.    Review of Systems   Constitutional:  Positive for activity change, appetite change and unexpected weight change.        Patient senses some anorexia postpartum   HENT:  Negative for congestion and sinus pressure.    Eyes:  Negative for visual disturbance.   Respiratory:  Positive for shortness of breath and wheezing. Negative for cough.    Cardiovascular:  Negative for chest pain and leg swelling.   Gastrointestinal:  Negative for abdominal pain.   Endocrine: Negative for cold intolerance and heat intolerance.   Genitourinary:  Negative for difficulty urinating.   Musculoskeletal:  Positive for arthralgias.        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    Allergic/Immunologic: Positive for environmental allergies.   Neurological: Negative.    Hematological:  Negative for adenopathy.         Objective:      /82 (BP Location: Left arm, Patient Position: Sitting, Cuff Size: Standard)   Pulse 80   Temp 99.4 °F (37.4 °C) (Tympanic)   Ht 5' 6\" (1.676 m)   Wt 118 kg (260 lb)   LMP 09/18/2023   SpO2 98%   Breastfeeding Yes   BMI 41.97 kg/m²          Physical Exam  Vitals reviewed.   Constitutional:       General: She is not in acute distress.     Appearance: She is obese. She is not ill-appearing.   Neck:      Vascular: No JVD.   Cardiovascular:      Rate and Rhythm: Normal rate and regular rhythm.      Pulses:           Radial pulses are 3+ on the right side and 3+ on the left side.      Heart sounds: Normal heart sounds. No murmur heard.     No gallop.   Pulmonary:      Effort: Pulmonary effort is normal.      Breath sounds: Normal breath sounds. No wheezing or rhonchi.   Abdominal:      General: Abdomen is flat. There is no distension.      Palpations: Abdomen is soft. There is no hepatomegaly or splenomegaly.   Musculoskeletal:         General: No swelling.      Cervical back: No rigidity or tenderness.      Right lower leg: No edema.      Left lower leg: No edema.   Lymphadenopathy:      Cervical: No cervical adenopathy.   Skin:     General: Skin is warm and dry.      Capillary Refill: Capillary refill takes more than 3 seconds.   Neurological:      Mental Status: She is alert.   Psychiatric:         Mood and Affect: Mood normal.         Behavior: Behavior normal.         "

## 2024-05-22 NOTE — ASSESSMENT & PLAN NOTE
Dyspnea on exertion which brought patient to emergency on May 12.  There was a tentative diagnosis of pneumonia in spite of a normal white cell count and no fever.  Patient senses perhaps some improvement on antibiotic.  Still has some dyspnea.  Patient states background of asthma and intermittent wheezing so I decided to treat her with regular doses of Symbicort and she has already been prescribed as needed albuterol.  We will see if there is improvement with this therapy.  If not she will need presumed PFTs, another chest x-ray and possibly even an echocardiogram to screen for postpartum cardiomyopathy.  Nothing was found today on examination that is helpful.  Note that patient does have longstanding chronic anemia of unclear type with normal iron studies reviewed previously.  She states that she has already lost her weight gained with pregnancy.

## 2024-05-28 ENCOUNTER — POSTPARTUM VISIT (OUTPATIENT)
Dept: OBGYN CLINIC | Facility: CLINIC | Age: 24
End: 2024-05-28

## 2024-05-28 VITALS
WEIGHT: 253 LBS | HEIGHT: 66 IN | BODY MASS INDEX: 40.66 KG/M2 | DIASTOLIC BLOOD PRESSURE: 78 MMHG | SYSTOLIC BLOOD PRESSURE: 122 MMHG

## 2024-05-28 PROBLEM — Z98.891 STATUS POST PRIMARY LOW TRANSVERSE CESAREAN SECTION: Status: RESOLVED | Noted: 2023-12-15 | Resolved: 2024-05-28

## 2024-05-28 PROBLEM — O99.213 OBESITY AFFECTING PREGNANCY IN THIRD TRIMESTER: Status: RESOLVED | Noted: 2023-12-05 | Resolved: 2024-05-28

## 2024-05-28 PROBLEM — O36.8190 DECREASED FETAL MOVEMENT: Status: RESOLVED | Noted: 2024-04-25 | Resolved: 2024-05-28

## 2024-05-28 PROBLEM — O60.02 PRETERM LABOR IN SECOND TRIMESTER: Status: RESOLVED | Noted: 2024-02-27 | Resolved: 2024-05-28

## 2024-05-28 PROBLEM — O26.872 SHORT CERVIX DURING PREGNANCY IN SECOND TRIMESTER: Status: RESOLVED | Noted: 2024-02-29 | Resolved: 2024-05-28

## 2024-05-28 PROBLEM — O36.8130 DECREASED FETAL MOVEMENT AFFECTING MANAGEMENT OF PREGNANCY IN THIRD TRIMESTER: Status: RESOLVED | Noted: 2024-05-03 | Resolved: 2024-05-28

## 2024-05-28 PROBLEM — O10.919 CHRONIC HYPERTENSION AFFECTING PREGNANCY: Status: RESOLVED | Noted: 2024-02-05 | Resolved: 2024-05-28

## 2024-05-28 PROBLEM — O24.414 INSULIN CONTROLLED GESTATIONAL DIABETES MELLITUS (GDM) IN THIRD TRIMESTER: Status: RESOLVED | Noted: 2024-04-23 | Resolved: 2024-05-28

## 2024-05-28 PROCEDURE — 99024 POSTOP FOLLOW-UP VISIT: CPT | Performed by: OBSTETRICS & GYNECOLOGY

## 2024-05-28 NOTE — PROGRESS NOTES
Assessment & Plan     Normal postpartum exam.     1. Contraception: none  2. Annual exam due in February.   3. Increase activity as tolerated, may resume all normal activity.  4. Lactation consult needed: no; if yes, [facility] information discussed.         Subjective   Chief Complaint   Patient presents with    Postpartum Care       Latisha Kelly is a 24 y.o.  female who presents for a postpartum visit.     Delivery Method: , Low Transverse   Delivery Date and Time: 2024 5:10 PM  Delivery Attending: Lizzette Hernandez  Gestational Age at delivery: 33w1d   Route of Delivery: , Low Transverse  Reason for  delivery: Category II FHR Tracing      Admitting Diagnoses:   Patient Active Problem List   Diagnosis    Obesity affecting pregnancy in third trimester    Status post primary low transverse  section    Chronic hypertension with super imposed preeclampsia without severe features    Encounter for care and examination of lactating mother    Hypothyroidism     labor in second trimester    Short cervix during pregnancy in second trimester    Insulin controlled gestational diabetes mellitus (GDM) in third trimester    Nuchal cord, single gestation    Decreased fetal movement    Decreased fetal movement affecting management of pregnancy in third trimester    Postpartum hemorrhage    De Quervain's tenosynovitis, left    Dyspnea on exertion       Gestational Diabetes: yes  Pregnancy Complications: chronic HTN and gestational DM    Anesthesia: Spinal,     Delivery: , Low Transverse at 2024 5:10 PM      Baby's Name: Pattie  Baby's Weight: 4#6  Apgar scores: 6  and 8  at 1 and 5 minutes, respectively  Breast or formula: pumping   Pediatrician: in NICU    Bleeding staining only. Bowel function: normal. Bladder function: normal. The patient is not having any pain.     Patient has not been sexually active. Desired contraception method is none. Declines  at this time    Postpartum depression screening: positive. EPDS : 12. She denies depression/anxiety/trouble sleeping.     Last Pap : 12/21/23 ; atypical squamous cellularity of undetermined significance (ASCUS)      The following portions of the patient's history were reviewed and updated as appropriate: allergies, current medications, past family history, past medical history, past social history, past surgical history, and problem list.      Current Outpatient Medications:     albuterol (Proventil HFA) 90 mcg/act inhaler, Inhale 2 puffs every 6 (six) hours as needed for wheezing, Disp: 6.7 g, Rfl: 0    budesonide-formoterol (Symbicort) 80-4.5 MCG/ACT inhaler, Inhale 2 puffs 2 (two) times a day Rinse mouth after use., Disp: 10.2 g, Rfl: 5    ferrous gluconate (FERGON) 240 (27 FE) MG tablet, Take 1 tablet (240 mg total) by mouth in the morning for 20 days, Disp: 20 tablet, Rfl: 0    labetalol (NORMODYNE) 200 mg tablet, Take 1 tablet (200 mg total) by mouth 2 (two) times a day, Disp: , Rfl:     levothyroxine (Euthyrox) 25 mcg tablet, Take 1 tablet (25 mcg total) by mouth daily, Disp: 90 tablet, Rfl: 0    Prenatal Vit-Iron Carbonyl-FA (prenatal multivitamin) TABS, Take 1 tablet by mouth daily, Disp: , Rfl:     acetaminophen (TYLENOL) 325 mg tablet, Take 2 tablets (650 mg total) by mouth every 6 (six) hours as needed for mild pain, moderate pain, fever or headaches (Patient not taking: Reported on 5/28/2024), Disp: 30 tablet, Rfl: 0    aspirin 81 mg chewable tablet, Chew 2 tablets (162 mg total) daily (Patient not taking: Reported on 5/22/2024), Disp: 180 tablet, Rfl: 1    ibuprofen (MOTRIN) 600 mg tablet, Take 1 tablet (600 mg total) by mouth every 6 (six) hours (Patient not taking: Reported on 5/28/2024), Disp: 30 tablet, Rfl: 0    Insulin Pen Needle (BD Pen Needle Susan U/F) 32G X 4 MM MISC, Use daily at bedtime (Patient not taking: Reported on 5/22/2024), Disp: 100 each, Rfl: 3    Allergies   Allergen Reactions     "Latex Rash       Review of Systems  Review of Systems   Constitutional:  Negative for chills and fever.   Respiratory:  Negative for shortness of breath.    Cardiovascular:  Negative for chest pain and leg swelling.   Gastrointestinal:  Negative for abdominal distention, abdominal pain, constipation, nausea and vomiting.   Genitourinary:  Positive for difficulty urinating (pressure with urination) and vaginal bleeding. Negative for dysuria, frequency, urgency and vaginal discharge.   Neurological:  Negative for headaches.         Objective      /78 (BP Location: Right arm, Patient Position: Sitting, Cuff Size: Large)   Ht 5' 6\" (1.676 m)   Wt 115 kg (253 lb)   LMP 09/18/2023   Breastfeeding Yes   BMI 40.84 kg/m²     Physical Exam  Constitutional:       General: She is not in acute distress.     Appearance: Normal appearance. She is well-developed. She is not ill-appearing.   Pulmonary:      Effort: Pulmonary effort is normal. No respiratory distress.   Neurological:      General: No focal deficit present.      Mental Status: She is alert and oriented to person, place, and time.   Psychiatric:         Mood and Affect: Mood normal.         Behavior: Behavior normal.         Thought Content: Thought content normal.         Judgment: Judgment normal.   Vitals and nursing note reviewed.         Incision: clean, dry, and intact     "

## 2024-05-31 ENCOUNTER — TELEPHONE (OUTPATIENT)
Age: 24
End: 2024-05-31

## 2024-05-31 NOTE — TELEPHONE ENCOUNTER
Caller: Latisha    Doctor: Yudy    Reason for call: Patient stated at her last office visit she was informed that her OBGYN would be contacted for clearance. She would like to get CSI but is currently breastfeeding     Call back#: [phone]

## 2024-06-04 ENCOUNTER — TELEPHONE (OUTPATIENT)
Dept: OBGYN CLINIC | Facility: CLINIC | Age: 24
End: 2024-06-04

## 2024-06-04 NOTE — TELEPHONE ENCOUNTER
Called patient to let her know that her doctor is ok with her getting a steroid injection. Not able to schedule at the moment. She will call back to schedule. Please call office and ask for Nancy to help schedule.

## 2024-06-06 NOTE — TELEPHONE ENCOUNTER
Caller: Patient    Doctor: Yudy    Reason for call: Calling back to schedule a  steroid Inj with Dr. Jimenes- Tried to reach Nancy in office to help schedule but no answer.     Call back#: [phone]

## 2024-06-10 ENCOUNTER — OFFICE VISIT (OUTPATIENT)
Dept: OBGYN CLINIC | Facility: CLINIC | Age: 24
End: 2024-06-10
Payer: COMMERCIAL

## 2024-06-10 VITALS — WEIGHT: 253 LBS | HEIGHT: 66 IN | BODY MASS INDEX: 40.66 KG/M2

## 2024-06-10 DIAGNOSIS — M65.4 DE QUERVAIN'S TENOSYNOVITIS, LEFT: Primary | ICD-10-CM

## 2024-06-10 PROCEDURE — 99213 OFFICE O/P EST LOW 20 MIN: CPT

## 2024-06-10 PROCEDURE — 20550 NJX 1 TENDON SHEATH/LIGAMENT: CPT

## 2024-06-10 RX ORDER — BUPIVACAINE HYDROCHLORIDE 2.5 MG/ML
1 INJECTION, SOLUTION INFILTRATION; PERINEURAL
Status: COMPLETED | OUTPATIENT
Start: 2024-06-10 | End: 2024-06-10

## 2024-06-10 RX ORDER — BETAMETHASONE SODIUM PHOSPHATE AND BETAMETHASONE ACETATE 3; 3 MG/ML; MG/ML
0.5 INJECTION, SUSPENSION INTRA-ARTICULAR; INTRALESIONAL; INTRAMUSCULAR; SOFT TISSUE
Status: COMPLETED | OUTPATIENT
Start: 2024-06-10 | End: 2024-06-10

## 2024-06-10 RX ORDER — MELOXICAM 7.5 MG/1
7.5 TABLET ORAL DAILY
Qty: 60 TABLET | Refills: 0 | Status: SHIPPED | OUTPATIENT
Start: 2024-06-10

## 2024-06-10 RX ADMIN — BUPIVACAINE HYDROCHLORIDE 1 ML: 2.5 INJECTION, SOLUTION INFILTRATION; PERINEURAL at 13:30

## 2024-06-10 RX ADMIN — BETAMETHASONE SODIUM PHOSPHATE AND BETAMETHASONE ACETATE 0.5 ML: 3; 3 INJECTION, SUSPENSION INTRA-ARTICULAR; INTRALESIONAL; INTRAMUSCULAR; SOFT TISSUE at 13:30

## 2024-06-10 NOTE — PROGRESS NOTES
Assessment:  1. De Quervain's tenosynovitis, left  meloxicam (MOBIC) 7.5 mg tablet        Patient Active Problem List   Diagnosis    Encounter for care and examination of lactating mother    Hypothyroidism    De Quervain's tenosynovitis, left    Dyspnea on exertion       Plan:    24 y.o. female  with De Quervain's tenosynovitis of the left wrist    Discussed steroid injection of the left wrist.  Discussed Dr. Jimenes reviewed with OB/GYN that it was okay for steroid injection.  Patient agreeable.  Injection administered today.  Discussed with patient Mobic to help with persistent pain and inflammation.  Discussed speaking with PCP and OB/GYN prior to taking.  Patient agreeable.  Prescription provided today.  Patient to continue with bracing of the left wrist.  Patient agreeable.  Patient to follow-up as needed for persistent pain and inflammation.    Subjective:   Patient ID: Latisha Kelly is a 24 y.o. female .    HPI    Patient presents to the office for follow up of left wrist pain.  Patient has been utilizing bracing of the left wrist.  Notes some persistent pain specifically with motion of the wrist.  Patient seeking CSI of the left wrist today as OB/GYN and she was able to undergo injection.    The following portions of the patient's history were reviewed and updated as appropriate: allergies, current medications, past family history, past social history, past surgical history and problem list.    Social History     Socioeconomic History    Marital status: Single     Spouse name: Not on file    Number of children: Not on file    Years of education: Not on file    Highest education level: Not on file   Occupational History    Not on file   Tobacco Use    Smoking status: Never    Smokeless tobacco: Never   Vaping Use    Vaping status: Former    Quit date: 1/1/2023    Substances: Nicotine, Flavoring   Substance and Sexual Activity    Alcohol use: Not Currently     Alcohol/week: 7.0 standard drinks of alcohol      Types: 7 Glasses of wine per week     Comment: 1 daily-prior to pregnancy    Drug use: Not Currently     Types: Marijuana     Comment: last used THC over 2 years ago (as of 24)    Sexual activity: Yes     Partners: Male     Birth control/protection: None   Other Topics Concern    Not on file   Social History Narrative    Not on file     Social Determinants of Health     Financial Resource Strain: Low Risk  (2022)    Overall Financial Resource Strain (CARDIA)     Difficulty of Paying Living Expenses: Not hard at all   Food Insecurity: No Food Insecurity (2024)    Hunger Vital Sign     Worried About Running Out of Food in the Last Year: Never true     Ran Out of Food in the Last Year: Never true   Transportation Needs: No Transportation Needs (2024)    PRAPARE - Transportation     Lack of Transportation (Medical): No     Lack of Transportation (Non-Medical): No   Physical Activity: Not on file   Stress: No Stress Concern Present (2022)    [place] [employer] [place] [employer] - Occupational Stress Questionnaire     Feeling of Stress : Not at all   Social Connections: Not on file   Intimate Partner Violence: Not on file   Housing Stability: Low Risk  (2024)    Housing Stability Vital Sign     Unable to Pay for Housing in the Last Year: No     Number of Times Moved in the Last Year: 1     Homeless in the Last Year: No     Past Medical History:   Diagnosis Date    Anemia     Chronic hypertension with super imposed preeclampsia without severe features 2024    Hypertension     chtn started medication with pregnancy    Hypothyroid     Insulin controlled gestational diabetes mellitus (GDM) in third trimester 2024    Migraine without aura     OTC med management    PCOS (polycystic ovarian syndrome)     Status post primary low transverse  section 12/15/2023    Varicella     vaccine    Visual impairment      Past Surgical History:   Procedure Laterality Date    AR CERCLAGE  UTERINE CERVIX NONOBSTETRICAL N/A 2024    Procedure: CERCLAGE CERVICAL;  Surgeon: Ann Hoffmann MD;  [facility];  Service: Obstetrics    VT  DELIVERY ONLY N/A 2024    Procedure:  SECTION ();  Surgeon: Lizzette Hernandez DO;  [facility];  Service: Obstetrics    VT REMOVAL CERCLAGE SUTURE UNDER ANESTHESIA N/A 2024    Procedure: CERCLAGE REMOVAL;  Surgeon: Lizzette Hernandez DO;  [facility];  Service: Obstetrics    TURBINATE RESECTION       Allergies   Allergen Reactions    Latex Rash     Current Outpatient Medications on File Prior to Visit   Medication Sig Dispense Refill    albuterol (Proventil HFA) 90 mcg/act inhaler Inhale 2 puffs every 6 (six) hours as needed for wheezing 6.7 g 0    budesonide-formoterol (Symbicort) 80-4.5 MCG/ACT inhaler Inhale 2 puffs 2 (two) times a day Rinse mouth after use. 10.2 g 5    ferrous gluconate (FERGON) 240 (27 FE) MG tablet Take 1 tablet (240 mg total) by mouth in the morning for 20 days 20 tablet 0    labetalol (NORMODYNE) 200 mg tablet Take 1 tablet (200 mg total) by mouth 2 (two) times a day      levothyroxine (Euthyrox) 25 mcg tablet Take 1 tablet (25 mcg total) by mouth daily 90 tablet 0    Prenatal Vit-Iron Carbonyl-FA (prenatal multivitamin) TABS Take 1 tablet by mouth daily       No current facility-administered medications on file prior to visit.       Review of Systems   Constitutional:  Negative for chills and fever.   HENT:  Negative for ear pain and sore throat.    Eyes:  Negative for pain and visual disturbance.   Respiratory:  Negative for cough and shortness of breath.    Cardiovascular:  Negative for chest pain and palpitations.   Gastrointestinal:  Negative for abdominal pain and vomiting.   Genitourinary:  Negative for dysuria and hematuria.   Musculoskeletal:  Negative for arthralgias and back pain.   Skin:  Negative for color change and rash.   Neurological:  Negative for seizures and syncope.    All other systems reviewed and are negative.    See HPi    Objective:    There were no vitals filed for this visit.    Physical Exam  Vitals and nursing note reviewed.   Constitutional:       General: She is not in acute distress.     Appearance: She is well-developed.   HENT:      Head: Normocephalic and atraumatic.   Eyes:      Conjunctiva/sclera: Conjunctivae normal.   Cardiovascular:      Rate and Rhythm: Normal rate and regular rhythm.      Heart sounds: No murmur heard.  Pulmonary:      Effort: Pulmonary effort is normal. No respiratory distress.      Breath sounds: Normal breath sounds.   Abdominal:      Palpations: Abdomen is soft.      Tenderness: There is no abdominal tenderness.   Musculoskeletal:         General: No swelling.      Cervical back: Neck supple.   Skin:     General: Skin is warm and dry.      Capillary Refill: Capillary refill takes less than 2 seconds.   Neurological:      Mental Status: She is alert.   Psychiatric:         Mood and Affect: Mood normal.         Left Hand Exam     Comments:  Positive tenderness to palpation over 1st dorsal extensor compartment   Negative crepitus over 1st dorsal extensor compartment   Positive Finkelstein's test    Nontender thumb CMC joint or A1 pulley               Hand/upper extremity injection: L extensor compartment 1  Universal Protocol:  Consent: Verbal consent obtained.  Risks and benefits: risks, benefits and alternatives were discussed  Consent given by: patient  Patient understanding: patient states understanding of the procedure being performed  Patient identity confirmed: verbally with patient  Supporting Documentation  Indications: pain   Procedure Details  Condition:de Quervain's tenosynovitis Site: L extensor compartment 1   Needle size: 25 G  Ultrasound guidance: no  Approach: dorsal  Medications administered: 1 mL bupivacaine 0.25 %; 0.5 mL betamethasone acetate-betamethasone sodium phosphate 6 (3-3) mg/mL             Portions of the  "record may have been created with voice recognition software.  Occasional wrong word or \"sound a like\" substitutions may have occurred due to the inherent limitations of voice recognition software.  Read the chart carefully and recognize, using context, where substitutions have occurred.   "

## 2024-06-17 ENCOUNTER — SOCIAL WORK (OUTPATIENT)
Dept: BEHAVIORAL/MENTAL HEALTH CLINIC | Facility: CLINIC | Age: 24
End: 2024-06-17
Payer: COMMERCIAL

## 2024-06-17 DIAGNOSIS — F43.10 POST TRAUMATIC STRESS DISORDER (PTSD): ICD-10-CM

## 2024-06-17 DIAGNOSIS — F41.9 ANXIETY: Primary | ICD-10-CM

## 2024-06-17 PROCEDURE — 90791 PSYCH DIAGNOSTIC EVALUATION: CPT | Performed by: SOCIAL WORKER
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
## 2024-06-24 ENCOUNTER — OFFICE VISIT (OUTPATIENT)
Dept: POSTPARTUM | Facility: CLINIC | Age: 24
End: 2024-06-24
Payer: COMMERCIAL

## 2024-06-24 PROCEDURE — 99404 PREV MED CNSL INDIV APPRX 60: CPT | Performed by: PEDIATRICS

## 2024-06-24 NOTE — PATIENT INSTRUCTIONS
Continue pumping as often as you find sustainable.  To increase milk production, try pumping a little more frequently than you currently are.  Pumping every 3 hours during the day and just once over night, at 4-5 hours, if you can may lead to an increase in milk production.  When pumping, cycle your pump through stimulation and expression mode several times in a session to stimulate several let downs until you have expressed enough milk to feed the baby and to achieve breast comfort.   Use gentle hands on pumping technique and hand expression   Maintain your pump as recommended. Use a flange that fits comfortably and allows the breast to empty effectively.    Speak with your OB about checking for thyroid issues, anemia and insulin resistance.  Treating these issues can help with milk production.  Refer to your hand out about other methods of increasing milk production.  Please call with any questions or concerns or for more support as desired.

## 2024-06-24 NOTE — PROGRESS NOTES
INITIAL BREAST FEEDING EVALUATION    Informant/Relationship: Latisha    Discussion of General Lactation Issues: Pattie was in the NICU for the first month due to prematurity.  Initially, Latisha was able to express all of the milk that Pattie needed and more.  Since Pattie came home from the hospital, Latisha saw a significant drop in milk production over the first week.  She was no longer able to use the hospital pump. At this point, she is able to express some milk but has needed to begin supplementing with formula.  She has attempted to directly breastfeed but Pattie has struggled to latch.  Latisha is not sure if she wants to directly breastfeed.    Infant is 6 weeks old today. Delivered at 33 weeks       History:  Fertility Problem:yes - was scheduled to see a fertility specialist but conceived naturally prior to her appointment .  Breast changes:yes - breasts were tender, areolas were larger and darker, nipples were very sensitive  :  No.   due to concerns with fetal health  Full term: No.  33 weeks   labor:yes - at 23 weeks.  Stopped with meds. Was hospitalized for a week.  First nursing/attempt < 1 hour after birth:no  Skin to skin following delivery: No.  First STS  was several hours later in the NICU  Breast changes after delivery:yes - breasts are castaneda and milk was in within  a few days.  Rooming in (infant in room with mother with exception of procedures, eg. Circumcision: no  Blood sugar issues: yes  NICU stay:yes - for a month due to prematurity  Jaundice: yes  Phototherapy: yes  Supplement given: (list supplement and method used as well as reason(s):yes - donor milk and then expressed MOM via NGT and then bottle.  Very little opportunity to breastfeed    Past Medical History:   Diagnosis Date    Anemia     Chronic hypertension with super imposed preeclampsia without severe features 2024    Hypertension     chtn started medication with pregnancy    Hypothyroid      Insulin controlled gestational diabetes mellitus (GDM) in third trimester 2024    Migraine without aura     OTC med management    PCOS (polycystic ovarian syndrome)     Status post primary low transverse  section 12/15/2023    Varicella     vaccine    Visual impairment          Current Outpatient Medications:     budesonide-formoterol (Symbicort) 80-4.5 MCG/ACT inhaler, Inhale 2 puffs 2 (two) times a day Rinse mouth after use., Disp: 10.2 g, Rfl: 5    ferrous gluconate (FERGON) 240 (27 FE) MG tablet, Take 1 tablet (240 mg total) by mouth in the morning for 20 days, Disp: 20 tablet, Rfl: 0    labetalol (NORMODYNE) 200 mg tablet, Take 1 tablet (200 mg total) by mouth 2 (two) times a day, Disp: , Rfl:     levothyroxine (Euthyrox) 25 mcg tablet, Take 1 tablet (25 mcg total) by mouth daily, Disp: 90 tablet, Rfl: 0    meloxicam (MOBIC) 7.5 mg tablet, Take 1 tablet (7.5 mg total) by mouth daily, Disp: 60 tablet, Rfl: 0    Prenatal Vit-Iron Carbonyl-FA (prenatal multivitamin) TABS, Take 1 tablet by mouth daily, Disp: , Rfl:     Allergies   Allergen Reactions    Latex Rash       Social History     Substance and Sexual Activity   Drug Use Not Currently    Types: Marijuana    Comment: last used THC over 2 years ago (as of 24)       Social History former e-cigarette user    Alternative/Artificial Feedings:   Bottle: Yes, Pattie is exclusively bottle fed using Dr Olivas's bottles with paced feeding technique.  No issues with bottle feeding at this time.  Cup: No  Syringe/Finger: No           Formula Type: Similac Neosure                     Amount: 2 ounces for most feedings            Breast Milk:                      Amount: 2 ounces 1-2 times a day            Frequency Q 2-2.5 Hr between feedings        Equipment:  Pump            Type: Lansinoh and Mom Cozy            Frequency of Use: Using the Lansinoh pump exclusively.  Currently 4 times during the day and once at night.  Did not pump last night.   When Pattie was in the NICU, she was pumping every 2-3 hours during the day and going 8 hours over night without pumping.  She is currently expressing 2-4 ounces total per day    Equipment Problems: yes no longer able to express any milk with her wearable pump    Mom:  Breast: Large symmetrical breasts.  Rounded shape.  Very sightly wide spacing. Areolas are a little large in relation to the size of the breast.  More fullness noted in the upper quadrants.  Nipples point downward  Nipple Assessment in General: Normal: elongated/eraser, no discoloration and no damage noted.  Support System: FOB is in the home and good with the baby but not very supportive of Latisha.  Latisha's mother is supportive.  History of Breastfeeding: none  Changes/Stressors/Violence: Latisha has many stressors in her life currently.  She has financial concerns.  She is concerned about her milk production  Concerns/Goals: Latisha desires to provide milk for her baby    Problems with Mom: insufficient lactation, stress    Physical Exam  Constitutional:       Appearance: She is obese.   HENT:      Head: Normocephalic and atraumatic.      Nose: Nose normal.   Pulmonary:      Effort: Pulmonary effort is normal.   Musculoskeletal:         General: Normal range of motion.      Cervical back: Normal range of motion and neck supple.   Neurological:      Mental Status: She is alert and oriented to person, place, and time.   Skin:     General: Skin is warm and dry.   Psychiatric:         Mood and Affect: Mood normal.         Behavior: Behavior normal.         Thought Content: Thought content normal.         Judgment: Judgment normal.           Handouts:   Increasing your supply    Education:  Reviewed Frequency/Supply & Demand: Discussed how milk production is regulated and factors that can impact milk production  Reviewed Mom/Breast care: Discussed appropriate handling of the breasts to avoid pain, inflammation and trauma.   Reviewed Equipment:  discussed methods for pumping hands free and while multi tasking possible      Plan:    Reassurance and support given.  I acknowledged Latisha's concerns and her desire to provide milk for her baby.  I discussed at length with Latisha that her PCOS, insulin resistance, anemia, thyroid issues can have a negative impact on milk production.  I encouraged her to speak with her OB or PCP to monitor and manage these issues.  I reviewed galactagogues that may help when used in conjunction with more frequent, effective pumping.  I reviewed with her that going long intervals over night without pumping early on impacted prolactin levels and pumping even less frequently during the day now has led to additional down regulation.  Latisha expressed she is motivated to work on increasing production so I encouraged her to pump about every 3 hours during the day and just once over night if she feels she can.  She was able to demonstrated effective use of her pump and her flanges appear to fit well. I did suggest she use lower suction settings to resolve the mild nipple tenderness she is experiencing.  I offered her additional support for direct breastfeeding if she desires to try at any time  I offered additional support with our breastfeeding medicine specialist if she would like more support for milk production.  She is getting support from out mental health provider to address the significant stressors at home currently.   I encouraged her to call with any questions or concerns.    I have spent 60 minutes with Patient  today in which greater than 50% of this time was spent in counseling/coordination of care regarding Instructions for management and Patient and family education.

## 2024-06-25 NOTE — PROGRESS NOTES
I have reviewed the notes, assessments, and/or procedures performed by Ana Luu RN, IBCLC, I concur with her/his documentation of Latisha Nix MD 06/24/24

## 2024-06-27 ENCOUNTER — RA CDI HCC (OUTPATIENT)
Dept: OTHER | Facility: HOSPITAL | Age: 24
End: 2024-06-27

## 2024-07-03 ENCOUNTER — OFFICE VISIT (OUTPATIENT)
Dept: FAMILY MEDICINE CLINIC | Facility: CLINIC | Age: 24
End: 2024-07-03
Payer: COMMERCIAL

## 2024-07-03 VITALS
BODY MASS INDEX: 41.78 KG/M2 | HEART RATE: 91 BPM | OXYGEN SATURATION: 94 % | TEMPERATURE: 98.9 F | SYSTOLIC BLOOD PRESSURE: 116 MMHG | HEIGHT: 66 IN | WEIGHT: 260 LBS | DIASTOLIC BLOOD PRESSURE: 88 MMHG

## 2024-07-03 DIAGNOSIS — T73.3XXD FATIGUE DUE TO EXCESSIVE EXERTION, SUBSEQUENT ENCOUNTER: ICD-10-CM

## 2024-07-03 DIAGNOSIS — E28.2 PCOS (POLYCYSTIC OVARIAN SYNDROME): ICD-10-CM

## 2024-07-03 DIAGNOSIS — E03.8 SUBCLINICAL HYPOTHYROIDISM: ICD-10-CM

## 2024-07-03 DIAGNOSIS — R73.01 ELEVATED FASTING GLUCOSE: ICD-10-CM

## 2024-07-03 DIAGNOSIS — D50.9 IRON DEFICIENCY ANEMIA, UNSPECIFIED IRON DEFICIENCY ANEMIA TYPE: Primary | ICD-10-CM

## 2024-07-03 DIAGNOSIS — Z00.00 ANNUAL PHYSICAL EXAM: ICD-10-CM

## 2024-07-03 DIAGNOSIS — E66.01 MORBID OBESITY WITH BMI OF 40.0-44.9, ADULT (HCC): ICD-10-CM

## 2024-07-03 DIAGNOSIS — E55.9 VITAMIN D DEFICIENCY: ICD-10-CM

## 2024-07-03 PROCEDURE — 99213 OFFICE O/P EST LOW 20 MIN: CPT | Performed by: NURSE PRACTITIONER

## 2024-07-03 PROCEDURE — 99395 PREV VISIT EST AGE 18-39: CPT | Performed by: NURSE PRACTITIONER

## 2024-07-03 NOTE — PROGRESS NOTES
Adult Annual Physical  Name: Latisha Kelly      : 2000      MRN: [id number]  Encounter Provider: VALENTIN Parrish  Encounter Date: 7/3/2024   Encounter department: [facility]  Patient is a 24-year-old female presents in office for annual physical she is post baby delivered on 2024.  Currently breast-feeding present on her.  Feeling well other than sleepless nights with the baby  She had preeclampsia and was on labetalol which she stopped about 2 weeks ago states her blood pressure has been okay and the blood pressure in the office looks okay.   She has been advised to keep blood pressure log for the next few weeks just to make sure were not missing anything.   She is due for follow-up blood work on anemia thyroid functions blood counts.   Was seen by pulmonologist for her some shortness of breath on exertion.   Discussed healthy diet adequate hydration use of prenatal vitamins while breast-feeding I will call her with the lab results as soon as they are get them.     Assessment & Plan   1. Iron deficiency anemia, unspecified iron deficiency anemia type  -     Comprehensive metabolic panel  -     CBC and differential  -     TSH, 3rd generation with Free T4 reflex  -     Lipid panel  -     UA/M w/rflx Culture, Routine  -     Vitamin D 25 hydroxy  -     Iron Panel (Includes Ferritin, Iron Sat%, Iron, and TIBC)  -     Hemoglobin A1C  -     B-Type Natriuretic Peptide(BNP)  2. PCOS (polycystic ovarian syndrome)  -     Comprehensive metabolic panel  -     CBC and differential  -     TSH, 3rd generation with Free T4 reflex  -     Lipid panel  -     UA/M w/rflx Culture, Routine  -     Vitamin D 25 hydroxy  -     Iron Panel (Includes Ferritin, Iron Sat%, Iron, and TIBC)  -     Hemoglobin A1C  -     B-Type Natriuretic Peptide(BNP)  3. Fatigue due to excessive exertion, subsequent encounter  -     Comprehensive metabolic panel  -     CBC and differential  -     TSH, 3rd  generation with Free T4 reflex  -     Lipid panel  -     UA/M w/rflx Culture, Routine  -     Vitamin D 25 hydroxy  -     Iron Panel (Includes Ferritin, Iron Sat%, Iron, and TIBC)  4. Vitamin D deficiency  -     Comprehensive metabolic panel  -     CBC and differential  -     TSH, 3rd generation with Free T4 reflex  -     Lipid panel  -     UA/M w/rflx Culture, Routine  -     Vitamin D 25 hydroxy  -     Iron Panel (Includes Ferritin, Iron Sat%, Iron, and TIBC)  5. Subclinical hypothyroidism  -     Comprehensive metabolic panel  -     CBC and differential  -     TSH, 3rd generation with Free T4 reflex  -     Lipid panel  -     UA/M w/rflx Culture, Routine  -     Vitamin D 25 hydroxy  -     Iron Panel (Includes Ferritin, Iron Sat%, Iron, and TIBC)  6. Annual physical exam  7. Morbid obesity with BMI of 40.0-44.9, adult (HCC)  -     Hemoglobin A1C  -     B-Type Natriuretic Peptide(BNP)  8. Elevated fasting glucose  -     Hemoglobin A1C  -     B-Type Natriuretic Peptide(BNP)    Immunizations and preventive care screenings were discussed with patient today. Appropriate education was printed on patient's after visit summary.    Counseling:  Alcohol/drug use: discussed moderation in alcohol intake, the recommendations for healthy alcohol use, and avoidance of illicit drug use.  Dental Health: discussed importance of regular tooth brushing, flossing, and dental visits.  Injury prevention: discussed safety/seat belts, safety helmets, smoke detectors, carbon dioxide detectors, and smoking near bedding or upholstery.  Sexual health: discussed sexually transmitted diseases, partner selection, use of condoms, avoidance of unintended pregnancy, and contraceptive alternatives.  Exercise: the importance of regular exercise/physical activity was discussed. Recommend exercise 3-5 times per week for at least 30 minutes.          History of Present Illness     Adult Annual Physical:  Patient presents for annual physical. Patient is a  24-year-old female presents in office for annual physical she is post baby delivered on 5/7/2024.  Currently breast-feeding present on her.  Feeling well other than sleepless nights with the baby  She had preeclampsia and was on labetalol which she stopped about 2 weeks ago states her blood pressure has been okay and the blood pressure in the office looks okay. .     Diet and Physical Activity:  - Diet/Nutrition: well balanced diet.  - Exercise: no formal exercise.    Depression Screening:  - PHQ-2 Score: 1    General Health:  - Sleep: sleeps poorly.  - Vision: no vision problems.  - Dental: regular dental visits.    /GYN Health:  - Follows with GYN: yes.     Advanced Care Planning:  - Has an advanced directive?: no    - Has a durable medical POA?: no    - ACP document given to patient?: no      Review of Systems   Constitutional:  Positive for fatigue. Negative for fever and unexpected weight change.   HENT:  Negative for congestion, postnasal drip and sore throat.    Eyes: Negative.    Respiratory:  Negative for cough and shortness of breath.    Cardiovascular:  Negative for chest pain and palpitations.        History of preeclampsia   Gastrointestinal:  Positive for blood in stool. Negative for abdominal distention, nausea and vomiting.   Endocrine:        Elevated sugars in pregnancy   Genitourinary:  Negative for difficulty urinating and frequency.   Musculoskeletal:  Negative for arthralgias and neck pain.   Skin:  Negative for rash.   Allergic/Immunologic: Negative.    Neurological:  Negative for headaches.   Hematological:  Negative for adenopathy.   Psychiatric/Behavioral:  Negative for sleep disturbance and suicidal ideas. The patient is not nervous/anxious.      Pertinent Medical History   Reviewed      Medical History Reviewed by provider this encounter:       Past Medical History   Past Medical History:   Diagnosis Date    Anemia     Chronic hypertension with super imposed preeclampsia without severe  features 2024    Hypertension     chtn started medication with pregnancy    Hypothyroid     Insulin controlled gestational diabetes mellitus (GDM) in third trimester 2024    Migraine without aura     OTC med management    PCOS (polycystic ovarian syndrome)     Status post primary low transverse  section 12/15/2023    Varicella     vaccine    Visual impairment      Past Surgical History:   Procedure Laterality Date    CA CERCLAGE UTERINE CERVIX NONOBSTETRICAL N/A 2024    Procedure: CERCLAGE CERVICAL;  Surgeon: Ann Hoffmann MD;  [facility];  Service: Obstetrics    CA  DELIVERY ONLY N/A 2024    Procedure:  SECTION ();  Surgeon: Lizzette Hernandez DO;  [facility];  Service: Obstetrics    CA REMOVAL CERCLAGE SUTURE UNDER ANESTHESIA N/A 2024    Procedure: CERCLAGE REMOVAL;  Surgeon: Lizzette Hernandez DO;  [facility];  Service: Obstetrics    TURBINATE RESECTION       Family History   Problem Relation Age of Onset    No Known Problems Mother     Hypertension Father     No Known Problems Sister     No Known Problems Brother     No Known Problems Brother     No Known Problems Brother     Colon cancer Neg Hx     Ovarian cancer Neg Hx     Breast cancer Neg Hx     Cancer Neg Hx      Current Outpatient Medications on File Prior to Visit   Medication Sig Dispense Refill    [DISCONTINUED] budesonide-formoterol (Symbicort) 80-4.5 MCG/ACT inhaler Inhale 2 puffs 2 (two) times a day Rinse mouth after use. (Patient not taking: Reported on 2024) 10.2 g 5    [DISCONTINUED] ferrous gluconate (FERGON) 240 (27 FE) MG tablet Take 1 tablet (240 mg total) by mouth in the morning for 20 days 20 tablet 0    [DISCONTINUED] labetalol (NORMODYNE) 200 mg tablet Take 1 tablet (200 mg total) by mouth 2 (two) times a day (Patient not taking: Reported on 2024)      [DISCONTINUED] levothyroxine (Euthyrox) 25 mcg tablet Take 1 tablet (25 mcg total) by mouth  daily (Patient not taking: Reported on 7/3/2024) 90 tablet 0    [DISCONTINUED] meloxicam (MOBIC) 7.5 mg tablet Take 1 tablet (7.5 mg total) by mouth daily (Patient not taking: Reported on 6/24/2024) 60 tablet 0    [DISCONTINUED] Prenatal Vit-Iron Carbonyl-FA (prenatal multivitamin) TABS Take 1 tablet by mouth daily (Patient not taking: Reported on 7/3/2024)       No current facility-administered medications on file prior to visit.     Allergies   Allergen Reactions    Latex Rash      Current Outpatient Medications on File Prior to Visit   Medication Sig Dispense Refill    [DISCONTINUED] budesonide-formoterol (Symbicort) 80-4.5 MCG/ACT inhaler Inhale 2 puffs 2 (two) times a day Rinse mouth after use. (Patient not taking: Reported on 6/24/2024) 10.2 g 5    [DISCONTINUED] ferrous gluconate (FERGON) 240 (27 FE) MG tablet Take 1 tablet (240 mg total) by mouth in the morning for 20 days 20 tablet 0    [DISCONTINUED] labetalol (NORMODYNE) 200 mg tablet Take 1 tablet (200 mg total) by mouth 2 (two) times a day (Patient not taking: Reported on 6/24/2024)      [DISCONTINUED] levothyroxine (Euthyrox) 25 mcg tablet Take 1 tablet (25 mcg total) by mouth daily (Patient not taking: Reported on 7/3/2024) 90 tablet 0    [DISCONTINUED] meloxicam (MOBIC) 7.5 mg tablet Take 1 tablet (7.5 mg total) by mouth daily (Patient not taking: Reported on 6/24/2024) 60 tablet 0    [DISCONTINUED] Prenatal Vit-Iron Carbonyl-FA (prenatal multivitamin) TABS Take 1 tablet by mouth daily (Patient not taking: Reported on 7/3/2024)       No current facility-administered medications on file prior to visit.      Social History     Tobacco Use    Smoking status: Never    Smokeless tobacco: Never   Vaping Use    Vaping status: Former    Quit date: 1/1/2023    Substances: Nicotine, Flavoring   Substance and Sexual Activity    Alcohol use: Not Currently     Alcohol/week: 7.0 standard drinks of alcohol     Types: 7 Glasses of wine per week     Comment: 1  "daily-prior to pregnancy    Drug use: Not Currently     Types: Marijuana     Comment: last used THC over 2 years ago (as of 11/29/23)    Sexual activity: Not Currently     Partners: Male     Birth control/protection: None       Objective     /88 (BP Location: Right arm, Patient Position: Sitting, Cuff Size: Large)   Pulse 91   Temp 98.9 °F (37.2 °C)   Ht 5' 6\" (1.676 m)   Wt 118 kg (260 lb)   SpO2 94%   BMI 41.97 kg/m²     Physical Exam  Vitals and nursing note reviewed.   Constitutional:       Comments: BMI 41.97   HENT:      Head: Normocephalic and atraumatic.   Cardiovascular:      Rate and Rhythm: Normal rate and regular rhythm.      Pulses: Normal pulses.      Heart sounds: Normal heart sounds.   Pulmonary:      Effort: Pulmonary effort is normal.      Breath sounds: Normal breath sounds.   Abdominal:      Palpations: Abdomen is soft.   Musculoskeletal:      Cervical back: Normal range of motion.      Right lower leg: No edema.      Left lower leg: No edema.   Skin:     General: Skin is warm.   Neurological:      Mental Status: She is alert and oriented to person, place, and time.   Psychiatric:         Mood and Affect: Mood normal.         Behavior: Behavior normal.       Administrative Statements   I have spent a total time of 25  minutes in caring for this patient on the day of the visit/encounter including Risks and benefits of tx options, Instructions for management, Patient and family education, Importance of tx compliance, Risk factor reductions, Impressions, Counseling / Coordination of care, Documenting in the medical record, Reviewing / ordering tests, medicine, procedures  , and Obtaining or reviewing history  .      "

## 2024-07-11 ENCOUNTER — SOCIAL WORK (OUTPATIENT)
Dept: BEHAVIORAL/MENTAL HEALTH CLINIC | Facility: CLINIC | Age: 24
End: 2024-07-11
Payer: COMMERCIAL

## 2024-07-11 DIAGNOSIS — F43.23 ADJUSTMENT DISORDER WITH MIXED ANXIETY AND DEPRESSED MOOD: Primary | ICD-10-CM

## 2024-07-11 DIAGNOSIS — M65.4 DE QUERVAIN'S TENOSYNOVITIS, LEFT: Primary | ICD-10-CM

## 2024-07-11 PROCEDURE — 90853 GROUP PSYCHOTHERAPY: CPT | Performed by: COUNSELOR

## 2024-07-12 NOTE — PSYCH
Behavioral Health Psychotherapy Group Progress Note    Psychotherapy Provided: Group Therapy    1. Adjustment disorder with mixed anxiety and depressed mood        Goals addressed in session: Postpartum Mothers' Psychoeducation and Support Group     Group Name: Mom Rage: Understanding Maternal Feelings of Anger, Rage, and Irritability     Topic(s) covered: Cognitive distortions associated with maternal feelings of anger/rage, what causes rage ranging from mood features, anxiety, and a sense of being overwhelmed.     Skill(s) covered: coping strategies, reframing negative thoughts, identifying sources of stress and developing action plans to combat stress. Worksheets were provided.     Group summary:  The group was focused on identifying causes of maternal anger and why it is so distressing to new mothers. The group looked at different causes including: depression, anxiety, stress, and a sense of being overwhelmed and overstimulated. The group focused as well on strategies to cope with these emotions and also providing validation that it is normal at times to be frustrated and the importance of allowing one's self to experience human reactions.        Data: Latisha attended today's group. She is new to the group. Latisha reports that she has been struggling with adjusting at times and feeling frustrated with her . She disclosed her baby was in the NICU and recently came home. The group provided validation and feedback, as well as strategies for coping.     Substance Abuse was addressed during this session. If the client is diagnosed with a co-occurring substance use disorder, please indicate any changes in the frequency or amount of use: Latisha does not use substances. Stage of change for addressing substance use diagnoses: No substance use/Not applicable    ASSESSMENT:  Latisha appeared  with a Euthymic/ normal mood. Her affect is Normal range and intensity, which is congruent, with his mood and the content  "of the session. She appeared to actively participated in the group and interacted appropriately with and was supportive of the other group members.    Latisha was engaged in group. Latisha Kelly presents with a nonerisk of suicide,nonerisk of self-harm, and none risk of harm to others.    For any risk assessment that surpasses a \"low\" rating, a safety plan must be developed.    A safety plan was indicated: no  If yes, describe in detail NA    PLAN: Latisha will continue to attend group as scheduled. The next group is scheduled for July 25, 2024 and will cover the topic of couples.    Behavioral Health Treatment Plan and Discharge Planning: Latisha Kelly is aware of and agrees to continue to work on their treatment plan. They have identified and are working toward their discharge goals. yes    Visit start and stop times:    07/12/24  Start Time: 1100  Stop Time: 1200  Total Visit Time: 60 minutes      "

## 2024-07-16 ENCOUNTER — DOCUMENTATION (OUTPATIENT)
Dept: BEHAVIORAL/MENTAL HEALTH CLINIC | Facility: CLINIC | Age: 24
End: 2024-07-16

## 2024-07-16 NOTE — PROGRESS NOTES
No Call. No Show. No Charge    Latisha Kelly no showed 07/16/24 appointment , staff called and left message to reschedule appointment     Treatment Plan not completed within required time limits due to: Latisha Kelly no show appointment on 07/16/24

## 2024-07-25 ENCOUNTER — SOCIAL WORK (OUTPATIENT)
Dept: BEHAVIORAL/MENTAL HEALTH CLINIC | Facility: CLINIC | Age: 24
End: 2024-07-25
Payer: COMMERCIAL

## 2024-07-25 DIAGNOSIS — F43.23 ADJUSTMENT DISORDER WITH MIXED ANXIETY AND DEPRESSED MOOD: Primary | ICD-10-CM

## 2024-07-25 PROCEDURE — 90853 GROUP PSYCHOTHERAPY: CPT | Performed by: COUNSELOR

## 2024-07-25 NOTE — PSYCH
"Behavioral Health Psychotherapy Group Progress Note    Psychotherapy Provided: Group Therapy    No diagnosis found.    Goals addressed in session: Postpartum mothers' psychoeducation and support group     Group Name: Couples and Safe Guarding Your Relationship    Topic(s) covered: Communication, boundary setting    Skill(s) covered: Assertiveness    Group summary:  The group was focused on strategies for safe guarding a relationship in the  stage and throughout postpartum.     Data: Latisha attended today's group. She was engaged in the group. She was able to share some of her experiences as a new mom with her partner and struggles with communication. Per last group, she has started with a schedule, and it has been helpful as a tool for them both to express their needs. She is finding some relief with this strategy. The group was able to provide her with additional suggestions and feedback which Latisha seemed to find helpful. Latisha is encouraged to attend group as scheduled.     Substance Abuse was addressed during this session. If the client is diagnosed with a co-occurring substance use disorder, please indicate any changes in the frequency or amount of use: Latisha does not use substances. Stage of change for addressing substance use diagnoses: No substance use/Not applicable    ASSESSMENT:  Latisha appeared  with a Euthymic/ normal mood. Her affect is Normal range and intensity, which is congruent, with his mood and the content of the session. She appeared to actively participated in the group and interacted appropriately with and was supportive of the other group members.    Latisha was engaged in the group. Latisha Kelly presents with a nonerisk of suicide,nonerisk of self-harm, and none risk of harm to others.    For any risk assessment that surpasses a \"low\" rating, a safety plan must be developed.    A safety plan was indicated: no  If yes, describe in detail NA    PLAN: Latisha will continue " to attend group as scheduled. The next group is scheduled for 8/8/24 and will cover the topic of loss of identity.    Behavioral Health Treatment Plan and Discharge Planning: Latisha Kelly is aware of and agrees to continue to work on their treatment plan. They have identified and are working toward their discharge goals. yes    Visit start and stop times:    07/25/24  Start Time: 1100  Stop Time: 1200  Total Visit Time: 60 minutes

## 2024-08-02 ENCOUNTER — LAB (OUTPATIENT)
Dept: LAB | Facility: HOSPITAL | Age: 24
End: 2024-08-02
Payer: COMMERCIAL

## 2024-08-02 DIAGNOSIS — Z13.1 DIABETES MELLITUS SCREENING: ICD-10-CM

## 2024-08-02 DIAGNOSIS — D50.9 IRON DEFICIENCY ANEMIA, UNSPECIFIED IRON DEFICIENCY ANEMIA TYPE: Primary | ICD-10-CM

## 2024-08-02 LAB
25(OH)D3 SERPL-MCNC: 19 NG/ML (ref 30–100)
ALBUMIN SERPL BCG-MCNC: 4.3 G/DL (ref 3.5–5)
ALP SERPL-CCNC: 62 U/L (ref 34–104)
ALT SERPL W P-5'-P-CCNC: 17 U/L (ref 7–52)
ANION GAP SERPL CALCULATED.3IONS-SCNC: 9 MMOL/L (ref 4–13)
AST SERPL W P-5'-P-CCNC: 14 U/L (ref 13–39)
BASOPHILS # BLD AUTO: 0.02 THOUSANDS/ÂΜL (ref 0–0.1)
BASOPHILS NFR BLD AUTO: 0 % (ref 0–1)
BILIRUB SERPL-MCNC: 0.51 MG/DL (ref 0.2–1)
BILIRUB UR QL STRIP: NEGATIVE
BNP SERPL-MCNC: 20 PG/ML (ref 0–100)
BUN SERPL-MCNC: 9 MG/DL (ref 5–25)
CALCIUM SERPL-MCNC: 9.1 MG/DL (ref 8.4–10.2)
CHLORIDE SERPL-SCNC: 104 MMOL/L (ref 96–108)
CHOLEST SERPL-MCNC: 180 MG/DL
CLARITY UR: CLEAR
CO2 SERPL-SCNC: 25 MMOL/L (ref 21–32)
COLOR UR: NORMAL
CREAT SERPL-MCNC: 0.66 MG/DL (ref 0.6–1.3)
EOSINOPHIL # BLD AUTO: 0.17 THOUSAND/ÂΜL (ref 0–0.61)
EOSINOPHIL NFR BLD AUTO: 3 % (ref 0–6)
ERYTHROCYTE [DISTWIDTH] IN BLOOD BY AUTOMATED COUNT: 14 % (ref 11.6–15.1)
EST. AVERAGE GLUCOSE BLD GHB EST-MCNC: 100 MG/DL
FERRITIN SERPL-MCNC: 25 NG/ML (ref 11–307)
GFR SERPL CREATININE-BSD FRML MDRD: 124 ML/MIN/1.73SQ M
GLUCOSE 2H P 75 G GLC PO SERPL-MCNC: 115 MG/DL (ref 70–139)
GLUCOSE P FAST SERPL-MCNC: 100 MG/DL (ref 65–99)
GLUCOSE P FAST SERPL-MCNC: 98 MG/DL (ref 65–99)
GLUCOSE UR STRIP-MCNC: NEGATIVE MG/DL
HBA1C MFR BLD: 5.1 %
HCT VFR BLD AUTO: 37.5 % (ref 34.8–46.1)
HDLC SERPL-MCNC: 46 MG/DL
HGB BLD-MCNC: 11.7 G/DL (ref 11.5–15.4)
HGB UR QL STRIP.AUTO: NEGATIVE
IMM GRANULOCYTES # BLD AUTO: 0.01 THOUSAND/UL (ref 0–0.2)
IMM GRANULOCYTES NFR BLD AUTO: 0 % (ref 0–2)
IRON SATN MFR SERPL: 36 % (ref 15–50)
IRON SERPL-MCNC: 136 UG/DL (ref 50–212)
KETONES UR STRIP-MCNC: NEGATIVE MG/DL
LDLC SERPL CALC-MCNC: 102 MG/DL (ref 0–100)
LEUKOCYTE ESTERASE UR QL STRIP: NEGATIVE
LYMPHOCYTES # BLD AUTO: 2.1 THOUSANDS/ÂΜL (ref 0.6–4.47)
LYMPHOCYTES NFR BLD AUTO: 41 % (ref 14–44)
MCH RBC QN AUTO: 26.3 PG (ref 26.8–34.3)
MCHC RBC AUTO-ENTMCNC: 31.2 G/DL (ref 31.4–37.4)
MCV RBC AUTO: 84 FL (ref 82–98)
MONOCYTES # BLD AUTO: 0.44 THOUSAND/ÂΜL (ref 0.17–1.22)
MONOCYTES NFR BLD AUTO: 9 % (ref 4–12)
NEUTROPHILS # BLD AUTO: 2.41 THOUSANDS/ÂΜL (ref 1.85–7.62)
NEUTS SEG NFR BLD AUTO: 47 % (ref 43–75)
NITRITE UR QL STRIP: NEGATIVE
NONHDLC SERPL-MCNC: 134 MG/DL
NRBC BLD AUTO-RTO: 0 /100 WBCS
PH UR STRIP.AUTO: 5.5 [PH]
PLATELET # BLD AUTO: 269 THOUSANDS/UL (ref 149–390)
PMV BLD AUTO: 9.7 FL (ref 8.9–12.7)
POTASSIUM SERPL-SCNC: 3.7 MMOL/L (ref 3.5–5.3)
PROT SERPL-MCNC: 7.2 G/DL (ref 6.4–8.4)
PROT UR STRIP-MCNC: NEGATIVE MG/DL
RBC # BLD AUTO: 4.45 MILLION/UL (ref 3.81–5.12)
SODIUM SERPL-SCNC: 138 MMOL/L (ref 135–147)
SP GR UR STRIP.AUTO: 1.02 (ref 1–1.03)
TIBC SERPL-MCNC: 376 UG/DL (ref 250–450)
TRIGL SERPL-MCNC: 160 MG/DL
TSH SERPL DL<=0.05 MIU/L-ACNC: 1.87 UIU/ML (ref 0.45–4.5)
UIBC SERPL-MCNC: 240 UG/DL (ref 155–355)
UROBILINOGEN UR QL STRIP.AUTO: 0.2 E.U./DL
WBC # BLD AUTO: 5.15 THOUSAND/UL (ref 4.31–10.16)

## 2024-08-02 PROCEDURE — 82950 GLUCOSE TEST: CPT

## 2024-08-02 PROCEDURE — 82947 ASSAY GLUCOSE BLOOD QUANT: CPT

## 2024-08-02 PROCEDURE — 81003 URINALYSIS AUTO W/O SCOPE: CPT

## 2024-08-09 ENCOUNTER — NURSE TRIAGE (OUTPATIENT)
Age: 24
End: 2024-08-09

## 2024-08-09 NOTE — TELEPHONE ENCOUNTER
"Incoming call from patient. States that starting 1 month postpartum if she sleeps on her side she wakes up with 10/10 pain in both sides of pelvis - worse on left side.  24. Feels like someone is taking uterus or ovary and twisting it. This pain lasts about 30 minutes-1 hour before resolving. Pain does not reoccur during the day, only after she has been laying on her side for prolonged period of time. Denies pain currently.     Patient also states that she has not fully stopped bleeding since delivery - is having ongoing spotting that is light. To note, patient reports had ovarian cyst a few years ago and the pain she is experiencing is similar.     If patient laying down on back or stomach, the pain feels better. On side, creates pain. Office visit scheduled for evaluation 8/15. Bleeding and pain precautions reviewed and patient verbalized understanding.     Reason for Disposition   Abdominal pain is a chronic symptom (recurrent or ongoing AND lasting > 4 weeks)    Answer Assessment - Initial Assessment Questions  1. LOCATION: \"Where does it hurt?\"      Pelvic pain after laying on sides for period of time. Primairyly on left side - feels like someone is taking uterus/ovary and twisting it. States pain only lasts 30 minutes-1 hour and then resolves.     2. RADIATION: \"Does the pain shoot anywhere else?\" (e.g., chest, back)      Across lower abdomen    3. ONSET: \"When did the pain begin?\" (e.g., minutes, hours or days ago)       1 month postpartum.      4. SUDDEN: \"Gradual or sudden onset?\"      Sudden upon waking after laying on side    5. PATTERN \"Does the pain come and go, or is it constant?\"     - If constant: \"Is it getting better, staying the same, or worsening?\"       (Note: Constant means the pain never goes away completely; most serious pain is constant and it progresses)      - If intermittent: \"How long does it last?\" \"Do you have pain now?\"      (Note: Intermittent means the pain " "goes away completely between bouts)      Intermittent. Resolves with movement, does not reoccur during day.    6. SEVERITY: \"How bad is the pain?\"  (e.g., Scale 1-10; mild, moderate, or severe)     - MILD (1-3): doesn't interfere with normal activities, abdomen soft and not tender to touch      - MODERATE (4-7): interferes with normal activities or awakens from sleep, tender to touch      - SEVERE (8-10): excruciating pain, doubled over, unable to do any normal activities        10/10 when occuring    7. RECURRENT SYMPTOM: \"Have you ever had this type of stomach pain before?\" If Yes, ask: \"When was the last time?\" and \"What happened that time?\"       denies    8. AGGRAVATING FACTORS: \"Does anything seem to cause this pain?\" (e.g., foods, stress, alcohol)      Sleeping on side    9. CARDIAC SYMPTOMS: \"Do you have any of the following symptoms: chest pain, difficulty breathing, sweating, nausea?\"      denies    10. OTHER SYMPTOMS: \"Do you have any other symptoms?\" (e.g., fever, vomiting, diarrhea)        denies    Protocols used: Abdominal Pain - Upper-ADULT-[place]    "

## 2024-08-15 ENCOUNTER — OFFICE VISIT (OUTPATIENT)
Dept: OBGYN CLINIC | Facility: CLINIC | Age: 24
End: 2024-08-15

## 2024-08-15 VITALS
WEIGHT: 261 LBS | BODY MASS INDEX: 41.95 KG/M2 | HEIGHT: 66 IN | DIASTOLIC BLOOD PRESSURE: 70 MMHG | SYSTOLIC BLOOD PRESSURE: 106 MMHG

## 2024-08-15 DIAGNOSIS — Z98.891 STATUS POST PRIMARY LOW TRANSVERSE CESAREAN SECTION: ICD-10-CM

## 2024-08-15 DIAGNOSIS — R10.2 PELVIC PAIN: Primary | ICD-10-CM

## 2024-08-15 PROCEDURE — 99214 OFFICE O/P EST MOD 30 MIN: CPT | Performed by: OBSTETRICS & GYNECOLOGY

## 2024-08-15 NOTE — PROGRESS NOTES
"Assessment/Plan:     Diagnoses and all orders for this visit:    Pelvic pain  -     US pelvis complete w transvaginal; Future  -     Ambulatory Referral to Physical Therapy; Future    Status post primary low transverse  section  -     Ambulatory Referral to Physical Therapy; Future        US to evaluate for any structural cause for her pain and rule out any ovarian pathology, though clinical symptoms are not as consistent with torsion and ovarian cyst. Discussed that her pain presentation is less common and possibly musculoskeletal due to its resolution occurring after 30 minutes and only occurring after sleeping on her side. We discussed possible nerve impingement vs adhesive disease.     I have spent a total time of 35 minutes in caring for this patient on the day of the visit/encounter including Diagnostic results, Prognosis, Risks and benefits of tx options, Instructions for management, Patient and family education, Risk factor reductions, Impressions, Counseling / Coordination of care, Reviewing / ordering tests, medicine, procedures  , and Obtaining or reviewing history  .      Subjective:    Patient ID: Latisha Kelly is a 24 y.o. female.  Chief Complaint   Patient presents with    Postpartum Care     Pt is here for a post partum care check. Pt states she is having pelvic pain, which is more prominent when sleeping laterally.        Latisha is a 23yo  presenting for gyn problem visit due to pelvic pain. She had called last week reporting significant pelvic pain occurring mainly in suprapubic and left sided pelvic area when she wakes up from sleeping on her side. She reports that the pain is debilitating and lasts for about 30 minutes before it resolves completely. She states it's like when her arm or foot falls asleep after being in a particular position for a long time and resolves but much more pain instead of a \"tingling feeling\". She denies pain with ambulation and other activities " "throughout the day.     She reports that she has had pain associated with the left side of her incision since her  but recently this left sided pelvic pain has become much worse. She denies hip pain and points to her suprapubic and left sided area when motioning where her pain is. She states it feels \"inside her vagina and upward\". She denies abnormal bleeding and denies dyspareunia. She denies any discharge or opening of the incision, but does have concern for a possible keloid scar.         The following portions of the patient's history were reviewed and updated as appropriate: allergies, current medications, past family history, past medical history, past social history, past surgical history, and problem list.    Review of Systems   Genitourinary:  Positive for pelvic pain and vaginal pain. Negative for difficulty urinating, dysuria, flank pain, menstrual problem, vaginal bleeding and vaginal discharge.         Objective:  /70 (BP Location: Left arm, Patient Position: Sitting, Cuff Size: Standard)   Ht 5' 6\" (1.676 m)   Wt 118 kg (261 lb)   LMP 2024 (Exact Date)   Breastfeeding No Comment: Formula feeding  BMI 42.13 kg/m²   Physical Exam  Constitutional:       General: She is not in acute distress.     Appearance: Normal appearance. She is obese. She is not diaphoretic.   Genitourinary:      Right Labia: No rash, tenderness or lesions.     Left Labia: No tenderness, lesions or rash.       Right Adnexa: not tender, not full and no mass present.     Left Adnexa: not tender, not full and no mass present.     Cervix is nulliparous.      No cervical motion tenderness, discharge, friability, lesion or polyp.      Uterus is not enlarged, tender or prolapsed.      No uterine mass detected.     Uterus exam comments: Size of uterus difficult to assess due to body habitus.   HENT:      Head: Normocephalic and atraumatic.   Pulmonary:      Effort: Pulmonary effort is normal. No respiratory " distress.   Abdominal:      General: A surgical scar is present. There is no distension.      Palpations: Abdomen is soft. There is no mass.      Tenderness: There is no abdominal tenderness. There is no guarding or rebound.          Comments: Incision clean/dry/intact. Some hypertrophy of scar. No defect, no opening of the incision.    Musculoskeletal:      Right lower leg: No edema.      Left lower leg: No edema.   Neurological:      Mental Status: She is alert and oriented to person, place, and time.   Skin:     General: Skin is warm and dry.      Coloration: Skin is not pale.   Psychiatric:         Mood and Affect: Mood normal.         Behavior: Behavior normal.         Thought Content: Thought content normal.         Judgment: Judgment normal.   Vitals and nursing note reviewed.

## 2024-08-19 ENCOUNTER — OFFICE VISIT (OUTPATIENT)
Dept: OBGYN CLINIC | Facility: CLINIC | Age: 24
End: 2024-08-19

## 2024-08-19 VITALS — OXYGEN SATURATION: 97 % | BODY MASS INDEX: 42.43 KG/M2 | HEART RATE: 79 BPM | WEIGHT: 264 LBS | HEIGHT: 66 IN

## 2024-08-19 DIAGNOSIS — M65.4 DE QUERVAIN'S TENOSYNOVITIS, LEFT: ICD-10-CM

## 2024-08-19 PROCEDURE — 99214 OFFICE O/P EST MOD 30 MIN: CPT | Performed by: STUDENT IN AN ORGANIZED HEALTH CARE EDUCATION/TRAINING PROGRAM

## 2024-08-19 PROCEDURE — 20550 NJX 1 TENDON SHEATH/LIGAMENT: CPT | Performed by: STUDENT IN AN ORGANIZED HEALTH CARE EDUCATION/TRAINING PROGRAM

## 2024-08-19 RX ORDER — BETAMETHASONE SODIUM PHOSPHATE AND BETAMETHASONE ACETATE 3; 3 MG/ML; MG/ML
6 INJECTION, SUSPENSION INTRA-ARTICULAR; INTRALESIONAL; INTRAMUSCULAR; SOFT TISSUE
Status: COMPLETED | OUTPATIENT
Start: 2024-08-19 | End: 2024-08-19

## 2024-08-19 RX ORDER — ROPIVACAINE HYDROCHLORIDE 5 MG/ML
1 INJECTION, SOLUTION EPIDURAL; INFILTRATION; PERINEURAL
Status: COMPLETED | OUTPATIENT
Start: 2024-08-19 | End: 2024-08-19

## 2024-08-19 RX ADMIN — BETAMETHASONE SODIUM PHOSPHATE AND BETAMETHASONE ACETATE 6 MG: 3; 3 INJECTION, SUSPENSION INTRA-ARTICULAR; INTRALESIONAL; INTRAMUSCULAR; SOFT TISSUE at 15:00

## 2024-08-19 RX ADMIN — ROPIVACAINE HYDROCHLORIDE 1 ML: 5 INJECTION, SOLUTION EPIDURAL; INFILTRATION; PERINEURAL at 15:00

## 2024-08-19 NOTE — PROGRESS NOTES
ORTHOPAEDIC HAND, WRIST, AND ELBOW OFFICE  VISIT      ASSESSMENT/PLAN:      Diagnoses and all orders for this visit:    De Quervain's tenosynovitis, left  -     Ambulatory Referral to Orthopedic Surgery  -     Hand/upper extremity injection: L extensor compartment 1  -     ropivacaine (NAROPIN) 0.5 % injection 1 mL  -     betamethasone acetate-betamethasone sodium phosphate (CELESTONE) injection 6 mg          24 y.o. female with left wrist DeQuervain's   XRs reviewed.   Treatment options and expected outcomes were discussed.  The patient verbalized understanding of exam findings and treatment plan.   The patient was given the opportunity to ask questions.  Questions were answered to the patient's satisfaction.  The patient decided to move forward with 2nd steroid injx today.  In addition, advised to continue use of thumb spica brace while sleeping.       Follow Up:  6 weeks PRN      To Do Next Visit:  Re-evaluation of current issue      Discussions:  De Quervain Tenosynovitis: The anatomy and physiology of de Quervain's tenosynovitis was discussed with the patient today in the office.  Edema and increased contact pressure within the first dorsal extensor compartment at the radial styloid can cause pain, crepitation, and limitation of function.  Treatment options include resting thumb spica splints to decrease edema, oral anti-inflammatory medications, home or formal therapy exercises, up to 2 steroid injections within the first dorsal extensor compartment, or surgical release.  While majority of patients do respond to conservative treatment, up to 20% may require surgical release.       Gilberto Mayberry MD  Attending, Orthopaedic Surgery  Hand, Wrist, and Elbow Surgery  [facility]    ______________________________________________________________________________________________    CHIEF COMPLAINT:  Chief Complaint   Patient presents with   • Left Wrist - Pain       SUBJECTIVE:  Patient is a 24  y.o. RHD female who presents today for evaluation and treatment of left wrist pain. Pt states this started when she was 3-4 mos pregnant. Describes it as along the radial aspect. No known trauma. She had seen urgent care who provided her with what sounds like a basic wrist cock up brace which didn't help. She continued to have issues and saw Dr. Jimenes shortly after delivering. At that time was provided with a thumb spica brace while she waited to get clearance from her obgyn for steroid injx. She then received steroid injx on 6/10/24.        Occupation: None    I have personally reviewed all the relevant PMH, PSH, SH, FH, Medications and allergies      PAST MEDICAL HISTORY:  Past Medical History:   Diagnosis Date   • Anemia    • Chronic hypertension with super imposed preeclampsia without severe features 2024   • Hypertension     chtn started medication with pregnancy   • Hypothyroid    • Insulin controlled gestational diabetes mellitus (GDM) in third trimester 2024   • Migraine without aura     OTC med management   • PCOS (polycystic ovarian syndrome)    • Status post primary low transverse  section 12/15/2023   • Varicella     vaccine   • Visual impairment        PAST SURGICAL HISTORY:  Past Surgical History:   Procedure Laterality Date   • IN CERCLAGE UTERINE CERVIX NONOBSTETRICAL N/A 2024    Procedure: CERCLAGE CERVICAL;  Surgeon: Ann Hoffmann MD;  [facility];  Service: Obstetrics   • IN  DELIVERY ONLY N/A 2024    Procedure:  SECTION ();  Surgeon: Lizzette Hernandez DO;  [facility];  Service: Obstetrics   • IN REMOVAL CERCLAGE SUTURE UNDER ANESTHESIA N/A 2024    Procedure: CERCLAGE REMOVAL;  Surgeon: Lizzette Hernandez DO;  [facility];  Service: Obstetrics   • TURBINATE RESECTION         FAMILY HISTORY:  Family History   Problem Relation Age of Onset   • No Known Problems Mother    • Hypertension Father    • No Known Problems  Sister    • No Known Problems Brother    • No Known Problems Brother    • No Known Problems Brother    • Colon cancer Neg Hx    • Ovarian cancer Neg Hx    • Breast cancer Neg Hx    • Cancer Neg Hx        SOCIAL HISTORY:  Social History     Tobacco Use   • Smoking status: Never   • Smokeless tobacco: Never   Vaping Use   • Vaping status: Former   • Quit date: 1/1/2023   • Substances: Nicotine, Flavoring   Substance Use Topics   • Alcohol use: Not Currently     Alcohol/week: 7.0 standard drinks of alcohol     Types: 7 Glasses of wine per week     Comment: Socially   • Drug use: Not Currently     Types: Marijuana     Comment: last used THC over 2 years ago (as of 11/29/23)       MEDICATIONS:  No current outpatient medications on file.  No current facility-administered medications for this visit.    ALLERGIES:  Allergies   Allergen Reactions   • Latex Rash           REVIEW OF SYSTEMS:  Musculoskeletal:        As noted in HPI.   All other systems reviewed and are negative.    VITALS:  Vitals:    08/19/24 1507   Pulse: 79   SpO2: 97%       LABS:  HgA1c:   Lab Results   Component Value Date    HGBA1C 5.1 08/02/2024     BMP:   Lab Results   Component Value Date    CALCIUM 9.1 08/02/2024    K 3.7 08/02/2024    CO2 25 08/02/2024     08/02/2024    BUN 9 08/02/2024    CREATININE 0.66 08/02/2024       _____________________________________________________  PHYSICAL EXAMINATION:  General: Well developed and well nourished, alert & oriented x 3, appears comfortable  Psychiatric: Normal  HEENT: Normocephalic, Atraumatic Trachea Midline, No torticollis  Pulmonary: No audible wheezing or respiratory distress   Abdomen/GI: Non tender, non distended   Cardiovascular: No pitting edema, 2+ radial pulse   Skin: No masses, erythema, lacerations, fluctation, ulcerations  Neurovascular: Sensation Intact to the Median, Ulnar, Radial Nerve, Motor Intact to the Median, Ulnar, Radial Nerve, and Pulses Intact  Musculoskeletal: Normal, except  as noted in detailed exam and in HPI.      MUSCULOSKELETAL EXAMINATION:  Left Wrist:  Nontender CMC joint.  Nontender 1st dorsal compartment.   + Finkelstein's.   - CMC grind.   Full fist.  Brisk capillary refill.     ___________________________________________________  STUDIES REVIEWED:  Xrays of the left wrist were reviewed and independently interpreted in PACS by Dr. Mayberry and demonstrate no acute osseous abnormalities.           PROCEDURES PERFORMED:  Hand/upper extremity injection: L extensor compartment 1  Universal Protocol:  Consent: Verbal consent obtained.  Risks and benefits: risks, benefits and alternatives were discussed  Consent given by: patient  Patient understanding: patient states understanding of the procedure being performed  Patient identity confirmed: verbally with patient  Supporting Documentation  Indications: pain   Procedure Details  Condition:de Quervain's tenosynovitis Site: L extensor compartment 1   Needle size: 25 G  Approach: radial  Medications administered: 6 mg betamethasone acetate-betamethasone sodium phosphate 6 (3-3) mg/mL; 1 mL ropivacaine 0.5 %  Patient tolerance: patient tolerated the procedure well with no immediate complications  Dressing:  Sterile dressing applied              _____________________________________________________      Scribe Attestation    I,:  Elizabeth Patrick PA-C am acting as a scribe while in the presence of the attending physician.:       I,:  Gilberto Mayberry MD personally performed the services described in this documentation    as scribed in my presence.:

## 2024-08-20 ENCOUNTER — TELEPHONE (OUTPATIENT)
Dept: BEHAVIORAL/MENTAL HEALTH CLINIC | Facility: CLINIC | Age: 24
End: 2024-08-20

## 2024-09-06 ENCOUNTER — DOCUMENTATION (OUTPATIENT)
Dept: BEHAVIORAL/MENTAL HEALTH CLINIC | Facility: CLINIC | Age: 24
End: 2024-09-06

## 2024-09-06 ENCOUNTER — HOSPITAL ENCOUNTER (OUTPATIENT)
Dept: ULTRASOUND IMAGING | Facility: CLINIC | Age: 24
Discharge: HOME | End: 2024-09-06
Payer: COMMERCIAL

## 2024-09-06 DIAGNOSIS — R10.2 PELVIC PAIN: ICD-10-CM

## 2024-09-06 PROCEDURE — 76830 TRANSVAGINAL US NON-OB: CPT

## 2024-09-06 PROCEDURE — 76856 US EXAM PELVIC COMPLETE: CPT

## 2024-09-06 NOTE — PROGRESS NOTES
Psychotherapy Discharge Summary    Preferred Name: Latisha Kelly  YOB: 2000    Admission date to psychotherapy: 6/17/24    Referred by: Lizzette Hernandez    Presenting Problem: PPD    Course of treatment included : individual therapy     Progress/Outcome of Treatment Goals (brief summary of course of treatment) Pt attended one session and was a NS for the next 2 scheduled sessions    Treatment Complications (if any): non-compliance    Treatment Progress: fair    Current [facility] Psychiatric Provider: na    Discharge Medications include: na    Discharge Date: 6/17/24    Discharge Diagnosis: No diagnosis found.    Criteria for Discharge: two or more unexcused absences for services    Aftercare recommendations include (include specific referral names and phone numbers, if appropriate): na    Prognosis: fair

## 2024-09-12 ENCOUNTER — SOCIAL WORK (OUTPATIENT)
Dept: BEHAVIORAL/MENTAL HEALTH CLINIC | Facility: CLINIC | Age: 24
End: 2024-09-12

## 2024-09-12 DIAGNOSIS — F43.23 ADJUSTMENT DISORDER WITH MIXED ANXIETY AND DEPRESSED MOOD: Primary | ICD-10-CM

## 2024-09-13 NOTE — PSYCH
"Behavioral Health Psychotherapy Group Progress Note    Psychotherapy Provided: Group Therapy    1. Adjustment disorder with mixed anxiety and depressed mood            Goals addressed in session: Postpartum Mother's Psychoeducation Group    Group Name: GRAPES, intimacy, being touched out     Topic(s) covered: GRAPES, intimacy, being touched out    Skill(s) covered: Assertive communication, scheduling time for self/partner, advocating for needs    Group summary:  The group was focused on review of GRAPES and the importance of GRAPES in the context of intimacy.  Data: Latisha attended today's group. She was engaged in the group. Latisha reports making a lot of progress with her partner in terms of being more intentional with each other and makin the time for each other. She is getting time for herself which is helpful. She provided feedback to the group.    Substance Abuse was addressed during this session. If the client is diagnosed with a co-occurring substance use disorder, please indicate any changes in the frequency or amount of use: Ulisses does not use substances. Stage of change for addressing substance use diagnoses: No substance use/Not applicable    ASSESSMENT:  Latisha appeared  with a Euthymic/ normal mood. Her affect is Normal range and intensity, which is congruent, with his mood and the content of the session. She appeared to actively participated in the group and interacted appropriately with and was supportive of the other group members.    Latisha was engaged in session. Latisha Kelly presents with a nonerisk of suicide,nonerisk of self-harm, and none risk of harm to others.    For any risk assessment that surpasses a \"low\" rating, a safety plan must be developed.    A safety plan was indicated: no  If yes, describe in detail NA    PLAN: Latisha will continue to work on managing her mood and anxiety. The next group is scheduled for 9/26/24 and will cover the topic of TBD.    Behavioral Health " Treatment Plan and Discharge Planning: Latisha Kelly is aware of and agrees to continue to work on their treatment plan. They have identified and are working toward their discharge goals. yes    Visit start and stop times:    09/12/24  Start Time: 1100  Stop Time: 1200  Total Visit Time: 60 minutes

## 2024-09-30 ENCOUNTER — OFFICE VISIT (OUTPATIENT)
Dept: OBGYN CLINIC | Facility: CLINIC | Age: 24
End: 2024-09-30
Payer: COMMERCIAL

## 2024-09-30 VITALS — BODY MASS INDEX: 42.43 KG/M2 | WEIGHT: 264 LBS | HEIGHT: 66 IN

## 2024-09-30 DIAGNOSIS — M65.4 TENOSYNOVITIS, DE QUERVAIN: Primary | ICD-10-CM

## 2024-09-30 PROCEDURE — 99213 OFFICE O/P EST LOW 20 MIN: CPT | Performed by: STUDENT IN AN ORGANIZED HEALTH CARE EDUCATION/TRAINING PROGRAM

## 2024-09-30 NOTE — PROGRESS NOTES
ORTHOPAEDIC HAND, WRIST, AND ELBOW OFFICE  VISIT      ASSESSMENT/PLAN:      Diagnoses and all orders for this visit:    Tenosynovitis, de Quervain          24 y.o. female with left wrist DeQuervain's   Treatment options and expected outcomes were discussed.  The patient verbalized understanding of exam findings and treatment plan.   The patient was given the opportunity to ask questions.  Questions were answered to the patient's satisfaction.  The patient decided to move forward with continued bracing.  She will follow-up with office as needed      Follow Up:  PRN       To Do Next Visit:  Re-evaluation of current issue      Discussions:  De Quervain Tenosynovitis: The anatomy and physiology of de Quervain's tenosynovitis was discussed with the patient today in the office.  Edema and increased contact pressure within the first dorsal extensor compartment at the radial styloid can cause pain, crepitation, and limitation of function.  Treatment options include resting thumb spica splints to decrease edema, oral anti-inflammatory medications, home or formal therapy exercises, up to 2 steroid injections within the first dorsal extensor compartment, or surgical release.  While majority of patients do respond to conservative treatment, up to 20% may require surgical release.       Gilberto Mayberry MD  Attending, Orthopaedic Surgery  Hand, Wrist, and Elbow Surgery  [facility]    ______________________________________________________________________________________________    CHIEF COMPLAINT:  Chief Complaint   Patient presents with    Left Wrist - Follow-up       SUBJECTIVE:  Patient is a 24 y.o. RHD female who presents today for follow up of for De Quervain on the left side.  She reports she is 90% better then when she was seen last. She received a CSI at her last visit on 8/19/2024.  She reports her only incidence of pain is with specific motions, she feels the tendons in her wrist crossover.  She  reports she is significantly better than when she was seen in office last.     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      I have personally reviewed all the relevant PMH, PSH, SH, FH, Medications and allergies      PAST MEDICAL HISTORY:  Past Medical History:   Diagnosis Date    Anemia     Chronic hypertension with super imposed preeclampsia without severe features 2024    Hypertension     chtn started medication with pregnancy    Hypothyroid     Insulin controlled gestational diabetes mellitus (GDM) in third trimester 2024    Migraine without aura     OTC med management    PCOS (polycystic ovarian syndrome)     Status post primary low transverse  section 12/15/2023    Varicella     vaccine    Visual impairment        PAST SURGICAL HISTORY:  Past Surgical History:   Procedure Laterality Date    CT CERCLAGE UTERINE CERVIX NONOBSTETRICAL N/A 2024    Procedure: CERCLAGE CERVICAL;  Surgeon: Ann Hoffmann MD;  [facility];  Service: Obstetrics    CT  DELIVERY ONLY N/A 2024    Procedure:  SECTION ();  Surgeon: Lizzette Hernandez DO;  [facility];  Service: Obstetrics    CT REMOVAL CERCLAGE SUTURE UNDER ANESTHESIA N/A 2024    Procedure: CERCLAGE REMOVAL;  Surgeon: Lizzette Hernandez DO;  [facility];  Service: Obstetrics    TURBINATE RESECTION         FAMILY HISTORY:  Family History   Problem Relation Age of Onset    No Known Problems Mother     Hypertension Father     No Known Problems Sister     No Known Problems Brother     No Known Problems Brother     No Known Problems Brother     Colon cancer Neg Hx     Ovarian cancer Neg Hx     Breast cancer Neg Hx     Cancer Neg Hx        SOCIAL HISTORY:  Social History     Tobacco Use    Smoking status: Never    Smokeless tobacco: Never   Vaping Use    Vaping status: Former    Quit date: 2023    Substances: Nicotine, Flavoring   Substance Use Topics    Alcohol use: Not Currently     Alcohol/week: 7.0  standard drinks of alcohol     Types: 7 Glasses of wine per week     Comment: Socially    Drug use: Not Currently     Types: Marijuana     Comment: last used THC over 2 years ago (as of 11/29/23)       MEDICATIONS:  No current outpatient medications on file.    ALLERGIES:  Allergies   Allergen Reactions    Latex Rash           REVIEW OF SYSTEMS:  Musculoskeletal:        As noted in HPI.   All other systems reviewed and are negative.    VITALS:  There were no vitals filed for this visit.    LABS:  HgA1c:   Lab Results   Component Value Date    HGBA1C 5.1 08/02/2024     BMP:   Lab Results   Component Value Date    CALCIUM 9.1 08/02/2024    K 3.7 08/02/2024    CO2 25 08/02/2024     08/02/2024    BUN 9 08/02/2024    CREATININE 0.66 08/02/2024       _____________________________________________________  PHYSICAL EXAMINATION:  General: Well developed and well nourished, alert & oriented x 3, appears comfortable  Psychiatric: Normal  HEENT: Normocephalic, Atraumatic Trachea Midline, No torticollis  Pulmonary: No audible wheezing or respiratory distress   Abdomen/GI: Non tender, non distended   Cardiovascular: No pitting edema, 2+ radial pulse   Skin: No masses, erythema, lacerations, fluctation, ulcerations  Neurovascular: Sensation Intact to the Median, Ulnar, Radial Nerve, Motor Intact to the Median, Ulnar, Radial Nerve, and Pulses Intact  Musculoskeletal: Normal, except as noted in detailed exam and in HPI.      MUSCULOSKELETAL EXAMINATION:    Left wrist:  Negative finkelstein test    No tenderness over first dorsal compartment   Full composite fist   Full digit extension  No pain with wrist motion    ___________________________________________________  STUDIES REVIEWED:  Prior left wrist x-rays reviewed  Family medicine note from 7/3/24 reviewed      PROCEDURES PERFORMED:  Procedures  No Procedures performed today    _____________________________________________________      Landon Gee I,:   am  acting as a scribe while in the presence of the attending physician.:       I,:   personally performed the services described in this documentation    as scribed in my presence.:

## 2024-10-04 ENCOUNTER — HOSPITAL ENCOUNTER (EMERGENCY)
Facility: HOSPITAL | Age: 24
Discharge: HOME | End: 2024-10-05
Payer: COMMERCIAL

## 2024-10-04 ENCOUNTER — APPOINTMENT (EMERGENCY)
Dept: CT IMAGING | Facility: HOSPITAL | Age: 24
End: 2024-10-04
Payer: COMMERCIAL

## 2024-10-04 ENCOUNTER — APPOINTMENT (EMERGENCY)
Dept: RADIOLOGY | Facility: HOSPITAL | Age: 24
End: 2024-10-04
Payer: COMMERCIAL

## 2024-10-04 DIAGNOSIS — R03.0 ELEVATED BLOOD PRESSURE READING: ICD-10-CM

## 2024-10-04 DIAGNOSIS — R79.89 LOW THYROID STIMULATING HORMONE (TSH) LEVEL: ICD-10-CM

## 2024-10-04 DIAGNOSIS — R74.01 TRANSAMINITIS: ICD-10-CM

## 2024-10-04 DIAGNOSIS — E83.42 HYPOMAGNESEMIA: ICD-10-CM

## 2024-10-04 DIAGNOSIS — R00.2 PALPITATIONS: Primary | ICD-10-CM

## 2024-10-04 LAB
ALBUMIN SERPL BCG-MCNC: 4.4 G/DL (ref 3.5–5)
ALP SERPL-CCNC: 63 U/L (ref 34–104)
ALT SERPL W P-5'-P-CCNC: 71 U/L (ref 7–52)
ANION GAP SERPL CALCULATED.3IONS-SCNC: 9 MMOL/L (ref 4–13)
AST SERPL W P-5'-P-CCNC: 40 U/L (ref 13–39)
BASOPHILS # BLD AUTO: 0.01 THOUSANDS/ΜL (ref 0–0.1)
BASOPHILS NFR BLD AUTO: 0 % (ref 0–1)
BILIRUB SERPL-MCNC: 0.4 MG/DL (ref 0.2–1)
BNP SERPL-MCNC: 17 PG/ML (ref 0–100)
BUN SERPL-MCNC: 11 MG/DL (ref 5–25)
CALCIUM SERPL-MCNC: 9.5 MG/DL (ref 8.4–10.2)
CARDIAC TROPONIN I PNL SERPL HS: 3 NG/L
CHLORIDE SERPL-SCNC: 105 MMOL/L (ref 96–108)
CO2 SERPL-SCNC: 25 MMOL/L (ref 21–32)
CREAT SERPL-MCNC: 0.64 MG/DL (ref 0.6–1.3)
EOSINOPHIL # BLD AUTO: 0.11 THOUSAND/ΜL (ref 0–0.61)
EOSINOPHIL NFR BLD AUTO: 2 % (ref 0–6)
ERYTHROCYTE [DISTWIDTH] IN BLOOD BY AUTOMATED COUNT: 11.9 % (ref 11.6–15.1)
EXT PREGNANCY TEST URINE: NEGATIVE
EXT. CONTROL: NORMAL
GFR SERPL CREATININE-BSD FRML MDRD: 126 ML/MIN/1.73SQ M
GLUCOSE SERPL-MCNC: 121 MG/DL (ref 65–140)
HCT VFR BLD AUTO: 38.5 % (ref 34.8–46.1)
HGB BLD-MCNC: 12 G/DL (ref 11.5–15.4)
IMM GRANULOCYTES # BLD AUTO: 0.01 THOUSAND/UL (ref 0–0.2)
IMM GRANULOCYTES NFR BLD AUTO: 0 % (ref 0–2)
LYMPHOCYTES # BLD AUTO: 2.52 THOUSANDS/ΜL (ref 0.6–4.47)
LYMPHOCYTES NFR BLD AUTO: 42 % (ref 14–44)
MAGNESIUM SERPL-MCNC: 1.8 MG/DL (ref 1.9–2.7)
MCH RBC QN AUTO: 26.8 PG (ref 26.8–34.3)
MCHC RBC AUTO-ENTMCNC: 31.2 G/DL (ref 31.4–37.4)
MCV RBC AUTO: 86 FL (ref 82–98)
MONOCYTES # BLD AUTO: 0.88 THOUSAND/ΜL (ref 0.17–1.22)
MONOCYTES NFR BLD AUTO: 15 % (ref 4–12)
NEUTROPHILS # BLD AUTO: 2.47 THOUSANDS/ΜL (ref 1.85–7.62)
NEUTS SEG NFR BLD AUTO: 41 % (ref 43–75)
NRBC BLD AUTO-RTO: 0 /100 WBCS
PLATELET # BLD AUTO: 296 THOUSANDS/UL (ref 149–390)
PMV BLD AUTO: 9.3 FL (ref 8.9–12.7)
POTASSIUM SERPL-SCNC: 3.7 MMOL/L (ref 3.5–5.3)
PROT SERPL-MCNC: 7.3 G/DL (ref 6.4–8.4)
RBC # BLD AUTO: 4.47 MILLION/UL (ref 3.81–5.12)
SODIUM SERPL-SCNC: 139 MMOL/L (ref 135–147)
WBC # BLD AUTO: 6 THOUSAND/UL (ref 4.31–10.16)

## 2024-10-04 PROCEDURE — 99285 EMERGENCY DEPT VISIT HI MDM: CPT

## 2024-10-04 PROCEDURE — 36415 COLL VENOUS BLD VENIPUNCTURE: CPT | Performed by: EMERGENCY MEDICINE

## 2024-10-04 PROCEDURE — 96361 HYDRATE IV INFUSION ADD-ON: CPT

## 2024-10-04 PROCEDURE — 81025 URINE PREGNANCY TEST: CPT

## 2024-10-04 PROCEDURE — 84443 ASSAY THYROID STIM HORMONE: CPT

## 2024-10-04 PROCEDURE — 71275 CT ANGIOGRAPHY CHEST: CPT

## 2024-10-04 PROCEDURE — 83735 ASSAY OF MAGNESIUM: CPT

## 2024-10-04 PROCEDURE — 84484 ASSAY OF TROPONIN QUANT: CPT | Performed by: EMERGENCY MEDICINE

## 2024-10-04 PROCEDURE — 84439 ASSAY OF FREE THYROXINE: CPT

## 2024-10-04 PROCEDURE — 83880 ASSAY OF NATRIURETIC PEPTIDE: CPT

## 2024-10-04 PROCEDURE — 85025 COMPLETE CBC W/AUTO DIFF WBC: CPT | Performed by: EMERGENCY MEDICINE

## 2024-10-04 PROCEDURE — 96375 TX/PRO/DX INJ NEW DRUG ADDON: CPT

## 2024-10-04 PROCEDURE — 80053 COMPREHEN METABOLIC PANEL: CPT | Performed by: EMERGENCY MEDICINE

## 2024-10-04 PROCEDURE — 96365 THER/PROPH/DIAG IV INF INIT: CPT

## 2024-10-04 PROCEDURE — 93005 ELECTROCARDIOGRAM TRACING: CPT

## 2024-10-04 PROCEDURE — 71045 X-RAY EXAM CHEST 1 VIEW: CPT

## 2024-10-04 RX ORDER — MAGNESIUM SULFATE HEPTAHYDRATE 40 MG/ML
2 INJECTION, SOLUTION INTRAVENOUS ONCE
Status: COMPLETED | OUTPATIENT
Start: 2024-10-04 | End: 2024-10-05

## 2024-10-04 RX ORDER — ONDANSETRON 2 MG/ML
4 INJECTION INTRAMUSCULAR; INTRAVENOUS ONCE
Status: COMPLETED | OUTPATIENT
Start: 2024-10-04 | End: 2024-10-04

## 2024-10-04 RX ORDER — METOCLOPRAMIDE HYDROCHLORIDE 5 MG/ML
10 INJECTION INTRAMUSCULAR; INTRAVENOUS ONCE
Status: COMPLETED | OUTPATIENT
Start: 2024-10-05 | End: 2024-10-04

## 2024-10-04 RX ADMIN — SODIUM CHLORIDE 1000 ML: 0.9 INJECTION, SOLUTION INTRAVENOUS at 22:57

## 2024-10-04 RX ADMIN — IOHEXOL 85 ML: 350 INJECTION, SOLUTION INTRAVENOUS at 23:43

## 2024-10-04 RX ADMIN — ONDANSETRON 4 MG: 2 INJECTION INTRAMUSCULAR; INTRAVENOUS at 22:58

## 2024-10-04 RX ADMIN — METOCLOPRAMIDE 10 MG: 5 INJECTION, SOLUTION INTRAMUSCULAR; INTRAVENOUS at 23:56

## 2024-10-04 RX ADMIN — MAGNESIUM SULFATE HEPTAHYDRATE 2 G: 40 INJECTION, SOLUTION INTRAVENOUS at 23:54

## 2024-10-05 VITALS
WEIGHT: 253.53 LBS | HEART RATE: 101 BPM | BODY MASS INDEX: 40.92 KG/M2 | RESPIRATION RATE: 21 BRPM | OXYGEN SATURATION: 99 % | SYSTOLIC BLOOD PRESSURE: 144 MMHG | TEMPERATURE: 98.3 F | DIASTOLIC BLOOD PRESSURE: 70 MMHG

## 2024-10-05 LAB
2HR DELTA HS TROPONIN: <-1 NG/L
ATRIAL RATE: 122 BPM
ATRIAL RATE: 98 BPM
CARDIAC TROPONIN I PNL SERPL HS: <2 NG/L
P AXIS: 60 DEGREES
P AXIS: 67 DEGREES
PR INTERVAL: 148 MS
PR INTERVAL: 182 MS
QRS AXIS: 32 DEGREES
QRS AXIS: 54 DEGREES
QRSD INTERVAL: 72 MS
QRSD INTERVAL: 78 MS
QT INTERVAL: 312 MS
QT INTERVAL: 336 MS
QTC INTERVAL: 428 MS
QTC INTERVAL: 444 MS
T WAVE AXIS: 13 DEGREES
T WAVE AXIS: 21 DEGREES
T4 FREE SERPL-MCNC: 3.23 NG/DL (ref 0.61–1.12)
TSH SERPL DL<=0.05 MIU/L-ACNC: <0.01 UIU/ML (ref 0.45–4.5)
VENTRICULAR RATE: 122 BPM
VENTRICULAR RATE: 98 BPM

## 2024-10-05 PROCEDURE — 93010 ELECTROCARDIOGRAM REPORT: CPT | Performed by: STUDENT IN AN ORGANIZED HEALTH CARE EDUCATION/TRAINING PROGRAM

## 2024-10-05 PROCEDURE — 93005 ELECTROCARDIOGRAM TRACING: CPT

## 2024-10-05 PROCEDURE — 84484 ASSAY OF TROPONIN QUANT: CPT | Performed by: EMERGENCY MEDICINE

## 2024-10-05 PROCEDURE — 36415 COLL VENOUS BLD VENIPUNCTURE: CPT | Performed by: EMERGENCY MEDICINE

## 2024-10-05 RX ORDER — PROPRANOLOL HCL 10 MG
10 TABLET ORAL 3 TIMES DAILY
Qty: 21 TABLET | Refills: 0 | Status: CANCELLED | OUTPATIENT
Start: 2024-10-05 | End: 2024-10-12

## 2024-10-05 NOTE — DISCHARGE INSTRUCTIONS
Return to the emergency department for any new or worsening symptoms.  Follow-up with endocrinology.  We did discuss beginning propranolol today for symptomatic relief but you elected to wait until seen by endocrinology.  Contact them at the number listed above to schedule an appointment, referral has been placed for you.  Follow-up with your primary care provider.

## 2024-10-07 ENCOUNTER — CONSULT (OUTPATIENT)
Dept: ENDOCRINOLOGY | Facility: CLINIC | Age: 24
End: 2024-10-07
Payer: COMMERCIAL

## 2024-10-07 ENCOUNTER — APPOINTMENT (OUTPATIENT)
Dept: LAB | Facility: HOSPITAL | Age: 24
End: 2024-10-07
Attending: INTERNAL MEDICINE
Payer: COMMERCIAL

## 2024-10-07 VITALS
OXYGEN SATURATION: 98 % | WEIGHT: 252 LBS | TEMPERATURE: 98.1 F | HEIGHT: 66 IN | SYSTOLIC BLOOD PRESSURE: 122 MMHG | DIASTOLIC BLOOD PRESSURE: 66 MMHG | HEART RATE: 94 BPM | BODY MASS INDEX: 40.5 KG/M2

## 2024-10-07 DIAGNOSIS — E05.90 HYPERTHYROIDISM: Primary | ICD-10-CM

## 2024-10-07 DIAGNOSIS — R74.01 TRANSAMINASEMIA: ICD-10-CM

## 2024-10-07 DIAGNOSIS — E66.01 MORBID OBESITY (HCC): ICD-10-CM

## 2024-10-07 DIAGNOSIS — R00.2 PALPITATIONS: ICD-10-CM

## 2024-10-07 DIAGNOSIS — E05.90 HYPERTHYROIDISM: ICD-10-CM

## 2024-10-07 DIAGNOSIS — E06.3 HYPOTHYROIDISM DUE TO HASHIMOTO THYROIDITIS: ICD-10-CM

## 2024-10-07 PROBLEM — E05.80 IATROGENIC HYPERTHYROIDISM: Status: ACTIVE | Noted: 2024-10-07

## 2024-10-07 LAB
ALBUMIN SERPL BCG-MCNC: 4.5 G/DL (ref 3.5–5)
ALP SERPL-CCNC: 71 U/L (ref 34–104)
ALT SERPL W P-5'-P-CCNC: 68 U/L (ref 7–52)
ANION GAP SERPL CALCULATED.3IONS-SCNC: 9 MMOL/L (ref 4–13)
AST SERPL W P-5'-P-CCNC: 25 U/L (ref 13–39)
BILIRUB SERPL-MCNC: 0.42 MG/DL (ref 0.2–1)
BUN SERPL-MCNC: 12 MG/DL (ref 5–25)
CALCIUM SERPL-MCNC: 10.3 MG/DL (ref 8.4–10.2)
CHLORIDE SERPL-SCNC: 104 MMOL/L (ref 96–108)
CO2 SERPL-SCNC: 26 MMOL/L (ref 21–32)
CREAT SERPL-MCNC: 0.68 MG/DL (ref 0.6–1.3)
GFR SERPL CREATININE-BSD FRML MDRD: 123 ML/MIN/1.73SQ M
GLUCOSE SERPL-MCNC: 109 MG/DL (ref 65–140)
POTASSIUM SERPL-SCNC: 3.8 MMOL/L (ref 3.5–5.3)
PROT SERPL-MCNC: 7.7 G/DL (ref 6.4–8.4)
SODIUM SERPL-SCNC: 139 MMOL/L (ref 135–147)
T3 SERPL-MCNC: 3 NG/ML
T4 FREE SERPL-MCNC: 3.13 NG/DL (ref 0.61–1.12)
TSH SERPL DL<=0.05 MIU/L-ACNC: <0.01 UIU/ML (ref 0.45–4.5)

## 2024-10-07 PROCEDURE — 36415 COLL VENOUS BLD VENIPUNCTURE: CPT

## 2024-10-07 PROCEDURE — 86376 MICROSOMAL ANTIBODY EACH: CPT

## 2024-10-07 PROCEDURE — 84443 ASSAY THYROID STIM HORMONE: CPT

## 2024-10-07 PROCEDURE — 84480 ASSAY TRIIODOTHYRONINE (T3): CPT

## 2024-10-07 PROCEDURE — 80053 COMPREHEN METABOLIC PANEL: CPT

## 2024-10-07 PROCEDURE — 84445 ASSAY OF TSI GLOBULIN: CPT

## 2024-10-07 PROCEDURE — 99244 OFF/OP CNSLTJ NEW/EST MOD 40: CPT | Performed by: INTERNAL MEDICINE

## 2024-10-07 PROCEDURE — 84439 ASSAY OF FREE THYROXINE: CPT

## 2024-10-07 PROCEDURE — 86800 THYROGLOBULIN ANTIBODY: CPT

## 2024-10-07 RX ORDER — PROPRANOLOL HCL 20 MG
20 TABLET ORAL EVERY 12 HOURS SCHEDULED
Qty: 30 TABLET | Refills: 0 | Status: SHIPPED | OUTPATIENT
Start: 2024-10-07

## 2024-10-07 RX ORDER — METHIMAZOLE 5 MG/1
10 TABLET ORAL DAILY
Qty: 180 TABLET | Refills: 0 | Status: SHIPPED | OUTPATIENT
Start: 2024-10-07

## 2024-10-07 NOTE — ASSESSMENT & PLAN NOTE
She was treated with levothyroxine 25mcg qdaily during first trimester to achieve a goal TSH <2.5uIU/mL to avoid risk of miscarriage.  She does not have active hypothyroid state and no Hx prior to pregnancy either.

## 2024-10-07 NOTE — ASSESSMENT & PLAN NOTE
Today we discussed all aspects of obesity and metabolism including pathophysiology, risk factors, complications, goal of sustaining weight loss long term, usual propensity to regain weight with short term approaches, follow up needs and medications - efficacy and limitations.   Discussed role of endocrinopathies, inflammatory conditions, sleep disorders, mental health disorders, lifestyle issues and polypharmacy and weight gain.  Briefly discussed bariatric surgery.  Diet: carb controlled diet <1500cal/day/ VLCD, 64oz fluids/day   lifestyle modifications: intermittent fasting and 10,000 steps/day  medical fitness training: muscle building  education: nutrition input    She lost 30 lbs since post partum. Goal weight is 170 lbs over next year.  She may be a candidate for weight loss medications after diet and lifestyle modification - she is currently active with lifestyle changes.  Will follow up in 3 months.

## 2024-10-07 NOTE — PROGRESS NOTES
"    New Consult Note      CC: hyperthyroidism    History of Present Illness:   24 yr female with   24 at 34 WGA, hyperthyroidism post partum, hypothyroidism during first trimester, obesity BMI 40,  Hx PCOS, transaminasemia and vit D deficiency.    LMP was 24.  She was started on levothyroxine 25mcg daily at about 4 weeks gestational age after TSH was 4.2uIU/mL.    Physical Exam:  Body mass index is 40.67 kg/m².  /66 (BP Location: Left arm, Patient Position: Sitting, Cuff Size: Standard)   Pulse 94   Temp 98.1 °F (36.7 °C) (Temporal)   Ht 5' 6\" (1.676 m)   Wt 114 kg (252 lb)   SpO2 98%   BMI 40.67 kg/m²    Vitals:    10/07/24 1125   Weight: 114 kg (252 lb)        Physical Exam  Constitutional:       General: She is not in acute distress.     Appearance: She is well-developed.   HENT:      Head: Normocephalic and atraumatic.      Nose: Nose normal.   Eyes:      Conjunctiva/sclera: Conjunctivae normal.   Pulmonary:      Effort: Pulmonary effort is normal.   Abdominal:      General: There is no distension.   Musculoskeletal:      Cervical back: Normal range of motion and neck supple.   Skin:     Findings: No rash.      Comments: No icterus   Neurological:      Mental Status: She is alert and oriented to person, place, and time.         Labs:   Lab Results   Component Value Date    HGBA1C 5.1 2024       Lab Results   Component Value Date    MSC0QBDAMVBV <0.010 (L) 10/04/2024    TSH 2.51 12/10/2020       Lab Results   Component Value Date    CREATININE 0.64 10/04/2024    CREATININE 0.66 2024    CREATININE 0.66 2024    BUN 11 10/04/2024    K 3.7 10/04/2024     10/04/2024    CO2 25 10/04/2024     GFR, Calculated   Date Value Ref Range Status   2020 103 mL/min/1.73m2 Final     Comment:     mL/min per 1.73 square meters                                            Normal Function or Mild Renal    Disease (if clinically at risk):  >or=60  Moderately Decreased:     "            30-59  Severely Decreased:                  15-29  Renal Failure:                         <15                                            -American GFR: multiply reported GFR by 1.16    Please note that the eGFR is based on the CKD-EPI calculation, and is not intended to be used for drug dosing.                                            Note: Calculated GFR may not be an accurate indicator of renal function if the patient's renal function is not in a steady state.     eGFR   Date Value Ref Range Status   10/04/2024 126 ml/min/1.73sq m Final       Lab Results   Component Value Date    ALT 71 (H) 10/04/2024    AST 40 (H) 10/04/2024    ALKPHOS 63 10/04/2024       Lab Results   Component Value Date    CHOLESTEROL 180 08/02/2024    CHOLESTEROL 167 01/23/2023     Lab Results   Component Value Date    HDL 46 (L) 08/02/2024    HDL 54 01/23/2023     Lab Results   Component Value Date    TRIG 160 (H) 08/02/2024    TRIG 98 01/23/2023     Lab Results   Component Value Date    NONHDLC 134 08/02/2024    NONHDLC 113 01/23/2023         Assessment/Plan:    1. Hyperthyroidism  Assessment & Plan:  She probably has postpartum thyroiditis with a hyperthyroid state.  We will however r/o hashitoxicosis or Graves disease.    Today we discussed all aspects of hyperthyroidism including pathophysiology, complications including orbital, cardiac, bone and metabolic, treatment options including methimazole, STALLWORTH and surgery, goals of therapy.    We will do labs and then start methimazole. She is not breast feeding.  We may consider propranolol or another beta blocker for symptom relief as well.     Note mild transaminasemia - monitor CMP as methimazole has a rare chance of liver failure as side effect.  I expect this hyperthyroid state will be transitory and she may be euthyroid or hypothyroid in next few months.    Follow up in 3 months.    Orders:  -     Thyroid stimulating immunoglobulin; Future  -     Thyroid Antibodies  Panel; Future  -     T4, free; Future  -     TSH, 3rd generation; Future  -     T3; Future  -     US thyroid; Future; Expected date: 10/07/2024  -     methimazole (TAPAZOLE) 5 mg tablet; Take 2 tablets (10 mg total) by mouth in the morning  -     T4, free; Future; Expected date: 01/07/2025  -     TSH, 3rd generation; Future; Expected date: 01/07/2025  -     T3; Future; Expected date: 01/07/2025  -     Comprehensive metabolic panel; Future  -     propranolol (INDERAL) 20 mg tablet; Take 1 tablet (20 mg total) by mouth every 12 (twelve) hours As needed for palpitations  2. Palpitations  -     Ambulatory Referral to Endocrinology  3. Hypothyroidism due to Hashimoto thyroiditis  Assessment & Plan:  She was treated with levothyroxine 25mcg qdaily during first trimester to achieve a goal TSH <2.5uIU/mL to avoid risk of miscarriage.  She does not have active hypothyroid state and no Hx prior to pregnancy either.   4. Morbid obesity (HCC)  Assessment & Plan:  Today we discussed all aspects of obesity and metabolism including pathophysiology, risk factors, complications, goal of sustaining weight loss long term, usual propensity to regain weight with short term approaches, follow up needs and medications - efficacy and limitations.   Discussed role of endocrinopathies, inflammatory conditions, sleep disorders, mental health disorders, lifestyle issues and polypharmacy and weight gain.  Briefly discussed bariatric surgery.  Diet: carb controlled diet <1500cal/day/ VLCD, 64oz fluids/day   lifestyle modifications: intermittent fasting and 10,000 steps/day  medical fitness training: muscle building  education: nutrition input    She lost 30 lbs since post partum. Goal weight is 170 lbs over next year.  She may be a candidate for weight loss medications after diet and lifestyle modification - she is currently active with lifestyle changes.  Will follow up in 3 months.  5. Transaminasemia        I have spent a total time of 40  minutes on 10/07/24 in caring for this patient including greater than 50% of this time was spent in counseling/coordination of care as listed above.       Discussed with the patient and all questioned fully answered. She will contact me with concerns.    Shira Stone

## 2024-10-07 NOTE — ASSESSMENT & PLAN NOTE
She probably has postpartum thyroiditis with a hyperthyroid state.  We will however r/o hashitoxicosis or Graves disease.    Today we discussed all aspects of hyperthyroidism including pathophysiology, complications including orbital, cardiac, bone and metabolic, treatment options including methimazole, STALLWORTH and surgery, goals of therapy.    We will do labs and then start methimazole. She is not breast feeding.  We may consider propranolol or another beta blocker for symptom relief as well.     Note mild transaminasemia - monitor CMP as methimazole has a rare chance of liver failure as side effect.  I expect this hyperthyroid state will be transitory and she may be euthyroid or hypothyroid in next few months.    Follow up in 3 months.

## 2024-10-08 LAB
THYROGLOB AB SERPL-ACNC: <1 IU/ML (ref 0–0.9)
THYROPEROXIDASE AB SERPL-ACNC: >600 IU/ML (ref 0–34)

## 2024-10-09 ENCOUNTER — OFFICE VISIT (OUTPATIENT)
Dept: FAMILY MEDICINE CLINIC | Facility: CLINIC | Age: 24
End: 2024-10-09
Payer: COMMERCIAL

## 2024-10-09 ENCOUNTER — HOSPITAL ENCOUNTER (OUTPATIENT)
Dept: ULTRASOUND IMAGING | Facility: HOSPITAL | Age: 24
Discharge: HOME | End: 2024-10-09
Payer: COMMERCIAL

## 2024-10-09 VITALS
WEIGHT: 248 LBS | HEIGHT: 66 IN | OXYGEN SATURATION: 95 % | SYSTOLIC BLOOD PRESSURE: 116 MMHG | TEMPERATURE: 100.1 F | DIASTOLIC BLOOD PRESSURE: 80 MMHG | HEART RATE: 110 BPM | BODY MASS INDEX: 39.86 KG/M2

## 2024-10-09 DIAGNOSIS — R06.09 DYSPNEA ON EXERTION: Primary | ICD-10-CM

## 2024-10-09 DIAGNOSIS — R00.0 TACHYCARDIA: ICD-10-CM

## 2024-10-09 DIAGNOSIS — E05.90 HYPERTHYROIDISM: ICD-10-CM

## 2024-10-09 DIAGNOSIS — R00.2 PALPITATIONS: ICD-10-CM

## 2024-10-09 LAB — TSI SER-ACNC: 0.11 IU/L (ref 0–0.55)

## 2024-10-09 PROCEDURE — 99214 OFFICE O/P EST MOD 30 MIN: CPT | Performed by: NURSE PRACTITIONER

## 2024-10-09 PROCEDURE — 76536 US EXAM OF HEAD AND NECK: CPT

## 2024-10-09 NOTE — Clinical Note
Hi thank you for seeing pt  Would you be ok if she used propranolol for symptoms management of anxiety and tachycardia  I discussed with pt but she wanted at to have have it cleared by you

## 2024-10-10 NOTE — PROGRESS NOTES
Ambulatory Visit  Name: Latisha Kelly      : 2000      MRN: [id number]  Encounter Provider: VALENTIN Parrish  Encounter Date: 10/9/2024   Encounter department: [facility]      Pt is a 24 yr old female   Presents in office for follow up post ED   New onset of hyperthyroidism  She was seen in the ER for palpitations and chest pain noted to be in thyroiditis.  Was started on Tapazole and since she has seen the endocrinologist.   She is very nervous about diagnoses and new medications   She is scheduled to get  US of thyroid done today     Discussed pathophysiology of thyroiditis- causes such as anemia recent pregnancy diet and exercise and weight loss  hormonal changes etc.     She has had some shortness of breath on exertion and palpitations and feeling very tired and fatigued    Assessment & Plan  Hyperthyroidism  Medications were started discussed 4-6 weeks follow up on labs and 3 months with ENDO  Pending US of thyroid   Orders:    TSH, 3rd generation with Free T4 reflex    T3    Dyspnea on exertion  Discussed symptoms related to that   Reviewed CT chest and also XR findings     Orders:    TSH, 3rd generation with Free T4 reflex    T3    Palpitations  Discussed using the propranolol   States ENDO did not give her that one nervous to start again   I will get it cleared by ENDO and discuss with her   Orders:    TSH, 3rd generation with Free T4 reflex    T3    Tachycardia    Orders:    TSH, 3rd generation with Free T4 reflex    T3      Depression Screening and Follow-up Plan: Patient was screened for depression during today's encounter. They screened negative with a PHQ-2 score of 0.      History of Present Illness     Pt is a 24 yr old female   Presents in office for follow up post ED   New onset of hyperthyroidism  She was seen in the ER for palpitations and chest pain noted to be in thyroiditis.  Was started on Tapazole and since she has seen the endocrinologist.   She  is very nervous about diagnoses and new medications   She is scheduled to get  US of thyroid done today     Discussed pathophysiology of thyroiditis- causes such as anemia recent pregnancy diet and exercise and weight loss  hormonal changes etc.     She has had some shortness of breath on exertion and palpitations and feeling very tired and fatigued          History obtained from : patient  Review of Systems   Constitutional:  Positive for fatigue. Negative for fever and unexpected weight change.   HENT:  Negative for congestion, postnasal drip and sore throat.    Eyes: Negative.    Respiratory:  Positive for shortness of breath. Negative for cough.    Cardiovascular:  Positive for palpitations. Negative for chest pain.        History of preeclampsia  Recent onset of thyroiditis    Gastrointestinal:  Positive for blood in stool. Negative for abdominal distention, nausea and vomiting.   Endocrine:        Elevated sugars in pregnancy   Genitourinary:  Negative for difficulty urinating and frequency.   Musculoskeletal:  Negative for arthralgias and neck pain.   Skin:  Negative for rash.   Allergic/Immunologic: Negative.    Neurological:  Negative for headaches.   Hematological:  Negative for adenopathy.   Psychiatric/Behavioral:  Negative for sleep disturbance and suicidal ideas. The patient is not nervous/anxious.      Pertinent Medical History     Reviewed      Medical History Reviewed by provider this encounter:  Tobacco  Allergies  Meds  Problems  Med Hx  Surg Hx  Fam Hx       Past Medical History   Past Medical History:   Diagnosis Date    Anemia     Chronic hypertension with super imposed preeclampsia without severe features 02/05/2024    Hypertension     chtn started medication with pregnancy    Hypothyroid     Insulin controlled gestational diabetes mellitus (GDM) in third trimester 04/23/2024    Migraine without aura     OTC med management    PCOS (polycystic ovarian syndrome)     Status post primary  low transverse  section 12/15/2023    Varicella     vaccine    Visual impairment      Past Surgical History:   Procedure Laterality Date    IA CERCLAGE UTERINE CERVIX NONOBSTETRICAL N/A 2024    Procedure: CERCLAGE CERVICAL;  Surgeon: Ann Hoffmann MD;  [facility];  Service: Obstetrics    IA  DELIVERY ONLY N/A 2024    Procedure:  SECTION ();  Surgeon: Lizzette Hernandez DO;  [facility];  Service: Obstetrics    IA REMOVAL CERCLAGE SUTURE UNDER ANESTHESIA N/A 2024    Procedure: CERCLAGE REMOVAL;  Surgeon: Lizzette Hernandez DO;  [facility];  Service: Obstetrics    TURBINATE RESECTION       Family History   Problem Relation Age of Onset    No Known Problems Mother     Hypertension Father     No Known Problems Sister     No Known Problems Brother     No Known Problems Brother     No Known Problems Brother     Colon cancer Neg Hx     Ovarian cancer Neg Hx     Breast cancer Neg Hx     Cancer Neg Hx      Current Outpatient Medications on File Prior to Visit   Medication Sig Dispense Refill    methimazole (TAPAZOLE) 5 mg tablet Take 2 tablets (10 mg total) by mouth in the morning 180 tablet 0    propranolol (INDERAL) 20 mg tablet Take 1 tablet (20 mg total) by mouth every 12 (twelve) hours As needed for palpitations (Patient not taking: Reported on 10/9/2024) 30 tablet 0     No current facility-administered medications on file prior to visit.     Allergies   Allergen Reactions    Latex Rash      Current Outpatient Medications on File Prior to Visit   Medication Sig Dispense Refill    methimazole (TAPAZOLE) 5 mg tablet Take 2 tablets (10 mg total) by mouth in the morning 180 tablet 0    propranolol (INDERAL) 20 mg tablet Take 1 tablet (20 mg total) by mouth every 12 (twelve) hours As needed for palpitations (Patient not taking: Reported on 10/9/2024) 30 tablet 0     No current facility-administered medications on file prior to visit.      Social History  "    Tobacco Use    Smoking status: Never    Smokeless tobacco: Never   Vaping Use    Vaping status: Former    Quit date: 1/1/2023    Substances: Nicotine, Flavoring   Substance and Sexual Activity    Alcohol use: Not Currently     Alcohol/week: 7.0 standard drinks of alcohol     Types: 7 Glasses of wine per week     Comment: Socially    Drug use: Not Currently     Types: Marijuana     Comment: last used THC over 2 years ago (as of 11/29/23)    Sexual activity: Yes     Partners: Male     Birth control/protection: None, Coitus interruptus         Objective     /80 (BP Location: Right arm, Patient Position: Sitting, Cuff Size: Large)   Pulse (!) 110   Temp 100.1 °F (37.8 °C)   Ht 5' 6\" (1.676 m)   Wt 112 kg (248 lb)   SpO2 95%   BMI 40.03 kg/m²     Physical Exam  Vitals and nursing note reviewed.   Constitutional:       Appearance: Normal appearance.   HENT:      Head: Atraumatic.   Cardiovascular:      Rate and Rhythm: Regular rhythm. Tachycardia present.      Pulses: Normal pulses.      Heart sounds: Normal heart sounds.   Pulmonary:      Breath sounds: Normal breath sounds.   Abdominal:      Palpations: Abdomen is soft.   Musculoskeletal:      Right lower leg: No edema.      Left lower leg: No edema.   Neurological:      Mental Status: She is alert.   Psychiatric:         Mood and Affect: Mood normal.         Behavior: Behavior normal.      Comments: Anxious about her health     Reviewed recent ER labs and CT scan and chest XR   Administrative Statements   I have spent a total time of 45  minutes in caring for this patient on the day of the visit/encounter including Diagnostic results, Prognosis, Risks and benefits of tx options, Instructions for management, Patient and family education, Importance of tx compliance, Risk factor reductions, Impressions, Counseling / Coordination of care, Documenting in the medical record, Reviewing / ordering tests, medicine, procedures  , Obtaining or reviewing history  " ", and Communicating with other healthcare professionals .    Portions of the record may have been created with voice recognition software.  Occasional wrong word or \"sound a like\" substitutions may have occurred due to the inherent limitations of voice recognition software.  Read the chart carefully and recognize, using context, where substitutions have occurred   "

## 2024-10-10 NOTE — ASSESSMENT & PLAN NOTE
Medications were started discussed 4-6 weeks follow up on labs and 3 months with ENDO  Pending US of thyroid   Orders:    TSH, 3rd generation with Free T4 reflex    T3

## 2024-10-10 NOTE — ASSESSMENT & PLAN NOTE
Discussed using the propranolol   States GUILLE did not give her that one nervous to start again   I will get it cleared by GUILLE and discuss with her   Orders:    TSH, 3rd generation with Free T4 reflex    T3

## 2024-10-10 NOTE — ASSESSMENT & PLAN NOTE
Discussed symptoms related to that   Reviewed CT chest and also XR findings     Orders:    TSH, 3rd generation with Free T4 reflex    T3

## 2024-11-11 ENCOUNTER — APPOINTMENT (OUTPATIENT)
Dept: LAB | Facility: HOSPITAL | Age: 24
End: 2024-11-11
Payer: COMMERCIAL

## 2024-11-11 LAB — TSH SERPL DL<=0.05 MIU/L-ACNC: 0.22 UIU/ML (ref 0.45–4.5)

## 2024-11-12 LAB
T3 SERPL-MCNC: 0.9 NG/ML
T4 FREE SERPL-MCNC: 0.58 NG/DL (ref 0.61–1.12)

## 2024-11-13 ENCOUNTER — RESULTS FOLLOW-UP (OUTPATIENT)
Dept: FAMILY MEDICINE CLINIC | Facility: CLINIC | Age: 24
End: 2024-11-13

## 2024-11-13 ENCOUNTER — OFFICE VISIT (OUTPATIENT)
Dept: FAMILY MEDICINE CLINIC | Facility: CLINIC | Age: 24
End: 2024-11-13
Payer: COMMERCIAL

## 2024-11-13 VITALS
WEIGHT: 239 LBS | SYSTOLIC BLOOD PRESSURE: 118 MMHG | BODY MASS INDEX: 38.41 KG/M2 | TEMPERATURE: 99.1 F | OXYGEN SATURATION: 97 % | HEIGHT: 66 IN | DIASTOLIC BLOOD PRESSURE: 70 MMHG | HEART RATE: 91 BPM

## 2024-11-13 DIAGNOSIS — D50.9 IRON DEFICIENCY ANEMIA, UNSPECIFIED IRON DEFICIENCY ANEMIA TYPE: ICD-10-CM

## 2024-11-13 DIAGNOSIS — E05.90 HYPERTHYROIDISM: Primary | ICD-10-CM

## 2024-11-13 DIAGNOSIS — E55.9 VITAMIN D DEFICIENCY: ICD-10-CM

## 2024-11-13 PROCEDURE — 99213 OFFICE O/P EST LOW 20 MIN: CPT | Performed by: NURSE PRACTITIONER

## 2024-11-13 NOTE — Clinical Note
Just wanted to update you that pt has stopped taking her tapazole - she had some follow up labs done for your review

## 2024-11-15 NOTE — ASSESSMENT & PLAN NOTE
Has not been taking her medications she is to notify the endocrinologist and I will send a note   TSH looks a bit better bit not sure of due to medications used until 2 weeks ago   Orders:    Comprehensive metabolic panel    CBC and differential    TSH, 3rd generation with Free T4 reflex    Lipid panel    UA/M w/rflx Culture, Routine

## 2024-11-15 NOTE — PROGRESS NOTES
Name: Latisha Kelly      : 2000      MRN: [id number]  Encounter Provider: VALENTIN Parrish  Encounter Date: 2024   Encounter department: [facility]  :  Assessment & Plan  Hyperthyroidism  Has not been taking her medications she is to notify the endocrinologist and I will send a note   TSH looks a bit better bit not sure of due to medications used until 2 weeks ago   Orders:    Comprehensive metabolic panel    CBC and differential    TSH, 3rd generation with Free T4 reflex    Lipid panel    UA/M w/rflx Culture, Routine    Iron deficiency anemia, unspecified iron deficiency anemia type  She is to continue to supplements   Feels better - improved   Orders:    Comprehensive metabolic panel    CBC and differential    TSH, 3rd generation with Free T4 reflex    Lipid panel    UA/M w/rflx Culture, Routine    Iron Panel (Includes Ferritin, Iron Sat%, Iron, and TIBC)    Vitamin D deficiency  Continues supplements we will repeat labs in 3-4 months   Orders:    Comprehensive metabolic panel    CBC and differential    TSH, 3rd generation with Free T4 reflex    Lipid panel    UA/M w/rflx Culture, Routine    Vitamin D 25 hydroxy          Depression Screening and Follow-up Plan: Patient was screened for depression during today's encounter. They screened negative with a PHQ-2 score of 1.      History of Present Illness     Pt is a 24 yr old female   Presents in office for follow up hyperthyroidism- under care of ENDO however she has not been taking her medication as prescribed   Some at home stress  - emotional support provided   Here for lab review        Review of Systems   Constitutional:  Positive for fatigue. Negative for fever and unexpected weight change.   HENT:  Negative for congestion, postnasal drip and sore throat.    Eyes: Negative.    Respiratory:  Negative for cough and shortness of breath.    Cardiovascular:  Negative for chest pain and palpitations.        History  of preeclampsia  Recent onset of thyroiditis    Gastrointestinal:  Negative for abdominal distention, blood in stool, nausea and vomiting.   Endocrine:        Elevated sugars in pregnancy  Hyperthyroid after pregnancy- not taking medications as prescribed    Genitourinary:  Negative for difficulty urinating and frequency.   Musculoskeletal:  Negative for arthralgias and neck pain.   Skin:  Negative for rash.   Allergic/Immunologic: Negative.    Neurological:  Negative for headaches.   Hematological:  Negative for adenopathy.   Psychiatric/Behavioral:  Negative for sleep disturbance and suicidal ideas. The patient is not nervous/anxious.      Reviewed  Reviewed  Reviewed      Medical History Reviewed by provider this encounter:  Tobacco  Allergies  Meds  Problems  Med Hx  Surg Hx  Fam Hx     .  Past Medical History   Past Medical History:   Diagnosis Date    Anemia     Chronic hypertension with super imposed preeclampsia without severe features 2024    Hypertension     chtn started medication with pregnancy    Hypothyroid     Insulin controlled gestational diabetes mellitus (GDM) in third trimester 2024    Migraine without aura     OTC med management    PCOS (polycystic ovarian syndrome)     Status post primary low transverse  section 12/15/2023    Varicella     vaccine    Visual impairment      Past Surgical History:   Procedure Laterality Date    AK CERCLAGE UTERINE CERVIX NONOBSTETRICAL N/A 2024    Procedure: CERCLAGE CERVICAL;  Surgeon: Ann Hoffmann MD;  [facility];  Service: Obstetrics    AK  DELIVERY ONLY N/A 2024    Procedure:  SECTION ();  Surgeon: Lizzette Hernandez DO;  [facility];  Service: Obstetrics    AK REMOVAL CERCLAGE SUTURE UNDER ANESTHESIA N/A 2024    Procedure: CERCLAGE REMOVAL;  Surgeon: Lizzette Hernandez DO;  [facility];  Service: Obstetrics    TURBINATE RESECTION       Family History   Problem Relation  "Age of Onset    No Known Problems Mother     Hypertension Father     No Known Problems Sister     No Known Problems Brother     No Known Problems Brother     No Known Problems Brother     Colon cancer Neg Hx     Ovarian cancer Neg Hx     Breast cancer Neg Hx     Cancer Neg Hx       reports that she has never smoked. She has never used smokeless tobacco. She reports that she does not currently use alcohol after a past usage of about 7.0 standard drinks of alcohol per week. She reports that she does not currently use drugs after having used the following drugs: Marijuana.  Current Outpatient Medications on File Prior to Visit   Medication Sig Dispense Refill    methimazole (TAPAZOLE) 5 mg tablet Take 2 tablets (10 mg total) by mouth in the morning 180 tablet 0     No current facility-administered medications on file prior to visit.     Allergies   Allergen Reactions    Latex Rash      Current Outpatient Medications on File Prior to Visit   Medication Sig Dispense Refill    methimazole (TAPAZOLE) 5 mg tablet Take 2 tablets (10 mg total) by mouth in the morning 180 tablet 0     No current facility-administered medications on file prior to visit.      Social History     Tobacco Use    Smoking status: Never    Smokeless tobacco: Never   Vaping Use    Vaping status: Former    Quit date: 1/1/2023    Substances: Nicotine, Flavoring   Substance and Sexual Activity    Alcohol use: Not Currently     Alcohol/week: 7.0 standard drinks of alcohol     Types: 7 Glasses of wine per week     Comment: Socially    Drug use: Not Currently     Types: Marijuana     Comment: last used THC over 2 years ago (as of 11/29/23)    Sexual activity: Yes     Partners: Male     Birth control/protection: None, Coitus interruptus        Objective   /70 (BP Location: Right arm, Patient Position: Sitting, Cuff Size: Large)   Pulse 91   Temp 99.1 °F (37.3 °C)   Ht 5' 6\" (1.676 m)   Wt 108 kg (239 lb)   SpO2 97%   BMI 38.58 kg/m²      Physical " Exam  Vitals and nursing note reviewed.   Constitutional:       Comments: BMI 41.97   HENT:      Head: Normocephalic and atraumatic.      Nose: No congestion or rhinorrhea.   Cardiovascular:      Rate and Rhythm: Normal rate and regular rhythm.      Pulses: Normal pulses.      Heart sounds: Normal heart sounds.   Pulmonary:      Effort: Pulmonary effort is normal.      Breath sounds: Normal breath sounds.   Abdominal:      Palpations: Abdomen is soft.   Musculoskeletal:      Cervical back: Normal range of motion.      Right lower leg: No edema.      Left lower leg: No edema.   Skin:     General: Skin is warm.   Neurological:      Mental Status: She is alert and oriented to person, place, and time.   Psychiatric:         Mood and Affect: Mood normal.         Behavior: Behavior normal.     LAB REVIEWED   Administrative Statements   I have spent a total time of 35  minutes in caring for this patient on the day of the visit/encounter including Risks and benefits of tx options, Instructions for management, Patient and family education, Importance of tx compliance, Risk factor reductions, Impressions, Counseling / Coordination of care, Documenting in the medical record, Reviewing / ordering tests, medicine, procedures  , and Obtaining or reviewing history  . Topics discussed with the patient / family include symptom assessment and management, medication review, medication adjustment, and goals of care.

## 2024-12-02 ENCOUNTER — OFFICE VISIT (OUTPATIENT)
Age: 24
End: 2024-12-02
Payer: COMMERCIAL

## 2024-12-02 VITALS
TEMPERATURE: 98.6 F | OXYGEN SATURATION: 96 % | DIASTOLIC BLOOD PRESSURE: 56 MMHG | HEART RATE: 81 BPM | RESPIRATION RATE: 18 BRPM | WEIGHT: 244 LBS | SYSTOLIC BLOOD PRESSURE: 101 MMHG | BODY MASS INDEX: 39.38 KG/M2

## 2024-12-02 DIAGNOSIS — Z20.2 STD EXPOSURE: ICD-10-CM

## 2024-12-02 DIAGNOSIS — R30.0 DYSURIA: ICD-10-CM

## 2024-12-02 DIAGNOSIS — N76.0 ACUTE VAGINITIS: ICD-10-CM

## 2024-12-02 DIAGNOSIS — R30.0 DYSURIA: Primary | ICD-10-CM

## 2024-12-02 LAB
SL AMB  POCT GLUCOSE, UA: NEGATIVE
SL AMB LEUKOCYTE ESTERASE,UA: ABNORMAL
SL AMB POCT BILIRUBIN,UA: NEGATIVE
SL AMB POCT BLOOD,UA: NEGATIVE
SL AMB POCT CLARITY,UA: ABNORMAL
SL AMB POCT COLOR,UA: YELLOW
SL AMB POCT KETONES,UA: NEGATIVE
SL AMB POCT NITRITE,UA: NEGATIVE
SL AMB POCT PH,UA: 5
SL AMB POCT SPECIFIC GRAVITY,UA: 1.02
SL AMB POCT URINE PROTEIN: NEGATIVE
SL AMB POCT UROBILINOGEN: 0.2

## 2024-12-02 PROCEDURE — 87086 URINE CULTURE/COLONY COUNT: CPT | Performed by: PHYSICIAN ASSISTANT

## 2024-12-02 PROCEDURE — 81002 URINALYSIS NONAUTO W/O SCOPE: CPT | Performed by: PHYSICIAN ASSISTANT

## 2024-12-02 PROCEDURE — G0382 LEV 3 HOSP TYPE B ED VISIT: HCPCS | Performed by: PHYSICIAN ASSISTANT

## 2024-12-02 PROCEDURE — S9083 URGENT CARE CENTER GLOBAL: HCPCS | Performed by: PHYSICIAN ASSISTANT

## 2024-12-02 PROCEDURE — 87591 N.GONORRHOEAE DNA AMP PROB: CPT | Performed by: PHYSICIAN ASSISTANT

## 2024-12-02 PROCEDURE — 87491 CHLMYD TRACH DNA AMP PROBE: CPT | Performed by: PHYSICIAN ASSISTANT

## 2024-12-02 PROCEDURE — 96372 THER/PROPH/DIAG INJ SC/IM: CPT | Performed by: PHYSICIAN ASSISTANT

## 2024-12-02 RX ORDER — DOXYCYCLINE 100 MG/1
100 CAPSULE ORAL 2 TIMES DAILY
Qty: 1 CAPSULE | Refills: 0 | Status: SHIPPED | OUTPATIENT
Start: 2024-12-02 | End: 2024-12-02

## 2024-12-02 RX ORDER — CEFTRIAXONE SODIUM 250 MG/1
500 INJECTION, POWDER, FOR SOLUTION INTRAMUSCULAR; INTRAVENOUS ONCE
Status: COMPLETED | OUTPATIENT
Start: 2024-12-02 | End: 2024-12-02

## 2024-12-02 RX ORDER — FLUCONAZOLE 150 MG/1
TABLET ORAL
Qty: 2 TABLET | Refills: 0 | Status: SHIPPED | OUTPATIENT
Start: 2024-12-02 | End: 2024-12-05

## 2024-12-02 RX ORDER — DOXYCYCLINE 100 MG/1
CAPSULE ORAL
Qty: 1 CAPSULE | Refills: 0 | Status: SHIPPED | OUTPATIENT
Start: 2024-12-02 | End: 2024-12-09

## 2024-12-02 RX ORDER — DOXYCYCLINE HYCLATE 100 MG/1
100 TABLET, DELAYED RELEASE ORAL 2 TIMES DAILY
Qty: 14 TABLET | Refills: 0 | Status: SHIPPED | OUTPATIENT
Start: 2024-12-02 | End: 2024-12-02

## 2024-12-02 RX ADMIN — CEFTRIAXONE SODIUM 500 MG: 250 INJECTION, POWDER, FOR SOLUTION INTRAMUSCULAR; INTRAVENOUS at 16:05

## 2024-12-02 NOTE — PATIENT INSTRUCTIONS
New Medications Ordered This Visit   Medications    cefTRIAXone (ROCEPHIN) injection 500 mg     Indication::   other     Specify indication::   Exposure to STD    fluconazole (DIFLUCAN) 150 mg tablet     Sig: Take 1 tablet by mouth once. Repeat dose in 72 hours if needed.     Dispense:  2 tablet     Refill:  0    doxycycline (DORYX) 100 MG EC tablet     Sig: Take 1 tablet (100 mg total) by mouth 2 (two) times a day for 7 days     Dispense:  14 tablet     Refill:  0       Do not have sex until both you and your partner are fully treated and symptom-free    If symptoms continue after treatment, please see your OBGYN

## 2024-12-02 NOTE — PROGRESS NOTES
[facility]        NAME: Latisha Kelly is a 24 y.o. female  : 2000    MRN: [id number]  DATE: 2024  TIME: 4:29 PM    Assessment and Plan   Dysuria [R30.0]  1. Dysuria  Urine culture    POCT urine dip    Chlamydia/GC amplified DNA by PCR    Urine culture    cefTRIAXone (ROCEPHIN) injection 500 mg    fluconazole (DIFLUCAN) 150 mg tablet    DISCONTINUED: doxycycline (DORYX) 100 MG EC tablet      2. STD exposure  fluconazole (DIFLUCAN) 150 mg tablet    DISCONTINUED: doxycycline (DORYX) 100 MG EC tablet      3. Acute vaginitis  fluconazole (DIFLUCAN) 150 mg tablet    DISCONTINUED: doxycycline (DORYX) 100 MG EC tablet        Results for orders placed or performed in visit on 24   POCT urine dip    Collection Time: 24  3:46 PM   Result Value Ref Range    LEUKOCYTE ESTERASE,UA Trace     NITRITE,UA Negative     SL AMB POCT UROBILINOGEN 0.2     POCT URINE PROTEIN Negative      PH,UA 5.0     BLOOD,UA Negative     SPECIFIC GRAVITY,UA 1.025     KETONES,UA Negative     BILIRUBIN,UA Negative     GLUCOSE, UA Negative      COLOR,UA Yellow     CLARITY,UA Cloudy          Patient Instructions     Patient Instructions     New Medications Ordered This Visit   Medications    cefTRIAXone (ROCEPHIN) injection 500 mg     Indication::   other     Specify indication::   Exposure to STD    fluconazole (DIFLUCAN) 150 mg tablet     Sig: Take 1 tablet by mouth once. Repeat dose in 72 hours if needed.     Dispense:  2 tablet     Refill:  0    doxycycline (DORYX) 100 MG EC tablet     Sig: Take 1 tablet (100 mg total) by mouth 2 (two) times a day for 7 days     Dispense:  14 tablet     Refill:  0       Do not have sex until both you and your partner are fully treated and symptom-free    If symptoms continue after treatment, please see your OBGYN      Chief Complaint     Chief Complaint   Patient presents with    Possible UTI     Pt states 2 weeks ago she developed itching and irritation in the vaginal area.  Pt having slight burning with urination. Pt partner tested positive for chlamydia.          History of Present Illness       HPI  She presents with concern for STD exposure-  tested positive this week for Chlamydia.  She has been having dysuria for about a week- external burning when urine touches skin- clear vaginal discharge, and pelvic pain  She started 11/12/24 with vaginal itching which began one day after intercourse.  Thought she might have a yeast infection  Most recent intercourse with  was last week, burning and pain after.   No fever, chills, nausea, vomiting, rash, redness, sores, flank pain, urinary urgency, frequency   She has never had an STD previously. No other partners.   Her daughter is 6 months old. STD testing during her pregnancy was negative  No recent antibiotics, new soaps, detergents, bubble baths, etc.  No home treatment    Review of Systems   Review of Systems  As per [facility]    Current Medications       Current Outpatient Medications:     fluconazole (DIFLUCAN) 150 mg tablet, Take 1 tablet by mouth once. Repeat dose in 72 hours if needed., Disp: 2 tablet, Rfl: 0    doxycycline monohydrate (MONODOX) 100 mg capsule, Take 1 capsule (100 mg total) by mouth 2 (two) times a day for 7 days, Disp: 1 capsule, Rfl: 0    methimazole (TAPAZOLE) 5 mg tablet, Take 2 tablets (10 mg total) by mouth in the morning (Patient not taking: Reported on 12/2/2024), Disp: 180 tablet, Rfl: 0  No current facility-administered medications for this visit.    Current Allergies     Allergies as of 12/02/2024 - Reviewed 12/02/2024   Allergen Reaction Noted    Latex Rash 05/18/2022            The following portions of the patient's history were reviewed and updated as appropriate: allergies, current medications, past family history, past medical history, past social history, past surgical history and problem list.     Past Medical History:   Diagnosis Date    Anemia     Chronic hypertension with super imposed  preeclampsia without severe features 2024    Hypertension     chtn started medication with pregnancy    Hypothyroid     Insulin controlled gestational diabetes mellitus (GDM) in third trimester 2024    Migraine without aura     OTC med management    PCOS (polycystic ovarian syndrome)     Status post primary low transverse  section 12/15/2023    Varicella     vaccine    Visual impairment        Past Surgical History:   Procedure Laterality Date    NE CERCLAGE UTERINE CERVIX NONOBSTETRICAL N/A 2024    Procedure: CERCLAGE CERVICAL;  Surgeon: Ann Hoffmann MD;  [facility];  Service: Obstetrics    NE  DELIVERY ONLY N/A 2024    Procedure:  SECTION ();  Surgeon: Lizzette Hernandez DO;  [facility];  Service: Obstetrics    NE REMOVAL CERCLAGE SUTURE UNDER ANESTHESIA N/A 2024    Procedure: CERCLAGE REMOVAL;  Surgeon: Lizzette Hernandez DO;  [facility];  Service: Obstetrics    TURBINATE RESECTION         Family History   Problem Relation Age of Onset    No Known Problems Mother     Hypertension Father     No Known Problems Sister     No Known Problems Brother     No Known Problems Brother     No Known Problems Brother     Colon cancer Neg Hx     Ovarian cancer Neg Hx     Breast cancer Neg Hx     Cancer Neg Hx          Medications have been verified.        Objective   /56   Pulse 81   Temp 98.6 °F (37 °C)   Resp 18   Wt 111 kg (244 lb)   SpO2 96%   BMI 39.38 kg/m²        Physical Exam     Physical Exam  Vitals and nursing note reviewed.   Constitutional:       General: She is not in acute distress.     Appearance: Normal appearance. She is not ill-appearing or toxic-appearing.   HENT:      Head: Normocephalic and atraumatic.      Nose: Nose normal.      Mouth/Throat:      Mouth: Mucous membranes are moist.   Eyes:      Extraocular Movements: Extraocular movements intact.      Pupils: Pupils are equal, round, and reactive to light.    Pulmonary:      Effort: Pulmonary effort is normal. No respiratory distress.   Genitourinary:     Comments: Deferred at this time  Musculoskeletal:         General: Normal range of motion.   Skin:     General: Skin is warm and dry.      Capillary Refill: Capillary refill takes less than 2 seconds.   Neurological:      Mental Status: She is alert.   Psychiatric:         Mood and Affect: Mood normal.         Behavior: Behavior normal.

## 2024-12-03 LAB
C TRACH DNA SPEC QL NAA+PROBE: NEGATIVE
N GONORRHOEA DNA SPEC QL NAA+PROBE: NEGATIVE

## 2024-12-04 LAB
BACTERIA UR CULT: ABNORMAL
BACTERIA UR CULT: ABNORMAL

## 2025-01-10 ENCOUNTER — ANNUAL EXAM (OUTPATIENT)
Dept: OBGYN CLINIC | Facility: CLINIC | Age: 25
End: 2025-01-10
Payer: COMMERCIAL

## 2025-01-10 VITALS
HEIGHT: 66 IN | DIASTOLIC BLOOD PRESSURE: 66 MMHG | WEIGHT: 235 LBS | SYSTOLIC BLOOD PRESSURE: 124 MMHG | BODY MASS INDEX: 37.77 KG/M2

## 2025-01-10 DIAGNOSIS — Z12.4 ENCOUNTER FOR SCREENING FOR MALIGNANT NEOPLASM OF CERVIX: ICD-10-CM

## 2025-01-10 DIAGNOSIS — Z01.419 WELL WOMAN EXAM WITH ROUTINE GYNECOLOGICAL EXAM: Primary | ICD-10-CM

## 2025-01-10 DIAGNOSIS — Z11.51 SCREENING FOR HPV (HUMAN PAPILLOMAVIRUS): ICD-10-CM

## 2025-01-10 DIAGNOSIS — N93.9 ABNORMAL UTERINE BLEEDING (AUB): ICD-10-CM

## 2025-01-10 DIAGNOSIS — Z11.3 SCREENING FOR STD (SEXUALLY TRANSMITTED DISEASE): ICD-10-CM

## 2025-01-10 PROCEDURE — 87591 N.GONORRHOEAE DNA AMP PROB: CPT | Performed by: OBSTETRICS & GYNECOLOGY

## 2025-01-10 PROCEDURE — S0612 ANNUAL GYNECOLOGICAL EXAMINA: HCPCS | Performed by: OBSTETRICS & GYNECOLOGY

## 2025-01-10 PROCEDURE — 87491 CHLMYD TRACH DNA AMP PROBE: CPT | Performed by: OBSTETRICS & GYNECOLOGY

## 2025-01-10 PROCEDURE — G0145 SCR C/V CYTO,THINLAYER,RESCR: HCPCS | Performed by: OBSTETRICS & GYNECOLOGY

## 2025-01-10 NOTE — PROGRESS NOTES
ASSESSMENT & PLAN:   Diagnoses and all orders for this visit:    Well woman exam with routine gynecological exam  -     Liquid-based pap, screening    Encounter for screening for malignant neoplasm of cervix  -     Liquid-based pap, screening    Screening for HPV (human papillomavirus)  -     Liquid-based pap, screening    Screening for STD (sexually transmitted disease)    Abnormal uterine bleeding (AUB)  -     US pelvis complete w transvaginal; Future          The following were reviewed in today's visit: ASCCP guidelines, Gardisil vaccination, STD testing breast self exam, use and side effects of OCPs, family planning choices, and exercise.    Patient to return to office in yearly for annual exam.     All questions have been answered to her satisfaction.        CC:  Annual Gynecologic Examination  Chief Complaint   Patient presents with   • Gynecologic Exam     Pt is here for her yearly exam. Pap due.  Last pap 2023 ASCUS (repeat 1 yr)          HPI: Latisha Kelly is a 24 y.o.  who presents for annual gynecologic examination.  She has the following concerns:  none.       Health Maintenance:    Exercise: frequently  Breast exams/breast awareness: no    Past Medical History:   Diagnosis Date   • Anemia    • Chronic hypertension with super imposed preeclampsia without severe features 2024   • Hypertension     chtn started medication with pregnancy   • Hypothyroid    • Insulin controlled gestational diabetes mellitus (GDM) in third trimester 2024   • Migraine without aura     OTC med management   • PCOS (polycystic ovarian syndrome)    • Status post primary low transverse  section 12/15/2023   • Varicella     vaccine   • Visual impairment        Past Surgical History:   Procedure Laterality Date   • [place] CERCLAGE UTERINE CERVIX NONOBSTETRICAL N/A 2024    Procedure: CERCLAGE CERVICAL;  Surgeon: Ann Hoffmann MD;  [facility] ;  Service: Obstetrics   • [place]  DELIVERY  ONLY N/A 2024    Procedure:  SECTION ();  Surgeon: Lizzette Hernandez DO;  [facility];  Service: Obstetrics   • [place] REMOVAL CERCLAGE SUTURE UNDER ANESTHESIA N/A 2024    Procedure: CERCLAGE REMOVAL;  Surgeon: Lizzette Hernandez DO;  [facility];  Service: Obstetrics   • TURBINATE RESECTION         Past OB/Gyn History:   Patient's last menstrual period was 2024 (exact date).  Last two periods were very heavy.     Last Pap: 2023 : atypical squamous cellularity of undetermined significance (ASCUS)  History of abnormal Pap smear: yes  HPV vaccine completed: yes    Patient is not currently sexually active.   STD testing: no  Current contraception:none      Family History  Family History   Problem Relation Age of Onset   • No Known Problems Mother    • Hypertension Father    • No Known Problems Sister    • No Known Problems Brother    • No Known Problems Brother    • No Known Problems Brother    • Colon cancer Neg Hx    • Ovarian cancer Neg Hx    • Breast cancer Neg Hx    • Cancer Neg Hx        Family history of uterine or ovarian cancer: no  Family history of breast cancer: no  Family history of colon cancer: no    Social History:  Social History     Socioeconomic History   • Marital status: Single     Spouse name: Not on file   • Number of children: Not on file   • Years of education: Not on file   • Highest education level: Not on file   Occupational History   • Not on file   Tobacco Use   • Smoking status: Never   • Smokeless tobacco: Never   Vaping Use   • Vaping status: Former   • Quit date: 2023   • Substances: Nicotine, Flavoring   Substance and Sexual Activity   • Alcohol use: Yes     Alcohol/week: 7.0 standard drinks of alcohol     Types: 7 Glasses of wine per week     Comment: Socially   • Drug use: Not Currently     Types: Marijuana     Comment: last used THC over 2 years ago (as of 24)   • Sexual activity: Not Currently     Partners: Male     Birth  control/protection: None, Coitus interruptus   Other Topics Concern   • Not on file   Social History Narrative   • Not on file     Social Drivers of Health     Financial Resource Strain: Low Risk  (6/17/2022)    Overall Financial Resource Strain (CARDIA)    • Difficulty of Paying Living Expenses: Not hard at all   Food Insecurity: No Food Insecurity (2/27/2024)    Nursing - Inadequate Food Risk Classification    • Worried About Running Out of Food in the Last Year: Never true    • Ran Out of Food in the Last Year: Never true    • Ran Out of Food in the Last Year: Not on file   Transportation Needs: No Transportation Needs (2/27/2024)    PRAPARE - Transportation    • Lack of Transportation (Medical): No    • Lack of Transportation (Non-Medical): No   Physical Activity: Not on file   Stress: No Stress Concern Present (6/17/2022)    [place] [employer] - Occupational Stress Questionnaire    • Feeling of Stress : Not at all   Social Connections: Not on file   Intimate Partner Violence: Not on file   Housing Stability: Low Risk  (2/27/2024)    Housing Stability Vital Sign    • Unable to Pay for Housing in the Last Year: No    • Number of Times Moved in the Last Year: 1    • Homeless in the Last Year: No     Domestic violence screen: negative    Allergies:  Allergies   Allergen Reactions   • Latex Rash       Medications:    Current Outpatient Medications:   •  methimazole (TAPAZOLE) 5 mg tablet, Take 2 tablets (10 mg total) by mouth in the morning (Patient not taking: Reported on 1/10/2025), Disp: 180 tablet, Rfl: 0    Review of Systems:  Review of Systems   Constitutional:  Negative for chills and fever.   Respiratory:  Negative for shortness of breath.    Cardiovascular:  Negative for chest pain.   Gastrointestinal:  Negative for abdominal distention, abdominal pain, blood in stool, constipation, nausea and vomiting.   Genitourinary:  Negative for difficulty urinating, dyspareunia, dysuria,  "frequency, menstrual problem, pelvic pain, urgency, vaginal bleeding, vaginal discharge and vaginal pain.   Neurological:  Negative for headaches.         Physical Exam:  /66 (BP Location: Right arm, Patient Position: Sitting, Cuff Size: Large)   Ht 5' 6\" (1.676 m)   Wt 107 kg (235 lb)   LMP 12/22/2024 (Exact Date)   BMI 37.93 kg/m²    Physical Exam  Constitutional:       General: She is awake.      Appearance: Normal appearance. She is well-developed.   Genitourinary:      Vulva, bladder and urethral meatus normal.      Right Labia: No rash, tenderness or lesions.     Left Labia: No tenderness, lesions or rash.     No labial fusion noted.      No vaginal discharge, erythema, tenderness or bleeding.      No vaginal prolapse present.     No vaginal atrophy present.       Right Adnexa: not tender, not full and no mass present.     Left Adnexa: not tender, not full and no mass present.     No cervical motion tenderness, discharge, lesion or polyp.      Uterus is not enlarged, tender or irregular.      No uterine mass detected.     No urethral prolapse present.      Bladder is not tender.       Pelvic exam was performed with patient in the lithotomy position.   Breasts:     Right: No inverted nipple, mass, nipple discharge, skin change or tenderness.      Left: No inverted nipple, mass, nipple discharge, skin change or tenderness.   HENT:      Head: Normocephalic and atraumatic.   Cardiovascular:      Rate and Rhythm: Normal rate and regular rhythm.      Heart sounds: Normal heart sounds.   Pulmonary:      Effort: Pulmonary effort is normal. No tachypnea or respiratory distress.      Breath sounds: Normal breath sounds.   Abdominal:      General: Abdomen is flat. There is no distension.      Palpations: Abdomen is soft.      Tenderness: There is no abdominal tenderness. There is no guarding or rebound.   Musculoskeletal:      Cervical back: Neck supple.   Lymphadenopathy:      Upper Body:      Right upper " body: No supraclavicular or axillary adenopathy.      Left upper body: No supraclavicular or axillary adenopathy.   Neurological:      General: No focal deficit present.      Mental Status: She is alert and oriented to person, place, and time.   Psychiatric:         Mood and Affect: Mood normal.         Behavior: Behavior normal.         Thought Content: Thought content normal.         Judgment: Judgment normal.   Vitals reviewed.

## 2025-01-13 ENCOUNTER — OFFICE VISIT (OUTPATIENT)
Dept: BARIATRICS | Facility: CLINIC | Age: 25
End: 2025-01-13
Payer: COMMERCIAL

## 2025-01-13 VITALS
HEIGHT: 67 IN | SYSTOLIC BLOOD PRESSURE: 122 MMHG | DIASTOLIC BLOOD PRESSURE: 68 MMHG | WEIGHT: 235.6 LBS | HEART RATE: 88 BPM | BODY MASS INDEX: 36.98 KG/M2 | OXYGEN SATURATION: 99 %

## 2025-01-13 DIAGNOSIS — E66.01 CLASS 2 SEVERE OBESITY DUE TO EXCESS CALORIES WITH SERIOUS COMORBIDITY AND BODY MASS INDEX (BMI) OF 37.0 TO 37.9 IN ADULT (HCC): Primary | ICD-10-CM

## 2025-01-13 DIAGNOSIS — E66.812 CLASS 2 SEVERE OBESITY DUE TO EXCESS CALORIES WITH SERIOUS COMORBIDITY AND BODY MASS INDEX (BMI) OF 37.0 TO 37.9 IN ADULT (HCC): Primary | ICD-10-CM

## 2025-01-13 PROCEDURE — 99204 OFFICE O/P NEW MOD 45 MIN: CPT | Performed by: INTERNAL MEDICINE

## 2025-01-13 RX ORDER — TOPIRAMATE 25 MG/1
TABLET, FILM COATED ORAL
Qty: 60 TABLET | Refills: 3 | Status: SHIPPED | OUTPATIENT
Start: 2025-01-13

## 2025-01-13 NOTE — ASSESSMENT & PLAN NOTE
Nutrition: Patient reports she lifts heavy weights.  Discussed meeting with a dietitian to ensure adequate protein intake to help muscle building and calorie deficit to ensure weight loss     Physical Activity: Patient reports she goes to the gym continued incline treadmill walks and lifting heavy weights    Sleep: -STOP-BANG 1/8 6 hours of sleep at night due to infant    Behavioral/Stress: Patient currently undergoing a lot of stress due to impending divorce.  She is not under the care of a counselor which I discussed and suggested.  Patient will consider this.  Discussed stress hormone cortisol as a contributor to weight and insufficient response to weight loss efforts    Antiobesity Medications/Medical --class II obesity BMI of 37.4.  Comorbidities of hyperlipidemia.  Patient reports that she was recently evaluated for hyperthyroidism and was prescribed Tapazole but she did not take this.  Repeat labs per her report were normal limits.  She is going to follow-up with the endocrinologist soon.  Discussed with patient that phentermine is contraindicated with any history of hyper thyroidism and therefore would not consider either phentermine or Qsymia..  Will await visit with endocrinology to consider this agent  -Requested patient to inquire with her insurance regarding coverage for injectable GLP-1 medication such as Wegovy and Zepbound  -Will submit a prescription for Topamax  -No contraindications noted to Wellbutrin naltrexone or metformin

## 2025-01-13 NOTE — PROGRESS NOTES
Assessment/Plan     Latisha Kelly is 24 y.o. year old female  who comes in for consultation for assistance with weight management.     - Discussed options of Healthy[facility]-Intensive Lifestyle Intervention Program, Very Low Calorie Diet-VLCD, and Conservative Program and the role of weight loss medications.  - Patient is interested in pursuing Conservative Program    Class 2 severe obesity due to excess calories with serious comorbidity and body mass index (BMI) of 37.0 to 37.9 in adult (HCC)  Nutrition: Patient reports she lifts heavy weights.  Discussed meeting with a dietitian to ensure adequate protein intake to help muscle building and calorie deficit to ensure weight loss     Physical Activity: Patient reports she goes to the gym continued incline treadmill walks and lifting heavy weights    Sleep: -STOP-BANG 1/8 6 hours of sleep at night due to infant    Behavioral/Stress: Patient currently undergoing a lot of stress due to impending divorce.  She is not under the care of a counselor which I discussed and suggested.  Patient will consider this.  Discussed stress hormone cortisol as a contributor to weight and insufficient response to weight loss efforts    Antiobesity Medications/Medical --class II obesity BMI of 37.4.  Comorbidities of hyperlipidemia.  Patient reports that she was recently evaluated for hyperthyroidism and was prescribed Tapazole but she did not take this.  Repeat labs per her report were normal limits.  She is going to follow-up with the endocrinologist soon.  Discussed with patient that phentermine is contraindicated with any history of hyper thyroidism and therefore would not consider either phentermine or Qsymia..  Will await visit with endocrinology to consider this agent  -Requested patient to inquire with her insurance regarding coverage for injectable GLP-1 medication such as Wegovy and Zepbound  -Will submit a prescription for Topamax  -No contraindications noted to Wellbutrin  "naltrexone or metformin    Latisha was seen today for consult.    Diagnoses and all orders for this visit:    Class 2 severe obesity due to excess calories with serious comorbidity and body mass index (BMI) of 37.0 to 37.9 in adult (HCC)  -     topiramate (Topamax) 25 mg tablet; Start with 1 tablet 20 minutes before dinner meal and after 1 week increase to 1 tablet twice a day before meals          -In addition, please follow general recommendations below.          Return visit:  6-8 weeks        General Lifestyle recommendations:    Nutrition   -Avoid skipping meals. Avoid sugary beverages. At least 64oz of water daily.  Limit processed food, refined sugars and grain. Encourage  healthy choices for meals and snacks   -Focus on protein goals and non starchy fiber rich vegetables for satiety effect and to help support a calorie deficit.   - Emphasize portion control, well balanced macronutrient's (protein, carbohydrate, fat using MyPlate method )and adequate protein with each meal/snacks and distributing calories equally throughout the day along with.   -Advise starting the day with a protein breakfast   Behavioral/Stress   Food log via meka or provided paper log (meka options include www.Dream Kitchenpal.com, sparkpeople.com, loseit.com, calorieking.com, Countdown To Buy). Encouraged mindful eating. Be sure to set aside time to eat, eat slowly, and savor your food. Consider meditation apps and/or taking a few minutes of mindfulness every AM. Understand the role of regarding the role of stress hormone cortisol in promoting weight gain and visceral fat accumulation. Weigh daily or atleast 2-3 times/ week  Physical Activity   Increase physical activity by 10 minutes daily. Gradually increase physical activity to a goal of 5 days per week for 30 minutes of MODERATE intensity ( should be able to pass the \"talk test\" but should not be able to sing. Target 150-300 minutes  PLUS 2 days per week of FULL BODY resistance training. " Progression will be addressed at follow up visits. Encouraged contemplation regarding establishing a daily physical activity routine  - Resistance training along with increase protein intake is important to maintain and enhance metabolism  Sleep   Encourage sleep hygiene and importance of having adequate sleep duration at least > 6 hours to support response in weight loss efforts    Handouts provided :  THRIVE program at [facility] center  MyPlate and food quality  Food log resources, phone meka or paper journal  Antiobesity medications options     - Discussed at length and the role of weight loss medications and medication options   - Explained the importance of making lifestyle changes in addition to starting any anti-obesity medications if the  patient chooses.  -  Initial weight loss goal of 5-10% weight loss for improved health  - Weight loss can improve patient's co-morbid conditions and/or prevent weight-related complications.  - Weight is not at goal and patient has been unable to achieve a meaningful weight loss above 5% using various programs and tools for more than 6 months    Latisha was seen today for consult.    Diagnoses and all orders for this visit:    Class 2 severe obesity due to excess calories with serious comorbidity and body mass index (BMI) of 37.0 to 37.9 in adult (HCC)  -     topiramate (Topamax) 25 mg tablet; Start with 1 tablet 20 minutes before dinner meal and after 1 week increase to 1 tablet twice a day before meals          Topiramate Instructions:  -Patient was counseled that the medication is associated with cleft palate if used during the first trimester of pregnancy and therefore was instructed to use birth control during the period of her use of this medication.  -May reduce the effectiveness of hormonal birth control - backup method such as condoms recommended to avoid pregnancy.  -Upon discontinuation tapering dose over using every other day dosing is recommended.  - patient  to maintain adequate fluid intake to minimize risk of kidney stones  Advise patient against sudden discontinuation, as this may cause increased seizure activity  Instruct female patient to use an additional form of contraception due to decreased effectiveness of estrogen-containing or progestin-only contraceptives  - report symptoms of acute myopia, sudden ocular pain, blurred vision, or decreased visual acuity.  -Advise patient to report new or worsening depression, suicidal thoughts or behavior, or unusual changes in mood or behavior    Reviewed the potential side effects of topiramate (Topamax) which may include - numbness or tingling, difficulty with word finding, metabolic acidosis, occurrence of gallstones and kidney stones, glaucoma, fatigue, worsening depression/anxiety, changes in taste, abdominal upset/heartburn, and trouble sleeping. Side effects may include anorexia, taste perversion, fatigue, paresthesia, psychomotor slowing, memory or concentration difficulties, cognitive dysfunction, mood problems, and flushing.             Total time spent reviewing chart, interviewing patient, examining patient, discussing plan, answering all questions, and documentin min, with >50% face-to-face time spent counseling patient on nonsurgical interventions for the treatment of excess weight. Discussed in detail nonsurgical options including intensive lifestyle intervention program, very low-calorie diet program and conservative program.  Discussed the role of weight loss medications.  Counseled patient on diet behavior and exercise modification for weight loss.        History of present illness/ Weight history   Patient states that she has tried phentermine in the past and lost 40 lbs.  However she experienced side effects and could not sleep.  She states that when she is consistent with her nutrition and physical activity she is able to lose weight.  She regained weight after her phentermine attempt and just with  lifestyle efforts, she succeeded in losing from 250 pounds to 210 pounds.  Her highest adult weight is 260 pounds.  She started going back to the gym and watching what she eats again and is down to her current weight of 235 pounds.  She is referred by PCP.  She does report a family history of obesity and her mom    Wt Readings from Last 30 Encounters:   01/13/25 107 kg (235 lb 9.6 oz)   01/10/25 107 kg (235 lb)   12/02/24 111 kg (244 lb)   11/13/24 108 kg (239 lb)   10/09/24 112 kg (248 lb)   10/07/24 114 kg (252 lb)   10/04/24 115 kg (253 lb 8.5 oz)   09/30/24 120 kg (264 lb)   08/19/24 120 kg (264 lb)   08/15/24 118 kg (261 lb)   07/03/24 118 kg (260 lb)   06/10/24 115 kg (253 lb)   05/28/24 115 kg (253 lb)   05/22/24 118 kg (260 lb)   05/20/24 124 kg (274 lb)   05/15/24 124 kg (274 lb)   05/14/24 125 kg (275 lb)   05/07/24 126 kg (277 lb 12.8 oz)   05/07/24 126 kg (277 lb 12.8 oz)   05/06/24 128 kg (281 lb 3.2 oz)   05/03/24 127 kg (279 lb 3.2 oz)   05/02/24 127 kg (280 lb 6.4 oz)   05/01/24 126 kg (278 lb 6.4 oz)   04/30/24 125 kg (275 lb 12.8 oz)   04/29/24 126 kg (277 lb)   04/26/24 125 kg (276 lb 6.4 oz)   04/25/24 125 kg (275 lb 9.6 oz)   04/23/24 125 kg (276 lb 3.2 oz)   04/23/24 125 kg (275 lb)   04/17/24 125 kg (276 lb 3.2 oz)               Lifestyle questionnaire       Diet recall:  B: skips trying to eat eggs and sausage  S:  L: canned Soup  S:pretzels  D: Rice and chicken veggies  S: none    Frequency Eating out x/ week: Once a month    Food behaviors- none only during periods    Trouble area of the day: none    Beverages  Water-- 80 oz   Caffeine/tea--  12 oz   SSB --zero calorie crystal lite     Alcohol: 1 drinks/ week   Drug use: none    Social History     Substance and Sexual Activity   Alcohol Use Yes    Alcohol/week: 7.0 standard drinks of alcohol    Types: 7 Glasses of wine per week    Comment: Socially      Social History     Tobacco Use   Smoking Status Never   Smokeless Tobacco Never       Social History     Substance and Sexual Activity   Drug Use Not Currently    Types: Marijuana    Comment: last used THC over 2 years ago (as of 11/29/23)         Physical Activity --gym since sept cardio incline treadmill lifts heavy    Sleep -- Stop bang: Score: 1 / 8  ; 6 hours of sleep per night    Occupation-SAHM    Psycho social- lives with      Gyneac (Menopausal status/menses/contraception)-going through a divorce      Start date: 1/13/2025   Intial weight on 1/13/2025 : 235 lbs  Body mass index is 37.46 kg/m². on 1/13/2025   Obesity Class: 35.0-39.9- Obesity Class II  Goal weight: 200 lbs    Waist circumference (inches): 44 Inches        Medication considerations/contraindications     -Patient denies personal history of pancreatitis. Patient also denies personal and family history of medullary thyroid cancer, and multiple endocrine neoplasia type 2 (MEN 2 tumor). -Patient denies any history of kidney stones, seizures, or glaucoma, diabetic retinopathy, gall bladder disease, gastroparesis, hyperthyroidism.  -Denies Hx of CAD, PAD, palpitations, arrhythmia, uncontrolled hypertension  -Denies uncontrolled anxiety or depression, suicidal behavior or thinking , insomnia or sleep disturbance.         Past medical history/past surgical history       Previous notes and records have been reviewed.    The following portions of the patient's history were reviewed and updated as appropriate: allergies, current medications, past family history, past medical history, past social history, past surgical history, and problem list.    Past Medical History:   Diagnosis Date    Anemia     Chronic hypertension with super imposed preeclampsia without severe features 02/05/2024    Hypertension     chtn started medication with pregnancy    Hypothyroid     Insulin controlled gestational diabetes mellitus (GDM) in third trimester 04/23/2024    Migraine without aura     OTC med management    PCOS (polycystic ovarian syndrome)  "    Status post primary low transverse  section 12/15/2023    Varicella     vaccine    Visual impairment          Past Surgical History:   Procedure Laterality Date    [place] CERCLAGE UTERINE CERVIX NONOBSTETRICAL N/A 2024    Procedure: CERCLAGE CERVICAL;  Surgeon: Ann Hoffmann MD;  [facility];  Service: Obstetrics    AZ  DELIVERY ONLY N/A 2024    Procedure:  SECTION ();  Surgeon: Lizzette Hernandez DO;  [facility];  Service: Obstetrics    [place] REMOVAL CERCLAGE SUTURE UNDER ANESTHESIA N/A 2024    Procedure: CERCLAGE REMOVAL;  Surgeon: Lizzette Hernandez DO;  [facility];  Service: Obstetrics    TURBINATE RESECTION               Family History   Problem Relation Age of Onset    No Known Problems Mother     Hypertension Father     No Known Problems Sister     No Known Problems Brother     No Known Problems Brother     No Known Problems Brother     Colon cancer Neg Hx     Ovarian cancer Neg Hx     Breast cancer Neg Hx     Cancer Neg Hx             Objective     /68   Pulse 88   Ht 5' 6.5\" (1.689 m)   Wt 107 kg (235 lb 9.6 oz)   LMP 2024 (Exact Date)   SpO2 99%   BMI 37.46 kg/m²     Review of Systems   Constitutional:  Negative for chills, fatigue and fever.   HENT:  Negative for ear pain and sore throat.    Eyes:  Negative for pain and visual disturbance.   Respiratory:  Negative for cough and shortness of breath.    Cardiovascular:  Negative for chest pain, palpitations and leg swelling.   Gastrointestinal:  Negative for abdominal pain, constipation, diarrhea, nausea and vomiting.   Genitourinary:  Negative for dysuria and hematuria.   Musculoskeletal:  Negative for arthralgias and back pain.   Skin:  Negative for color change and rash.   Neurological:  Negative for seizures, syncope and headaches.   Psychiatric/Behavioral:  Negative for dysphoric mood. The patient is not nervous/anxious.    All other systems reviewed and are " negative.    Physical Exam  Vitals and nursing note reviewed.   Constitutional:       Appearance: Normal appearance.   HENT:      Head: Normocephalic.   Pulmonary:      Effort: Pulmonary effort is normal.   Neurological:      General: No focal deficit present.      Mental Status: He is alert and oriented to person, place, and time.   Psychiatric:         Mood and Affect: Mood normal.         Behavior: Behavior normal.         Thought Content: Thought content normal.         Judgment: Judgment normal.         Medications       Current Outpatient Medications:     topiramate (Topamax) 25 mg tablet, Start with 1 tablet 20 minutes before dinner meal and after 1 week increase to 1 tablet twice a day before meals, Disp: 60 tablet, Rfl: 3    methimazole (TAPAZOLE) 5 mg tablet, Take 2 tablets (10 mg total) by mouth in the morning (Patient not taking: Reported on 12/2/2024), Disp: 180 tablet, Rfl: 0           Labs and imaging     Recent labs and imaging have been personally reviewed.  Lab Results   Component Value Date    WBC 6.00 10/04/2024    HGB 12.0 10/04/2024    HCT 38.5 10/04/2024    MCV 86 10/04/2024     10/04/2024     Lab Results   Component Value Date    SODIUM 139 10/07/2024    K 3.8 10/07/2024     10/07/2024    CO2 26 10/07/2024    AGAP 9 10/07/2024    BUN 12 10/07/2024    CREATININE 0.68 10/07/2024    GLUC 109 10/07/2024    GLUF 98 08/02/2024    GLUF 100 (H) 08/02/2024    CALCIUM 10.3 (H) 10/07/2024    AST 25 10/07/2024    ALT 68 (H) 10/07/2024    ALKPHOS 71 10/07/2024    TP 7.7 10/07/2024    TBILI 0.42 10/07/2024    EGFR 123 10/07/2024     Lab Results   Component Value Date    HGBA1C 5.1 08/02/2024     Lab Results   Component Value Date    PEP4HDVUWOSU 0.224 (L) 11/11/2024    TSH 2.51 12/10/2020     Lab Results   Component Value Date    CHOLESTEROL 180 08/02/2024     Lab Results   Component Value Date    HDL 46 (L) 08/02/2024     Lab Results   Component Value Date    TRIG 160 (H) 08/02/2024     Lab  Results   Component Value Date    LDLCALC 102 (H) 08/02/2024

## 2025-01-14 ENCOUNTER — RESULTS FOLLOW-UP (OUTPATIENT)
Dept: OBGYN CLINIC | Facility: CLINIC | Age: 25
End: 2025-01-14

## 2025-01-14 LAB
C TRACH DNA SPEC QL NAA+PROBE: NEGATIVE
N GONORRHOEA DNA SPEC QL NAA+PROBE: NEGATIVE

## 2025-01-16 LAB
LAB AP GYN PRIMARY INTERPRETATION: NORMAL
Lab: NORMAL

## 2025-01-17 ENCOUNTER — APPOINTMENT (OUTPATIENT)
Dept: LAB | Facility: HOSPITAL | Age: 25
End: 2025-01-17
Payer: COMMERCIAL

## 2025-01-17 ENCOUNTER — RESULTS FOLLOW-UP (OUTPATIENT)
Dept: FAMILY MEDICINE CLINIC | Facility: CLINIC | Age: 25
End: 2025-01-17

## 2025-01-17 DIAGNOSIS — E05.90 HYPERTHYROIDISM: Primary | ICD-10-CM

## 2025-01-17 LAB
25(OH)D3 SERPL-MCNC: 11.6 NG/ML (ref 30–100)
ALBUMIN SERPL BCG-MCNC: 4.7 G/DL (ref 3.5–5)
ALP SERPL-CCNC: 68 U/L (ref 34–104)
ALT SERPL W P-5'-P-CCNC: 9 U/L (ref 7–52)
ANION GAP SERPL CALCULATED.3IONS-SCNC: 7 MMOL/L (ref 4–13)
AST SERPL W P-5'-P-CCNC: 14 U/L (ref 13–39)
BASOPHILS # BLD AUTO: 0.02 THOUSANDS/ΜL (ref 0–0.1)
BASOPHILS NFR BLD AUTO: 0 % (ref 0–1)
BILIRUB SERPL-MCNC: 0.36 MG/DL (ref 0.2–1)
BUN SERPL-MCNC: 14 MG/DL (ref 5–25)
CALCIUM SERPL-MCNC: 9.7 MG/DL (ref 8.4–10.2)
CHLORIDE SERPL-SCNC: 107 MMOL/L (ref 96–108)
CO2 SERPL-SCNC: 23 MMOL/L (ref 21–32)
CREAT SERPL-MCNC: 0.81 MG/DL (ref 0.6–1.3)
EOSINOPHIL # BLD AUTO: 0.18 THOUSAND/ΜL (ref 0–0.61)
EOSINOPHIL NFR BLD AUTO: 3 % (ref 0–6)
ERYTHROCYTE [DISTWIDTH] IN BLOOD BY AUTOMATED COUNT: 13.9 % (ref 11.6–15.1)
FERRITIN SERPL-MCNC: 25 NG/ML (ref 11–307)
GFR SERPL CREATININE-BSD FRML MDRD: 102 ML/MIN/1.73SQ M
GLUCOSE SERPL-MCNC: 87 MG/DL (ref 65–140)
HCT VFR BLD AUTO: 36.5 % (ref 34.8–46.1)
HGB BLD-MCNC: 11.4 G/DL (ref 11.5–15.4)
IMM GRANULOCYTES # BLD AUTO: 0.01 THOUSAND/UL (ref 0–0.2)
IMM GRANULOCYTES NFR BLD AUTO: 0 % (ref 0–2)
IRON SATN MFR SERPL: 20 % (ref 15–50)
IRON SERPL-MCNC: 79 UG/DL (ref 50–212)
LYMPHOCYTES # BLD AUTO: 2.38 THOUSANDS/ΜL (ref 0.6–4.47)
LYMPHOCYTES NFR BLD AUTO: 42 % (ref 14–44)
MCH RBC QN AUTO: 26.8 PG (ref 26.8–34.3)
MCHC RBC AUTO-ENTMCNC: 31.2 G/DL (ref 31.4–37.4)
MCV RBC AUTO: 86 FL (ref 82–98)
MONOCYTES # BLD AUTO: 0.43 THOUSAND/ΜL (ref 0.17–1.22)
MONOCYTES NFR BLD AUTO: 8 % (ref 4–12)
NEUTROPHILS # BLD AUTO: 2.66 THOUSANDS/ΜL (ref 1.85–7.62)
NEUTS SEG NFR BLD AUTO: 47 % (ref 43–75)
NRBC BLD AUTO-RTO: 0 /100 WBCS
PLATELET # BLD AUTO: 285 THOUSANDS/UL (ref 149–390)
PMV BLD AUTO: 9.9 FL (ref 8.9–12.7)
POTASSIUM SERPL-SCNC: 4 MMOL/L (ref 3.5–5.3)
PROT SERPL-MCNC: 7.5 G/DL (ref 6.4–8.4)
RBC # BLD AUTO: 4.25 MILLION/UL (ref 3.81–5.12)
SODIUM SERPL-SCNC: 137 MMOL/L (ref 135–147)
T3 SERPL-MCNC: 1.3 NG/ML (ref 0.9–1.8)
T4 FREE SERPL-MCNC: 0.5 NG/DL (ref 0.61–1.12)
T4 FREE SERPL-MCNC: 0.5 NG/DL (ref 0.61–1.12)
TIBC SERPL-MCNC: 404.6 UG/DL (ref 250–450)
TRANSFERRIN SERPL-MCNC: 289 MG/DL (ref 203–362)
TSH SERPL DL<=0.05 MIU/L-ACNC: 13.9 UIU/ML (ref 0.45–4.5)
UIBC SERPL-MCNC: 326 UG/DL (ref 155–355)
WBC # BLD AUTO: 5.68 THOUSAND/UL (ref 4.31–10.16)

## 2025-01-17 PROCEDURE — 84439 ASSAY OF FREE THYROXINE: CPT

## 2025-01-17 PROCEDURE — 84480 ASSAY TRIIODOTHYRONINE (T3): CPT

## 2025-01-19 NOTE — RESULT ENCOUNTER NOTE
Will need appt if not one on record   Vitamin D is very low   Needs to take supplements   TSH - now hypothyroid will need medications to adjust  Anemia improved

## 2025-01-25 DIAGNOSIS — E05.90 HYPERTHYROIDISM: Primary | ICD-10-CM

## 2025-01-27 ENCOUNTER — OFFICE VISIT (OUTPATIENT)
Dept: FAMILY MEDICINE CLINIC | Facility: CLINIC | Age: 25
End: 2025-01-27
Payer: COMMERCIAL

## 2025-01-27 VITALS
OXYGEN SATURATION: 95 % | TEMPERATURE: 99 F | HEIGHT: 66 IN | BODY MASS INDEX: 36.96 KG/M2 | WEIGHT: 230 LBS | HEART RATE: 78 BPM | DIASTOLIC BLOOD PRESSURE: 70 MMHG | SYSTOLIC BLOOD PRESSURE: 126 MMHG

## 2025-01-27 DIAGNOSIS — L85.3 DRY SKIN: ICD-10-CM

## 2025-01-27 DIAGNOSIS — E03.9 ACQUIRED HYPOTHYROIDISM: Primary | ICD-10-CM

## 2025-01-27 DIAGNOSIS — D50.8 OTHER IRON DEFICIENCY ANEMIA: ICD-10-CM

## 2025-01-27 DIAGNOSIS — E55.9 VITAMIN D DEFICIENCY: ICD-10-CM

## 2025-01-27 PROCEDURE — 99214 OFFICE O/P EST MOD 30 MIN: CPT | Performed by: NURSE PRACTITIONER

## 2025-01-27 RX ORDER — PETROLATUM 0.61 G/G
CREAM TOPICAL AS NEEDED
Qty: 100 G | Refills: 1 | Status: SHIPPED | OUTPATIENT
Start: 2025-01-27 | End: 2025-02-26

## 2025-01-27 RX ORDER — ERGOCALCIFEROL 1.25 MG/1
50000 CAPSULE, LIQUID FILLED ORAL WEEKLY
Qty: 12 CAPSULE | Refills: 0 | Status: SHIPPED | OUTPATIENT
Start: 2025-01-27 | End: 2025-04-27

## 2025-01-27 RX ORDER — LEVOTHYROXINE SODIUM 25 UG/1
25 TABLET ORAL
Qty: 90 TABLET | Refills: 0 | Status: SHIPPED | OUTPATIENT
Start: 2025-01-27 | End: 2025-02-05

## 2025-01-27 RX ORDER — HYDROCORTISONE 25 MG/G
OINTMENT TOPICAL 2 TIMES DAILY
Qty: 453 G | Refills: 1 | Status: SHIPPED | OUTPATIENT
Start: 2025-01-27 | End: 2025-02-06

## 2025-02-03 NOTE — PROGRESS NOTES
Name: Latisha Kelly      : 2000      MRN: [id number]  Encounter Provider: VALENTIN Parrish  Encounter Date: 2025   Encounter department: [facility]  :  Assessment & Plan  Acquired hypothyroidism  Discussed labs and starting medications   Discussed vitamin D supplements   Orders:    levothyroxine 25 mcg tablet; Take 1 tablet (25 mcg total) by mouth daily in the early morning    ergocalciferol (VITAMIN D2) 50,000 units; Take 1 capsule (50,000 Units total) by mouth once a week    CBC and differential    TSH, 3rd generation with Free T4 reflex    Vitamin D 25 hydroxy    Dry skin  Possibly related to hypothyroidism/ vs eczema    Orders:    hydrocortisone 2.5 % ointment; Apply topically 2 (two) times a day for 10 days    Skin Protectants, Misc. (eucerin) cream; Apply topically as needed for wound care    Vitamin D deficiency  Vitamin D supplemented        Other iron deficiency anemia  Continues follow up with hematology and iron supplementation   Improved CBC but ferritin still low   Continue supplements increase greens                Depression Screening and Follow-up Plan: Patient was screened for depression during today's encounter. They screened negative with a PHQ-2 score of 2.      History of Present Illness   Pt is a 24 yr old female   Presents in office for lab review - abnormal labs       Review of Systems   Constitutional:  Positive for fatigue. Negative for fever and unexpected weight change.   HENT:  Negative for congestion, postnasal drip and sore throat.    Eyes: Negative.    Respiratory:  Negative for cough and shortness of breath.    Cardiovascular:  Negative for chest pain and palpitations.        History of preeclampsia  Recent onset of thyroiditis    Gastrointestinal:  Negative for abdominal distention, blood in stool, nausea and vomiting.   Endocrine:        Elevated sugars in pregnancy  Hyperthyroid after pregnancy- not taking medications as prescribed  "  NOW HYPOTHYROID   We will need to restart thyroid medications used to be on them in the past   Vitamin  D supplementation needed    Genitourinary:  Negative for difficulty urinating and frequency.   Musculoskeletal:  Negative for arthralgias and neck pain.   Skin:  Negative for rash.   Allergic/Immunologic: Negative.    Neurological:  Negative for headaches.   Hematological:  Negative for adenopathy.        Anemia follow up    Psychiatric/Behavioral:  Negative for sleep disturbance and suicidal ideas. The patient is not nervous/anxious.        Objective   /70 (BP Location: Left arm, Patient Position: Sitting, Cuff Size: Large)   Pulse 78   Temp 99 °F (37.2 °C)   Ht 5' 6\" (1.676 m)   Wt 104 kg (230 lb)   LMP 12/16/2024 (Exact Date)   SpO2 95%   BMI 37.12 kg/m²      Physical Exam  Vitals and nursing note reviewed.   Constitutional:       Appearance: Normal appearance.      Comments: BMI 37.12   HENT:      Head: Atraumatic.   Eyes:      Extraocular Movements: Extraocular movements intact.   Cardiovascular:      Rate and Rhythm: Normal rate and regular rhythm.   Pulmonary:      Breath sounds: Normal breath sounds.   Abdominal:      Palpations: Abdomen is soft.   Musculoskeletal:      Cervical back: Normal range of motion.      Right lower leg: No edema.      Left lower leg: No edema.   Skin:     Capillary Refill: Capillary refill takes less than 2 seconds.   Neurological:      Mental Status: She is alert and oriented to person, place, and time.   Psychiatric:         Mood and Affect: Mood normal.         Behavior: Behavior normal.                     Component  Ref Range & Units (hover) 1/17/25  3:34 PM 10/4/24 10:00 PM 8/2/24 11:26 AM 5/16/24  1:14 PM 5/12/24  1:58 PM 5/8/24  6:44 AM 5/7/24 10:57 AM   WBC 5.68 6.00 5.15 6.03 9.01 16.30 High  9.69   RBC 4.25 4.47 4.45 3.45 Low  3.07 Low  3.05 Low  3.83   Hemoglobin 11.4 Low  12.0 11.7 9.4 Low  8.5 Low  8.7 Low  10.4 Low    Hematocrit 36.5 38.5 37.5 31.2 " Low  27.7 Low  27.5 Low  33.7 Low    MCV 86 86 84 90 90 90 88   MCH 26.8 26.8 26.3 Low  27.2 27.7 28.5 27.2   MCHC 31.2 Low  31.2 Low  31.2 Low  30.1 Low  30.7 Low  31.6 30.9 Low    RDW 13.9 11.9 14.0 14.1 14.3 14.0 14.3   MPV 9.9 9.3 9.7 8.9 8.9 9.1 9.1   Platelets 285 296 269 426 High  302 245 269   nRBC 0 0 0 0 0  0   Segmented % 47 41 Low  47 53 64  66   Immature Grans % 0 0 0 1 2  2   Lymphocytes % 42 42 41 30 22  20   Monocytes % 8 15 High  9 10 8  9   Eosinophils Relative 3 2 3 5 4  3   Basophils Relative 0 0 0 1 0  0   Absolute Neutrophils 2.66 2.47 2.41 3.21 5.75  6.38   Absolute Immature Grans 0.01 0.01 0.01 0.06 0.14  0.15   Absolute Lymphocytes 2.38 2.52 2.10 1.83 1.97  1.96   Absolute Monocytes 0.43 0.88 0.44 0.58 0.75  0.84   Eosinophils Absolute 0.18 0.11 0.17 0.32 0.37  0.32   Basophils Absolute 0.02 0.01 0.02 0.03 0.03  0.04             Specimen Collected: 01/17/25  3:34 PM               Component  Ref Range & Units ([facility]) 1/17/25  3:34 PM 10/7/24 12:24 PM 10/4/24 10:00 PM 8/2/24 11:26 AM 5/16/24  1:14 PM 5/12/24  1:58 PM 5/7/24 10:57 AM   Sodium 137 139 139 138 141 140 134 Low    Potassium 4.0 3.8 3.7 3.7 4.2 3.6 4.0   Chloride 107 104 105 104 108 108 105   CO2 23 26 25 25 25 24 21   ANION GAP 7 9 9 9 8 8 8   BUN 14 12 11 9 9 8 7   Creatinine 0.81 0.68 CM 0.64 CM 0.66 CM 0.66 CM 0.55 Low  CM 0.49 Low  CM   Comment: Standardized to IDMS reference method   Glucose 87 109  CM  80  CM 72 CM   Comment: If the patient is fasting, the ADA then defines impaired fasting glucose as > 100 mg/dL and diabetes as > or equal to 123 mg/dL.   Calcium 9.7 10.3 High  9.5 9.1 8.9 8.6 9.0   AST 14 25 40 High  14 10 Low  14 12 Low    ALT 9 68 High  CM 71 High  CM 17 CM 14 CM 30 CM 15 CM   Comment: Specimen collection should occur prior to Sulfasalazine administration due to the potential for falsely depressed results.   Alkaline Phosphatase 68 71 63 62 72 74 101   Total Protein 7.5 7.7 7.3 7.2 6.5 6.0 Low   6.5   Albumin 4.7 4.5 4.4 4.3 3.7 3.4 Low  3.5   Total Bilirubin 0.36 0.42 CM 0.40 CM 0.51 CM 0.27 CM 0.22 CM 0.31 CM   Comment: Use of this assay is not recommended for patients undergoing treatment with eltrombopag due to the potential for falsely elevated results.  N-acetyl-p-benzoquinone imine (metabolite of Acetaminophen) will generate erroneously low results in samples for patients that have taken an overdose of Acetaminophen.   eGFR 102 123 126 124 124 132 137              Narrative    National Kidney Disease Foundation guidelines for Chronic Kidney Disease (CKD):    Stage 1 with normal or high GFR (GFR > 90 mL/min/1.73 square meters)    Stage 2 Mild CKD (GFR = 60-89 mL/min/1.73 square meters)    Stage 3A Moderate CKD (GFR = 45-59 mL/min/1.73 square meters)    Stage 3B Moderate CKD (GFR = 30-44 mL/min/1.73 square meters)    Stage 4 Severe CKD (GFR = 15-29 mL/min/1.73 square meters)    Stage 5 End Stage CKD (GFR <15 mL/min/1.73 square meters)  Note: GFR calculation is accurate only with a steady state creatinine   Specimen Collected: 01/17/25  3:34 PM   TSH, 3rd generation with Free T4 reflex  Order: [id number]   Status: Final result       Next appt: 04/14/2025 at 04:30 PM in Bariatrics (Yeny Taylor MD)       Dx: Hyperthyroidism; Vitamin D deficiency...    Test Result Released: Yes (seen)       Messages: Seen    1 Result Note       1 Patient Communication       View Follow-Up Encounter            Component  Ref Range & Units (hover) 1/17/25  3:34 PM 11/11/24  4:35 PM 10/7/24 12:24 PM 10/4/24 10:00 PM 8/2/24 11:26 AM 1/9/24  2:00 PM 11/30/23  5:27 PM   TSH 3RD GENERATON 13.901 High  0.224 Low  CM <0.010 Low  CM <0.010 Low  C         Ferritin  Order: [id number] - Part of Panel Order [id number]   Status: Final result       Next appt: 04/14/2025 at 04:30 PM in Bariatrics (Yeny Taylor MD)       Dx: Iron deficiency anemia, unspecified i...    Test Result Released: Yes (seen)    0 Result Notes       View  Follow-Up Encounter         Component  Ref Range & Units (hover) 1/17/25  3:34 PM 8/2/24 11:26 AM 11/30/23  5:27 PM 1/23/23  4:16 PM   Ferritin 25 25 73 58 R             Specimen Collected: 01/17/25  3:34 PM   Vitamin D 25 hydroxy  Order: [id number]   Status: Final result       Next appt: 04/14/2025 at 04:30 PM in Bariatrics (Yeny Taylor MD)       Dx: Vitamin D deficiency    Test Result Released: Yes (seen)       Messages: Seen    1 Result Note       1 Patient Communication       View Follow-Up Encounter         Component  Ref Range & Units (hover) 1/17/25  3:34 PM 8/2/24 11:26 AM 11/30/23  5:27 PM 1/23/23  4:16 PM   Vit D, 25-Hydroxy 11.6 Low  19.0 Low  CM 23.7 Low  CM 15.6 Low    Comment: Vitamin D guidelines established by Clinical Guidelines Subcommittee  of the Endocrine Society Task Force, 2011    Deficiency <20ng/ml  Insufficiency 20-30ng/ml  Sufficient  ng/ml        Component  Ref Range & Units (hover) 1/17/25  3:34 PM 11/11/24  4:35 PM 10/7/24 12:24 PM 10/4/24 10:00 PM 12/10/20 11:22 AM   Free T4 0.50 Low  0.58 Low  CM 3.13 High  CM 3.23 High  CM 1.06 R   Comment: Specimens with biotin concentrations > 10 ng/mL can lead to significant (> 10%) positive bias in result.               Administrative Statements   I have spent a total time of 35  minutes in caring for this patient on the day of the visit/encounter including Diagnostic results, Prognosis, Risks and benefits of tx options, Instructions for management, Patient and family education, Importance of tx compliance, Risk factor reductions, Impressions, Counseling / Coordination of care, Documenting in the medical record, Reviewing / ordering tests, medicine, procedures  , and Obtaining or reviewing history  . Topics discussed with the patient / family include symptom assessment and management, medication review, medication adjustment, and goals of care.

## 2025-02-03 NOTE — ASSESSMENT & PLAN NOTE
Discussed labs and starting medications   Discussed vitamin D supplements   Orders:    levothyroxine 25 mcg tablet; Take 1 tablet (25 mcg total) by mouth daily in the early morning    ergocalciferol (VITAMIN D2) 50,000 units; Take 1 capsule (50,000 Units total) by mouth once a week    CBC and differential    TSH, 3rd generation with Free T4 reflex    Vitamin D 25 hydroxy

## 2025-02-05 ENCOUNTER — RESULTS FOLLOW-UP (OUTPATIENT)
Dept: OTHER | Facility: HOSPITAL | Age: 25
End: 2025-02-05

## 2025-02-05 DIAGNOSIS — E03.9 ACQUIRED HYPOTHYROIDISM: ICD-10-CM

## 2025-02-05 RX ORDER — LEVOTHYROXINE SODIUM 50 UG/1
50 TABLET ORAL
Qty: 90 TABLET | Refills: 0 | Status: SHIPPED | OUTPATIENT
Start: 2025-02-05 | End: 2025-05-06

## 2025-02-17 ENCOUNTER — DOCUMENTATION (OUTPATIENT)
Dept: BEHAVIORAL/MENTAL HEALTH CLINIC | Facility: CLINIC | Age: 25
End: 2025-02-17

## 2025-02-17 NOTE — PROGRESS NOTES
Psychotherapy Discharge Summary    Preferred Name: Latisha Kelly  YOB: 2000    Admission date to psychotherapy: September 12, 2024    Referred by: HUA from  to [facility]    Presenting Problem: PPA    Course of treatment included : group counseling    Progress/Outcome of Treatment Goals (brief summary of course of treatment) Latisha only attended a few groups and did not reschedule to continue to attend.     Treatment Complications (if any): None noted.     Treatment Progress: fair    Current [facility] Psychiatric Provider: Please see chart.     Discharge Medications include: Please see chart.    Discharge Date: February 17, 2025    Discharge Diagnosis: No diagnosis found.    Criteria for Discharge: completed treatment goals and objectives and is no longer in need of services    Aftercare recommendations include (include specific referral names and phone numbers, if appropriate): Latisha is encouraged to speak with her provider if she feels that she needs services in the future. Latisha is encouraged to go to the local ED or call 911 if experiencing crisis.     Prognosis: fair

## 2025-03-17 ENCOUNTER — PATIENT MESSAGE (OUTPATIENT)
Dept: FAMILY MEDICINE CLINIC | Facility: CLINIC | Age: 25
End: 2025-03-17

## 2025-03-18 ENCOUNTER — APPOINTMENT (OUTPATIENT)
Dept: LAB | Facility: AMBULARY SURGERY CENTER | Age: 25
End: 2025-03-18
Payer: COMMERCIAL

## 2025-03-18 DIAGNOSIS — E05.90 HYPERTHYROIDISM: ICD-10-CM

## 2025-03-18 LAB
25(OH)D3 SERPL-MCNC: 28.8 NG/ML (ref 30–100)
BASOPHILS # BLD AUTO: 0.03 THOUSANDS/ÂΜL (ref 0–0.1)
BASOPHILS NFR BLD AUTO: 1 % (ref 0–1)
BILIRUB UR QL STRIP: NEGATIVE
CLARITY UR: CLEAR
COLOR UR: NORMAL
EOSINOPHIL # BLD AUTO: 0.22 THOUSAND/ÂΜL (ref 0–0.61)
EOSINOPHIL NFR BLD AUTO: 4 % (ref 0–6)
ERYTHROCYTE [DISTWIDTH] IN BLOOD BY AUTOMATED COUNT: 12.1 % (ref 11.6–15.1)
GLUCOSE UR STRIP-MCNC: NEGATIVE MG/DL
HCT VFR BLD AUTO: 37.5 % (ref 34.8–46.1)
HGB BLD-MCNC: 11.7 G/DL (ref 11.5–15.4)
HGB UR QL STRIP.AUTO: NEGATIVE
IMM GRANULOCYTES # BLD AUTO: 0.01 THOUSAND/UL (ref 0–0.2)
IMM GRANULOCYTES NFR BLD AUTO: 0 % (ref 0–2)
KETONES UR STRIP-MCNC: NEGATIVE MG/DL
LEUKOCYTE ESTERASE UR QL STRIP: NEGATIVE
LYMPHOCYTES # BLD AUTO: 2.65 THOUSANDS/ÂΜL (ref 0.6–4.47)
LYMPHOCYTES NFR BLD AUTO: 44 % (ref 14–44)
MCH RBC QN AUTO: 28.3 PG (ref 26.8–34.3)
MCHC RBC AUTO-ENTMCNC: 31.2 G/DL (ref 31.4–37.4)
MCV RBC AUTO: 91 FL (ref 82–98)
MONOCYTES # BLD AUTO: 0.5 THOUSAND/ÂΜL (ref 0.17–1.22)
MONOCYTES NFR BLD AUTO: 8 % (ref 4–12)
NEUTROPHILS # BLD AUTO: 2.67 THOUSANDS/ÂΜL (ref 1.85–7.62)
NEUTS SEG NFR BLD AUTO: 43 % (ref 43–75)
NITRITE UR QL STRIP: NEGATIVE
NRBC BLD AUTO-RTO: 0 /100 WBCS
PH UR STRIP.AUTO: 6 [PH]
PLATELET # BLD AUTO: 300 THOUSANDS/UL (ref 149–390)
PMV BLD AUTO: 9.8 FL (ref 8.9–12.7)
PROT UR STRIP-MCNC: NEGATIVE MG/DL
RBC # BLD AUTO: 4.13 MILLION/UL (ref 3.81–5.12)
SP GR UR STRIP.AUTO: 1.03 (ref 1–1.03)
T3 SERPL-MCNC: 1.1 NG/ML (ref 0.9–1.8)
T4 FREE SERPL-MCNC: 0.8 NG/DL (ref 0.61–1.12)
TSH SERPL DL<=0.05 MIU/L-ACNC: 3.56 UIU/ML (ref 0.45–4.5)
UROBILINOGEN UR STRIP-ACNC: <2 MG/DL
WBC # BLD AUTO: 6.08 THOUSAND/UL (ref 4.31–10.16)

## 2025-03-18 PROCEDURE — 84480 ASSAY TRIIODOTHYRONINE (T3): CPT

## 2025-03-18 PROCEDURE — 84439 ASSAY OF FREE THYROXINE: CPT

## 2025-03-18 PROCEDURE — 81003 URINALYSIS AUTO W/O SCOPE: CPT

## 2025-03-21 ENCOUNTER — TELEMEDICINE (OUTPATIENT)
Dept: FAMILY MEDICINE CLINIC | Facility: CLINIC | Age: 25
End: 2025-03-21
Payer: COMMERCIAL

## 2025-03-21 DIAGNOSIS — R51.9 LEFT-SIDED HEADACHE: Primary | ICD-10-CM

## 2025-03-21 DIAGNOSIS — M43.6 NECK STIFFNESS: ICD-10-CM

## 2025-03-21 DIAGNOSIS — H93.8X2 CONGESTION OF LEFT EAR: ICD-10-CM

## 2025-03-21 DIAGNOSIS — R42 VERTIGO: ICD-10-CM

## 2025-03-21 DIAGNOSIS — R26.89 BALANCE DISORDER: ICD-10-CM

## 2025-03-21 DIAGNOSIS — M54.2 NECK PAIN: ICD-10-CM

## 2025-03-21 PROCEDURE — 99213 OFFICE O/P EST LOW 20 MIN: CPT | Performed by: NURSE PRACTITIONER

## 2025-03-21 RX ORDER — PREDNISONE 20 MG/1
20 TABLET ORAL DAILY
Qty: 3 TABLET | Refills: 0 | Status: SHIPPED | OUTPATIENT
Start: 2025-03-21 | End: 2025-03-24

## 2025-03-21 RX ORDER — CEPHALEXIN 500 MG/1
500 CAPSULE ORAL EVERY 8 HOURS SCHEDULED
Qty: 15 CAPSULE | Refills: 0 | Status: SHIPPED | OUTPATIENT
Start: 2025-03-21 | End: 2025-03-26

## 2025-03-21 RX ORDER — MECLIZINE HCL 12.5 MG 12.5 MG/1
12.5 TABLET ORAL EVERY 8 HOURS PRN
Qty: 30 TABLET | Refills: 0 | Status: SHIPPED | OUTPATIENT
Start: 2025-03-21 | End: 2025-03-26

## 2025-03-21 NOTE — PROGRESS NOTES
Virtual Regular VisitName: Latisha Kelly      : 2000      MRN: [id number]  Encounter Provider: VALENTIN Parrish  Encounter Date: 3/21/2025   Encounter department: [facility]  :  Assessment & Plan  Left-sided headache    Orders:    predniSONE 20 mg tablet; Take 1 tablet (20 mg total) by mouth daily for 3 days    cephalexin (KEFLEX) 500 mg capsule; Take 1 capsule (500 mg total) by mouth every 8 (eight) hours for 5 days    tiZANidine (ZANAFLEX) 4 mg tablet; Take 1 tablet (4 mg total) by mouth daily at bedtime for 10 days Muscle relaxer    meclizine (ANTIVERT) 12.5 MG tablet; Take 1 tablet (12.5 mg total) by mouth every 8 (eight) hours as needed for dizziness for up to 5 days    Neck pain    Orders:    predniSONE 20 mg tablet; Take 1 tablet (20 mg total) by mouth daily for 3 days    cephalexin (KEFLEX) 500 mg capsule; Take 1 capsule (500 mg total) by mouth every 8 (eight) hours for 5 days    tiZANidine (ZANAFLEX) 4 mg tablet; Take 1 tablet (4 mg total) by mouth daily at bedtime for 10 days Muscle relaxer    meclizine (ANTIVERT) 12.5 MG tablet; Take 1 tablet (12.5 mg total) by mouth every 8 (eight) hours as needed for dizziness for up to 5 days    Neck stiffness    Orders:    predniSONE 20 mg tablet; Take 1 tablet (20 mg total) by mouth daily for 3 days    cephalexin (KEFLEX) 500 mg capsule; Take 1 capsule (500 mg total) by mouth every 8 (eight) hours for 5 days    tiZANidine (ZANAFLEX) 4 mg tablet; Take 1 tablet (4 mg total) by mouth daily at bedtime for 10 days Muscle relaxer    meclizine (ANTIVERT) 12.5 MG tablet; Take 1 tablet (12.5 mg total) by mouth every 8 (eight) hours as needed for dizziness for up to 5 days    Balance disorder    Orders:    predniSONE 20 mg tablet; Take 1 tablet (20 mg total) by mouth daily for 3 days    cephalexin (KEFLEX) 500 mg capsule; Take 1 capsule (500 mg total) by mouth every 8 (eight) hours for 5 days    tiZANidine (ZANAFLEX) 4 mg tablet;  Take 1 tablet (4 mg total) by mouth daily at bedtime for 10 days Muscle relaxer    meclizine (ANTIVERT) 12.5 MG tablet; Take 1 tablet (12.5 mg total) by mouth every 8 (eight) hours as needed for dizziness for up to 5 days    Vertigo    Orders:    predniSONE 20 mg tablet; Take 1 tablet (20 mg total) by mouth daily for 3 days    cephalexin (KEFLEX) 500 mg capsule; Take 1 capsule (500 mg total) by mouth every 8 (eight) hours for 5 days    tiZANidine (ZANAFLEX) 4 mg tablet; Take 1 tablet (4 mg total) by mouth daily at bedtime for 10 days Muscle relaxer    meclizine (ANTIVERT) 12.5 MG tablet; Take 1 tablet (12.5 mg total) by mouth every 8 (eight) hours as needed for dizziness for up to 5 days    Congestion of left ear    Orders:    predniSONE 20 mg tablet; Take 1 tablet (20 mg total) by mouth daily for 3 days    cephalexin (KEFLEX) 500 mg capsule; Take 1 capsule (500 mg total) by mouth every 8 (eight) hours for 5 days    tiZANidine (ZANAFLEX) 4 mg tablet; Take 1 tablet (4 mg total) by mouth daily at bedtime for 10 days Muscle relaxer    meclizine (ANTIVERT) 12.5 MG tablet; Take 1 tablet (12.5 mg total) by mouth every 8 (eight) hours as needed for dizziness for up to 5 days                          History of Present Illness     VIRTUAL SAME DAY CONCERNS WITH LEFT SIDED HEADACHES, NECK STIFFNESS , EAR FULLNESS AND BALANCE ISSUE - pain and dizziness when bending down    I've been getting headaches non stop for weeks, mainly on the left side of my head, left sinus, left eye. The left side of my neck is super stiff down into my shoulder blades and my collarbone on the left side. A few weeks ago I leaned my head forward and liquid rushed into my nose and it burned really bad and ever since things have felt off. My left ear is also stuffy. I just don't feel right.    Denies any head injury   No falls   Offered to go to ER if symptoms are really bed but also we can try treating for possible middle ear infection and some neck  stiffness and see if symptoms get better if symptoms worsen she should go to the ER may need a CT scan       Review of Systems   Constitutional:  Positive for fatigue.   HENT:  Positive for congestion.         VIRTUAL SAME DAY CONCERNS WITH LEFT SIDED HEADACHES, NECK STIFFNESS , EAR FULLNESS AND BALANCE ISSUE - pain and dizziness when bending down   Respiratory:  Negative for cough and shortness of breath.    Cardiovascular:  Positive for palpitations. Negative for chest pain.   Gastrointestinal:  Negative for abdominal distention, abdominal pain, nausea and vomiting.   Endocrine: Negative.    Genitourinary:  Negative for difficulty urinating and flank pain.   Musculoskeletal:  Positive for arthralgias and myalgias.        Neck stiffness and shoulder pain    Skin:  Negative for rash.   Neurological:  Positive for dizziness, light-headedness and headaches.        VIRTUAL SAME DAY CONCERNS WITH LEFT SIDED HEADACHES, NECK STIFFNESS , EAR FULLNESS AND BALANCE ISSUE - pain and dizziness when bending down   Hematological:  Negative for adenopathy.   Psychiatric/Behavioral:  Negative for sleep disturbance and suicidal ideas. The patient is nervous/anxious.        Objective   There were no vitals taken for this visit.    Physical Exam  Vitals and nursing note reviewed.   Constitutional:       Appearance: Normal appearance.   Pulmonary:      Effort: Pulmonary effort is normal.   Neurological:      Mental Status: She is alert and oriented to person, place, and time.   Psychiatric:         Mood and Affect: Mood normal.         Behavior: Behavior normal.         Administrative Statements   Encounter provider VALENTIN Parrish    The Patient is located at Home and in the following state in which I hold an active license [place].    The patient was identified by name and date of birth. Latisha Kelly was informed that this is a telemedicine visit and that the visit is being conducted through the Epic Embedded platform. She  agrees to proceed..  My office door was closed. No one else was in the room.  She acknowledged consent and understanding of privacy and security of the video platform. The patient has agreed to participate and understands they can discontinue the visit at any time.    I have spent a total time of 35  minutes in caring for this patient on the day of the visit/encounter including Diagnostic results, Prognosis, Risks and benefits of tx options, Instructions for management, Patient and family education, Importance of tx compliance, Risk factor reductions, Impressions, Counseling / Coordination of care, Documenting in the medical record, Reviewing/placing orders in the medical record (including tests, medications, and/or procedures), Obtaining or reviewing history  , and Communicating with other healthcare professionals , not including the time spent for establishing the audio/video connection.

## 2025-03-23 ENCOUNTER — HOSPITAL ENCOUNTER (EMERGENCY)
Facility: HOSPITAL | Age: 25
Discharge: HOME | End: 2025-03-23
Attending: EMERGENCY MEDICINE
Payer: COMMERCIAL

## 2025-03-23 ENCOUNTER — APPOINTMENT (EMERGENCY)
Dept: RADIOLOGY | Facility: HOSPITAL | Age: 25
End: 2025-03-23
Payer: COMMERCIAL

## 2025-03-23 VITALS
SYSTOLIC BLOOD PRESSURE: 130 MMHG | RESPIRATION RATE: 20 BRPM | OXYGEN SATURATION: 99 % | DIASTOLIC BLOOD PRESSURE: 67 MMHG | TEMPERATURE: 98.1 F | HEART RATE: 97 BPM

## 2025-03-23 DIAGNOSIS — M62.838 MUSCLE SPASM: ICD-10-CM

## 2025-03-23 DIAGNOSIS — R00.2 PALPITATIONS: Primary | ICD-10-CM

## 2025-03-23 LAB
ALBUMIN SERPL BCG-MCNC: 4.4 G/DL (ref 3.5–5)
ALP SERPL-CCNC: 65 U/L (ref 34–104)
ALT SERPL W P-5'-P-CCNC: 12 U/L (ref 7–52)
ANION GAP SERPL CALCULATED.3IONS-SCNC: 5 MMOL/L (ref 4–13)
AST SERPL W P-5'-P-CCNC: 13 U/L (ref 13–39)
BACTERIA UR QL AUTO: ABNORMAL /HPF
BASOPHILS # BLD AUTO: 0.02 THOUSANDS/ÂΜL (ref 0–0.1)
BASOPHILS NFR BLD AUTO: 1 % (ref 0–1)
BILIRUB SERPL-MCNC: 0.32 MG/DL (ref 0.2–1)
BILIRUB UR QL STRIP: NEGATIVE
BUN SERPL-MCNC: 9 MG/DL (ref 5–25)
CALCIUM SERPL-MCNC: 9 MG/DL (ref 8.4–10.2)
CARDIAC TROPONIN I PNL SERPL HS: <2 NG/L (ref ?–50)
CARDIAC TROPONIN I PNL SERPL HS: <2 NG/L (ref ?–50)
CHLORIDE SERPL-SCNC: 105 MMOL/L (ref 96–108)
CLARITY UR: CLEAR
CO2 SERPL-SCNC: 29 MMOL/L (ref 21–32)
COLOR UR: YELLOW
CREAT SERPL-MCNC: 0.8 MG/DL (ref 0.6–1.3)
EOSINOPHIL # BLD AUTO: 0.13 THOUSAND/ÂΜL (ref 0–0.61)
EOSINOPHIL NFR BLD AUTO: 3 % (ref 0–6)
ERYTHROCYTE [DISTWIDTH] IN BLOOD BY AUTOMATED COUNT: 11.9 % (ref 11.6–15.1)
EXT PREGNANCY TEST URINE: NEGATIVE
EXT. CONTROL: NORMAL
GFR SERPL CREATININE-BSD FRML MDRD: 103 ML/MIN/1.73SQ M
GLUCOSE SERPL-MCNC: 92 MG/DL (ref 65–140)
GLUCOSE UR STRIP-MCNC: NEGATIVE MG/DL
HCT VFR BLD AUTO: 37.9 % (ref 34.8–46.1)
HGB BLD-MCNC: 12.2 G/DL (ref 11.5–15.4)
HGB UR QL STRIP.AUTO: NEGATIVE
IMM GRANULOCYTES # BLD AUTO: 0.01 THOUSAND/UL (ref 0–0.2)
IMM GRANULOCYTES NFR BLD AUTO: 0 % (ref 0–2)
KETONES UR STRIP-MCNC: ABNORMAL MG/DL
LEUKOCYTE ESTERASE UR QL STRIP: NEGATIVE
LYMPHOCYTES # BLD AUTO: 1.84 THOUSANDS/ÂΜL (ref 0.6–4.47)
LYMPHOCYTES NFR BLD AUTO: 42 % (ref 14–44)
MCH RBC QN AUTO: 28.5 PG (ref 26.8–34.3)
MCHC RBC AUTO-ENTMCNC: 32.2 G/DL (ref 31.4–37.4)
MCV RBC AUTO: 89 FL (ref 82–98)
MONOCYTES # BLD AUTO: 0.5 THOUSAND/ÂΜL (ref 0.17–1.22)
MONOCYTES NFR BLD AUTO: 12 % (ref 4–12)
MUCOUS THREADS UR QL AUTO: ABNORMAL
NEUTROPHILS # BLD AUTO: 1.86 THOUSANDS/ÂΜL (ref 1.85–7.62)
NEUTS SEG NFR BLD AUTO: 42 % (ref 43–75)
NITRITE UR QL STRIP: NEGATIVE
NON-SQ EPI CELLS URNS QL MICRO: ABNORMAL /HPF
NRBC BLD AUTO-RTO: 0 /100 WBCS
PH UR STRIP.AUTO: 6 [PH]
PLATELET # BLD AUTO: 244 THOUSANDS/UL (ref 149–390)
PMV BLD AUTO: 9.1 FL (ref 8.9–12.7)
POTASSIUM SERPL-SCNC: 3.6 MMOL/L (ref 3.5–5.3)
PROT SERPL-MCNC: 7.3 G/DL (ref 6.4–8.4)
PROT UR STRIP-MCNC: ABNORMAL MG/DL
RBC # BLD AUTO: 4.28 MILLION/UL (ref 3.81–5.12)
RBC #/AREA URNS AUTO: ABNORMAL /HPF
SODIUM SERPL-SCNC: 139 MMOL/L (ref 135–147)
SP GR UR STRIP.AUTO: 1.03 (ref 1–1.03)
TSH SERPL DL<=0.05 MIU/L-ACNC: 2.67 UIU/ML (ref 0.45–4.5)
UROBILINOGEN UR STRIP-ACNC: <2 MG/DL
WBC # BLD AUTO: 4.36 THOUSAND/UL (ref 4.31–10.16)
WBC #/AREA URNS AUTO: ABNORMAL /HPF

## 2025-03-23 PROCEDURE — 81001 URINALYSIS AUTO W/SCOPE: CPT | Performed by: EMERGENCY MEDICINE

## 2025-03-23 PROCEDURE — 99284 EMERGENCY DEPT VISIT MOD MDM: CPT | Performed by: EMERGENCY MEDICINE

## 2025-03-23 PROCEDURE — 71045 X-RAY EXAM CHEST 1 VIEW: CPT

## 2025-03-23 PROCEDURE — 93005 ELECTROCARDIOGRAM TRACING: CPT

## 2025-03-23 PROCEDURE — 36415 COLL VENOUS BLD VENIPUNCTURE: CPT | Performed by: EMERGENCY MEDICINE

## 2025-03-23 PROCEDURE — 84484 ASSAY OF TROPONIN QUANT: CPT | Performed by: EMERGENCY MEDICINE

## 2025-03-23 PROCEDURE — 96366 THER/PROPH/DIAG IV INF ADDON: CPT

## 2025-03-23 PROCEDURE — 81025 URINE PREGNANCY TEST: CPT | Performed by: EMERGENCY MEDICINE

## 2025-03-23 PROCEDURE — 80053 COMPREHEN METABOLIC PANEL: CPT | Performed by: EMERGENCY MEDICINE

## 2025-03-23 PROCEDURE — 96375 TX/PRO/DX INJ NEW DRUG ADDON: CPT

## 2025-03-23 PROCEDURE — 96361 HYDRATE IV INFUSION ADD-ON: CPT

## 2025-03-23 PROCEDURE — 85025 COMPLETE CBC W/AUTO DIFF WBC: CPT | Performed by: EMERGENCY MEDICINE

## 2025-03-23 PROCEDURE — 99285 EMERGENCY DEPT VISIT HI MDM: CPT

## 2025-03-23 PROCEDURE — 84443 ASSAY THYROID STIM HORMONE: CPT | Performed by: EMERGENCY MEDICINE

## 2025-03-23 PROCEDURE — 96365 THER/PROPH/DIAG IV INF INIT: CPT

## 2025-03-23 RX ORDER — METOCLOPRAMIDE HYDROCHLORIDE 5 MG/ML
10 INJECTION INTRAMUSCULAR; INTRAVENOUS ONCE
Status: COMPLETED | OUTPATIENT
Start: 2025-03-23 | End: 2025-03-23

## 2025-03-23 RX ORDER — KETOROLAC TROMETHAMINE 30 MG/ML
15 INJECTION, SOLUTION INTRAMUSCULAR; INTRAVENOUS ONCE
Status: COMPLETED | OUTPATIENT
Start: 2025-03-23 | End: 2025-03-23

## 2025-03-23 RX ORDER — MAGNESIUM SULFATE HEPTAHYDRATE 40 MG/ML
2 INJECTION, SOLUTION INTRAVENOUS ONCE
Status: COMPLETED | OUTPATIENT
Start: 2025-03-23 | End: 2025-03-23

## 2025-03-23 RX ORDER — DIPHENHYDRAMINE HYDROCHLORIDE 50 MG/ML
25 INJECTION, SOLUTION INTRAMUSCULAR; INTRAVENOUS ONCE
Status: COMPLETED | OUTPATIENT
Start: 2025-03-23 | End: 2025-03-23

## 2025-03-23 RX ADMIN — KETOROLAC TROMETHAMINE 15 MG: 30 INJECTION, SOLUTION INTRAMUSCULAR; INTRAVENOUS at 16:57

## 2025-03-23 RX ADMIN — METOCLOPRAMIDE HYDROCHLORIDE 10 MG: 5 INJECTION INTRAMUSCULAR; INTRAVENOUS at 16:57

## 2025-03-23 RX ADMIN — SODIUM CHLORIDE 1000 ML: 0.9 INJECTION, SOLUTION INTRAVENOUS at 16:48

## 2025-03-23 RX ADMIN — MAGNESIUM SULFATE HEPTAHYDRATE 2 G: 40 INJECTION, SOLUTION INTRAVENOUS at 17:35

## 2025-03-23 RX ADMIN — DIPHENHYDRAMINE HYDROCHLORIDE 25 MG: 50 INJECTION, SOLUTION INTRAMUSCULAR; INTRAVENOUS at 16:57

## 2025-03-23 NOTE — Clinical Note
Latisha Kelly was seen and treated in our emergency department on 3/23/2025.                Diagnosis:     Latisha  .    She may return on this date: 03/25/2025         If you have any questions or concerns, please don't hesitate to call.      Arvind Josue MD    ______________________________           _______________          _______________  Hospital Representative                              Date                                Time

## 2025-03-24 ENCOUNTER — PATIENT MESSAGE (OUTPATIENT)
Dept: FAMILY MEDICINE CLINIC | Facility: CLINIC | Age: 25
End: 2025-03-24

## 2025-03-24 DIAGNOSIS — R06.02 SHORTNESS OF BREATH: ICD-10-CM

## 2025-03-24 DIAGNOSIS — R05.8 OTHER COUGH: Primary | ICD-10-CM

## 2025-03-24 LAB
ATRIAL RATE: 96 BPM
P AXIS: 81 DEGREES
PR INTERVAL: 156 MS
QRS AXIS: 62 DEGREES
QRSD INTERVAL: 70 MS
QT INTERVAL: 334 MS
QTC INTERVAL: 421 MS
T WAVE AXIS: 64 DEGREES
VENTRICULAR RATE: 96 BPM

## 2025-03-24 PROCEDURE — 93010 ELECTROCARDIOGRAM REPORT: CPT | Performed by: INTERNAL MEDICINE

## 2025-03-24 NOTE — ED PROVIDER NOTES
Time reflects when diagnosis was documented in both MDM as applicable and the Disposition within this note       Time User Action Codes Description Comment    3/23/2025  7:03 PM Arvind Josue Add [R00.2] Palpitations     3/23/2025  7:08 PM Arvind Josue [M62.838] Muscle spasm           ED Disposition       ED Disposition   Discharge    Condition   Stable    Date/Time   Sun Mar 23, 2025  7:03 PM    Comment   Latisha Kelly discharge to home/self care.                   Assessment & Plan       Medical Decision Making  25-year-old female with palpitations, myalgias, she is normal sinus to monitor here and on twelve-lead EKG, will check cardiac labs, thyroid, CT give fluids meds reassess    Amount and/or Complexity of Data Reviewed  Labs: ordered.  Radiology: ordered.    Risk  Prescription drug management.             Medications   sodium chloride 0.9 % bolus 1,000 mL (0 mL Intravenous Stopped 3/23/25 1921)   metoclopramide (REGLAN) injection 10 mg (10 mg Intravenous Given 3/23/25 1657)   ketorolac (TORADOL) injection 15 mg (15 mg Intravenous Given 3/23/25 1657)   diphenhydrAMINE (BENADRYL) injection 25 mg (25 mg Intravenous Given 3/23/25 1657)   magnesium sulfate 2 g/50 mL IVPB (premix) 2 g (0 g Intravenous Stopped 3/23/25 1921)       ED Risk Strat Scores                            SBIRT 22yo+      Flowsheet Row Most Recent Value   Initial Alcohol Screen: US AUDIT-C     1. How often do you have a drink containing alcohol? 0 Filed at: 03/23/2025 1622   2. How many drinks containing alcohol do you have on a typical day you are drinking?  0 Filed at: 03/23/2025 1622   3b. FEMALE Any Age, or MALE 65+: How often do you have 4 or more drinks on one occassion? 0 Filed at: 03/23/2025 1622   Audit-C Score 0 Filed at: 03/23/2025 1622   MERISSA: How many times in the past year have you...    Used an illegal drug or used a prescription medication for non-medical reasons? Never Filed at: 03/23/2025 1622                             History of Present Illness       Chief Complaint   Patient presents with    Palpitations     Patient c.o intermittent palpitations, left sided neck pain and headaches with no relief from tylenol x 2 weeks.        Past Medical History:   Diagnosis Date    Anemia     Chronic hypertension with super imposed preeclampsia without severe features 2024    Hypertension     chtn started medication with pregnancy    Hypothyroid     Insulin controlled gestational diabetes mellitus (GDM) in third trimester 2024    Migraine without aura     OTC med management    PCOS (polycystic ovarian syndrome)     Status post primary low transverse  section 12/15/2023    Varicella     vaccine    Visual impairment       Past Surgical History:   Procedure Laterality Date    MI CERCLAGE UTERINE CERVIX NONOBSTETRICAL N/A 2024    Procedure: CERCLAGE CERVICAL;  Surgeon: Ann Hoffmann MD;  [facility]: [facility];  Service: Obstetrics    MI  DELIVERY ONLY N/A 2024    Procedure:  SECTION ();  Surgeon: Lizzette Hernandez DO;  Location: [facility];  Service: Obstetrics    MI REMOVAL CERCLAGE SUTURE UNDER ANESTHESIA N/A 2024    Procedure: CERCLAGE REMOVAL;  Surgeon: Lizzette Hernandez DO;  Location: [facility];  Service: Obstetrics    TURBINATE RESECTION        Family History   Problem Relation Age of Onset    No Known Problems Mother     Hypertension Father     No Known Problems Sister     No Known Problems Brother     No Known Problems Brother     No Known Problems Brother     Colon cancer Neg Hx     Ovarian cancer Neg Hx     Breast cancer Neg Hx     Cancer Neg Hx       Social History     Tobacco Use    Smoking status: Never    Smokeless tobacco: Never   Vaping Use    Vaping status: Former    Quit date: 2023    Substances: Nicotine, Flavoring   Substance Use Topics    Alcohol use: Yes     Alcohol/week: 7.0 standard drinks of alcohol     Types: 7 Glasses of wine per week     Comment:  Socially    Drug use: Not Currently     Types: Marijuana     Comment: last used THC over 2 years ago (as of 11/29/23)      E-Cigarette/Vaping    E-Cigarette Use Former User     Quit Date 1/1/23     Comments very seldom, 3 times a year-quit pror to recent pregnancy       E-Cigarette/Vaping Substances    Nicotine Yes     THC No     CBD No     Flavoring Yes     Other No     Unknown No       I have reviewed and agree with the history as documented.     25-year-old female presents emergency department for evaluation of palpitations.  Patient had palpitations headache myalgias.  Been intermittent for the past week, saw her PCP who checked her thyroid but it was normal, symptoms worsened today and so she came to the emergency department.  Presently she has chest pain no lightheadedness syncope or presyncope.  No fevers or chills.  No abdominal pain nausea or vomiting.        Review of Systems        Objective       ED Triage Vitals [03/23/25 1620]   Temperature Pulse Blood Pressure Respirations SpO2 Patient Position - Orthostatic VS   98.1 °F (36.7 °C) 97 130/67 20 99 % Sitting      Temp Source Heart Rate Source BP Location FiO2 (%) Pain Score    Temporal Monitor Left arm -- --      Vitals      Date and Time Temp Pulse SpO2 Resp BP Pain Score FACES Pain Rating User   03/23/25 1620 98.1 °F (36.7 °C) 97 99 % 20 130/67 -- -- MS            Physical Exam  Constitutional:       General: She is not in acute distress.     Appearance: She is well-developed. She is not diaphoretic.   HENT:      Head: Normocephalic and atraumatic.   Eyes:      Pupils: Pupils are equal, round, and reactive to light.   Neck:      Vascular: No JVD.      Trachea: No tracheal deviation.   Cardiovascular:      Rate and Rhythm: Normal rate and regular rhythm.      Heart sounds: Normal heart sounds. No murmur heard.     No friction rub. No gallop.   Pulmonary:      Effort: Pulmonary effort is normal. No respiratory distress.      Breath sounds: Normal breath  sounds. No wheezing or rales.   Abdominal:      General: Bowel sounds are normal. There is no distension.      Palpations: Abdomen is soft.      Tenderness: There is no abdominal tenderness. There is no guarding or rebound.   Skin:     General: Skin is warm and dry.      Coloration: Skin is not pale.   Neurological:      Mental Status: She is alert and oriented to person, place, and time.      Cranial Nerves: No cranial nerve deficit.      Motor: No abnormal muscle tone.   Psychiatric:         Behavior: Behavior normal.         Results Reviewed       Procedure Component Value Units Date/Time    HS Troponin I 2hr [[id number]] Collected: 03/23/25 1851    Lab Status: Final result Specimen: Blood from Arm, Left Updated: 03/23/25 1920     hs TnI 2hr <2 ng/L      Delta 2hr hsTnI --    TSH, 3rd generation with Free T4 reflex [[id number]]  (Normal) Collected: 03/23/25 1648    Lab Status: Final result Specimen: Blood from Arm, Right Updated: 03/23/25 1738     TSH 3RD GENERATON 2.674 uIU/mL     HS Troponin 0hr (reflex protocol) [[id number]]  (Normal) Collected: 03/23/25 1648    Lab Status: Final result Specimen: Blood from Arm, Right Updated: 03/23/25 1726     hs TnI 0hr <2 ng/L     Comprehensive metabolic panel [[id number]] Collected: 03/23/25 1648    Lab Status: Final result Specimen: Blood from Arm, Right Updated: 03/23/25 1718     Sodium 139 mmol/L      Potassium 3.6 mmol/L      Chloride 105 mmol/L      CO2 29 mmol/L      ANION GAP 5 mmol/L      BUN 9 mg/dL      Creatinine 0.80 mg/dL      Glucose 92 mg/dL      Calcium 9.0 mg/dL      AST 13 U/L      ALT 12 U/L      Alkaline Phosphatase 65 U/L      Total Protein 7.3 g/dL      Albumin 4.4 g/dL      Total Bilirubin 0.32 mg/dL      eGFR 103 ml/min/1.73sq m     Narrative:      [employer] Kid[employer] guidelines for Chronic Kidney Disease (CKD):     Stage 1 with normal or high GFR (GFR > 90 mL/min/1.73 square meters)    Stage 2 Mild CKD (GFR = 60-89 mL/min/1.73 square  meters)    Stage 3A Moderate CKD (GFR = 45-59 mL/min/1.73 square meters)    Stage 3B Moderate CKD (GFR = 30-44 mL/min/1.73 square meters)    Stage 4 Severe CKD (GFR = 15-29 mL/min/1.73 square meters)    Stage 5 End Stage CKD (GFR <15 mL/min/1.73 square meters)  Note: GFR calculation is accurate only with a steady state creatinine    CBC and differential [[id number]]  (Abnormal) Collected: 03/23/25 1648    Lab Status: Final result Specimen: Blood from Arm, Right Updated: 03/23/25 1656     WBC 4.36 Thousand/uL      RBC 4.28 Million/uL      Hemoglobin 12.2 g/dL      Hematocrit 37.9 %      MCV 89 fL      MCH 28.5 pg      MCHC 32.2 g/dL      RDW 11.9 %      MPV 9.1 fL      Platelets 244 Thousands/uL      nRBC 0 /100 WBCs      Segmented % 42 %      Immature Grans % 0 %      Lymphocytes % 42 %      Monocytes % 12 %      Eosinophils Relative 3 %      Basophils Relative 1 %      Absolute Neutrophils 1.86 Thousands/µL      Absolute Immature Grans 0.01 Thousand/uL      Absolute Lymphocytes 1.84 Thousands/µL      Absolute Monocytes 0.50 Thousand/µL      Eosinophils Absolute 0.13 Thousand/µL      Basophils Absolute 0.02 Thousands/µL     Urine Microscopic [[id number]]  (Abnormal) Collected: 03/23/25 1641    Lab Status: Final result Specimen: Urine, Clean Catch Updated: 03/23/25 1655     RBC, UA 1-2 /hpf      WBC, UA 4-10 /hpf      Epithelial Cells Occasional /hpf      Bacteria, UA None Seen /hpf      MUCUS THREADS Innumerable    UA w Reflex to Microscopic w Reflex to Culture [[id number]]  (Abnormal) Collected: 03/23/25 1641    Lab Status: Final result Specimen: Urine, Clean Catch Updated: 03/23/25 1653     Color, UA Yellow     Clarity, UA Clear     Specific Gravity, UA 1.033     pH, UA 6.0     Leukocytes, UA Negative     Nitrite, UA Negative     Protein, UA Trace mg/dl      Glucose, UA Negative mg/dl      Ketones, UA Trace mg/dl      Urobilinogen, UA <2.0 mg/dl      Bilirubin, UA Negative     Occult Blood, UA Negative    POCT  pregnancy, urine [[id number]]  (Normal) Collected: 03/23/25 1648    Lab Status: Final result Updated: 03/23/25 1648     EXT Preg Test, Ur Negative     Control Valid            XR chest 1 view portable   Final Interpretation by Satya Cantor MD (03/23 2059)      No acute cardiopulmonary disease.            Workstation performed: [id number]             Procedures    ED Medication and Procedure Management   Prior to Admission Medications   Prescriptions Last Dose Informant Patient Reported? Taking?   Skin Protectants, Misc. (eucerin) cream   No No   Sig: Apply topically as needed for wound care   cephalexin (KEFLEX) 500 mg capsule   No No   Sig: Take 1 capsule (500 mg total) by mouth every 8 (eight) hours for 5 days   ergocalciferol (VITAMIN D2) 50,000 units   No No   Sig: Take 1 capsule (50,000 Units total) by mouth once a week   hydrocortisone 2.5 % ointment   No No   Sig: Apply topically 2 (two) times a day for 10 days   levothyroxine 50 mcg tablet   No No   Sig: Take 1 tablet (50 mcg total) by mouth daily in the early morning   meclizine (ANTIVERT) 12.5 MG tablet   No No   Sig: Take 1 tablet (12.5 mg total) by mouth every 8 (eight) hours as needed for dizziness for up to 5 days   predniSONE 20 mg tablet   No No   Sig: Take 1 tablet (20 mg total) by mouth daily for 3 days   tiZANidine (ZANAFLEX) 4 mg tablet   No No   Sig: Take 1 tablet (4 mg total) by mouth daily at bedtime for 10 days Muscle relaxer      Facility-Administered Medications: None     Discharge Medication List as of 3/23/2025  7:08 PM        CONTINUE these medications which have NOT CHANGED    Details   cephalexin (KEFLEX) 500 mg capsule Take 1 capsule (500 mg total) by mouth every 8 (eight) hours for 5 days, Starting Fri 3/21/2025, Until Wed 3/26/2025, Normal      ergocalciferol (VITAMIN D2) 50,000 units Take 1 capsule (50,000 Units total) by mouth once a week, Starting Wed 3/19/2025, Until Tue 6/17/2025, Normal      hydrocortisone 2.5 % ointment Apply  topically 2 (two) times a day for 10 days, Starting Mon 1/27/2025, Until Thu 2/6/2025, Normal      levothyroxine 50 mcg tablet Take 1 tablet (50 mcg total) by mouth daily in the early morning, Starting Wed 2/5/2025, Until Tue 5/6/2025, Normal      meclizine (ANTIVERT) 12.5 MG tablet Take 1 tablet (12.5 mg total) by mouth every 8 (eight) hours as needed for dizziness for up to 5 days, Starting Fri 3/21/2025, Until Wed 3/26/2025 at 2359, Normal      predniSONE 20 mg tablet Take 1 tablet (20 mg total) by mouth daily for 3 days, Starting Fri 3/21/2025, Until Mon 3/24/2025, Normal      Skin Protectants, Misc. (eucerin) cream Apply topically as needed for wound care, Starting Mon 1/27/2025, Until Wed 2/26/2025 at 2359, Normal      tiZANidine (ZANAFLEX) 4 mg tablet Take 1 tablet (4 mg total) by mouth daily at bedtime for 10 days Muscle relaxer, Starting Fri 3/21/2025, Until Mon 3/31/2025, Normal           No discharge procedures on file.  ED SEPSIS DOCUMENTATION   Time reflects when diagnosis was documented in both MDM as applicable and the Disposition within this note       Time User Action Codes Description Comment    3/23/2025  7:03 PM Arvind Josue [R00.2] Palpitations     3/23/2025  7:08 PM Arvind Josue [M62.838] Muscle spasm                  Arvind Josue MD  03/24/25 0415

## 2025-03-25 RX ORDER — ALBUTEROL SULFATE 90 UG/1
2 INHALANT RESPIRATORY (INHALATION) EVERY 6 HOURS PRN
Qty: 8.5 G | Refills: 1 | Status: SHIPPED | OUTPATIENT
Start: 2025-03-25 | End: 2025-04-24

## 2025-03-25 RX ORDER — BUDESONIDE AND FORMOTEROL FUMARATE DIHYDRATE 160; 4.5 UG/1; UG/1
2 AEROSOL RESPIRATORY (INHALATION) 2 TIMES DAILY
Qty: 10.2 G | Refills: 1 | Status: SHIPPED | OUTPATIENT
Start: 2025-03-25 | End: 2025-04-24

## 2025-03-27 ENCOUNTER — TELEPHONE (OUTPATIENT)
Age: 25
End: 2025-03-27

## 2025-03-27 NOTE — TELEPHONE ENCOUNTER
Attempted to contact patient per referral for Shortness of Breath and Cough, I left a message for patient to return our call. She would be in need of a follow up visit with . Thank you

## 2025-04-14 ENCOUNTER — OFFICE VISIT (OUTPATIENT)
Dept: BARIATRICS | Facility: CLINIC | Age: 25
End: 2025-04-14
Payer: COMMERCIAL

## 2025-04-14 VITALS
SYSTOLIC BLOOD PRESSURE: 120 MMHG | OXYGEN SATURATION: 99 % | BODY MASS INDEX: 34.84 KG/M2 | DIASTOLIC BLOOD PRESSURE: 78 MMHG | HEIGHT: 67 IN | HEART RATE: 89 BPM | WEIGHT: 222 LBS

## 2025-04-14 DIAGNOSIS — E66.01 CLASS 2 SEVERE OBESITY DUE TO EXCESS CALORIES WITH SERIOUS COMORBIDITY AND BODY MASS INDEX (BMI) OF 35.0 TO 35.9 IN ADULT (HCC): Primary | ICD-10-CM

## 2025-04-14 DIAGNOSIS — E66.812 CLASS 2 SEVERE OBESITY DUE TO EXCESS CALORIES WITH SERIOUS COMORBIDITY AND BODY MASS INDEX (BMI) OF 37.0 TO 37.9 IN ADULT (HCC): ICD-10-CM

## 2025-04-14 DIAGNOSIS — E66.812 CLASS 2 SEVERE OBESITY DUE TO EXCESS CALORIES WITH SERIOUS COMORBIDITY AND BODY MASS INDEX (BMI) OF 35.0 TO 35.9 IN ADULT (HCC): Primary | ICD-10-CM

## 2025-04-14 DIAGNOSIS — E66.01 CLASS 2 SEVERE OBESITY DUE TO EXCESS CALORIES WITH SERIOUS COMORBIDITY AND BODY MASS INDEX (BMI) OF 37.0 TO 37.9 IN ADULT (HCC): ICD-10-CM

## 2025-04-14 PROCEDURE — 99215 OFFICE O/P EST HI 40 MIN: CPT | Performed by: INTERNAL MEDICINE

## 2025-04-14 RX ORDER — PHENTERMINE HYDROCHLORIDE 15 MG/1
15 CAPSULE ORAL EVERY MORNING
Qty: 30 CAPSULE | Refills: 1 | Status: SHIPPED | OUTPATIENT
Start: 2025-04-14

## 2025-04-14 RX ORDER — PREDNISONE 20 MG/1
1 TABLET ORAL 2 TIMES DAILY
COMMUNITY
Start: 2025-03-29

## 2025-04-14 NOTE — ASSESSMENT & PLAN NOTE
Antiobesity Medications/Medical --  Notify via portal if new insurance covers either Wegovy or Zepbound.  Patient was provided Topamax last office visit but she did not initiate as she tried wanted to try lifestyle efforts first.  She lost 12 pounds on her own  If insurance denies coverage then will initiate phentermine and Topamax.  Reviewed twelve-lead EKG which was within normal limits  Notify endocrine team regarding initiating phentermine as patient was previously treated for postpartum thyroiditis/hypothyroidism  -No contraindications noted to Wellbutrin naltrexone or metformin   Patient has a prior history of PCOS-patient would benefit from metformin but she was hesitant to start this medication      Nutrition:    Per diet recall-she will benefit from meeting with a dietitian to increase protein to help support her resistance training efforts  Also incorporate dinner meal consistently and incorporate some protein-based snacks in between meals    Physical Activity:   20 min incline walk and weights, core 3-4 days a week    Sleep: -  STOP-BANG 1/8 6 hours of sleep at night due to infant     Food Behaviors/Stress/pyschosocial:    Patient currently undergoing a lot of stress due to impending divorce. She is under the care of a counselor and sees them once a week.  Discussed stress hormone cortisol as a contributor to weight and insufficient response to weight loss efforts

## 2025-04-14 NOTE — PROGRESS NOTES
Assessment/Plan     Latisha Kelly is 25 y.o. year old female  who comes in for consultation for assistance with weight management.     - Discussed options of HealthyCORE-Intensive Lifestyle Intervention Program, Very Low Calorie Diet-VLCD, and Conservative Program and the role of weight loss medications.  - Patient is interested in pursuing Conservative Program    Class 2 severe obesity due to excess calories with serious comorbidity and body mass index (BMI) of 37.0 to 37.9 in adult (HCC)  Antiobesity Medications/Medical --  Notify via portal if new insurance covers either Wegovy or Zepbound.  Patient was provided Topamax last office visit but she did not initiate as she tried wanted to try lifestyle efforts first.  She lost 12 pounds on her own  If insurance denies coverage then will initiate phentermine and Topamax.  Reviewed twelve-lead EKG which was within normal limits  Notify endocrine team regarding initiating phentermine as patient was previously treated for postpartum thyroiditis/hypothyroidism  -No contraindications noted to Wellbutrin naltrexone or metformin   Patient has a prior history of PCOS-patient would benefit from metformin but she was hesitant to start this medication      Nutrition:    Per diet recall-she will benefit from meeting with a dietitian to increase protein to help support her resistance training efforts  Also incorporate dinner meal consistently and incorporate some protein-based snacks in between meals    Physical Activity:   20 min incline walk and weights, core 3-4 days a week    Sleep: -  STOP-BANG 1/8 6 hours of sleep at night due to infant     Food Behaviors/Stress/pyschosocial:    Patient currently undergoing a lot of stress due to impending divorce. She is under the care of a counselor and sees them once a week.  Discussed stress hormone cortisol as a contributor to weight and insufficient response to weight loss efforts       Latisha was seen today for  "follow-up.    Diagnoses and all orders for this visit:    Class 2 severe obesity due to excess calories with serious comorbidity and body mass index (BMI) of 35.0 to 35.9 in adult ([facility])    Class 2 severe obesity due to excess calories with serious comorbidity and body mass index (BMI) of 37.0 to 37.9 in adult (HCC)            -In addition, please follow general recommendations below.          Return visit:  6-8 weeks        General Lifestyle recommendations:    Nutrition   -Avoid skipping meals. Avoid sugary beverages. At least 64oz of water daily.  Limit processed food, refined sugars and grain. Encourage  healthy choices for meals and snacks   -Focus on protein goals and non starchy fiber rich vegetables for satiety effect and to help support a calorie deficit.   - Emphasize portion control, well balanced macronutrient's (protein, carbohydrate, fat using MyPlate method )and adequate protein with each meal/snacks and distributing calories equally throughout the day along with.   -Advise starting the day with a protein breakfast   Behavioral/Stress   Food log via meka or provided paper log (meka options include www.Woop!Wear.com, sparkpeople.com, loseit.com, calorieking.com, [employer]). Encouraged mindful eating. Be sure to set aside time to eat, eat slowly, and savor your food. Consider meditation apps and/or taking a few minutes of mindfulness every AM. Understand the role of regarding the role of stress hormone cortisol in promoting weight gain and visceral fat accumulation. Weigh daily or atleast 2-3 times/ week  Physical Activity   Increase physical activity by 10 minutes daily. Gradually increase physical activity to a goal of 5 days per week for 30 minutes of MODERATE intensity ( should be able to pass the \"talk test\" but should not be able to sing. Target 150-300 minutes  PLUS 2 days per week of FULL BODY resistance training. Progression will be addressed at follow up visits. Encouraged contemplation " regarding establishing a daily physical activity routine  - Resistance training along with increase protein intake is important to maintain and enhance metabolism  Sleep   Encourage sleep hygiene and importance of having adequate sleep duration at least > 6 hours to support response in weight loss efforts    Handouts provided :  THRIVE program at [facility]  MyPlate and food quality  Food log resources, phone meka or paper journal  Antiobesity medications options     - Discussed at length and the role of weight loss medications and medication options   - Explained the importance of making lifestyle changes in addition to starting any anti-obesity medications if the  patient chooses.  -  Initial weight loss goal of 5-10% weight loss for improved health  - Weight loss can improve patient's co-morbid conditions and/or prevent weight-related complications.  - Weight is not at goal and patient has been unable to achieve a meaningful weight loss above 5% using various programs and tools for more than 6 months    Latisha was seen today for follow-up.    Diagnoses and all orders for this visit:    Class 2 severe obesity due to excess calories with serious comorbidity and body mass index (BMI) of 35.0 to 35.9 in adult (HCC)    Class 2 severe obesity due to excess calories with serious comorbidity and body mass index (BMI) of 37.0 to 37.9 in adult (HCC)            Topiramate Instructions:  -Patient was counseled that the medication is associated with cleft palate if used during the first trimester of pregnancy and therefore was instructed to use birth control during the period of her use of this medication.  -May reduce the effectiveness of hormonal birth control - backup method such as condoms recommended to avoid pregnancy.  -Upon discontinuation tapering dose over using every other day dosing is recommended.  - patient to maintain adequate fluid intake to minimize risk of kidney stones  Advise patient against  sudden discontinuation, as this may cause increased seizure activity  Instruct female patient to use an additional form of contraception due to decreased effectiveness of estrogen-containing or progestin-only contraceptives  - report symptoms of acute myopia, sudden ocular pain, blurred vision, or decreased visual acuity.  -Advise patient to report new or worsening depression, suicidal thoughts or behavior, or unusual changes in mood or behavior    Reviewed the potential side effects of topiramate (Topamax) which may include - numbness or tingling, difficulty with word finding, metabolic acidosis, occurrence of gallstones and kidney stones, glaucoma, fatigue, worsening depression/anxiety, changes in taste, abdominal upset/heartburn, and trouble sleeping. Side effects may include anorexia, taste perversion, fatigue, paresthesia, psychomotor slowing, memory or concentration difficulties, cognitive dysfunction, mood problems, and flushing.             Total time spent reviewing chart, interviewing patient, examining patient, discussing plan, answering all questions, and documentin min, with >50% face-to-face time spent counseling patient on nonsurgical interventions for the treatment of excess weight. Discussed in detail nonsurgical options including intensive lifestyle intervention program, very low-calorie diet program and conservative program.  Discussed the role of weight loss medications.  Counseled patient on diet behavior and exercise modification for weight loss.        History of present illness/ Weight history   Patient states that she has tried phentermine in the past and lost 40 lbs.  However she experienced side effects and could not sleep.  She states that when she is consistent with her nutrition and physical activity she is able to lose weight.  She regained weight after her phentermine attempt and just with lifestyle efforts, she succeeded in losing from 250 pounds to 210 pounds.  Her highest  adult weight is 260 pounds.  She started going back to the gym and watching what she eats again and is down to her current weight of 235 pounds.  She is referred by PCP.  She does report a family history of obesity and her mom    Wt Readings from Last 30 Encounters:   04/14/25 101 kg (222 lb)   01/27/25 104 kg (230 lb)   01/13/25 107 kg (235 lb 9.6 oz)   01/10/25 107 kg (235 lb)   12/02/24 111 kg (244 lb)   11/13/24 108 kg (239 lb)   10/09/24 112 kg (248 lb)   10/07/24 114 kg (252 lb)   10/04/24 115 kg (253 lb 8.5 oz)   09/30/24 120 kg (264 lb)   08/19/24 120 kg (264 lb)   08/15/24 118 kg (261 lb)   07/03/24 118 kg (260 lb)   06/10/24 115 kg (253 lb)   05/28/24 115 kg (253 lb)   05/22/24 118 kg (260 lb)   05/20/24 124 kg (274 lb)   05/15/24 124 kg (274 lb)   05/14/24 125 kg (275 lb)   05/07/24 126 kg (277 lb 12.8 oz)   05/07/24 126 kg (277 lb 12.8 oz)   05/06/24 128 kg (281 lb 3.2 oz)   05/03/24 127 kg (279 lb 3.2 oz)   05/02/24 127 kg (280 lb 6.4 oz)   05/01/24 126 kg (278 lb 6.4 oz)   04/30/24 125 kg (275 lb 12.8 oz)   04/29/24 126 kg (277 lb)   04/26/24 125 kg (276 lb 6.4 oz)   04/25/24 125 kg (275 lb 9.6 oz)   04/23/24 125 kg (276 lb 3.2 oz)               Lifestyle questionnaire       Diet recall:  B:  eat eggs and mackerel sausage  S:  L: canned Soup  S:pretzels  D:  chicken salad or fish not consistent  S: none    Frequency Eating out x/ week: Once a month    Food behaviors- none only during periods    Trouble area of the day: none    Beverages  Water-- 80 oz   Caffeine/tea--  12 oz   SSB --zero calorie crystal lite     Alcohol: 1 drinks/ week   Drug use: none    Social History     Substance and Sexual Activity   Alcohol Use Yes    Alcohol/week: 7.0 standard drinks of alcohol    Types: 7 Glasses of wine per week    Comment: Socially      Social History     Tobacco Use   Smoking Status Never   Smokeless Tobacco Never      Social History     Substance and Sexual Activity   Drug Use Not Currently    Types:  Marijuana    Comment: last used THC over 2 years ago (as of 11/29/23)         Physical Activity --gym since sept cardio incline treadmill lifts heavy    Sleep -- Stop bang:   / 8  ; 6 hours of sleep per night    Occupation-SAHM    Psycho social- lives with      Gyneac (Menopausal status/menses/contraception)-going through a divorce  Start date: 1/13/2025   Intial weight on 1/13/2025 : 235 lbs  Body mass index is 37.46 kg/m². on 1/13/2025   Obesity Class: 35.0-39.9- Obesity Class II  Goal weight: 200 lbs    Weight on 4/14/2025 :101 kg (222 lb)  BMI on  4/14/2025 : Body mass index is 35.29 kg/m². 35.0-39.9- Obesity Class II  Difference: -13 lbs      Anti obesity Medications/ Medical---    Weight loss medication and dose: Topamax  Date initiated: Prescription provided January 2025-did not initiate       Waist circumference (inches): 44 Inches        Medication considerations/contraindications     -Patient denies personal history of pancreatitis. Patient also denies personal and family history of medullary thyroid cancer, and multiple endocrine neoplasia type 2 (MEN 2 tumor). -Patient denies any history of kidney stones, seizures, or glaucoma, diabetic retinopathy, gall bladder disease, gastroparesis, hyperthyroidism.  -Denies Hx of CAD, PAD, palpitations, arrhythmia, uncontrolled hypertension  -Denies uncontrolled anxiety or depression, suicidal behavior or thinking , insomnia or sleep disturbance.         Past medical history/past surgical history       Previous notes and records have been reviewed.    The following portions of the patient's history were reviewed and updated as appropriate: allergies, current medications, past family history, past medical history, past social history, past surgical history, and problem list.    Past Medical History:   Diagnosis Date    Anemia     Chronic hypertension with super imposed preeclampsia without severe features 02/05/2024    Hypertension     chtn started medication  "with pregnancy    Hypothyroid     Insulin controlled gestational diabetes mellitus (GDM) in third trimester 2024    Migraine without aura     OTC med management    PCOS (polycystic ovarian syndrome)     Status post primary low transverse  section 12/15/2023    Varicella     vaccine    Visual impairment          Past Surgical History:   Procedure Laterality Date    UT CERCLAGE UTERINE CERVIX NONOBSTETRICAL N/A 2024    Procedure: CERCLAGE CERVICAL;  Surgeon: Ann Hoffmann MD;  [facility];  Service: Obstetrics    UT  DELIVERY ONLY N/A 2024    Procedure:  SECTION ();  Surgeon: Lizzette Hernandez DO;  [facility]: [facility];  Service: Obstetrics    UT REMOVAL CERCLAGE SUTURE UNDER ANESTHESIA N/A 2024    Procedure: CERCLAGE REMOVAL;  Surgeon: Lizzette Hernandez DO;  [facility]: [facility];  Service: Obstetrics    TURBINATE RESECTION               Family History   Problem Relation Age of Onset    No Known Problems Mother     Hypertension Father     No Known Problems Sister     No Known Problems Brother     No Known Problems Brother     No Known Problems Brother     Colon cancer Neg Hx     Ovarian cancer Neg Hx     Breast cancer Neg Hx     Cancer Neg Hx             Objective     /78   Pulse 89   Ht 5' 6.5\" (1.689 m)   Wt 101 kg (222 lb)   LMP 2025 (Exact Date)   SpO2 99%   BMI 35.29 kg/m²     Review of Systems   Constitutional:  Negative for chills, fatigue and fever.   HENT:  Negative for ear pain and sore throat.    Eyes:  Negative for pain and visual disturbance.   Respiratory:  Negative for cough and shortness of breath.    Cardiovascular:  Negative for chest pain, palpitations and leg swelling.   Gastrointestinal:  Negative for abdominal pain, constipation, diarrhea, nausea and vomiting.   Genitourinary:  Negative for dysuria and hematuria.   Musculoskeletal:  Negative for arthralgias and back pain.   Skin:  Negative for color change and rash. "   Neurological:  Negative for seizures, syncope and headaches.   Psychiatric/Behavioral:  Negative for dysphoric mood. The patient is not nervous/anxious.    All other systems reviewed and are negative.    Physical Exam  Vitals and nursing note reviewed.   Constitutional:       Appearance: Normal appearance.   HENT:      Head: Normocephalic.   Pulmonary:      Effort: Pulmonary effort is normal.   Neurological:      General: No focal deficit present.      Mental Status: He is alert and oriented to person, place, and time.   Psychiatric:         Mood and Affect: Mood normal.         Behavior: Behavior normal.         Thought Content: Thought content normal.         Judgment: Judgment normal.         Medications       Current Outpatient Medications:     albuterol (ProAir HFA) 90 mcg/act inhaler, Inhale 2 puffs every 6 (six) hours as needed for wheezing, Disp: 8.5 g, Rfl: 1    ergocalciferol (VITAMIN D2) 50,000 units, Take 1 capsule (50,000 Units total) by mouth once a week, Disp: 12 capsule, Rfl: 0    hydrocortisone 2.5 % ointment, Apply topically 2 (two) times a day for 10 days, Disp: 453 g, Rfl: 1    predniSONE 20 mg tablet, Take 1 tablet by mouth 2 (two) times a day, Disp: , Rfl:     Skin Protectants, Misc. (eucerin) cream, Apply topically as needed for wound care, Disp: 100 g, Rfl: 1    budesonide-formoterol (Symbicort) 160-4.5 mcg/act inhaler, Inhale 2 puffs 2 (two) times a day Rinse mouth after use. (Patient not taking: Reported on 4/14/2025), Disp: 10.2 g, Rfl: 1    levothyroxine 50 mcg tablet, Take 1 tablet (50 mcg total) by mouth daily in the early morning (Patient not taking: Reported on 4/14/2025), Disp: 90 tablet, Rfl: 0    meclizine (ANTIVERT) 12.5 MG tablet, Take 1 tablet (12.5 mg total) by mouth every 8 (eight) hours as needed for dizziness for up to 5 days (Patient not taking: Reported on 4/14/2025), Disp: 30 tablet, Rfl: 0    tiZANidine (ZANAFLEX) 4 mg tablet, Take 1 tablet (4 mg total) by mouth daily  at bedtime for 10 days Muscle relaxer (Patient not taking: Reported on 4/14/2025), Disp: 10 tablet, Rfl: 0           Labs and imaging     Recent labs and imaging have been personally reviewed.  Lab Results   Component Value Date    WBC 4.36 03/23/2025    HGB 12.2 03/23/2025    HCT 37.9 03/23/2025    MCV 89 03/23/2025     03/23/2025     Lab Results   Component Value Date    SODIUM 139 03/23/2025    K 3.6 03/23/2025     03/23/2025    CO2 29 03/23/2025    AGAP 5 03/23/2025    BUN 9 03/23/2025    CREATININE 0.80 03/23/2025    GLUC 92 03/23/2025    GLUF 98 08/02/2024    GLUF 100 (H) 08/02/2024    CALCIUM 9.0 03/23/2025    AST 13 03/23/2025    ALT 12 03/23/2025    ALKPHOS 65 03/23/2025    TP 7.3 03/23/2025    TBILI 0.32 03/23/2025    EGFR 103 03/23/2025     Lab Results   Component Value Date    HGBA1C 5.1 08/02/2024     Lab Results   Component Value Date    IMT2YPXKDXHT 2.674 03/23/2025    TSH 2.51 12/10/2020     Lab Results   Component Value Date    CHOLESTEROL 180 08/02/2024     Lab Results   Component Value Date    HDL 46 (L) 08/02/2024     Lab Results   Component Value Date    TRIG 160 (H) 08/02/2024     Lab Results   Component Value Date    LDLCALC 102 (H) 08/02/2024

## 2025-04-18 ENCOUNTER — OFFICE VISIT (OUTPATIENT)
Dept: FAMILY MEDICINE CLINIC | Facility: CLINIC | Age: 25
End: 2025-04-18
Payer: COMMERCIAL

## 2025-04-18 VITALS
TEMPERATURE: 99.5 F | OXYGEN SATURATION: 97 % | SYSTOLIC BLOOD PRESSURE: 132 MMHG | WEIGHT: 222 LBS | BODY MASS INDEX: 34.84 KG/M2 | HEIGHT: 67 IN | HEART RATE: 95 BPM | DIASTOLIC BLOOD PRESSURE: 84 MMHG

## 2025-04-18 DIAGNOSIS — J02.9 PHARYNGITIS, UNSPECIFIED ETIOLOGY: Primary | ICD-10-CM

## 2025-04-18 PROCEDURE — 99213 OFFICE O/P EST LOW 20 MIN: CPT | Performed by: NURSE PRACTITIONER

## 2025-04-18 RX ORDER — NYSTATIN 100000 [USP'U]/ML
500000 SUSPENSION ORAL 4 TIMES DAILY
Qty: 200 ML | Refills: 0 | Status: SHIPPED | OUTPATIENT
Start: 2025-04-18 | End: 2025-04-28

## 2025-04-18 RX ORDER — AMOXICILLIN 500 MG/1
500 CAPSULE ORAL EVERY 8 HOURS SCHEDULED
Qty: 15 CAPSULE | Refills: 0 | Status: SHIPPED | OUTPATIENT
Start: 2025-04-18 | End: 2025-04-23

## 2025-04-18 NOTE — PROGRESS NOTES
Name: Latisha Kelly      : 2000      MRN: [id number]  Encounter Provider: VALENTIN Parrish  Encounter Date: 2025   Encounter department: [facility]  :  Assessment & Plan  Pharyngitis, unspecified etiology  Discussed symptoms management Tylenol and Motrin as needed for headaches or fevers   Needed nasal saline lavages for nasal congestion  Discussed over-the-counter modalities  Discussed watchful waiting and if not better within 3 to 4 days they are supposed to call me   If any excessive shortness of breath difficulty breathing please go to the ER   Orders:    amoxicillin (AMOXIL) 500 mg capsule; Take 1 capsule (500 mg total) by mouth every 8 (eight) hours for 5 days    nystatin (MYCOSTATIN) 500,000 units/5 mL suspension; Apply 5 mL (500,000 Units total) to the mouth or throat 4 (four) times a day for 10 days          Depression Screening and Follow-up Plan: Patient was screened for depression during today's encounter. They screened negative with a PHQ-2 score of 0.        History of Present Illness   Pt is a 25 yr old female   Presents in office for follow up on recurrent pharyngitis   Was seen in urgent care and treated on few occasions but continues to have issues       Review of Systems   Constitutional:  Positive for fatigue. Negative for fever and unexpected weight change.   HENT:  Positive for congestion, postnasal drip, sore throat and trouble swallowing.    Eyes: Negative.    Respiratory:  Negative for cough and shortness of breath.    Cardiovascular:  Negative for chest pain and palpitations.        History of preeclampsia  Recent onset of thyroiditis    Gastrointestinal:  Negative for abdominal distention, blood in stool, nausea and vomiting.   Endocrine:        Elevated sugars in pregnancy  Hyperthyroid after pregnancy- not taking medications as prescribed    Genitourinary:  Negative for difficulty urinating and frequency.   Musculoskeletal:  Negative for  "arthralgias and neck pain.   Skin:  Negative for rash.   Allergic/Immunologic: Negative.    Neurological:  Negative for headaches.   Hematological:  Negative for adenopathy.        Anemia follow up    Psychiatric/Behavioral:  Negative for sleep disturbance and suicidal ideas. The patient is not nervous/anxious.        Objective   /84 (BP Location: Left arm, Patient Position: Sitting, Cuff Size: Large)   Pulse 95   Temp 99.5 °F (37.5 °C)   Ht 5' 6.5\" (1.689 m)   Wt 101 kg (222 lb)   LMP 03/14/2025 (Exact Date)   SpO2 97%   BMI 35.29 kg/m²      Physical Exam  Vitals and nursing note reviewed.   Constitutional:       Appearance: She is well-developed.   HENT:      Right Ear: Tympanic membrane is erythematous.      Left Ear: Tympanic membrane is erythematous.      Mouth/Throat:      Pharynx: Pharyngeal swelling, oropharyngeal exudate, posterior oropharyngeal erythema and uvula swelling present.   Cardiovascular:      Rate and Rhythm: Normal rate and regular rhythm.   Abdominal:      Palpations: Abdomen is soft.   Musculoskeletal:      Cervical back: Normal range of motion.   Lymphadenopathy:      Cervical: Cervical adenopathy present.   Skin:     Capillary Refill: Capillary refill takes less than 2 seconds.   Neurological:      Mental Status: She is alert and oriented to person, place, and time.       Administrative Statements   I have spent a total time of 25  minutes in caring for this patient on the day of the visit/encounter including Diagnostic results, Prognosis, Risks and benefits of tx options, Instructions for management, Patient and family education, Importance of tx compliance, Risk factor reductions, Impressions, Counseling / Coordination of care, Documenting in the medical record, Reviewing/placing orders in the medical record (including tests, medications, and/or procedures), Obtaining or reviewing history  , and Communicating with other healthcare professionals .  "

## 2025-05-09 NOTE — PROGRESS NOTES
Tonsils still have white patches in her throat so she wants to see ENT.  She is now dealing with her allergies.

## (undated) DEVICE — SPONGE LAP 18 X 18 IN STRL RFD

## (undated) DEVICE — SUT ETHIBOND 5 V-40 30 IN MB46G

## (undated) DEVICE — GLOVE SRG BIOGEL ECLIPSE 6.5

## (undated) DEVICE — GLOVE INDICATOR PI UNDERGLOVE SZ 6.5 BLUE

## (undated) DEVICE — PERI/GYN PACK: Brand: CONVERTORS

## (undated) DEVICE — RED RUBBER URETHRAL CATHETER: Brand: DOVER

## (undated) DEVICE — SUT MONOCRYL 0 CTX 36 IN Y398H

## (undated) DEVICE — 3M™ IOBAN™ 2 ANTIMICROBIAL INCISE DRAPE 6640EZ: Brand: IOBAN™ 2

## (undated) DEVICE — PROVE COVER: Brand: UNBRANDED

## (undated) DEVICE — BOWL 32OZ TURQUOISE NON-STERILE BULK NS 100/CS: Brand: MEDEGEN MEDICAL PRODUCTS

## (undated) DEVICE — 1840 FOAM BLOCK NEEDLE COUNTER: Brand: DEVON

## (undated) DEVICE — SUT PLAIN 3-0 CT-1 27 IN 842H

## (undated) DEVICE — SKIN MARKER DUAL TIP WITH RULER CAP, FLEXIBLE RULER AND LABELS: Brand: DEVON

## (undated) DEVICE — ASTOUND IMPERVIOUS SURGICAL GOWN: Brand: CONVERTORS

## (undated) DEVICE — LIGHT GLOVE GREEN

## (undated) DEVICE — SUT STRATAFIX SPIRAL 4-0 PGA/PCL 30 X 30 CM SXMD2B409

## (undated) DEVICE — GLOVE SRG BIOGEL ECLIPSE 7

## (undated) DEVICE — PACK C-SECTION PBDS

## (undated) DEVICE — SURGICEL NU-KNIT 3 X 4

## (undated) DEVICE — CHLORAPREP HI-LITE 26ML ORANGE

## (undated) DEVICE — SUT STRATAFIX SPIRAL PDS PLUS 1 CTX 18 IN SXPP1A400

## (undated) DEVICE — SPONGE STICK WITH PVP-I: Brand: KENDALL

## (undated) DEVICE — DRESSING MEPILEX AG BORDER POST-OP 4 X 12 IN

## (undated) DEVICE — Device

## (undated) DEVICE — INTENT TO BE USED WITH SUTURE MATERIAL FOR TISSUE CLOSURE: Brand: RICHARD-ALLAN® NEEDLE 1/2 CIRCLE TAPER

## (undated) DEVICE — GLOVE INDICATOR PI UNDERGLOVE SZ 7.5 BLUE